# Patient Record
Sex: FEMALE | Race: WHITE | NOT HISPANIC OR LATINO | Employment: FULL TIME | URBAN - METROPOLITAN AREA
[De-identification: names, ages, dates, MRNs, and addresses within clinical notes are randomized per-mention and may not be internally consistent; named-entity substitution may affect disease eponyms.]

---

## 2017-07-19 ENCOUNTER — HOSPITAL ENCOUNTER (EMERGENCY)
Facility: MEDICAL CENTER | Age: 32
End: 2017-07-19
Attending: EMERGENCY MEDICINE
Payer: MEDICAID

## 2017-07-19 ENCOUNTER — APPOINTMENT (OUTPATIENT)
Dept: RADIOLOGY | Facility: MEDICAL CENTER | Age: 32
End: 2017-07-19
Attending: EMERGENCY MEDICINE
Payer: MEDICAID

## 2017-07-19 VITALS
HEIGHT: 63 IN | WEIGHT: 132.72 LBS | RESPIRATION RATE: 18 BRPM | TEMPERATURE: 98.5 F | DIASTOLIC BLOOD PRESSURE: 70 MMHG | SYSTOLIC BLOOD PRESSURE: 102 MMHG | OXYGEN SATURATION: 98 % | BODY MASS INDEX: 23.52 KG/M2 | HEART RATE: 62 BPM

## 2017-07-19 DIAGNOSIS — K59.00 CONSTIPATION, UNSPECIFIED CONSTIPATION TYPE: ICD-10-CM

## 2017-07-19 DIAGNOSIS — R10.84 GENERALIZED ABDOMINAL PAIN: ICD-10-CM

## 2017-07-19 DIAGNOSIS — R14.1 ABDOMINAL GAS PAIN: ICD-10-CM

## 2017-07-19 LAB
BASOPHILS # BLD AUTO: 0.3 % (ref 0–1.8)
BASOPHILS # BLD: 0.02 K/UL (ref 0–0.12)
EOSINOPHIL # BLD AUTO: 0.11 K/UL (ref 0–0.51)
EOSINOPHIL NFR BLD: 1.8 % (ref 0–6.9)
ERYTHROCYTE [DISTWIDTH] IN BLOOD BY AUTOMATED COUNT: 42.7 FL (ref 35.9–50)
HCT VFR BLD AUTO: 41.6 % (ref 37–47)
HGB BLD-MCNC: 14.5 G/DL (ref 12–16)
IMM GRANULOCYTES # BLD AUTO: 0.01 K/UL (ref 0–0.11)
IMM GRANULOCYTES NFR BLD AUTO: 0.2 % (ref 0–0.9)
LYMPHOCYTES # BLD AUTO: 2.9 K/UL (ref 1–4.8)
LYMPHOCYTES NFR BLD: 48.4 % (ref 22–41)
MCH RBC QN AUTO: 33.8 PG (ref 27–33)
MCHC RBC AUTO-ENTMCNC: 34.9 G/DL (ref 33.6–35)
MCV RBC AUTO: 97 FL (ref 81.4–97.8)
MONOCYTES # BLD AUTO: 0.36 K/UL (ref 0–0.85)
MONOCYTES NFR BLD AUTO: 6 % (ref 0–13.4)
NEUTROPHILS # BLD AUTO: 2.59 K/UL (ref 2–7.15)
NEUTROPHILS NFR BLD: 43.3 % (ref 44–72)
NRBC # BLD AUTO: 0 K/UL
NRBC BLD AUTO-RTO: 0 /100 WBC
PLATELET # BLD AUTO: 227 K/UL (ref 164–446)
PMV BLD AUTO: 8.7 FL (ref 9–12.9)
RBC # BLD AUTO: 4.29 M/UL (ref 4.2–5.4)
WBC # BLD AUTO: 6 K/UL (ref 4.8–10.8)

## 2017-07-19 PROCEDURE — 96374 THER/PROPH/DIAG INJ IV PUSH: CPT

## 2017-07-19 PROCEDURE — 74022 RADEX COMPL AQT ABD SERIES: CPT

## 2017-07-19 PROCEDURE — 700111 HCHG RX REV CODE 636 W/ 250 OVERRIDE (IP): Performed by: EMERGENCY MEDICINE

## 2017-07-19 PROCEDURE — 94760 N-INVAS EAR/PLS OXIMETRY 1: CPT

## 2017-07-19 PROCEDURE — 36415 COLL VENOUS BLD VENIPUNCTURE: CPT

## 2017-07-19 PROCEDURE — A9270 NON-COVERED ITEM OR SERVICE: HCPCS | Performed by: EMERGENCY MEDICINE

## 2017-07-19 PROCEDURE — 85025 COMPLETE CBC W/AUTO DIFF WBC: CPT

## 2017-07-19 PROCEDURE — 700102 HCHG RX REV CODE 250 W/ 637 OVERRIDE(OP): Performed by: EMERGENCY MEDICINE

## 2017-07-19 PROCEDURE — 99285 EMERGENCY DEPT VISIT HI MDM: CPT

## 2017-07-19 PROCEDURE — 700105 HCHG RX REV CODE 258: Performed by: EMERGENCY MEDICINE

## 2017-07-19 RX ORDER — BISACODYL 5 MG
10 TABLET, DELAYED RELEASE (ENTERIC COATED) ORAL ONCE
Status: COMPLETED | OUTPATIENT
Start: 2017-07-19 | End: 2017-07-19

## 2017-07-19 RX ORDER — SODIUM CHLORIDE 9 MG/ML
INJECTION, SOLUTION INTRAVENOUS CONTINUOUS
Status: DISCONTINUED | OUTPATIENT
Start: 2017-07-19 | End: 2017-07-20 | Stop reason: HOSPADM

## 2017-07-19 RX ORDER — KETOROLAC TROMETHAMINE 30 MG/ML
30 INJECTION, SOLUTION INTRAMUSCULAR; INTRAVENOUS ONCE
Status: COMPLETED | OUTPATIENT
Start: 2017-07-19 | End: 2017-07-19

## 2017-07-19 RX ADMIN — KETOROLAC TROMETHAMINE 30 MG: 30 INJECTION, SOLUTION INTRAMUSCULAR at 23:09

## 2017-07-19 RX ADMIN — MAGNESIUM HYDROXIDE 30 ML: 400 SUSPENSION ORAL at 23:25

## 2017-07-19 RX ADMIN — SODIUM CHLORIDE 1000 ML: 9 INJECTION, SOLUTION INTRAVENOUS at 23:11

## 2017-07-19 RX ADMIN — BISACODYL 10 MG: 5 TABLET, COATED ORAL at 23:25

## 2017-07-19 ASSESSMENT — PAIN SCALES - GENERAL: PAINLEVEL_OUTOF10: 7

## 2017-07-19 NOTE — ED AVS SNAPSHOT
Home Care Instructions                                                                                                                Marian Kent   MRN: [id number]    Department:  [facility], Emergency Dept   Date of Visit:  7/19/2017            [facility], Emergency Dept    [address] [place] [place] [address]    Phone:  [phone]      You were seen by     Rosario Graves D.O.      Your Diagnosis Was     Generalized abdominal pain     R10.84       These are the medications you received during your hospitalization from 07/19/2017 2009 to 07/19/2017 2326     Date/Time Order Dose Route Action    07/19/2017 2311 NS infusion 1,000 mL Intravenous New Bag    07/19/2017 2309 ketorolac (TORADOL) injection 30 mg 30 mg Intravenous Given    07/19/2017 2325 bisacodyl EC (DULCOLAX) tablet 10 mg 10 mg Oral Given    07/19/2017 2325 magnesium hydroxide (MILK OF MAGNESIA) suspension 30 mL 30 mL Oral Given      Follow-up Information     1. Follow up with [facility] Schedule an appointment as soon as possible for a visit in 2 days.    Specialty:  Family Medicine    Why:  As needed, If symptoms worsen    Contact information    [address] [place] [address]  [phone]        Medication Information     Review all of your home medications and newly ordered medications with your primary doctor and/or pharmacist as soon as possible. Follow medication instructions as directed by your doctor and/or pharmacist.     Please keep your complete medication list with you and share with your physician. Update the information when medications are discontinued, doses are changed, or new medications (including over-the-counter products) are added; and carry medication information at all times in the event of emergency situations.               Medication List      Notice     You have not been prescribed any medications.            Procedures and tests performed during  your visit     CBC WITH DIFFERENTIAL    DX-ABDOMEN COMPLETE WITH AP OR PA CXR        Discharge Instructions       Constipation, Adult  Constipation is when a person has fewer than three bowel movements a week, has difficulty having a bowel movement, or has stools that are dry, hard, or larger than normal. As people grow older, constipation is more common. A low-fiber diet, not taking in enough fluids, and taking certain medicines may make constipation worse.   CAUSES   · Certain medicines, such as antidepressants, pain medicine, iron supplements, antacids, and water pills.    · Certain diseases, such as diabetes, irritable bowel syndrome (IBS), thyroid disease, or depression.    · Not drinking enough water.    · Not eating enough fiber-rich foods.    · Stress or travel.    · Lack of physical activity or exercise.    · Ignoring the urge to have a bowel movement.    · Using laxatives too much.    SIGNS AND SYMPTOMS   · Having fewer than three bowel movements a week.    · Straining to have a bowel movement.    · Having stools that are hard, dry, or larger than normal.    · Feeling full or bloated.    · Pain in the lower abdomen.    · Not feeling relief after having a bowel movement.    DIAGNOSIS   Your health care provider will take a medical history and perform a physical exam. Further testing may be done for severe constipation. Some tests may include:  · A barium enema X-ray to examine your rectum, colon, and, sometimes, your small intestine.    · A sigmoidoscopy to examine your lower colon.    · A colonoscopy to examine your entire colon.  TREATMENT   Treatment will depend on the severity of your constipation and what is causing it. Some dietary treatments include drinking more fluids and eating more fiber-rich foods. Lifestyle treatments may include regular exercise. If these diet and lifestyle recommendations do not help, your health care provider may recommend taking over-the-counter laxative medicines to help  you have bowel movements. Prescription medicines may be prescribed if over-the-counter medicines do not work.   HOME CARE INSTRUCTIONS   · Eat foods that have a lot of fiber, such as fruits, vegetables, whole grains, and beans.  · Limit foods high in fat and processed sugars, such as french fries, hamburgers, cookies, candies, and soda.    · A fiber supplement may be added to your diet if you cannot get enough fiber from foods.    · Drink enough fluids to keep your urine clear or pale yellow.    · Exercise regularly or as directed by your health care provider.    · Go to the restroom when you have the urge to go. Do not hold it.    · Only take over-the-counter or prescription medicines as directed by your health care provider. Do not take other medicines for constipation without talking to your health care provider first.    SEEK IMMEDIATE MEDICAL CARE IF:   · You have bright red blood in your stool.    · Your constipation lasts for more than 4 days or gets worse.    · You have abdominal or rectal pain.    · You have thin, pencil-like stools.    · You have unexplained weight loss.  MAKE SURE YOU:   · Understand these instructions.  · Will watch your condition.  · Will get help right away if you are not doing well or get worse.     This information is not intended to replace advice given to you by your health care provider. Make sure you discuss any questions you have with your health care provider.     Document Released: 09/15/2005 Document Revised: 01/08/2016 Document Reviewed: 09/29/2014  CareKinesis Interactive Patient Education ©2016 [employer].    Fiber Content in Foods  Drinking plenty of fluids and consuming foods high in fiber can help with constipation. See the list below for the fiber content of some common foods.  Starches and Grains / Dietary Fiber (g)  · Cheerios, 1 cup / 3 g  · Jose J's Corn Flakes, 1 cup / 0.7 g  · Rice Krispies, 1 ¼ cup / 0.3 g  · Episcopal Oat Life Cereal, ¾ cup / 2.1 g  · Oatmeal,  instant (cooked), ½ cup / 2 g  · Jose J's Frosted Mini Wheats, 1 cup / 5.1 g  · Rice, brown, long-grain (cooked), 1 cup / 3.5 g  · Rice, white, long-grain (cooked), 1 cup / 0.6 g  · Macaroni, cooked, enriched, 1 cup / 2.5 g  Legumes / Dietary Fiber (g)  · Beans, baked, canned, plain or vegetarian, ½ cup / 5.2 g  · Beans, kidney, canned, ½ cup / 6.8 g  · Beans, gutierrez, dried (cooked), ½ cup / 7.7 g  · Beans, gutierrez, canned, ½ cup / 5.5 g  Breads and Crackers / Dietary Fiber (g)  · Adi crackers, plain or honey, 2 squares / 0.7 g  · Saltine crackers, 3 squares / 0.3 g  · Pretzels, plain, salted, 10 pieces / 1.8 g  · Bread, whole-wheat, 1 slice / 1.9 g  · Bread, white, 1 slice / 0.7 g  · Bread, raisin, 1 slice / 1.2 g  · Bagel, plain, 3 oz / 2 g  · Tortilla, flour, 1 oz / 0.9 g  · Tortilla, corn, 1 small / 1.5 g  · Bun, hamburger or hotdog, 1 small / 0.9 g  Fruits / Dietary Fiber (g)  · Apple, raw with skin, 1 medium / 4.4 g  · Applesauce, sweetened, ½ cup / 1.5 g  · Banana, ½ medium / 1.5 g  · Grapes, 10 grapes / 0.4 g  · Orange, 1 small / 2.3 g  · Raisin, 1.5 oz / 1.6 g  · Melon, 1 cup / 1.4 g  Vegetables / Dietary Fiber (g)  · Green beans, canned, ½ cup / 1.3 g  · Carrots (cooked), ½ cup / 2.3 g  · Broccoli (cooked), ½ cup / 2.8 g  · Peas, frozen (cooked), ½ cup / 4.4 g  · Potatoes, mashed, ½ cup / 1.6 g  · Lettuce, 1 cup / 0.5 g  · Corn, canned, ½ cup / 1.6 g  · Tomato, ½ cup / 1.1 g  Document Released: 05/05/2008 Document Revised: 03/11/2013 Document Reviewed: 06/30/2008  ExitCare® Patient Information ©2014 [employer].      Increase water and raw fiber in her diet i.e. bran cereal, brown muffins, Abdiaziz stools  If you do not feel remarkably better after the 1st dose of laxative she may need to take a 2nd dose.  Return to the ER if fever greater than 101, persistent vomiting, uncontrolled pain          Patient Information     Patient Information    Following emergency treatment: all patient requiring follow-up  care must return either to a private physician or a clinic if your condition worsens before you are able to obtain further medical attention, please return to the emergency room.     Billing Information    At [facility], we work to make the billing process streamlined for our patients.  Our Representatives are here to answer any questions you may have regarding your hospital bill.  If you have insurance coverage and have supplied your insurance information to us, we will submit a claim to your insurer on your behalf.  Should you have any questions regarding your bill, we can be reached online or by phone as follows:  Online: You are able pay your bills online or live chat with our representatives about any billing questions you may have. We are here to help Monday - Friday from 8:00am to 7:30pm and 9:00am - 12:00pm on Saturdays.  Please visit [email or web address]  for more information.   Phone:  [phone] or [phone]    Please note that your emergency physician, surgeon, pathologist, radiologist, anesthesiologist, and other specialists are not employed by [facility] and will therefore bill separately for their services.  Please contact them directly for any questions concerning their bills at the numbers below:     Emergency Physician Services:  [phone]  [facility]:  [phone]  [facility]:  [phone]  [facility]:  [phone]    1. Your final bill may vary from the amount quoted upon discharge if all procedures are not complete at that time, or if your doctor has additional procedures of which we are not aware. You will receive an additional bill if you return to the Emergency Department at [facility] for suture removal regardless of the facility of which the sutures were placed.     2. Please arrange for settlement of this account at the emergency registration.    3. All self-pay accounts are due in full at  the time of treatment.  If you are unable to meet this obligation then payment is expected within 4-5 days.     4. If you have had radiology studies (CT, X-ray, Ultrasound, MRI), you have received a preliminary result during your emergency department visit. Please contact the radiology department [phone] to receive a copy of your final result. Please discuss the Final result with your primary physician or with the follow up physician provided.     Crisis Hotline:  [place] Crisis Hotline:  5-655-JXHIYMD or 1-880.246.5357  [place] Crisis Hotline:    1-895.912.1662 or 470-722-6140         ED Discharge Follow Up Questions    1. In order to provide you with very good care, we would like to follow up with a phone call in the next few days.  May we have your permission to contact you?     YES /  NO    2. What is the best phone number to call you? (       )_____-__________    3. What is the best time to call you?      Morning  /  Afternoon  /  Evening                   Patient Signature:  ____________________________________________________________    Date:  ____________________________________________________________

## 2017-07-19 NOTE — ED AVS SNAPSHOT
Clicktivated Access Code: [email or web address]  Expires: 8/18/2017 11:25 PM    Clicktivated  A secure, online tool to manage your health information     [employer]’s Clicktivated® is a secure, online tool that connects you to your personalized health information from the privacy of your home -- day or night - making it very easy for you to manage your healthcare. Once the activation process is completed, you can even access your medical information using the Clicktivated sinai, which is available for free in the Apple Sinai store or Google Play store.     Clicktivated provides the following levels of access (as shown below):   My Chart Features   [facility] Primary Care Doctor [facility]  Specialists [facility]  Urgent  Care Non-[facility]  Primary Care  Doctor   Email your healthcare team securely and privately 24/7 X X X X   Manage appointments: schedule your next appointment; view details of past/upcoming appointments X      Request prescription refills. X      View recent personal medical records, including lab and immunizations X X X X   View health record, including health history, allergies, medications X X X X   Read reports about your outpatient visits, procedures, consult and ER notes X X X X   See your discharge summary, which is a recap of your hospital and/or ER visit that includes your diagnosis, lab results, and care plan. X X       How to register for Clicktivated:  1. Go to  [email or web address].  2. Click on the Sign Up Now box, which takes you to the New Member Sign Up page. You will need to provide the following information:  a. Enter your Clicktivated Access Code exactly as it appears at the top of this page. (You will not need to use this code after you’ve completed the sign-up process. If you do not sign up before the expiration date, you must request a new code.)   b. Enter your date of birth.   c. Enter your home email address.   d. Click Submit, and follow the next screen’s instructions.  3. Create a Clicktivated ID. This will be your MOVE Guidest  login ID and cannot be changed, so think of one that is secure and easy to remember.  4. Create a Courtanet password. You can change your password at any time.  5. Enter your Password Reset Question and Answer. This can be used at a later time if you forget your password.   6. Enter your e-mail address. This allows you to receive e-mail notifications when new information is available in Courtanet.  7. Click Sign Up. You can now view your health information.    For assistance activating your Courtanet account, call [phone]

## 2017-07-19 NOTE — ED AVS SNAPSHOT
7/19/2017    Marian Kent  [address] [address]    Dear [place]:    [facility] wants to ensure your discharge home is safe and you or your loved ones have had all of your questions answered regarding your care after you leave the hospital.    Below is a list of resources and contact information should you have any questions regarding your hospital stay, follow-up instructions, or active medical symptoms.    Questions or Concerns Regarding… Contact   Medical Questions Related to Your Discharge  (7 days a week, 8am-5pm) Contact a Nurse Care Coordinator   [phone]   Medical Questions Not Related to Your Discharge  (24 hours a day / 7 days a week)  Contact the Nurse Health Line   [phone]    Medications or Discharge Instructions Refer to your discharge packet   or contact your [facility] Primary Care Provider   [phone]   Follow-up Appointment(s) Schedule your appointment via Trendient   or contact Scheduling [phone]   Billing Review your statement via Trendient  or contact Billing [phone]   Medical Records Review your records via Trendient   or contact Medical Records [phone]     You may receive a telephone call within two days of discharge. This call is to make certain you understand your discharge instructions and have the opportunity to have any questions answered. You can also easily access your medical information, test results and upcoming appointments via the Trendient free online health management tool. You can learn more and sign up at Kirax/Trendient. For assistance setting up your Trendient account, please call [phone].    Once again, we want to ensure your discharge home is safe and that you have a clear understanding of any next steps in your care. If you have any questions or concerns, please do not hesitate to contact us, we are here for you. Thank you for choosing [facility] for your healthcare needs.    Sincerely,    Your [facility] Healthcare Team

## 2017-07-20 NOTE — DISCHARGE INSTRUCTIONS
Constipation, Adult  Constipation is when a person has fewer than three bowel movements a week, has difficulty having a bowel movement, or has stools that are dry, hard, or larger than normal. As people grow older, constipation is more common. A low-fiber diet, not taking in enough fluids, and taking certain medicines may make constipation worse.   CAUSES   · Certain medicines, such as antidepressants, pain medicine, iron supplements, antacids, and water pills.    · Certain diseases, such as diabetes, irritable bowel syndrome (IBS), thyroid disease, or depression.    · Not drinking enough water.    · Not eating enough fiber-rich foods.    · Stress or travel.    · Lack of physical activity or exercise.    · Ignoring the urge to have a bowel movement.    · Using laxatives too much.    SIGNS AND SYMPTOMS   · Having fewer than three bowel movements a week.    · Straining to have a bowel movement.    · Having stools that are hard, dry, or larger than normal.    · Feeling full or bloated.    · Pain in the lower abdomen.    · Not feeling relief after having a bowel movement.    DIAGNOSIS   Your health care provider will take a medical history and perform a physical exam. Further testing may be done for severe constipation. Some tests may include:  · A barium enema X-ray to examine your rectum, colon, and, sometimes, your small intestine.    · A sigmoidoscopy to examine your lower colon.    · A colonoscopy to examine your entire colon.  TREATMENT   Treatment will depend on the severity of your constipation and what is causing it. Some dietary treatments include drinking more fluids and eating more fiber-rich foods. Lifestyle treatments may include regular exercise. If these diet and lifestyle recommendations do not help, your health care provider may recommend taking over-the-counter laxative medicines to help you have bowel movements. Prescription medicines may be prescribed if over-the-counter medicines do not work.    HOME CARE INSTRUCTIONS   · Eat foods that have a lot of fiber, such as fruits, vegetables, whole grains, and beans.  · Limit foods high in fat and processed sugars, such as french fries, hamburgers, cookies, candies, and soda.    · A fiber supplement may be added to your diet if you cannot get enough fiber from foods.    · Drink enough fluids to keep your urine clear or pale yellow.    · Exercise regularly or as directed by your health care provider.    · Go to the restroom when you have the urge to go. Do not hold it.    · Only take over-the-counter or prescription medicines as directed by your health care provider. Do not take other medicines for constipation without talking to your health care provider first.    SEEK IMMEDIATE MEDICAL CARE IF:   · You have bright red blood in your stool.    · Your constipation lasts for more than 4 days or gets worse.    · You have abdominal or rectal pain.    · You have thin, pencil-like stools.    · You have unexplained weight loss.  MAKE SURE YOU:   · Understand these instructions.  · Will watch your condition.  · Will get help right away if you are not doing well or get worse.     This information is not intended to replace advice given to you by your health care provider. Make sure you discuss any questions you have with your health care provider.     Document Released: 09/15/2005 Document Revised: 01/08/2016 Document Reviewed: 09/29/2014  Teamer.net Interactive Patient Education ©2016 [employer].    Fiber Content in Foods  Drinking plenty of fluids and consuming foods high in fiber can help with constipation. See the list below for the fiber content of some common foods.  Starches and Grains / Dietary Fiber (g)  · Cheerios, 1 cup / 3 g  · Titus's Corn Flakes, 1 cup / 0.7 g  · Rice Krispies, 1 ¼ cup / 0.3 g  · Religious Oat Life Cereal, ¾ cup / 2.1 g  · Oatmeal, instant (cooked), ½ cup / 2 g  · Jose J's Frosted Mini Wheats, 1 cup / 5.1 g  · Rice, brown, long-grain  (cooked), 1 cup / 3.5 g  · Rice, white, long-grain (cooked), 1 cup / 0.6 g  · Macaroni, cooked, enriched, 1 cup / 2.5 g  Legumes / Dietary Fiber (g)  · Beans, baked, canned, plain or vegetarian, ½ cup / 5.2 g  · Beans, kidney, canned, ½ cup / 6.8 g  · Beans, gutierrez, dried (cooked), ½ cup / 7.7 g  · Beans, gutierrez, canned, ½ cup / 5.5 g  Breads and Crackers / Dietary Fiber (g)  · Adi crackers, plain or honey, 2 squares / 0.7 g  · Saltine crackers, 3 squares / 0.3 g  · Pretzels, plain, salted, 10 pieces / 1.8 g  · Bread, whole-wheat, 1 slice / 1.9 g  · Bread, white, 1 slice / 0.7 g  · Bread, raisin, 1 slice / 1.2 g  · Bagel, plain, 3 oz / 2 g  · Tortilla, flour, 1 oz / 0.9 g  · Tortilla, corn, 1 small / 1.5 g  · Bun, hamburger or hotdog, 1 small / 0.9 g  Fruits / Dietary Fiber (g)  · Apple, raw with skin, 1 medium / 4.4 g  · Applesauce, sweetened, ½ cup / 1.5 g  · Banana, ½ medium / 1.5 g  · Grapes, 10 grapes / 0.4 g  · Orange, 1 small / 2.3 g  · Raisin, 1.5 oz / 1.6 g  · Melon, 1 cup / 1.4 g  Vegetables / Dietary Fiber (g)  · Green beans, canned, ½ cup / 1.3 g  · Carrots (cooked), ½ cup / 2.3 g  · Broccoli (cooked), ½ cup / 2.8 g  · Peas, frozen (cooked), ½ cup / 4.4 g  · Potatoes, mashed, ½ cup / 1.6 g  · Lettuce, 1 cup / 0.5 g  · Corn, canned, ½ cup / 1.6 g  · Tomato, ½ cup / 1.1 g  Document Released: 05/05/2008 Document Revised: 03/11/2013 Document Reviewed: 06/30/2008  ExitCare® Patient Information ©2014 [employer].      Increase water and raw fiber in her diet i.e. bran cereal, brown muffins, Abdiaziz stools  If you do not feel remarkably better after the 1st dose of laxative she may need to take a 2nd dose.  Return to the ER if fever greater than 101, persistent vomiting, uncontrolled pain

## 2017-07-20 NOTE — ED NOTES
"Pt presents to the ER stating that she has had abd pain x 1 month that got worse today, pt describes it like she \"is going into labor.\" Pt denies any n/v. Pt states she had a partial hysterectomy about 1 year ago and has intermittent pain since. Pt also c/o \"burning/itching down there in my privates\" Pt states that she is \"unable to go number one.\" Pt placed into a gown.   "

## 2017-07-20 NOTE — ED PROVIDER NOTES
ED Provider Note    CHIEF COMPLAINT  Chief Complaint   Patient presents with   • Abdominal Pain     X 1 month increasing in severity   • Vaginal Pain     denies any bleeding       HPI  Marian Kent is a 31 y.o. female who presents tonight with family members with a chief complaint of generalized abdominal pain off and on for the last month which has increased in severity since 5:00 this morning. She denies any vaginal discharges, vaginal bleeding. She's had a upper scopic hysterectomy done and has had multiple surgeries up to 10 for endometriosis. She denies any nausea, vomiting, fever, chills, dysuria, hematuria or flank pain. She states the pain seems to come and go and at 5 this morning it had doubled her over. She describes it as sharp and cramping in nature.    REVIEW OF SYSTEMS  See HPI for further details. All other system reviews are negative.    PAST MEDICAL HISTORY  Past Medical History   Diagnosis Date   • EP (ectopic pregnancy)    • Placenta previa diagnosed with US at [facility] 2013   • Hydronephrosis right 1/3/2014   • Pain 2016     pelvic, 7/10       FAMILY HISTORY  Family History   Problem Relation Age of Onset   • Cancer Maternal Grandmother    • Other Maternal Grandfather        SOCIAL HISTORY  Social History     Social History   • Marital Status:      Spouse Name: N/A   • Number of Children: N/A   • Years of Education: N/A     Social History Main Topics   • Smoking status: Current Every Day Smoker -- 0.50 packs/day for .1 years     Types: Cigarettes   • Smokeless tobacco: Never Used   • Alcohol Use: No   • Drug Use: No   • Sexual Activity:     Partners: Male     Birth Control/ Protection: Pill     Other Topics Concern   • None     Social History Narrative    ** Merged History Encounter **            SURGICAL HISTORY  Past Surgical History   Procedure Laterality Date   • Gyn surgery        x 2   • [facility]  delivery ser   &    • Pelviscopy   "1/23/2011     Performed by STEVEN MARIANO at SURGERY [facility]   • Salpingectomy  1/23/2011     Performed by STEVEN MARIANO at SURGERY [facility]   • Primary c section  6/19/2006   • Repeat c section  11/9/2007   • Repeat c section  5/23/2014     Performed by Steven Mariano M.D. at LABOR AND DELIVERY   • Pelviscopy N/A 5/20/2016     Procedure: PELVISCOPY Diagnostic;  Surgeon: Steven Mariano M.D.;  Location: SURGERY [facility];  Service:    • Hysterectomy laparoscopy  8/8/2016     Procedure: HYSTERECTOMY LAPAROSCOPY RIGHT SALPINGECTOMY;  Surgeon: Steven Mariano M.D.;  Location: SURGERY SAME DAY [facility];  Service:    • Cystoscopy  8/8/2016     Procedure: CYSTOSCOPY;  Surgeon: Steven Mariano M.D.;  Location: SURGERY SAME DAY [facility];  Service:        CURRENT MEDICATIONS  See nurse's note    ALLERGIES  No Known Allergies    PHYSICAL EXAM  VITAL SIGNS: /70 mmHg  Pulse 76  Temp(Src) 36.9 °C (98.5 °F)  Resp 18  Ht 1.6 m (5' 2.99\")  Wt 60.2 kg (132 lb 11.5 oz)  BMI 23.52 kg/m2  LMP 06/25/2013    Constitutional: Patient is well developed, well nourished in mild distress on exam. She is very tearful.  HENT: Normocephalic, atraumatic, Oropharynx moist , nose normal with no drainage.   Eyes: PERRL, EOMI, Conjunctiva without erythema.  Neck: Supple with  Normal range of motion in flexion, extension and lateral rotation. No tenderness along the bony prominences or paraspinal muscles.  Cardiovascular: Normal heart rate and rhythm. No murmur.  Thorax & Lungs: Clear and equal breath sounds with good excursion. No respiratory distress.  Abdomen: Bowel sounds hyperactive in all 4 quadrants. She has no rebound, guarding, flank tenderness, masses.  Skin: Warm, Dry, No rashes.   Back: No cervical, thoracic, or lumbosacral tenderness.  Extremities: Peripheral pulses 4/4  No tenderness  Musculoskeletal: Normal range of motion in all major joints. No tenderness to palpation.  Neurologic: " Alert & oriented x 3, Normal motor function, Normal sensory function.  Psychiatric: Affect odd, Judgment normal, Mood tearful.      RADIOLOGY/PROCEDURES  DX-ABDOMEN COMPLETE WITH AP OR PA CXR   Final Result      1.  No pneumonia or pneumothorax.   2.  No evidence of bowel obstruction.            COURSE & MEDICAL DECISION MAKING  Pertinent Labs & Imaging studies reviewed. (See chart for details)  Laboratories were drawn showing a normal white blood cell count with a stable H&H. There were no gross abnormalities noted. Three-view abdomen shows increased stool and bowel gas with no obstructive bowel gas patterns. Patient was given Dulcolax and milk of magnesia. She will be discharged home to increase water and fiber in her diet, rest and return if elevated fever, persistent vomiting or uncontrolled pain. She is follow up with her primary care of either within the week for recheck if symptoms persist.    FINAL IMPRESSION  1. Generalized abdominal pain  2. Constipation  3. Abdominal gas pains         Electronically signed by: Rosario Graves, 7/19/2017 11:24 PM

## 2017-07-20 NOTE — ED NOTES
"Urine collection device given to pt. Pt states,\"Im haven't been able to pee.\" when asked when last urination pt states this afternoon.  "

## 2017-07-20 NOTE — ED NOTES
"Chief Complaint   Patient presents with   • Abdominal Pain     X 1 month increasing in severity   • Vaginal Pain     denies any bleeding     Pt amb to triage with above complaint. Pt states,\" i know im dehydrated too but i just cant drink anything.\" pt denies n/v at this time. Pt states she had a hysterectomy approx 1 year ago and has had pain intermittently since procedure.   "

## 2017-09-09 ENCOUNTER — HOSPITAL ENCOUNTER (EMERGENCY)
Facility: MEDICAL CENTER | Age: 32
End: 2017-09-09
Attending: EMERGENCY MEDICINE
Payer: MEDICAID

## 2017-09-09 VITALS
HEIGHT: 62 IN | WEIGHT: 134.7 LBS | DIASTOLIC BLOOD PRESSURE: 82 MMHG | OXYGEN SATURATION: 97 % | SYSTOLIC BLOOD PRESSURE: 102 MMHG | TEMPERATURE: 97.2 F | RESPIRATION RATE: 14 BRPM | HEART RATE: 70 BPM | BODY MASS INDEX: 24.79 KG/M2

## 2017-09-09 DIAGNOSIS — G89.29 CHRONIC ABDOMINAL PAIN: ICD-10-CM

## 2017-09-09 DIAGNOSIS — R10.9 CHRONIC ABDOMINAL PAIN: ICD-10-CM

## 2017-09-09 DIAGNOSIS — Z72.0 TOBACCO ABUSE: ICD-10-CM

## 2017-09-09 LAB
ALBUMIN SERPL BCP-MCNC: 4.3 G/DL (ref 3.2–4.9)
ALBUMIN/GLOB SERPL: 1.7 G/DL
ALP SERPL-CCNC: 53 U/L (ref 30–99)
ALT SERPL-CCNC: 17 U/L (ref 2–50)
ANION GAP SERPL CALC-SCNC: 11 MMOL/L (ref 0–11.9)
AST SERPL-CCNC: 18 U/L (ref 12–45)
BASOPHILS # BLD AUTO: 0.4 % (ref 0–1.8)
BASOPHILS # BLD: 0.02 K/UL (ref 0–0.12)
BILIRUB SERPL-MCNC: 0.3 MG/DL (ref 0.1–1.5)
BUN SERPL-MCNC: 9 MG/DL (ref 8–22)
CALCIUM SERPL-MCNC: 9.4 MG/DL (ref 8.5–10.5)
CHLORIDE SERPL-SCNC: 110 MMOL/L (ref 96–112)
CO2 SERPL-SCNC: 20 MMOL/L (ref 20–33)
CREAT SERPL-MCNC: 0.65 MG/DL (ref 0.5–1.4)
EOSINOPHIL # BLD AUTO: 0.03 K/UL (ref 0–0.51)
EOSINOPHIL NFR BLD: 0.6 % (ref 0–6.9)
ERYTHROCYTE [DISTWIDTH] IN BLOOD BY AUTOMATED COUNT: 43.1 FL (ref 35.9–50)
GFR SERPL CREATININE-BSD FRML MDRD: >60 ML/MIN/1.73 M 2
GLOBULIN SER CALC-MCNC: 2.6 G/DL (ref 1.9–3.5)
GLUCOSE SERPL-MCNC: 103 MG/DL (ref 65–99)
HCT VFR BLD AUTO: 39.9 % (ref 37–47)
HGB BLD-MCNC: 13.9 G/DL (ref 12–16)
IMM GRANULOCYTES # BLD AUTO: 0.01 K/UL (ref 0–0.11)
IMM GRANULOCYTES NFR BLD AUTO: 0.2 % (ref 0–0.9)
LIPASE SERPL-CCNC: 39 U/L (ref 11–82)
LYMPHOCYTES # BLD AUTO: 1.92 K/UL (ref 1–4.8)
LYMPHOCYTES NFR BLD: 38.1 % (ref 22–41)
MCH RBC QN AUTO: 34.4 PG (ref 27–33)
MCHC RBC AUTO-ENTMCNC: 34.8 G/DL (ref 33.6–35)
MCV RBC AUTO: 98.8 FL (ref 81.4–97.8)
MONOCYTES # BLD AUTO: 0.29 K/UL (ref 0–0.85)
MONOCYTES NFR BLD AUTO: 5.8 % (ref 0–13.4)
NEUTROPHILS # BLD AUTO: 2.77 K/UL (ref 2–7.15)
NEUTROPHILS NFR BLD: 54.9 % (ref 44–72)
NRBC # BLD AUTO: 0 K/UL
NRBC BLD AUTO-RTO: 0 /100 WBC
PLATELET # BLD AUTO: 207 K/UL (ref 164–446)
PMV BLD AUTO: 8.8 FL (ref 9–12.9)
POTASSIUM SERPL-SCNC: 3.8 MMOL/L (ref 3.6–5.5)
PROT SERPL-MCNC: 6.9 G/DL (ref 6–8.2)
RBC # BLD AUTO: 4.04 M/UL (ref 4.2–5.4)
SODIUM SERPL-SCNC: 141 MMOL/L (ref 135–145)
WBC # BLD AUTO: 5 K/UL (ref 4.8–10.8)

## 2017-09-09 PROCEDURE — 700105 HCHG RX REV CODE 258: Performed by: EMERGENCY MEDICINE

## 2017-09-09 PROCEDURE — 83690 ASSAY OF LIPASE: CPT

## 2017-09-09 PROCEDURE — 80053 COMPREHEN METABOLIC PANEL: CPT

## 2017-09-09 PROCEDURE — 99285 EMERGENCY DEPT VISIT HI MDM: CPT

## 2017-09-09 PROCEDURE — 96375 TX/PRO/DX INJ NEW DRUG ADDON: CPT

## 2017-09-09 PROCEDURE — 700111 HCHG RX REV CODE 636 W/ 250 OVERRIDE (IP): Performed by: EMERGENCY MEDICINE

## 2017-09-09 PROCEDURE — 36415 COLL VENOUS BLD VENIPUNCTURE: CPT

## 2017-09-09 PROCEDURE — 96374 THER/PROPH/DIAG INJ IV PUSH: CPT

## 2017-09-09 PROCEDURE — 85025 COMPLETE CBC W/AUTO DIFF WBC: CPT

## 2017-09-09 RX ORDER — SODIUM CHLORIDE 9 MG/ML
1000 INJECTION, SOLUTION INTRAVENOUS ONCE
Status: COMPLETED | OUTPATIENT
Start: 2017-09-09 | End: 2017-09-09

## 2017-09-09 RX ORDER — ONDANSETRON 2 MG/ML
4 INJECTION INTRAMUSCULAR; INTRAVENOUS ONCE
Status: COMPLETED | OUTPATIENT
Start: 2017-09-09 | End: 2017-09-09

## 2017-09-09 RX ORDER — HYDROCODONE BITARTRATE AND ACETAMINOPHEN 10; 325 MG/1; MG/1
1-2 TABLET ORAL EVERY 6 HOURS PRN
COMMUNITY
End: 2018-06-22

## 2017-09-09 RX ORDER — KETOROLAC TROMETHAMINE 30 MG/ML
60 INJECTION, SOLUTION INTRAMUSCULAR; INTRAVENOUS ONCE
Status: DISCONTINUED | OUTPATIENT
Start: 2017-09-09 | End: 2017-09-09

## 2017-09-09 RX ADMIN — SODIUM CHLORIDE 1000 ML: 9 INJECTION, SOLUTION INTRAVENOUS at 15:30

## 2017-09-09 RX ADMIN — HYDROMORPHONE HYDROCHLORIDE 1 MG: 1 INJECTION, SOLUTION INTRAMUSCULAR; INTRAVENOUS; SUBCUTANEOUS at 15:29

## 2017-09-09 RX ADMIN — ONDANSETRON 4 MG: 2 INJECTION INTRAMUSCULAR; INTRAVENOUS at 15:28

## 2017-09-09 NOTE — DISCHARGE INSTRUCTIONS
Follow-up with the gastroenterologist on Thursday as planned. Also follow-up with Dr. Price as planned and continue seeing your pain management and primary care doctors. Drink lots of fluids and follow a healthy diet. You may return here if you need emergency medical care.

## 2017-09-09 NOTE — ED NOTES
"Ambulates to triage  Chief Complaint   Patient presents with   • Vomiting     started throwing up blood today   • LLQ Pain     started this morning     Denies any painful urination, no blood in her urine, denies any diarrhea. \"I know I'm dehydrated, I can't keep anything down.\"  "

## 2017-09-09 NOTE — LETTER
Emergency Services     September 9, 2017    Patient: Marian Kent   YOB: 1985   Date of Visit: 9/9/2017       To Whom It May Concern:    Marian Kent was seen and treated in our emergency department on 9/9/2017.   She may return 9/12/17.    Sincerely,     CARLOS DE LOS SANTOS M.D.  [facility], EMERGENCY DEPT  Dept: [phone]

## 2017-09-09 NOTE — ED NOTES
Pt iv removed catheter tip intact. Pt has follow up with pcp/pain management dr next week. Also with GI. Pt to call and make follow up with gyn. Continue taking medications as prescribed.

## 2017-09-09 NOTE — ED NOTES
Blood drawn and sent to lab.   Patient given urine specimen supplies.   Patient to lobby and instructed to inform staff of any needs.

## 2017-09-09 NOTE — ED NOTES
Pt reports having chronic adhesions and cysts. States she has a pain management Dr. And a appointment with GI.

## 2017-09-10 NOTE — ED PROVIDER NOTES
ED Provider Note    CHIEF COMPLAINT  Chief Complaint   Patient presents with   • Vomiting     started throwing up blood today   • LLQ Pain     started this morning       HPI  Marian Kent is a 31 y.o. female who presentsTo the emergency department complaining of continuing chronic lower abdominal pain. The patient says she has a long history of abdominal pain which has been attributed to adhesions and cysts in the past she has had multiple evaluations and currently sees a pain management doctor. The patient also tells me that she's had some vomiting recently and noticed a little bit of blood in the emesis a couple of days ago and has an appointment with a gastroenterologist on Thursday but she doesn't actually remember the doctor's name. Finally the patient says she has a history with Dr. Price on the 30th this month for gynecology care. The patient has had exactly this pain for a very long time and it doesn't seem new  just seems worse than usual. The patient has also had some vomiting and feels that she is dehydrated. The patient says the pain is bilateral and laterally just medial to the superior anterior iliac spine on both sides.    REVIEW OF SYSTEMS no black or bloody stool, no chest pain or difficulty breathing O upper abdominal pain.    PAST MEDICAL HISTORY  Past Medical History:   Diagnosis Date   • Pain 05-    pelvic, 7/10   • Hydronephrosis right 1/3/2014   • Placenta previa diagnosed with US at [facility] 12/17/2013   • EP (ectopic pregnancy)        FAMILY HISTORY  Family History   Problem Relation Age of Onset   • Cancer Maternal Grandmother    • Other Maternal Grandfather        SOCIAL HISTORY  Social History     Social History   • Marital status:      Spouse name: N/A   • Number of children: N/A   • Years of education: N/A     Social History Main Topics   • Smoking status: Current Every Day Smoker     Packs/day: 0.50     Years: 0.10     Types: Cigarettes   • Smokeless tobacco:  "Never Used   • Alcohol use No   • Drug use: No   • Sexual activity: Not Currently     Partners: Male     Birth control/ protection: Pill     Other Topics Concern   • Not on file     Social History Narrative    ** Merged History Encounter **            SURGICAL HISTORY  Past Surgical History:   Procedure Laterality Date   • HYSTERECTOMY LAPAROSCOPY  2016    Procedure: HYSTERECTOMY LAPAROSCOPY RIGHT SALPINGECTOMY;  Surgeon: Mark Mariano M.D.;  Location: SURGERY SAME DAY [facility];  Service:    • CYSTOSCOPY  2016    Procedure: CYSTOSCOPY;  Surgeon: Mark Mariano M.D.;  Location: SURGERY SAME DAY [facility];  Service:    • PELVISCOPY N/A 2016    Procedure: PELVISCOPY Diagnostic;  Surgeon: Mark Mariano M.D.;  Location: SURGERY [facility];  Service:    • REPEAT C SECTION  2014    Performed by Mark Mariano M.D. at LABOR AND DELIVERY   • PELVISCOPY  2011    Performed by MARK MARIANO at SURGERY [facility]   • SALPINGECTOMY  2011    Performed by MARK MARIANO at SURGERY [facility]   • REPEAT C SECTION  2007   • PRIMARY C SECTION  2006   • GYN SURGERY       x 2   • [facility]  DELIVERY SER   &        CURRENT MEDICATIONS  Home Medications     Reviewed by Clarisa Joseph R.N. (Registered Nurse) on 17 at 1358  Med List Status: Complete   Medication Last Dose Status   hydrocodone/acetaminophen (NORCO)  MG Tab 2017 Active                ALLERGIES  No Known Allergies    PHYSICAL EXAM  VITAL SIGNS: /82   Pulse 70   Temp 36.2 °C (97.2 °F) (Temporal)   Resp 14   Ht 1.575 m (5' 2\")   Wt 61.1 kg (134 lb 11.2 oz)   LMP 2016 (Exact Date)   SpO2 97%   BMI 24.64 kg/m²    Oxygen saturation is interpreted asAdequate  Constitutional: Awake verbal and nontoxic  HENT: Mucous membranes are moist  Eyes: No erythema or discharge or jaundice  Neck: Trachea midline no JVD  Cardiovascular: Regular rate and " rhythm  Lungs: Clear and equal bilaterally with no apparent difficulty breathing  Abdomen/Back: Soft nondistended with normoactive bowel sounds and completely nontender in the upper abdomen and mid abdomen although she does complain of pain laterally as described above  Skin: Warm and dry  Musculoskeletal: No acute bony deformity  Neurologic: Awake verbal and moving all extremities    Laboratory  A CBC shows white blood count of 5.0, complete metabolic panel and lipase are unremarkable.    MEDICAL DECISION MAKING and DISPOSITION  The patient has had multiple evaluations for chronic abdominal pain I do not think that she needs emergent imaging at this time. In generally showed swell and her labs and vital signs are unremarkable. In the emergency department the patient was given Dilaudid and Zofran and fluids. She had no vomiting or diarrhea while in the emergency department. I reviewed all the findings with the patient and I think it'll be safe for her to go home I have encouraged her to continue working with her pain management doctor and to see the gastroenterologist and gynecologist as planned. If she feels she needs emergency medical care or has new or worsening symptoms she is to return here for recheck    IMPRESSION  1. Chronic abdominal pain  2. Tobacco abuse      Electronically signed by: Tone Cruz, 9/10/2017 2:31 PM

## 2017-12-08 ENCOUNTER — APPOINTMENT (OUTPATIENT)
Dept: ADMISSIONS | Facility: MEDICAL CENTER | Age: 32
End: 2017-12-08
Attending: SPECIALIST
Payer: MEDICAID

## 2017-12-08 RX ORDER — ALPRAZOLAM 1 MG/1
1 TABLET ORAL NIGHTLY PRN
COMMUNITY
End: 2018-06-22

## 2017-12-08 RX ORDER — DULOXETIN HYDROCHLORIDE 30 MG/1
30 CAPSULE, DELAYED RELEASE ORAL EVERY EVENING
COMMUNITY
End: 2019-03-26

## 2017-12-09 ENCOUNTER — HOSPITAL ENCOUNTER (OUTPATIENT)
Facility: MEDICAL CENTER | Age: 32
End: 2017-12-10
Attending: SPECIALIST | Admitting: SPECIALIST
Payer: MEDICAID

## 2017-12-09 PROCEDURE — G0378 HOSPITAL OBSERVATION PER HR: HCPCS

## 2017-12-09 PROCEDURE — A9270 NON-COVERED ITEM OR SERVICE: HCPCS | Performed by: SPECIALIST

## 2017-12-09 PROCEDURE — 700101 HCHG RX REV CODE 250

## 2017-12-09 PROCEDURE — 501579 HCHG TROCAR, STEP 5MM: Performed by: SPECIALIST

## 2017-12-09 PROCEDURE — 160028 HCHG SURGERY MINUTES - 1ST 30 MINS LEVEL 3: Performed by: SPECIALIST

## 2017-12-09 PROCEDURE — 90732 PPSV23 VACC 2 YRS+ SUBQ/IM: CPT | Performed by: SPECIALIST

## 2017-12-09 PROCEDURE — A9270 NON-COVERED ITEM OR SERVICE: HCPCS

## 2017-12-09 PROCEDURE — 90471 IMMUNIZATION ADMIN: CPT

## 2017-12-09 PROCEDURE — 96374 THER/PROPH/DIAG INJ IV PUSH: CPT | Mod: XU

## 2017-12-09 PROCEDURE — 700105 HCHG RX REV CODE 258: Performed by: SPECIALIST

## 2017-12-09 PROCEDURE — 160039 HCHG SURGERY MINUTES - EA ADDL 1 MIN LEVEL 3: Performed by: SPECIALIST

## 2017-12-09 PROCEDURE — 160048 HCHG OR STATISTICAL LEVEL 1-5: Performed by: SPECIALIST

## 2017-12-09 PROCEDURE — 700111 HCHG RX REV CODE 636 W/ 250 OVERRIDE (IP): Performed by: SPECIALIST

## 2017-12-09 PROCEDURE — 160002 HCHG RECOVERY MINUTES (STAT): Performed by: SPECIALIST

## 2017-12-09 PROCEDURE — 500800 HCHG LAPAROSCOPIC J/L HOOK: Performed by: SPECIALIST

## 2017-12-09 PROCEDURE — 90686 IIV4 VACC NO PRSV 0.5 ML IM: CPT | Performed by: SPECIALIST

## 2017-12-09 PROCEDURE — 160009 HCHG ANES TIME/MIN: Performed by: SPECIALIST

## 2017-12-09 PROCEDURE — 500123 HCHG BOVIE, CONTROL W/BLADE: Performed by: SPECIALIST

## 2017-12-09 PROCEDURE — 160035 HCHG PACU - 1ST 60 MINS PHASE I: Performed by: SPECIALIST

## 2017-12-09 PROCEDURE — 700111 HCHG RX REV CODE 636 W/ 250 OVERRIDE (IP)

## 2017-12-09 PROCEDURE — 501838 HCHG SUTURE GENERAL: Performed by: SPECIALIST

## 2017-12-09 PROCEDURE — 500886 HCHG PACK, LAPAROSCOPY: Performed by: SPECIALIST

## 2017-12-09 PROCEDURE — 500854 HCHG NEEDLE, INSUFFLATION FOR STEP: Performed by: SPECIALIST

## 2017-12-09 PROCEDURE — 96376 TX/PRO/DX INJ SAME DRUG ADON: CPT | Mod: XU

## 2017-12-09 PROCEDURE — 700102 HCHG RX REV CODE 250 W/ 637 OVERRIDE(OP): Performed by: SPECIALIST

## 2017-12-09 PROCEDURE — 700102 HCHG RX REV CODE 250 W/ 637 OVERRIDE(OP)

## 2017-12-09 PROCEDURE — 160036 HCHG PACU - EA ADDL 30 MINS PHASE I: Performed by: SPECIALIST

## 2017-12-09 PROCEDURE — A4314 CATH W/DRAINAGE 2-WAY LATEX: HCPCS | Performed by: SPECIALIST

## 2017-12-09 RX ORDER — MORPHINE SULFATE 10 MG/ML
5 INJECTION, SOLUTION INTRAMUSCULAR; INTRAVENOUS
Status: DISCONTINUED | OUTPATIENT
Start: 2017-12-09 | End: 2017-12-10 | Stop reason: HOSPADM

## 2017-12-09 RX ORDER — LIDOCAINE AND PRILOCAINE 25; 25 MG/G; MG/G
1 CREAM TOPICAL
Status: ACTIVE | OUTPATIENT
Start: 2017-12-09 | End: 2017-12-10

## 2017-12-09 RX ORDER — MORPHINE SULFATE 10 MG/ML
5-10 INJECTION, SOLUTION INTRAMUSCULAR; INTRAVENOUS
Status: DISCONTINUED | OUTPATIENT
Start: 2017-12-09 | End: 2017-12-09

## 2017-12-09 RX ORDER — LIDOCAINE HYDROCHLORIDE 10 MG/ML
0.5 INJECTION, SOLUTION INFILTRATION; PERINEURAL
Status: ACTIVE | OUTPATIENT
Start: 2017-12-09 | End: 2017-12-10

## 2017-12-09 RX ORDER — ZOLPIDEM TARTRATE 5 MG/1
5 TABLET ORAL NIGHTLY PRN
Status: DISCONTINUED | OUTPATIENT
Start: 2017-12-09 | End: 2017-12-10 | Stop reason: HOSPADM

## 2017-12-09 RX ORDER — MIDAZOLAM HYDROCHLORIDE 1 MG/ML
INJECTION INTRAMUSCULAR; INTRAVENOUS
Status: DISPENSED
Start: 2017-12-09 | End: 2017-12-09

## 2017-12-09 RX ORDER — OXYCODONE HCL 5 MG/5 ML
SOLUTION, ORAL ORAL
Status: COMPLETED
Start: 2017-12-09 | End: 2017-12-09

## 2017-12-09 RX ORDER — LIDOCAINE HYDROCHLORIDE 10 MG/ML
INJECTION, SOLUTION INFILTRATION; PERINEURAL
Status: COMPLETED
Start: 2017-12-09 | End: 2017-12-09

## 2017-12-09 RX ORDER — OXYCODONE HYDROCHLORIDE 5 MG/1
5 TABLET ORAL EVERY 4 HOURS PRN
Status: DISCONTINUED | OUTPATIENT
Start: 2017-12-09 | End: 2017-12-10 | Stop reason: HOSPADM

## 2017-12-09 RX ORDER — HYDROMORPHONE HYDROCHLORIDE 2 MG/ML
INJECTION, SOLUTION INTRAMUSCULAR; INTRAVENOUS; SUBCUTANEOUS
Status: COMPLETED
Start: 2017-12-09 | End: 2017-12-09

## 2017-12-09 RX ORDER — MORPHINE SULFATE 10 MG/ML
10 INJECTION, SOLUTION INTRAMUSCULAR; INTRAVENOUS
Status: DISCONTINUED | OUTPATIENT
Start: 2017-12-09 | End: 2017-12-10 | Stop reason: HOSPADM

## 2017-12-09 RX ORDER — SIMETHICONE 80 MG
80 TABLET,CHEWABLE ORAL EVERY 8 HOURS PRN
Status: DISCONTINUED | OUTPATIENT
Start: 2017-12-09 | End: 2017-12-10 | Stop reason: HOSPADM

## 2017-12-09 RX ORDER — AMOXICILLIN 250 MG
2 CAPSULE ORAL
Status: DISCONTINUED | OUTPATIENT
Start: 2017-12-09 | End: 2017-12-10 | Stop reason: HOSPADM

## 2017-12-09 RX ORDER — OXYCODONE HYDROCHLORIDE AND ACETAMINOPHEN 5; 325 MG/1; MG/1
1-2 TABLET ORAL EVERY 4 HOURS PRN
Status: DISCONTINUED | OUTPATIENT
Start: 2017-12-09 | End: 2017-12-10 | Stop reason: HOSPADM

## 2017-12-09 RX ORDER — SODIUM CHLORIDE, SODIUM LACTATE, POTASSIUM CHLORIDE, CALCIUM CHLORIDE 600; 310; 30; 20 MG/100ML; MG/100ML; MG/100ML; MG/100ML
INJECTION, SOLUTION INTRAVENOUS CONTINUOUS
Status: DISCONTINUED | OUTPATIENT
Start: 2017-12-09 | End: 2017-12-10 | Stop reason: HOSPADM

## 2017-12-09 RX ORDER — OXYCODONE HYDROCHLORIDE 10 MG/1
10 TABLET ORAL EVERY 4 HOURS PRN
Status: DISCONTINUED | OUTPATIENT
Start: 2017-12-09 | End: 2017-12-10 | Stop reason: HOSPADM

## 2017-12-09 RX ORDER — ONDANSETRON 2 MG/ML
4 INJECTION INTRAMUSCULAR; INTRAVENOUS EVERY 6 HOURS PRN
Status: DISCONTINUED | OUTPATIENT
Start: 2017-12-09 | End: 2017-12-10 | Stop reason: HOSPADM

## 2017-12-09 RX ADMIN — MORPHINE SULFATE 5 MG: 10 INJECTION INTRAVENOUS at 19:56

## 2017-12-09 RX ADMIN — SODIUM CHLORIDE, POTASSIUM CHLORIDE, SODIUM LACTATE AND CALCIUM CHLORIDE: 600; 310; 30; 20 INJECTION, SOLUTION INTRAVENOUS at 08:38

## 2017-12-09 RX ADMIN — FENTANYL CITRATE 50 MCG: 50 INJECTION, SOLUTION INTRAMUSCULAR; INTRAVENOUS at 10:15

## 2017-12-09 RX ADMIN — LIDOCAINE HYDROCHLORIDE 0.5 ML: 10 INJECTION, SOLUTION INFILTRATION; PERINEURAL at 08:38

## 2017-12-09 RX ADMIN — HYDROMORPHONE HYDROCHLORIDE 0.2 MG: 2 INJECTION INTRAMUSCULAR; INTRAVENOUS; SUBCUTANEOUS at 10:58

## 2017-12-09 RX ADMIN — HYDROMORPHONE HYDROCHLORIDE 0.4 MG: 2 INJECTION INTRAMUSCULAR; INTRAVENOUS; SUBCUTANEOUS at 11:29

## 2017-12-09 RX ADMIN — OXYCODONE HYDROCHLORIDE 10 MG: 5 SOLUTION ORAL at 10:17

## 2017-12-09 RX ADMIN — HYDROMORPHONE HYDROCHLORIDE 0.4 MG: 2 INJECTION INTRAMUSCULAR; INTRAVENOUS; SUBCUTANEOUS at 11:05

## 2017-12-09 RX ADMIN — MORPHINE SULFATE 5 MG: 10 INJECTION INTRAVENOUS at 23:48

## 2017-12-09 RX ADMIN — OXYCODONE AND ACETAMINOPHEN 2 TABLET: 5; 325 TABLET ORAL at 12:46

## 2017-12-09 RX ADMIN — OXYCODONE HYDROCHLORIDE 10 MG: 10 TABLET ORAL at 22:38

## 2017-12-09 RX ADMIN — INFLUENZA A VIRUS A/MICHIGAN/45/2015 X-275 (H1N1) ANTIGEN (FORMALDEHYDE INACTIVATED), INFLUENZA A VIRUS A/HONG KONG/4801/2014 X-263B (H3N2) ANTIGEN (FORMALDEHYDE INACTIVATED), INFLUENZA B VIRUS B/PHUKET/3073/2013 ANTIGEN (FORMALDEHYDE INACTIVATED), AND INFLUENZA B VIRUS B/BRISBANE/60/2008 ANTIGEN (FORMALDEHYDE INACTIVATED) 0.5 ML: 15; 15; 15; 15 INJECTION, SUSPENSION INTRAMUSCULAR at 14:38

## 2017-12-09 RX ADMIN — FENTANYL CITRATE 50 MCG: 50 INJECTION, SOLUTION INTRAMUSCULAR; INTRAVENOUS at 10:30

## 2017-12-09 RX ADMIN — MORPHINE SULFATE 5 MG: 10 INJECTION INTRAVENOUS at 14:30

## 2017-12-09 RX ADMIN — PNEUMOCOCCAL VACCINE POLYVALENT 25 MCG
25; 25; 25; 25; 25; 25; 25; 25; 25; 25; 25; 25; 25; 25; 25; 25; 25; 25; 25; 25; 25; 25; 25 INJECTION, SOLUTION INTRAMUSCULAR; SUBCUTANEOUS at 14:33

## 2017-12-09 RX ADMIN — HYDROMORPHONE HYDROCHLORIDE 0.2 MG: 2 INJECTION INTRAMUSCULAR; INTRAVENOUS; SUBCUTANEOUS at 12:18

## 2017-12-09 RX ADMIN — SODIUM CHLORIDE, POTASSIUM CHLORIDE, SODIUM LACTATE AND CALCIUM CHLORIDE: 600; 310; 30; 20 INJECTION, SOLUTION INTRAVENOUS at 12:59

## 2017-12-09 RX ADMIN — MORPHINE SULFATE 5 MG: 10 INJECTION INTRAVENOUS at 16:30

## 2017-12-09 RX ADMIN — OXYCODONE HYDROCHLORIDE 5 MG: 5 TABLET ORAL at 16:13

## 2017-12-09 ASSESSMENT — PAIN SCALES - GENERAL
PAINLEVEL_OUTOF10: 9
PAINLEVEL_OUTOF10: 8
PAINLEVEL_OUTOF10: 0
PAINLEVEL_OUTOF10: 6
PAINLEVEL_OUTOF10: 6
PAINLEVEL_OUTOF10: 8
PAINLEVEL_OUTOF10: 6
PAINLEVEL_OUTOF10: 9
PAINLEVEL_OUTOF10: 10
PAINLEVEL_OUTOF10: 7
PAINLEVEL_OUTOF10: 7
PAINLEVEL_OUTOF10: 6
PAINLEVEL_OUTOF10: 0
PAINLEVEL_OUTOF10: 7
PAINLEVEL_OUTOF10: 8

## 2017-12-09 ASSESSMENT — LIFESTYLE VARIABLES
EVER_SMOKED: YES
ALCOHOL_USE: NO

## 2017-12-09 NOTE — CARE PLAN
Problem: Safety  Goal: Will remain free from injury  Outcome: PROGRESSING AS EXPECTED  Mobility assessed, sign placed outside door    Problem: Pain Management  Goal: Pain level will decrease to patient's comfort goal  Outcome: NOT MET  MD paged to manage pain

## 2017-12-09 NOTE — H&P
Marian Kent          YOB: 1985  Date of today's surgery: 2017  Facility: [facility]    ID: The patient is a very pleasant 32 year old multipara (para-3, and [place] has had 3 previous  sections).    Chief Complaint: The patient complains of pelvic pain.    History of Present Illness: The patient has had a history of pelvic pain and endometriosis.  She sees a Dr. Barger for pain management.  Her medications include hydrocodone, baclofen, and Cymbalta.  She is scheduled today to have a laparoscopy, both diagnostic and if necessary and depending on findings at the time of laparoscopy, operative.  Her previous surgeries include 3 previous  sections and on 2011 she had a laparoscopy with removal of hemoperitoneum and laparoscopic removal of left-sided ruptured tubal ectopic pregnancy via left salpingectomy.  She had a laparoscopic bilateral tubal ligation.  She has had diagnostic laparoscopies.  On 2016 I performed a total laparoscopic hysterectomy and right salpingectomy and cystoscopy.  I have discussed with the patient recently in detail and at length what diagnostic and operative laparoscopy is and what diagnostic and operative laparoscopy involves and I have discussed with her and explained to her in detail and at length the risks and benefits and alternatives of diagnostic and operative laparoscopy.  After our discussions and after answering her questions she told me that she very much wishes for us to proceed with laparoscopy, both diagnostic and if necessary and depending on findings at the time of laparoscopy, operative.    Past Medical History: The patient says she has no medical illnesses.    Past Surgical History: The patient has had 3 previous  sections.  On 2011 she had a laparoscopy with removal of hemoperitoneum a laparoscopic removal of left-sided ruptured tubal ectopic pregnancy  via laparoscopic left salpingectomy.  She has had a laparoscopic tubal ligation.  She had a diagnostic laparoscopy on May 20, 2016.  On Monday, August 8, 2016 I performed a total laparoscopic hysterectomy and unilateral salpingectomy.    Medications: The patient says that she takes hydrocodone and baclofen and Cymbalta    Allergies: The patient says that she has no known drug allergies.    Social History: The patient smokes.  She denies consuming alcoholic beverages.  She denies the use of recreational drugs.    Review of Systems  General: The patient denies any fevers, chills, sweats.  Pulmonary: The patient denies any coughing, wheezing, chest pain, shortness of breath.  Cardiovascular: The patient denies any palpitations, dyspnea, chest pain.  Gastrointestinal: The patient denies any nausea, vomiting, diarrhea, constipation, hematochezia, melena, history of hepatitis, history of jaundice.  Genitourinary: The patient complains of pelvic pain.  Musculoskeletal: The patient denies any arthralgias or myalgias.   Neurological: No headaches or syncope or seizures.     Physical Exam:   Vital Signs: The patient's vital signs are stable and she is afebrile.  General: The patient appears well developed and well nourished and relaxed and alert and comfortable and in no apparent distress.    HEENT :  Normo-cephalic, atraumatic, pupils equal, round, reactive to light and accommodation, extra ocular motions intact, pharynx clear; there is no thyromegaly. There is no cervical lymphadenopathy.  Chest: Heart regular rate and rhythm, with no murmurs or rubs or gallops; the lungs are clear to auscultation bilaterally.  Abdomen: The abdomen is soft and flat and non-tender and non-distended. There is no hepatomegaly. There is no splenomegaly.   Pelvic: The uterus is absent.  There is diffuse nonspecific mild pelvic tenderness.  Extremities: No clubbing or cyanosis or edema.   Neurological: non-focal.     Assessment:   Pelvic pain,  history of endometriosis, and the patient is status post previous hysterectomy.    Plan:   We will proceed today with laparoscopy, both diagnostic and if necessary and depending on findings at the time of laparoscopy, operative.  Please see above.            ________________________  Mark Price M.D.

## 2017-12-09 NOTE — OR SURGEON
Immediate Post OP Note    PreOp Diagnosis:   Pelvic pain, history of endometriosis, and the patient is status post previous hysterectomy.    PostOp Diagnosis:   Pelvic pain, history of endometriosis, and the patient is status post previous hysterectomy.  Intraperitoneal adhesions including adhesions of the adipose tissue associated with the sigmoid colon to the bladder (see pictures) and also adhesions involving the right ovary.  There is a small right ovarian cyst.    Procedure(s):  Exploratory laparoscopy - Wound Class: Clean  LAPAROSCOPIC LYSIS OF ADHESIONS - Wound Class: Clean    Surgeon(s):  Mark Price M.D.    Anesthesiologist/Type of Anesthesia:  Anesthesiologist: David Kern M.D./General    Surgical Staff:  Circulator: Mallory Araiza R.N.  Relief Circulator: Steph Peña R.N.  Scrub Person: Edson Hassan    Specimens:  None    Estimated Blood Loss:  Less than 20 mL    Findings:  The uterus is absent.  The vaginal cuff appears normal.  Both fallopian tubes are absent.  There are adhesions of the adipose tissue associated with the sigmoid colon to the bladder.  There are adhesions surrounding the right ovary.  There is a small cyst of the right ovary which was incised and drained yielded clear to straw-colored fluid.  Both Fallopian tubes are absent.  Both ovarian fossa are normal.  The cul-de-sac appears normal.    Complications:  None        12/9/2017 10:08 AM Mark Price

## 2017-12-09 NOTE — OP REPORT
DATE OF SERVICE:  12/09/2017    PREOPERATIVE DIAGNOSES:  Pelvic pain, history of endometriosis, and the   patient is status post previous hysterectomy.    POSTOPERATIVE DIAGNOSES:  1.  Pelvic pain, history of endometriosis, and the patient is status post   previous hysterectomy.  2.  Intraperitoneal adhesions including adhesions of the adipose tissue   associated with the sigmoid colon to the bladder and also adhesions involving   the right ovary.  3.  There is a small right ovarian cyst.    PROCEDURES:  Laparoscopy with lysis of adhesions including adhesions of the   adipose tissue associated with the colon to the bladder and also adhesions   surrounding and involving the right ovary and also incision and drainage of   right ovarian cyst.    SURGEON:  Mark Price MD    ANESTHESIA:  General endotracheal tube anesthesia.    ANESTHESIOLOGIST:  David Kern MD    FINDINGS:  At laparoscopy, absence of the uterus is noted.  The vaginal cuff   appears normal.  Both fallopian tubes are absent.  There are adhesions of the   adipose tissue associated with the sigmoid colon to the bladder.  There are   adhesions surrounding and involving the right ovary.  There is a small cyst of   the right ovary, which when incised and drains yields clear to straw-colored   fluid.  Both fallopian tubes are absent.  Both ovarian fossa are normal.  The   left ovary is normal.  The right ovary is normal except for a small cyst of   the right ovary.  The cul-de-sac appears normal.    SPECIMENS:  None.    COMPLICATIONS:  None.    ESTIMATED BLOOD LOSS:  Less than 20 mL.    DESCRIPTION OF PROCEDURE:  After appropriate consents have been obtained, the   patient was taken to the operating room and given general anesthesia.  She was   prepped and draped in the dorsal lithotomy position and a Good catheter was   noted to be in place and draining urine.  Attention was directed to the   abdomen where a small (approximately 1 cm) horizontal  infraumbilical incision   was made with a scalpel.  A Veress needle was advanced through this incision   into the peritoneal cavity and proper placement of the peritoneal cavity was   verified with palmar hanging-drop technique.  Peritoneal cavity was then   insufflated with approximately 2-3 liters of carbon dioxide gas.  The Veress   needle was removed and a 5 mm port was introduced through the infraumbilical   incision into the peritoneal cavity utilizing the VersaStep trocar system.    The central portion of this port was removed and the 5 mm 30-degree   laparoscope was inserted through the remaining sleeve and proper entry in the   peritoneal cavity was verified visually with laparoscope.  Patient was placed   in Trendelenburg position.  A 5 mm port was placed suprapubically under direct   laparoscopic visualization utilizing the VersaStep trocar system.  A Prestige   instrument was placed through the suprapubic port and used to manipulate   structures.  A sponge stick was placed in the vaginal vault and used to   manipulate the vaginal cuff.  Findings were as noted above.  A J-hook   monopolar cautery instrument was placed through the suprapubic port and used   to lyse adhesions of the adipose tissue associated with the colon to the   bladder.  Also, this instrument was used to lyse adhesions surrounding and   involving the right ovary.  This instrument was also used to incise and drain   the right ovarian cyst in the usual fashion.  Bleeding seen coming from the   site of incision and drainage of the right ovarian cyst was controlled with   use of monopolar cautery.  The J-hook was removed and suction irrigation   instrument was placed through the suprapubic port and used to copiously   irrigate and drained the pelvis.  The right ovary was carefully examined and   hemostasis was noted to be excellent.  The patient was taken out of   Trendelenburg position.  Pneumoperitoneum was evacuated with a suction    irrigation instrument and the suction irrigation instrument was then removed   along with laparoscope.  Both ports were removed.  Skin incisions were   reapproximated with placement of interrupted buried sutures of 4-0 Monocryl   placed in the dermis.  The vaginal sponge stick was removed.  The procedure   was terminated.  Patient tolerated the procedure well and sent to   postanesthesia recovery in stable condition.       ____________________________________     STEVEN MARIANO MD    MED / NTS    DD:  12/09/2017 10:22:00  DT:  12/09/2017 10:45:16    D#:  [id number]  Job#:  [id number]    cc: ROXANNE DEAL MD

## 2017-12-10 VITALS
RESPIRATION RATE: 16 BRPM | DIASTOLIC BLOOD PRESSURE: 60 MMHG | OXYGEN SATURATION: 93 % | HEART RATE: 73 BPM | TEMPERATURE: 98.7 F | SYSTOLIC BLOOD PRESSURE: 93 MMHG | BODY MASS INDEX: 24.77 KG/M2 | HEIGHT: 63 IN | WEIGHT: 139.77 LBS

## 2017-12-10 LAB
BASOPHILS # BLD AUTO: 0.3 % (ref 0–1.8)
BASOPHILS # BLD: 0.02 K/UL (ref 0–0.12)
EOSINOPHIL # BLD AUTO: 0 K/UL (ref 0–0.51)
EOSINOPHIL NFR BLD: 0 % (ref 0–6.9)
ERYTHROCYTE [DISTWIDTH] IN BLOOD BY AUTOMATED COUNT: 41.7 FL (ref 35.9–50)
HCT VFR BLD AUTO: 37.6 % (ref 37–47)
HGB BLD-MCNC: 13.1 G/DL (ref 12–16)
IMM GRANULOCYTES # BLD AUTO: 0.03 K/UL (ref 0–0.11)
IMM GRANULOCYTES NFR BLD AUTO: 0.4 % (ref 0–0.9)
LYMPHOCYTES # BLD AUTO: 1.76 K/UL (ref 1–4.8)
LYMPHOCYTES NFR BLD: 22.7 % (ref 22–41)
MCH RBC QN AUTO: 33.7 PG (ref 27–33)
MCHC RBC AUTO-ENTMCNC: 34.8 G/DL (ref 33.6–35)
MCV RBC AUTO: 96.7 FL (ref 81.4–97.8)
MONOCYTES # BLD AUTO: 0.82 K/UL (ref 0–0.85)
MONOCYTES NFR BLD AUTO: 10.6 % (ref 0–13.4)
NEUTROPHILS # BLD AUTO: 5.12 K/UL (ref 2–7.15)
NEUTROPHILS NFR BLD: 66 % (ref 44–72)
NRBC # BLD AUTO: 0 K/UL
NRBC BLD AUTO-RTO: 0 /100 WBC
PLATELET # BLD AUTO: 180 K/UL (ref 164–446)
PMV BLD AUTO: 8.5 FL (ref 9–12.9)
RBC # BLD AUTO: 3.89 M/UL (ref 4.2–5.4)
WBC # BLD AUTO: 7.8 K/UL (ref 4.8–10.8)

## 2017-12-10 PROCEDURE — 700111 HCHG RX REV CODE 636 W/ 250 OVERRIDE (IP): Performed by: SPECIALIST

## 2017-12-10 PROCEDURE — 85025 COMPLETE CBC W/AUTO DIFF WBC: CPT

## 2017-12-10 PROCEDURE — A9270 NON-COVERED ITEM OR SERVICE: HCPCS | Performed by: SPECIALIST

## 2017-12-10 PROCEDURE — G0378 HOSPITAL OBSERVATION PER HR: HCPCS

## 2017-12-10 PROCEDURE — 36415 COLL VENOUS BLD VENIPUNCTURE: CPT

## 2017-12-10 PROCEDURE — 700102 HCHG RX REV CODE 250 W/ 637 OVERRIDE(OP): Performed by: SPECIALIST

## 2017-12-10 PROCEDURE — 96376 TX/PRO/DX INJ SAME DRUG ADON: CPT

## 2017-12-10 RX ADMIN — MORPHINE SULFATE 5 MG: 10 INJECTION INTRAVENOUS at 06:18

## 2017-12-10 RX ADMIN — MORPHINE SULFATE 5 MG: 10 INJECTION INTRAVENOUS at 11:23

## 2017-12-10 RX ADMIN — MORPHINE SULFATE 5 MG: 10 INJECTION INTRAVENOUS at 08:37

## 2017-12-10 RX ADMIN — OXYCODONE HYDROCHLORIDE 10 MG: 10 TABLET ORAL at 07:38

## 2017-12-10 RX ADMIN — MORPHINE SULFATE 5 MG: 10 INJECTION INTRAVENOUS at 03:09

## 2017-12-10 RX ADMIN — OXYCODONE HYDROCHLORIDE 10 MG: 10 TABLET ORAL at 03:48

## 2017-12-10 ASSESSMENT — PAIN SCALES - GENERAL
PAINLEVEL_OUTOF10: 6
PAINLEVEL_OUTOF10: 8
PAINLEVEL_OUTOF10: 8
PAINLEVEL_OUTOF10: 10
PAINLEVEL_OUTOF10: 8
PAINLEVEL_OUTOF10: 8
PAINLEVEL_OUTOF10: 7

## 2017-12-10 NOTE — PROGRESS NOTES
Received report from day shift RN.   Pt A&O x 4.  Pain reported at 8/10. Medicated per MAR.  Pt denies nausea, vomiting, chest pain, shortness of breath at this time.  Lap stab sites x2 with bandaids, CDI.  +BS x 4. +void. Pt tolerating diet.  Pt ambulates with standby assist.  Call light within reach. Bed locked and in lowest position.  All needs are met at this time. Plan of care discussed with pt and pt's partner.

## 2017-12-10 NOTE — PROGRESS NOTES
Assumed care of patient. Patient sitting up in bed stating pain is an 8/10. Medicated per MAR. Patient very fixated on when next dose of IV Morphine is. Educated patient on weaning IV pain medications, and patient continues to state that her ribs also hurt like a cramp. Patient not wishing to decrease pain medications. Three lap sites noted with bandaids. No drainage noted. Abdomen is soft. No other needs at this time. Call light within reach.

## 2017-12-10 NOTE — CARE PLAN
Problem: Safety  Goal: Will remain free from falls  Outcome: PROGRESSING AS EXPECTED  Pt calls for assistance appropriately. Call light within reach. Bed locked and in lowest position. Hourly rounding in place.    Problem: Pain Management  Goal: Pain level will decrease to patient's comfort goal  Outcome: PROGRESSING SLOWER THAN EXPECTED  Pain reported at 8-10/10. Medicated per MAR.     Problem: Mobility  Goal: Risk for activity intolerance will decrease  Outcome: PROGRESSING AS EXPECTED  Pt ambulates with standby assist, steady gait noted.

## 2017-12-10 NOTE — DISCHARGE INSTRUCTIONS
Discharge Instructions    Discharged to home by car with relative. Discharged via wheelchair, hospital escort: Yes.  Special equipment needed: Not Applicable    Be sure to schedule a follow-up appointment with your primary care doctor or any specialists as instructed.     Discharge Plan:   Diet Plan: Discussed  Activity Level: Discussed  Smoking Cessation Offered: Patient Counseled  Confirmed Follow up Appointment: Patient to Call and Schedule Appointment  Confirmed Symptoms Management: Discussed  Medication Reconciliation Updated: Yes  Pneumococcal Vaccine Given - only chart on this line when given: Given (See MAR)  Influenza Vaccine Indication: Indicated: 9 to 64 years of age  Influenza Vaccine Given - only chart on this line when given: Influenza Vaccine Given (See MAR)    I understand that a diet low in cholesterol, fat, and sodium is recommended for good health. Unless I have been given specific instructions below for another diet, I accept this instruction as my diet prescription.   Other diet: Regular    Special Instructions: None    · Is patient discharged on Warfarin / Coumadin?   No     · Is patient Post Blood Transfusion?  No    Depression / Suicide Risk    As you are discharged from this [facility] facility, it is important to learn how to keep safe from harming yourself.    Recognize the warning signs:  · Abrupt changes in personality, positive or negative- including increase in energy   · Giving away possessions  · Change in eating patterns- significant weight changes-  positive or negative  · Change in sleeping patterns- unable to sleep or sleeping all the time   · Unwillingness or inability to communicate  · Depression  · Unusual sadness, discouragement and loneliness  · Talk of wanting to die  · Neglect of personal appearance   · Rebelliousness- reckless behavior  · Withdrawal from people/activities they love  · Confusion- inability to concentrate     If you or a loved one observes any of these  behaviors or has concerns about self-harm, here's what you can do:  · Talk about it- your feelings and reasons for harming yourself  · Remove any means that you might use to hurt yourself (examples: pills, rope, extension cords, firearm)  · Get professional help from the community (Mental Health, Substance Abuse, psychological counseling)  · Do not be alone:Call your Safe Contact- someone whom you trust who will be there for you.  · Call your local CRISIS HOTLINE 764-7744 or 526-706-5541  · Call your local Children's Mobile Crisis Response Team [place] (350) 484-0629 or www.[employer]  · Call the toll free National Suicide Prevention Hotlines   · National Suicide Prevention Lifeline 712-357-CMZC (3400)  · [employer] Line Network 800-SUICIDE (305-0198)          ACTIVITY: Rest and take it easy for the first 24 hours.  A responsible adult is recommended to remain with you during that time.  It is normal to feel sleepy.  We encourage you to not do anything that requires balance, judgment or coordination.    MILD FLU-LIKE SYMPTOMS ARE NORMAL. YOU MAY EXPERIENCE GENERALIZED MUSCLE ACHES, THROAT IRRITATION, HEADACHE AND/OR SOME NAUSEA.    FOR 24 HOURS DO NOT:  Drive, operate machinery or run household appliances.  Drink beer or alcoholic beverages.   Make important decisions or sign legal documents.    SPECIAL INSTRUCTIONS:   Laparoscopic lysis of abdominal adhesions is a surgical procedure to remove stringy (fibrous) bands of tissue from the abdomen. During the procedure a lighted, pencil-sized instrument (laparoscope) is inserted into the abdomen through an incision. The laparoscope allows your health care provider to look at the organs inside of your body.  LET YOUR HEALTH CARE PROVIDER KNOW ABOUT:  · Any allergies you have.  · All medicines you are taking, including vitamins, herbs, eye drops, creams, and over-the-counter medicines.  · Previous problems you or members of your family have had with the use  of anesthetics.  · Any blood disorders you have.  · Previous surgeries you have had.  · Any medical conditions you may have.  RISKS AND COMPLICATIONS  Generally, this is a safe procedure. However, problems may occur, including:  · Injury to abdominal organs.  · Bleeding.  · Infection.  · Development of more abdominal adhesions.  BEFORE THE PROCEDURE  · You may have imaging tests and blood tests.  · Ask your health care provider about:  ¨ Changing or stopping your regular medicines. This is especially important if you are taking diabetes medicines or blood thinners.  ¨ Taking medicines such as aspirin and ibuprofen. These medicines can thin your blood. Do not take these medicines before your procedure if your health care provider instructs you not to.  · Follow instructions from your health care provider about eating or drinking restrictions.  PROCEDURE  · An IV tube will be inserted into one of your veins.  · You will be given a medicine that makes you fall asleep (general anesthetic).  · A tube may be passed through your nose and into your stomach. This is called a nasogastric tube.  · The surgeon will make a small incision through the wall of your abdomen.  · Your abdomen will be filled with a gas. This will help your surgeon to see the inside your abdomen more clearly. It will also give him or her more room to operate.  · A laparoscope with a camera on the end will be passed through the incision. It will send images to a monitor in the operating room.  · Other small incisions may be made in your abdomen. Long, thin surgical instruments will be inserted through them as needed.  · The adhesions will be cut with surgical scissors or removed with some form of energy, such as electric current or laser energy.  · The incisions will be closed with adhesive strips or stitches (sutures).  · A bandage (dressing) will be placed over the incisions.  The procedure may vary among health care providers and hospitals.  AFTER THE  PROCEDURE  · You may have to spend one or more nights in the hospital.  · You may have some pain. You will get pain medicine as needed.  · You will be encouraged to get up and walk around while you are still in the hospital.  · The nasogastric tube will be removed when your bowel function returns. After that, you will be allowed to eat or drink as usual.  · When you are taking fluids well, your IV tube will be removed.     This information is not intended to replace advice given to you by your health care provider. Make sure you discuss any questions you have with your health care provider.     Document Released: 03/16/2010 Document Revised: 05/03/2016 Document Reviewed: 12/14/2015  [employer] Interactive Patient Education ©2016 [employer]      DIET: To avoid nausea, slowly advance diet as tolerated, avoiding spicy or greasy foods for the first day.  Add more substantial food to your diet according to your physician's instructions.  Babies can be fed formula or breast milk as soon as they are hungry.  INCREASE FLUIDS AND FIBER TO AVOID CONSTIPATION.    SURGICAL DRESSING/BATHING: No baths, shower okay    FOLLOW-UP APPOINTMENT:  A follow-up appointment should be arranged with your doctor in 1 week; call to schedule.    You should CALL YOUR PHYSICIAN if you develop:  Fever greater than 101 degrees F.  Pain not relieved by medication, or persistent nausea or vomiting.  Excessive bleeding (blood soaking through dressing) or unexpected drainage from the wound.  Extreme redness or swelling around the incision site, drainage of pus or foul smelling drainage.  Inability to urinate or empty your bladder within 8 hours.  Problems with breathing or chest pain.    You should call 911 if you develop problems with breathing or chest pain.  If you are unable to contact your doctor or surgical center, you should go to the nearest emergency room or urgent care center.      If any questions arise, call your doctor.  If your doctor is  not available, please feel free to call the Surgical Center at [phone].  The Center is open Monday through Friday from 7AM to 7PM.  You can also call the HEALTH HOTLINE open 24 hours/day, 7 days/week and speak to a nurse at [phone], or toll free at [phone].    A registered nurse may call you a few days after your surgery to see how you are doing after your procedure.    MEDICATIONS: Resume taking daily medication.  Take prescribed pain medication with food.  If no medication is prescribed, you may take non-aspirin pain medication if needed.  PAIN MEDICATION CAN BE VERY CONSTIPATING.  Take a stool softener or laxative such as senokot, pericolace, or milk of magnesia if needed.    Prescription given for Oxycodone.  Last pain medication given at 0738.    If your physician has prescribed pain medication that includes Acetaminophen (Tylenol), do not take additional Acetaminophen (Tylenol) while taking the prescribed medication.    Depression / Suicide Risk    As you are discharged from this [facility] facility, it is important to learn how to keep safe from harming yourself.    Recognize the warning signs:  · Abrupt changes in personality, positive or negative- including increase in energy   · Giving away possessions  · Change in eating patterns- significant weight changes-  positive or negative  · Change in sleeping patterns- unable to sleep or sleeping all the time   · Unwillingness or inability to communicate  · Depression  · Unusual sadness, discouragement and loneliness  · Talk of wanting to die  · Neglect of personal appearance   · Rebelliousness- reckless behavior  · Withdrawal from people/activities they love  · Confusion- inability to concentrate     If you or a loved one observes any of these behaviors or has concerns about self-harm, here's what you can do:  · Talk about it- your feelings and reasons for harming yourself  · Remove any means that you might use to hurt yourself (examples:  pills, rope, extension cords, firearm)  · Get professional help from the community (Mental Health, Substance Abuse, psychological counseling)  · Do not be alone:Call your Safe Contact- someone whom you trust who will be there for you.  · Call your local CRISIS HOTLINE 598-0776 or 766-005-5481  · Call your local Children's Mobile Crisis Response Team [place] (594) 477-3079 or www.[employer]  · Call the toll free National Suicide Prevention Hotlines   · National Suicide Prevention Lifeline 351-255-TTZR (9565)  · [employer] [facility] Line Network 800-SUICIDE (949-7017)    Exploratory Laparotomy, Adult  Exploratory laparotomy is a surgical procedure to examine the organs inside your belly (abdomen). Another name for this is abdominal exploration. You may have this procedure if you have abdominal pain, trauma, bleeding, infection, or obstruction. The procedure may be done if your health care provider cannot make a diagnosis from only an exam and testing.  Exploratory laparotomy may be a planned procedure or an emergency procedure. You may have surgical treatment as part of the laparotomy, or you may have additional treatment after your laparotomy. This will depend on what your surgeon finds during the procedure.  LET YOUR HEALTH CARE PROVIDER KNOW ABOUT:  · Any allergies you have.  · All medicines you are taking, including vitamins, herbs, eye drops, creams, and over-the-counter medicines.  · Previous problems you or members of your family have had with the use of anesthetics.  · Any blood disorders you have.  · Previous surgeries you have had.  · Medical conditions you have.  RISKS AND COMPLICATIONS  Generally, this is a safe procedure. However, problems can occur and include:  · Bleeding.  · Infection.  · A blood clot that forms in your leg and travels to your lungs.  · Damage to organs inside your abdomen.  · Scar tissue that blocks your digestive tract.  BEFORE THE PROCEDURE  · Ask your health care provider  about:  ¨ Changing or stopping your regular medicines. This is especially important if you are taking diabetes medicines or blood thinners.  ¨ Taking medicines such as aspirin and ibuprofen. These medicines can thin your blood. Do not take these medicines before your procedure if your health care provider instructs you not to.  · Do not eat or drink anything after midnight on the night before the procedure or as directed by your health care provider.  · You may be given instructions for clearing out your bowel before surgery (bowel prep). If you are already in the hospital, the bowel prep may be done there.  PROCEDURE  · An IV tube may be inserted into a vein. You may receive fluids and medicine through the IV tube. This may include antibiotic medicine to treat or prevent infection.  · You will be given a medicine that makes you go to sleep (general anesthetic).  · You may have a tube placed through your nose and into your stomach (nasogastric tube) to drain your stomach fluids.  · You may have a tube placed into your bladder (urinary catheter) to drain urine.  · Your abdomen will be cleaned with a germ-killing solution (antiseptic).  · The surgeon will make a surgical cut (incision) in your abdomen. This is usually an up-and-down incision in the midsection of your abdomen. The incision will go through the inside lining of your abdomen (peritoneum).  · Your surgeon will spread the incision wide enough to examine the inside of your abdomen.  · The rest of the procedure will depend on what the surgeon finds:  ¨ The surgeon will check all organs in your abdomen for damage or obstruction. Repairs will be made when possible.  ¨ If there is blood in the abdomen, the surgeon will look for the source of the bleeding in order to stop it.  ¨ If there is yellowish-white fluid (pus) or gastric fluids in your abdomen, the surgeon will check for an infection or a hole (perforation) in your digestive tract.  ¨ If the surgeon finds  infection, a drain may be placed to empty fluid that can build up in your abdomen after surgery.  ¨ If there is a growth (tumor) inside your abdomen, the surgeon may remove a piece of the growth (biopsy) to examine it under a microscope.  · When all procedures are complete, the surgeon will close your abdomen with layers of stitches (sutures).  · The incision through the skin of your abdomen will be closed with sutures or staples.  AFTER THE PROCEDURE  · Your blood pressure, heart rate, breathing rate, and blood oxygen level will be monitored often until the medicines you were given have worn off.  · You will continue to receive fluids and nutrition through your IV tube. This will stop when you can eat and drink on your own.  · You may also get antibiotic medicine and pain medicine through your IV tube.  · Your nasogastric tube may be removed when you start to pass gas.  · Your urinary catheter may be removed when the anesthetic wears off.     This information is not intended to replace advice given to you by your health care provider. Make sure you discuss any questions you have with your health care provider.     Document Released: 09/12/2002 Document Revised: 01/08/2016 Document Reviewed: 08/05/2015  [employer] Interactive Patient Education ©2016 [employer]      Laparoscopic Lysis of Abdominal Adhesions, Care After  Refer to this sheet in the next few weeks. These instructions provide you with information about caring for yourself after your procedure. Your health care provider may also give you more specific instructions. Your treatment has been planned according to current medical practices, but problems sometimes occur. Call your health care provider if you have any problems or questions after your procedure.  WHAT TO EXPECT AFTER THE PROCEDURE  After your procedure, it is common to have some pain around the incision.  HOME CARE INSTRUCTIONS  · Take medicines only as directed by your health care  provider.  · You may need to start by eating only a little at a time. Start with liquids. As your appetite improves, gradually return to eating solid foods.  · Do not lift anything that is heavier than 10 lb (4.5 kg) for four weeks.  · Return to your normal activities as directed by your health care provider. Ask your health care provider what activities are safe for you.  · There are many different ways to close and cover an incision, including stitches (sutures), skin glue, and adhesive strips. Follow instructions from your health care provider about:  ¨ Incision care.  ¨ Bandage (dressing) changes and removal.  ¨ Incision closure removal.  · Check your incisions every day for signs of infection. Watch for:  ¨ Redness, swelling, or pain.  ¨ Fluid , blood, or pus.  SEEK MEDICAL CARE IF:  · You develop a fever or chills.  · You have redness, swelling, or pain at the site of your incisions.  · You have fluid, blood, or pus coming from your incisions.  · There is a bad smell coming from your incisions or the dressing.  · Your pain gets worse.  · You have a cough.  · You have nausea and vomiting that does not go away after 3 hours.  SEEK IMMEDIATE MEDICAL CARE IF:  · You develop severe pain in your abdomen or your chest.  · You develop shortness of breath.  · You develop nausea or vomiting that is severe or keeps coming back.     This information is not intended to replace advice given to you by your health care provider. Make sure you discuss any questions you have with your health care provider.     Document Released: 05/03/2016 Document Reviewed: 12/14/2015  [employer] Interactive Patient Education ©2016 [employer]

## 2017-12-10 NOTE — PROGRESS NOTES
Discharge paperwork discussed and signed. All questions answered. Prescription and follow up information given. Patient verbalized understanding of worsening symptoms and when to call MD or return to ER. Patient opted to ambulate out with family.

## 2017-12-10 NOTE — CARE PLAN
Problem: Pain Management  Goal: Pain level will decrease to patient's comfort goal  Patient stating pain is always high. Requiring oxy 10 mg, and IV Morphine.     Problem: Mobility  Goal: Risk for activity intolerance will decrease  Patient up self with steady gait.

## 2018-02-07 NOTE — DISCHARGE SUMMARY
The patient is a very pleasant 32-year-old multipara (para 3 with 3 previous    sections), who was admitted on 2017 and on that day, she   underwent scheduled laparoscopy with laparoscopic lysis of adhesions including   adhesions of the adipose tissue associated with the colon to the bladder and   also adhesions surrounding and involving the right ovary and also incision and   drainage of right ovarian cyst.  The preoperative diagnoses included pelvic   pain, history of endometriosis, and the patient is status post previous   hysterectomy and the postoperative diagnoses include these plus   intraperitoneal adhesions including adhesions of the adipose tissue associated   with the sigmoid colon to the bladder and also adhesions involving the right   ovary and also small right ovarian cyst.  These procedures were without   complications.  The patient had requested to stay overnight and the next day,   on 12/10/2017, the patient was ambulating and urinating and tolerating regular   diet and passing flatus and is discharged home in stable condition with   prescriptions for analgesics and instructions to follow up with me in the   office.  She did have intravenous morphine overnight.  Her vital signs   remained stable, she remained afebrile.       ____________________________________     STEVEN MARIANO MD    MED / [facility]    DD:  2018 06:32:05  DT:  2018 08:15:42    D#:  [id number]  Job#:  [id number]

## 2018-02-26 ENCOUNTER — NON-PROVIDER VISIT (OUTPATIENT)
Dept: OCCUPATIONAL MEDICINE | Facility: CLINIC | Age: 33
End: 2018-02-26

## 2018-02-26 DIAGNOSIS — Z02.1 PRE-EMPLOYMENT DRUG SCREENING: ICD-10-CM

## 2018-02-26 LAB
AMP AMPHETAMINE: NORMAL
COC COCAINE: NORMAL
INT CON NEG: NORMAL
INT CON POS: NORMAL
MET METHAMPHETAMINES: NORMAL
OPI OPIATES: NORMAL
PCP PHENCYCLIDINE: NORMAL
POC DRUG COMMENT 753798-OCCUPATIONAL HEALTH: NORMAL
THC: NORMAL

## 2018-02-26 PROCEDURE — 80305 DRUG TEST PRSMV DIR OPT OBS: CPT | Performed by: PREVENTIVE MEDICINE

## 2018-06-22 DIAGNOSIS — Z01.810 PRE-OPERATIVE CARDIOVASCULAR EXAMINATION: ICD-10-CM

## 2018-06-22 DIAGNOSIS — Z01.812 PRE-PROCEDURAL LABORATORY EXAMINATION: ICD-10-CM

## 2018-06-22 LAB
ANION GAP SERPL CALC-SCNC: 6 MMOL/L (ref 0–11.9)
BUN SERPL-MCNC: 12 MG/DL (ref 8–22)
CALCIUM SERPL-MCNC: 8.8 MG/DL (ref 8.5–10.5)
CHLORIDE SERPL-SCNC: 110 MMOL/L (ref 96–112)
CO2 SERPL-SCNC: 23 MMOL/L (ref 20–33)
CREAT SERPL-MCNC: 0.66 MG/DL (ref 0.5–1.4)
ERYTHROCYTE [DISTWIDTH] IN BLOOD BY AUTOMATED COUNT: 43.6 FL (ref 35.9–50)
GLUCOSE SERPL-MCNC: 85 MG/DL (ref 65–99)
HCT VFR BLD AUTO: 39.6 % (ref 37–47)
HGB BLD-MCNC: 13.4 G/DL (ref 12–16)
MCH RBC QN AUTO: 33.9 PG (ref 27–33)
MCHC RBC AUTO-ENTMCNC: 33.8 G/DL (ref 33.6–35)
MCV RBC AUTO: 100.3 FL (ref 81.4–97.8)
PLATELET # BLD AUTO: 191 K/UL (ref 164–446)
PMV BLD AUTO: 8.9 FL (ref 9–12.9)
POTASSIUM SERPL-SCNC: 3.6 MMOL/L (ref 3.6–5.5)
RBC # BLD AUTO: 3.95 M/UL (ref 4.2–5.4)
SODIUM SERPL-SCNC: 139 MMOL/L (ref 135–145)
WBC # BLD AUTO: 4.4 K/UL (ref 4.8–10.8)

## 2018-06-22 PROCEDURE — 36415 COLL VENOUS BLD VENIPUNCTURE: CPT

## 2018-06-22 PROCEDURE — 93010 ELECTROCARDIOGRAM REPORT: CPT | Performed by: INTERNAL MEDICINE

## 2018-06-22 PROCEDURE — 80048 BASIC METABOLIC PNL TOTAL CA: CPT

## 2018-06-22 PROCEDURE — 85027 COMPLETE CBC AUTOMATED: CPT

## 2018-06-22 PROCEDURE — 93005 ELECTROCARDIOGRAM TRACING: CPT

## 2018-06-22 RX ORDER — DULOXETIN HYDROCHLORIDE 60 MG/1
60 CAPSULE, DELAYED RELEASE ORAL EVERY MORNING
COMMUNITY
End: 2019-03-26

## 2018-06-22 RX ORDER — CLONAZEPAM 0.5 MG/1
0.25 TABLET ORAL 2 TIMES DAILY
COMMUNITY
End: 2019-07-19

## 2018-06-22 RX ORDER — OXYCODONE AND ACETAMINOPHEN 7.5; 325 MG/1; MG/1
1 TABLET ORAL PRN
COMMUNITY
End: 2019-03-26

## 2018-06-23 LAB — EKG IMPRESSION: NORMAL

## 2018-07-09 ENCOUNTER — HOSPITAL ENCOUNTER (OUTPATIENT)
Facility: MEDICAL CENTER | Age: 33
End: 2018-07-10
Attending: SPECIALIST | Admitting: SPECIALIST
Payer: MEDICAID

## 2018-07-09 DIAGNOSIS — G89.18 POST-OP PAIN: Primary | ICD-10-CM

## 2018-07-09 PROCEDURE — 160041 HCHG SURGERY MINUTES - EA ADDL 1 MIN LEVEL 4: Performed by: SPECIALIST

## 2018-07-09 PROCEDURE — 160035 HCHG PACU - 1ST 60 MINS PHASE I: Performed by: SPECIALIST

## 2018-07-09 PROCEDURE — 700102 HCHG RX REV CODE 250 W/ 637 OVERRIDE(OP): Performed by: SPECIALIST

## 2018-07-09 PROCEDURE — 501838 HCHG SUTURE GENERAL: Performed by: SPECIALIST

## 2018-07-09 PROCEDURE — 160048 HCHG OR STATISTICAL LEVEL 1-5: Performed by: SPECIALIST

## 2018-07-09 PROCEDURE — A4338 INDWELLING CATHETER LATEX: HCPCS | Performed by: SPECIALIST

## 2018-07-09 PROCEDURE — 160029 HCHG SURGERY MINUTES - 1ST 30 MINS LEVEL 4: Performed by: SPECIALIST

## 2018-07-09 PROCEDURE — 160009 HCHG ANES TIME/MIN: Performed by: SPECIALIST

## 2018-07-09 PROCEDURE — 700111 HCHG RX REV CODE 636 W/ 250 OVERRIDE (IP)

## 2018-07-09 PROCEDURE — 500594 HCHG GELPORT: Performed by: SPECIALIST

## 2018-07-09 PROCEDURE — 500002 HCHG ADHESIVE, DERMABOND: Performed by: SPECIALIST

## 2018-07-09 PROCEDURE — 500854 HCHG NEEDLE, INSUFFLATION FOR STEP: Performed by: SPECIALIST

## 2018-07-09 PROCEDURE — G0378 HOSPITAL OBSERVATION PER HR: HCPCS

## 2018-07-09 PROCEDURE — 500800 HCHG LAPAROSCOPIC J/L HOOK: Performed by: SPECIALIST

## 2018-07-09 PROCEDURE — 700101 HCHG RX REV CODE 250

## 2018-07-09 PROCEDURE — 160002 HCHG RECOVERY MINUTES (STAT): Performed by: SPECIALIST

## 2018-07-09 PROCEDURE — 501579 HCHG TROCAR, STEP 5MM: Performed by: SPECIALIST

## 2018-07-09 PROCEDURE — A9270 NON-COVERED ITEM OR SERVICE: HCPCS | Performed by: SPECIALIST

## 2018-07-09 PROCEDURE — 500886 HCHG PACK, LAPAROSCOPY: Performed by: SPECIALIST

## 2018-07-09 PROCEDURE — 500123 HCHG BOVIE, CONTROL W/BLADE: Performed by: SPECIALIST

## 2018-07-09 RX ORDER — ONDANSETRON 2 MG/ML
INJECTION INTRAMUSCULAR; INTRAVENOUS
Status: COMPLETED
Start: 2018-07-09 | End: 2018-07-09

## 2018-07-09 RX ORDER — ONDANSETRON 2 MG/ML
4 INJECTION INTRAMUSCULAR; INTRAVENOUS EVERY 6 HOURS PRN
Status: DISCONTINUED | OUTPATIENT
Start: 2018-07-09 | End: 2018-07-10 | Stop reason: HOSPADM

## 2018-07-09 RX ORDER — IBUPROFEN 800 MG/1
800 TABLET ORAL EVERY 8 HOURS PRN
Status: DISCONTINUED | OUTPATIENT
Start: 2018-07-09 | End: 2018-07-10 | Stop reason: HOSPADM

## 2018-07-09 RX ORDER — SIMETHICONE 80 MG
80 TABLET,CHEWABLE ORAL EVERY 8 HOURS PRN
Status: DISCONTINUED | OUTPATIENT
Start: 2018-07-09 | End: 2018-07-10 | Stop reason: HOSPADM

## 2018-07-09 RX ORDER — SODIUM CHLORIDE, SODIUM LACTATE, POTASSIUM CHLORIDE, CALCIUM CHLORIDE 600; 310; 30; 20 MG/100ML; MG/100ML; MG/100ML; MG/100ML
1000 INJECTION, SOLUTION INTRAVENOUS
Status: COMPLETED | OUTPATIENT
Start: 2018-07-09 | End: 2018-07-09

## 2018-07-09 RX ORDER — AMOXICILLIN 250 MG
2 CAPSULE ORAL
Status: DISCONTINUED | OUTPATIENT
Start: 2018-07-09 | End: 2018-07-10 | Stop reason: HOSPADM

## 2018-07-09 RX ORDER — ZOLPIDEM TARTRATE 5 MG/1
5 TABLET ORAL NIGHTLY PRN
Status: DISCONTINUED | OUTPATIENT
Start: 2018-07-09 | End: 2018-07-10 | Stop reason: HOSPADM

## 2018-07-09 RX ORDER — HALOPERIDOL 5 MG/ML
INJECTION INTRAMUSCULAR
Status: COMPLETED
Start: 2018-07-09 | End: 2018-07-09

## 2018-07-09 RX ORDER — OXYCODONE HYDROCHLORIDE AND ACETAMINOPHEN 5; 325 MG/1; MG/1
1-2 TABLET ORAL EVERY 4 HOURS PRN
Status: DISCONTINUED | OUTPATIENT
Start: 2018-07-09 | End: 2018-07-10 | Stop reason: HOSPADM

## 2018-07-09 RX ADMIN — ONDANSETRON 4 MG: 2 INJECTION INTRAMUSCULAR; INTRAVENOUS at 14:50

## 2018-07-09 RX ADMIN — FENTANYL CITRATE 50 MCG: 50 INJECTION, SOLUTION INTRAMUSCULAR; INTRAVENOUS at 14:55

## 2018-07-09 RX ADMIN — IBUPROFEN 800 MG: 800 TABLET, FILM COATED ORAL at 20:48

## 2018-07-09 RX ADMIN — FENTANYL CITRATE 50 MCG: 50 INJECTION, SOLUTION INTRAMUSCULAR; INTRAVENOUS at 14:50

## 2018-07-09 RX ADMIN — HALOPERIDOL LACTATE 1 MG: 5 INJECTION, SOLUTION INTRAMUSCULAR at 15:00

## 2018-07-09 RX ADMIN — SODIUM CHLORIDE, SODIUM LACTATE, POTASSIUM CHLORIDE, CALCIUM CHLORIDE 1000 ML: 600; 310; 30; 20 INJECTION, SOLUTION INTRAVENOUS at 11:45

## 2018-07-09 RX ADMIN — OXYCODONE HYDROCHLORIDE AND ACETAMINOPHEN 1 TABLET: 5; 325 TABLET ORAL at 21:42

## 2018-07-09 RX ADMIN — OXYCODONE HYDROCHLORIDE AND ACETAMINOPHEN 2 TABLET: 5; 325 TABLET ORAL at 17:18

## 2018-07-09 ASSESSMENT — PAIN SCALES - GENERAL
PAINLEVEL_OUTOF10: 2
PAINLEVEL_OUTOF10: 3
PAINLEVEL_OUTOF10: 2
PAINLEVEL_OUTOF10: 7
PAINLEVEL_OUTOF10: 2
PAINLEVEL_OUTOF10: 8
PAINLEVEL_OUTOF10: 6
PAINLEVEL_OUTOF10: 7
PAINLEVEL_OUTOF10: 2
PAINLEVEL_OUTOF10: 1
PAINLEVEL_OUTOF10: 2
PAINLEVEL_OUTOF10: 0
PAINLEVEL_OUTOF10: 3

## 2018-07-09 ASSESSMENT — COGNITIVE AND FUNCTIONAL STATUS - GENERAL
DAILY ACTIVITIY SCORE: 24
SUGGESTED CMS G CODE MODIFIER MOBILITY: CH
SUGGESTED CMS G CODE MODIFIER DAILY ACTIVITY: CH
MOBILITY SCORE: 24

## 2018-07-09 ASSESSMENT — PATIENT HEALTH QUESTIONNAIRE - PHQ9
1. LITTLE INTEREST OR PLEASURE IN DOING THINGS: NOT AT ALL
SUM OF ALL RESPONSES TO PHQ9 QUESTIONS 1 AND 2: 0
2. FEELING DOWN, DEPRESSED, IRRITABLE, OR HOPELESS: NOT AT ALL

## 2018-07-09 ASSESSMENT — LIFESTYLE VARIABLES: EVER_SMOKED: YES

## 2018-07-09 NOTE — H&P
Marian Kent          YOB: 1985  Date of today's surgery:  Facility: [facility]    ID: The patient is a very pleasant 32-year-old multipara (para-3, and [place] has had 3 previous  sections).    Chief Complaint: The patient complains of pelvic pain.    History of Present Illness: The patient has a history of pelvic pain and she has in the past been diagnosed as having endometriosis.  She does see pain management.  Previous surgeries include 3 previous  sections, multiple laparoscopies, laparoscopic removal of left-sided ruptured tubal ectopic pregnancy via left salpingectomy, laparoscopic bilateral tubal ligation, and multiple diagnostic laparoscopies.  She is scheduled today to have laparoscopy, both diagnostic and if necessary and depending on findings at the time of laparoscopy, operative.  I have discussed with her and explained to her in detail and at length what diagnostic and operative laparoscopy is and what diagnostic and operative laparoscopy involves and I have discussed with her and explained to her in detail and at length the risks and benefits and alternatives of diagnostic and operative laparoscopy.  After our discussions and after answering her questions she told me that she would like for us to proceed with laparoscopy, both diagnostic and if necessary and depending on findings at the time of laparoscopy, operative.    Past Medical History: The patient says she has a history of depression and panic attacks.    Past Surgical History: The patient has had 3 previous  sections.  She has had multiple laparoscopies.  She has had a hysterectomy.    Family history: non contributory.    Medications: The patient takes Percocet 10/325 (she says that this is given to her by her pain management specialist) and Cymbalta and Klonopin.    Allergies: The patient says that she has no known drug allergies.    Social History: The patient says that she  smokes about one half of a pack of cigarettes per day.  She says she does not consume alcoholic beverages.  She denies using recreational drugs.    Review of Systems  General: The patient denies any fevers, chills, sweats.  Pulmonary: The patient denies any coughing, wheezing, chest pain, shortness of breath.  Cardiovascular: The patient denies any palpitations, dyspnea, chest pain.  Gastrointestinal: The patient denies any nausea, vomiting, diarrhea, constipation, hematochezia, melena, history of hepatitis, history of jaundice.  Genitourinary: The patient complains of pelvic pain.  Musculoskeletal: The patient denies any arthralgias or myalgias.   Neurological: No headaches or syncope or seizures.     Physical Exam:   Vital Signs: The patient's vital signs are stable and she is afebrile.  General: The patient appears well developed and well nourished and relaxed and alert and comfortable and in no apparent distress.    HEENT :  Normo-cephalic, atraumatic, pupils equal, round, reactive to light and accommodation, extra ocular motions intact, pharynx clear; there is no thyromegaly. There is no cervical lymphadenopathy.  Chest: Heart regular rate and rhythm, with no murmurs or rubs or gallops; the lungs are clear to auscultation bilaterally.  Abdomen: The abdomen is soft and flat and non-tender and non-distended. There is no hepatomegaly. There is no splenomegaly.   Pelvic: The uterus is absent.  Extremities: No clubbing or cyanosis or edema.   Neurological: non-focal.     Assessment:   Pelvic pain.  History of endometriosis.    Plan:   We will proceed today with laparoscopy, both diagnostic and if necessary and depending on findings at the time of laparoscopy, operative.  Please see above.            ________________________  Mark Price M.D.

## 2018-07-09 NOTE — OP REPORT
DATE OF SERVICE:  07/09/2018    PREOPERATIVE DIAGNOSES:  Pelvic pain, history of endometriosis.    POSTOPERATIVE DIAGNOSES:  Pelvic pain, small endometriosis lesion seen on the   right ovary.    PROCEDURES PERFORMED:  Laparoscopy with laparoscopic cauterization of   endometriosis on the right ovary.    SURGEON:  Mark Price MD    ANESTHESIA:  General endotracheal tube anesthesia.    ANESTHESIOLOGIST:  Jose Soares MD    FINDINGS:  Speculum exam under anesthesia reveals absence of the cervix and   reveals that there are no vulvar or vaginal lesions and the vaginal cuff   appears normal.  At laparoscopy, absence of the uterus is found.  The vaginal   cuff appears normal.  Absence of both fallopian tubes was noted.  The left   ovary is normal.  The left ovarian fossa is normal.  There is a small   endometriosis lesion seen on the right ovary.  The right ovarian fossa was   normal.  This cul-de-sac is normal.  The vaginal cuff is normal.    SPECIMENS:  None.    COMPLICATIONS:  None.    ESTIMATED BLOOD LOSS:  Less than 20 mL.    DESCRIPTION OF PROCEDURE:  After appropriate consents have been obtained, the   patient was taken to the operating room and given general anesthesia.  She is   prepped and draped in the dorsal lithotomy position and the bladder is emptied   with a catheter.  A speculum exam is performed and reveals that there are no   vulvar or vaginal lesions and reveals that the cervix is absent.  The vaginal   cuff is well visualized and appears normal.  The speculum was removed.  The   's gloves were changed.  Attention is directed to the abdomen where a   small (approximately 1 cm) horizontal infraumbilical incision was made with a   scalpel.  A Veress needle was advanced through this incision into the   peritoneal cavity and proper placement in the peritoneal cavity was verified   with a Matta hanging drop technique.  The peritoneal cavity was then   insufflated with approximately 2-3  liters of carbon dioxide gas.  The Veress   needle was removed and a 5 mm port was introduced through the infraumbilical   incision into the peritoneal cavity utilizing the VersaStep trocar system.    The central portion of this port was removed and the 5 mm 0-degree laparoscope   was inserted through the remaining sleeve and proper entry in the peritoneal   cavity was verified visually with laparoscope.  The patient was placed in   Trendelenburg position.  A 5 mm port was placed suprapubically under direct   laparoscopic visualization utilizing the VersaStep trocar system.  A Prestige   instrument was placed through the suprapubic port.  Findings are as noted   above.  A monopolar cautery instrument (namely the J hook) was placed through   the suprapubic port and used to cauterize an endometriosis lesion on the right   ovary.  Hemostasis is noted to be excellent.  Of note, the laparoscope was   placed through the suprapubic port to observe the infraumbilical port.  The   laparoscope was then placed through the infraumbilical port.  Again, findings   are as noted above.  Laparoscope was removed and air is allowed to escape the   peritoneal cavity and both ports were removed.  Skin incisions were   reapproximated with placement of multiple interrupted buried sutures of 4-0   Monocryl placed in the dermis.  The vaginal sponge stick was removed.  It   should be noted that during this procedure, the vaginal sponge stick was used   to delineate the vaginal cuff and the vaginal cuff was correctly identified   and examined during laparoscopy.  The procedure was terminated.  Final lap and   needle counts reported to be correct x2 at the end of the procedure.  The   patient tolerated the procedure well and sent to postanesthesia recovery in   stable condition.       ____________________________________     STEVEN MARIANO MD    MED / NTS    DD:  07/09/2018 15:37:52  DT:  07/09/2018 15:49:16    D#:  [id number]  Job#:   [id number]    cc: ALYSSA BALLESTEROS MD

## 2018-07-09 NOTE — OR SURGEON
Immediate Post OP Note    PreOp Diagnosis:   Pelvic pain.  History of endometriosis.    PostOp Diagnosis:   Pelvic pain  Small endometriosis lesion on the right ovary.     Procedure(s):  PELVISCOPY-DIAGNOSTIC - Wound Class: Clean Contaminated    Surgeon(s):  Mark Price M.D.    Anesthesiologist/Type of Anesthesia:  Anesthesiologist: Jose Soares M.D./General    Surgical Staff:  Circulator: Adilene Rucker R.N.  Scrub Person: Kassandra Arenas    Specimens removed if any:  None     Estimated Blood Loss:   Less than 20 cc's     Findings:   The uterus is absent  There is a small endometriosis lesion on the right ovary.     Complications:   None.         7/9/2018 2:30 PM Mark Price M.D.

## 2018-07-10 VITALS
HEIGHT: 63 IN | BODY MASS INDEX: 23.2 KG/M2 | DIASTOLIC BLOOD PRESSURE: 70 MMHG | WEIGHT: 130.95 LBS | OXYGEN SATURATION: 93 % | RESPIRATION RATE: 18 BRPM | SYSTOLIC BLOOD PRESSURE: 98 MMHG | HEART RATE: 64 BPM | TEMPERATURE: 98.3 F

## 2018-07-10 LAB
BASOPHILS # BLD AUTO: 0.3 % (ref 0–1.8)
BASOPHILS # BLD: 0.02 K/UL (ref 0–0.12)
EOSINOPHIL # BLD AUTO: 0 K/UL (ref 0–0.51)
EOSINOPHIL NFR BLD: 0 % (ref 0–6.9)
ERYTHROCYTE [DISTWIDTH] IN BLOOD BY AUTOMATED COUNT: 43.5 FL (ref 35.9–50)
HCT VFR BLD AUTO: 37 % (ref 37–47)
HGB BLD-MCNC: 12.3 G/DL (ref 12–16)
IMM GRANULOCYTES # BLD AUTO: 0.02 K/UL (ref 0–0.11)
IMM GRANULOCYTES NFR BLD AUTO: 0.3 % (ref 0–0.9)
LYMPHOCYTES # BLD AUTO: 1.11 K/UL (ref 1–4.8)
LYMPHOCYTES NFR BLD: 18.3 % (ref 22–41)
MCH RBC QN AUTO: 33.7 PG (ref 27–33)
MCHC RBC AUTO-ENTMCNC: 33.2 G/DL (ref 33.6–35)
MCV RBC AUTO: 101.4 FL (ref 81.4–97.8)
MONOCYTES # BLD AUTO: 0.4 K/UL (ref 0–0.85)
MONOCYTES NFR BLD AUTO: 6.6 % (ref 0–13.4)
NEUTROPHILS # BLD AUTO: 4.5 K/UL (ref 2–7.15)
NEUTROPHILS NFR BLD: 74.5 % (ref 44–72)
NRBC # BLD AUTO: 0 K/UL
NRBC BLD-RTO: 0 /100 WBC
PLATELET # BLD AUTO: 224 K/UL (ref 164–446)
PMV BLD AUTO: 8.5 FL (ref 9–12.9)
RBC # BLD AUTO: 3.65 M/UL (ref 4.2–5.4)
WBC # BLD AUTO: 6.1 K/UL (ref 4.8–10.8)

## 2018-07-10 PROCEDURE — 36415 COLL VENOUS BLD VENIPUNCTURE: CPT

## 2018-07-10 PROCEDURE — 700102 HCHG RX REV CODE 250 W/ 637 OVERRIDE(OP): Performed by: SPECIALIST

## 2018-07-10 PROCEDURE — A9270 NON-COVERED ITEM OR SERVICE: HCPCS | Performed by: SPECIALIST

## 2018-07-10 PROCEDURE — G0378 HOSPITAL OBSERVATION PER HR: HCPCS

## 2018-07-10 PROCEDURE — 85025 COMPLETE CBC W/AUTO DIFF WBC: CPT

## 2018-07-10 RX ORDER — IBUPROFEN 800 MG/1
800 TABLET ORAL EVERY 8 HOURS PRN
Qty: 30 TAB | Refills: 0 | Status: SHIPPED | OUTPATIENT
Start: 2018-07-10 | End: 2019-03-26

## 2018-07-10 RX ORDER — OXYCODONE AND ACETAMINOPHEN 10; 325 MG/1; MG/1
1 TABLET ORAL EVERY 6 HOURS PRN
Qty: 5 TAB | Refills: 0 | Status: SHIPPED | OUTPATIENT
Start: 2018-07-10 | End: 2018-07-12

## 2018-07-10 RX ADMIN — OXYCODONE HYDROCHLORIDE AND ACETAMINOPHEN 1 TABLET: 5; 325 TABLET ORAL at 04:15

## 2018-07-10 RX ADMIN — OXYCODONE HYDROCHLORIDE AND ACETAMINOPHEN 2 TABLET: 5; 325 TABLET ORAL at 10:29

## 2018-07-10 ASSESSMENT — PAIN SCALES - GENERAL
PAINLEVEL_OUTOF10: 3
PAINLEVEL_OUTOF10: 8

## 2018-07-10 NOTE — PROGRESS NOTES
The patient is today postoperative day #1 status post laparoscopy with laparoscopic cauterization of endometriosis lesion on the right ovary. She tells me today that she feels fine and that she has no problems or complaints.  The patient's vital signs are stable and she is afebrile.  The patient appears well-developed and well-nourished and relaxed and alert and comfortable and in no apparent distress.  The patient's hemoglobin went from 13.4 g/dL preoperatively to 12.3 g/dL postoperatively (this morning). Her white blood count this morning is normal at 6.1 and her platelet count this morning is normal at 224,000.  She is open to being sent home today. She says she usually takes for Percocet 10/325 per day and she says that this is prescribed to her by her pain management specialist.  She says she does not have an appointment with her pain management specialist until tomorrow and that she is completely out of analgesics.  I wrote for her a prescription for Percocet 10/325 one orally every 6 hours as needed for pain, #5, and I also wrote for her prescription for high-dose ibuprofen.  I asked her to follow up with me in the office in about 2 weeks for postoperative visit and to call or contact me at any time should she ever have any problems or questions or complaints and she said that she would do so.   Mark Price M.D.

## 2018-07-10 NOTE — DISCHARGE INSTRUCTIONS
Discharge Instructions    Discharged to home by car with friend. Discharged via walking, hospital escort: Yes.  Special equipment needed: Not Applicable    Be sure to schedule a follow-up appointment with your primary care doctor or any specialists as instructed.     Discharge Plan:   Diet Plan: Discussed  Activity Level: Discussed  Smoking Cessation Offered: Patient Counseled  Confirmed Follow up Appointment: Patient to Call and Schedule Appointment  Confirmed Symptoms Management: Discussed  Medication Reconciliation Updated: Yes  Pneumococcal Vaccine Administered/Refused:  (received in December 2017)  Influenza Vaccine Indication: Indicated: Not available from distributor/    I understand that a diet low in cholesterol, fat, and sodium is recommended for good health. Unless I have been given specific instructions below for another diet, I accept this instruction as my diet prescription.   Other diet: Regular diet as tolerated    Special Instructions:   Monitor for signs and symptoms of infection (fever, chills, nausea, vomiting)  Monitor incisions for swelling, redness, or drainage      Endometriosis  Endometriosis is a condition in which the tissue that lines the uterus (endometrium) grows outside of its normal location. The tissue may grow in many locations close to the uterus, but it commonly grows on the ovaries, fallopian tubes, vagina, or bowel. When the uterus sheds the endometrium every menstrual cycle, there is bleeding wherever the endometrial tissue is located. This can cause pain because blood is irritating to tissues that are not normally exposed to it.  What are the causes?  The cause of endometriosis is not known.  What increases the risk?  You may be more likely to develop endometriosis if you:  · Have a family history of endometriosis.  · Have never given birth.  · Started your period at age 10 or younger.  · Have high levels of estrogen in your body.  · Were exposed to a certain  medicine (diethylstilbestrol) before you were born (in utero).  · Had low birth weight.  · Were born as a twin, triplet, or other multiple.  · Have a BMI of less than 25. BMI is an estimate of body fat and is calculated from height and weight.  What are the signs or symptoms?  Often, there are no symptoms of this condition. If you do have symptoms, they may:  · Vary depending on where your endometrial tissue is growing.  · Occur during your menstrual period (most common) or midcycle.  · Come and go, or you may go months with no symptoms at all.  · Stop with menopause.  Symptoms may include:  · Pain in the back or abdomen.  · Heavier bleeding during periods.  · Pain during sex.  · Painful bowel movements.  · Infertility.  · Pelvic pain.  · Bleeding more than once a month.  How is this diagnosed?  This condition is diagnosed based on your symptoms and a physical exam. You may have tests, such as:  · Blood tests and urine tests. These may be done to help rule out other possible causes of your symptoms.  · Ultrasound, to look for abnormal tissues.  · An X-ray of the lower bowel (barium enema).  · An ultrasound that is done through the vagina (transvaginally).  · CT scan.  · MRI.  · Laparoscopy. In this procedure, a lighted, pencil-sized instrument called a laparoscope is inserted into your abdomen through an incision. The laparoscope allows your health care provider to look at the organs inside your body and check for abnormal tissue to confirm the diagnosis. If abnormal tissue is found, your health care provider may remove a small piece of tissue (biopsy) to be examined under a microscope.  How is this treated?  Treatment for this condition may include:  · Medicines to relieve pain, such as NSAIDs.  · Hormone therapy. This involves using artificial (synthetic) hormones to reduce endometrial tissue growth. Your health care provider may recommend using a hormonal form of birth control, or other medicines.  · Surgery.  This may be done to remove abnormal endometrial tissue.  ¨ In some cases, tissue may be removed using a laparoscope and a laser (laparoscopic laser treatment).  ¨ In severe cases, surgery may be done to remove the fallopian tubes, uterus, and ovaries (hysterectomy).  Follow these instructions at home:  · Take over-the-counter and prescription medicines only as told by your health care provider.  · Do not drive or use heavy machinery while taking prescription pain medicine.  · Try to avoid activities that cause pain, including sexual activity.  · Keep all follow-up visits as told by your health care provider. This is important.  Contact a health care provider if:  · You have pain in the area between your hip bones (pelvic area) that occurs:  ¨ Before, during, or after your period.  ¨ In between your period and gets worse during your period.  ¨ During or after sex.  ¨ With bowel movements or urination, especially during your period.  · You have problems getting pregnant.  · You have a fever.  Get help right away if:  · You have severe pain that does not get better with medicine.  · You have severe nausea and vomiting, or you cannot eat without vomiting.  · You have pain that affects only the lower, right side of your abdomen.  · You have abdominal pain that gets worse.  · You have abdominal swelling.  · You have blood in your stool.  This information is not intended to replace advice given to you by your health care provider. Make sure you discuss any questions you have with your health care provider.  Document Released: 12/15/2001 Document Revised: 09/22/2017 Document Reviewed: 05/20/2017  [employer] Interactive Patient Education © 2017 [employer]          Depression / Suicide Risk    As you are discharged from this [facility] facility, it is important to learn how to keep safe from harming yourself.    Recognize the warning signs:  · Abrupt changes in personality, positive or negative- including increase in  energy   · Giving away possessions  · Change in eating patterns- significant weight changes-  positive or negative  · Change in sleeping patterns- unable to sleep or sleeping all the time   · Unwillingness or inability to communicate  · Depression  · Unusual sadness, discouragement and loneliness  · Talk of wanting to die  · Neglect of personal appearance   · Rebelliousness- reckless behavior  · Withdrawal from people/activities they love  · Confusion- inability to concentrate     If you or a loved one observes any of these behaviors or has concerns about self-harm, here's what you can do:  · Talk about it- your feelings and reasons for harming yourself  · Remove any means that you might use to hurt yourself (examples: pills, rope, extension cords, firearm)  · Get professional help from the community (Mental Health, Substance Abuse, psychological counseling)  · Do not be alone:Call your Safe Contact- someone whom you trust who will be there for you.  · Call your local CRISIS HOTLINE 342-2649 or 230-738-2475  · Call your local Children's Mobile Crisis Response Team [place] (432) 564-2459 or www.[employer]  · Call the toll free National Suicide Prevention Hotlines   · National Suicide Prevention Lifeline 296-985-VWXS (5955)  · National Hope Line Network 800-SUICIDE (149-2685)

## 2018-07-10 NOTE — PROGRESS NOTES
Assumed care at 0700. Received report from night shift RN. Bedside report completed. AOx4.    C/o pain-medicated.  Denies nausea.  Tolerating diet. +voiding.   Ambulating with steady gait. Pt call light and belongings within reach, fall precautions in place. Family at bedside.

## 2018-07-10 NOTE — PROGRESS NOTES
Pt transferred to unit from PACU via pt transport.  AAOx4.  Able to ambulate to bed.  Friend present at bedside.  Lap sites noted to abdomen with adhesive bandage in place.  Pt up to bathroom to void.  Full liquid diet in place, no c/o n/v.  Pt rating pain at 8/10, medicated per MAR.  Pt reporting a sore throat.  Pt requesting nicotine patch, none ordered at this time.   POC reviewed with pt.  Call light within reach, pt educated to call for assistance.  Hourly rounding in place.

## 2018-07-10 NOTE — CARE PLAN
Problem: Venous Thromboembolism (VTW)/Deep Vein Thrombosis (DVT) Prevention:  Goal: Patient will participate in Venous Thrombosis (VTE)/Deep Vein Thrombosis (DVT)Prevention Measures  Outcome: PROGRESSING AS EXPECTED  Pt refusing SCDs at this time. DVT prophylaxis education provided. Pt continuing to refuse. Pt up ambulating self. Encouraging ambulation.     Problem: Pain Management  Goal: Pain level will decrease to patient's comfort goal  Outcome: PROGRESSING AS EXPECTED  PO percocet provided. Ice pack and heat pack offered. Extra pillows in use. Providing rest and quiet environment.

## 2018-07-10 NOTE — PROGRESS NOTES
Discharging Patient home per physician order.  Discharged with Friends.  Demonstrated understanding of discharge instructions, follow up appointments, home medications, prescriptions, home care for surgical wound and nursing care instructions.  Ambulating without assistance, voiding without difficulty, pain well controlled, tolerating oral medications, oxygen saturation greater than 90%, tolerating diet.  Educational handouts given and discussed.  Verbalized understanding of discharge instructions and educational handouts.  All questions answered.  Belongings with patient at time of discharge.

## 2018-12-28 ENCOUNTER — NON-PROVIDER VISIT (OUTPATIENT)
Dept: URGENT CARE | Facility: CLINIC | Age: 33
End: 2018-12-28

## 2018-12-28 DIAGNOSIS — Z11.1 PPD SCREENING TEST: ICD-10-CM

## 2018-12-28 PROCEDURE — 86580 TB INTRADERMAL TEST: CPT | Performed by: PHYSICIAN ASSISTANT

## 2019-01-01 ENCOUNTER — NON-PROVIDER VISIT (OUTPATIENT)
Dept: URGENT CARE | Facility: CLINIC | Age: 34
End: 2019-01-01

## 2019-01-01 LAB — TB WHEAL 3D P 5 TU DIAM: NORMAL MM

## 2019-01-14 ENCOUNTER — NON-PROVIDER VISIT (OUTPATIENT)
Dept: OCCUPATIONAL MEDICINE | Facility: CLINIC | Age: 34
End: 2019-01-14

## 2019-01-14 DIAGNOSIS — Z11.1 ENCOUNTER FOR PPD TEST: ICD-10-CM

## 2019-01-14 PROCEDURE — 86580 TB INTRADERMAL TEST: CPT | Performed by: PREVENTIVE MEDICINE

## 2019-01-21 ENCOUNTER — TELEPHONE (OUTPATIENT)
Dept: MEDICAL GROUP | Facility: MEDICAL CENTER | Age: 34
End: 2019-01-21

## 2019-01-21 NOTE — TELEPHONE ENCOUNTER
Left message with patient about no show to appointment today 1/21/19.  Explained that this was her first no show and the no show policy.

## 2019-02-19 ENCOUNTER — OFFICE VISIT (OUTPATIENT)
Dept: MEDICAL GROUP | Facility: MEDICAL CENTER | Age: 34
End: 2019-02-19
Attending: FAMILY MEDICINE
Payer: MEDICAID

## 2019-02-19 VITALS
OXYGEN SATURATION: 95 % | BODY MASS INDEX: 27.05 KG/M2 | HEART RATE: 95 BPM | WEIGHT: 147 LBS | HEIGHT: 62 IN | TEMPERATURE: 99.1 F | SYSTOLIC BLOOD PRESSURE: 106 MMHG | DIASTOLIC BLOOD PRESSURE: 72 MMHG | RESPIRATION RATE: 16 BRPM

## 2019-02-19 DIAGNOSIS — N80.9 ENDOMETRIOSIS: ICD-10-CM

## 2019-02-19 DIAGNOSIS — F41.9 ANXIETY: ICD-10-CM

## 2019-02-19 DIAGNOSIS — Z72.0 TOBACCO ABUSE DISORDER: ICD-10-CM

## 2019-02-19 DIAGNOSIS — K40.90 LEFT INGUINAL HERNIA: ICD-10-CM

## 2019-02-19 PROCEDURE — 99202 OFFICE O/P NEW SF 15 MIN: CPT | Performed by: FAMILY MEDICINE

## 2019-02-19 PROCEDURE — 99203 OFFICE O/P NEW LOW 30 MIN: CPT | Performed by: FAMILY MEDICINE

## 2019-02-20 NOTE — ASSESSMENT & PLAN NOTE
New patient reports a 2.5-month history of recurrent tender swelling that will protrude from her left groin.  Oftentimes it will received when she lies supine.  She reports that she will have daily to every second day bowel movement which is her normal pattern.  She is not seen any black or bloody stools or diarrhea.  She denies any emesis or abdominal early satiety.

## 2019-02-20 NOTE — PROGRESS NOTES
Chief Complaint   Patient presents with   • Establish Care   • Hernia         HISTORY OF THE PRESENT ILLNESS: Patient is a 33 y.o. female. This pleasant patient is here today to establish care and discuss the following problems      Left inguinal hernia  New patient reports a 2.5-month history of recurrent tender swelling that will protrude from her left groin.  Oftentimes it will received when she lies supine.  She reports that she will have daily to every second day bowel movement which is her normal pattern.  She is not seen any black or bloody stools or diarrhea.  She denies any emesis or abdominal early satiety.    Endometriosis  Patient reports past history of endometriosis however has subsequently had a partial hysterectomy along with removal of bilateral tubes.  Ovaries were spared.    Anxiety  Patient [place] [facility] [place].  She takes Lorazepam 1 mg on a as needed basis and duloxetine 60 mg daily.  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  She is assisted with  by her mom who lives nearby.  She denies confusion or suicidal ideation.    Social history-single, 3 children, working as a caregiver  Allergies: Vicodin [hydrocodone-acetaminophen]    Current Outpatient Prescriptions Ordered in [facility]   Medication Sig Dispense Refill   • ibuprofen (MOTRIN) 800 MG Tab Take 1 Tab by mouth every 8 hours as needed for Moderate Pain. 30 Tab 0   • DULoxetine (CYMBALTA) 60 MG Cap DR Particles delayed-release capsule Take 60 mg by mouth every morning.     • clonazePAM (KLONOPIN) 0.5 MG Tab Take 0.25 mg by mouth 2 times a day.     • oxyCODONE-acetaminophen (PERCOCET) 7.5-325 MG per tablet Take 1 Tab by mouth as needed.     • duloxetine (CYMBALTA) 30 MG Cap DR Particles Take 30 mg by mouth every evening.       No current Epic-ordered facility-administered medications on file.        Past Medical History:   Diagnosis Date   •  "Anesthesia     PONV   • Anxiety 2019   • EP (ectopic pregnancy)    • Gynecological disorder     adhesions/cysts   • Hydronephrosis right 1/3/2014   • Pain     pelvic   • Pain 2018    \"Stomach\"   • Pain management     With Dr. Barger in [place]   • Placenta previa diagnosed with US at [facility] 2013   • Psychiatric problem     depressions/cymbalta, anxiety   • PTSD (post-traumatic stress disorder)        Past Surgical History:   Procedure Laterality Date   • PELVISCOPY N/A 2018    Procedure: PELVISCOPY-DIAGNOSTIC;  Surgeon: Mark Mariano M.D.;  Location: SURGERY SAME DAY [facility];  Service: Gynecology   • LAPAROSCOPY  2017    Procedure: Exploratory laparoscopy;  Surgeon: Mark Mariano M.D.;  Location: SURGERY [facility];  Service: Gynecology   • LAPAROSCOPIC LYSIS OF ADHESIONS  2017    Procedure: LAPAROSCOPIC LYSIS OF ADHESIONS;  Surgeon: Mark Mariano M.D.;  Location: SURGERY [facility];  Service: Gynecology   • CYSTOSCOPY  2016    Procedure: CYSTOSCOPY;  Surgeon: Mark Mariano M.D.;  Location: SURGERY SAME DAY [facility];  Service:    • HYSTERECTOMY LAPAROSCOPY  2016    Procedure: HYSTERECTOMY LAPAROSCOPY RIGHT SALPINGECTOMY;  Surgeon: Mark Mariano M.D.;  Location: SURGERY SAME DAY [facility];  Service:    • PELVISCOPY N/A 2016    Procedure: PELVISCOPY Diagnostic;  Surgeon: Mark Mariano M.D.;  Location: SURGERY [facility];  Service:    • REPEAT C SECTION  2014    Performed by Mark Mariano M.D. at [facility] DELIVERY   • PELVISCOPY  2011    Performed by MARK MARIANO at SURGERY [facility]   • SALPINGECTOMY  2011    Performed by MARK MARIANO at SURGERY [facility]   • REPEAT C SECTION  2007   • PRIMARY C SECTION  2006   • GYN SURGERY       x 2   • [facility]  DELIVERY SER   &        Social History   Substance Use Topics   • Smoking status: Current Every Day Smoker     " Packs/day: 1.00     Years: 10.00     Types: Cigarettes   • Smokeless tobacco: Never Used   • Alcohol use No       Family Status   Relation Status   • Mo Alive   • Fa Alive   • Sis Alive   • MGMo    • MGFa    • PGMo    • PGFa    • Neg Hx (Not Specified)     Family History   Problem Relation Age of Onset   • Cancer Father 45        colon   • Other Maternal Grandfather    • Cancer Paternal Grandmother         breast   • Diabetes Paternal Grandmother    • Heart Disease Neg Hx    • Stroke Neg Hx        Review of Systems   Constitutional: Negative for fever, chills, weight loss and malaise/fatigue.   HENT: Negative for ear pain, nosebleeds, congestion, sore throat and neck pain.    Eyes: Negative for blurred vision.   Respiratory: Negative for cough, sputum production, shortness of breath and wheezing.    Cardiovascular: Negative for chest pain, palpitations, orthopnea and leg swelling.   Gastrointestinal: Negative for heartburn, nausea, vomiting.  Positive for intermittent left inguinal pain  Genitourinary: Negative for dysuria, urgency and frequency.   Musculoskeletal: Negative for myalgias, back pain and joint pain.   Skin: Negative for rash and itching.   Neurological: Negative for dizziness, tingling, tremors, sensory change, focal weakness and headaches.   Endo/Heme/Allergies: Does not bruise/bleed easily.   Psychiatric/Behavioral: Positive for depression, anxiety, without memory loss           Exam: Blood pressure 106/72, pulse 95, temperature 37.3 °C (99.1 °F), temperature source Temporal, resp. rate 16, weight 66.7 kg (147 lb), SpO2 95 %, not currently breastfeeding.  Height 5 feet 2 inches  General: Normal appearing. No distress.  HEENT: Normocephalic. Eyes conjunctiva clear lids without ptosis, pupils equal and reactive to light accommodation, ears normal shape and contour, canals are clear bilaterally, tympanic membranes are benign, nasal mucosa benign, oropharynx is without  erythema, edema or exudates.   Neck: Supple without JVD or bruit. Thyroid is not enlarged.  Pulmonary: Clear to ausculation.  Normal effort. No rales, ronchi, or wheezing.  Cardiovascular: Regular rate and rhythm without murmur. Carotid and radial pulses are intact and equal bilaterally.  Abdomen: Soft, nontender, nondistended. Normal bowel sounds. Liver and spleen are not palpable  Neurologic: Intact light touch and strength bilaterally,normal speech, no tremor, normal gait.   Lymph: No cervical, supraclavicular or axillary lymph nodes are palpable  Skin: Warm and dry.  No obvious lesions.  Musculoskeletal: Normal gait. No extremity cyanosis, clubbing, or edema.  Psych: Normal mood and affect. Alert and oriented x3. Judgment and insight is normal.    Please note that this dictation was created using voice recognition software. I have made every reasonable attempt to correct obvious errors, but I expect that there are errors of grammar and possibly content that I did not discover before finalizing the note.      Assessment/Plan  1. Left inguinal hernia  REFERRAL TO GENERAL SURGERY   2. Anxiety     3. Endometriosis     4. Tobacco abuse disorder       Plan: 1.  General surgical referral sent  2.  Patient was cautioned about using benzodiazepines (Xanax) and opiates concurrently due to risk of respiratory depression-she reports that she does not take those 2 medications in the same day.

## 2019-02-20 NOTE — ASSESSMENT & PLAN NOTE
Patient [place] [facility] [place].  She takes Lorazepam 1 mg on a as needed basis and duloxetine 60 mg daily.  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  She is assisted with  by her mom who lives nearby.  She denies confusion or suicidal ideation.

## 2019-02-20 NOTE — ASSESSMENT & PLAN NOTE
Patient reports past history of endometriosis however has subsequently had a partial hysterectomy along with removal of bilateral tubes.  Ovaries were spared.

## 2019-03-19 ENCOUNTER — APPOINTMENT (OUTPATIENT)
Dept: ADMISSIONS | Facility: MEDICAL CENTER | Age: 34
End: 2019-03-19
Payer: MEDICAID

## 2019-03-22 ENCOUNTER — APPOINTMENT (OUTPATIENT)
Dept: ADMISSIONS | Facility: MEDICAL CENTER | Age: 34
End: 2019-03-22
Payer: MEDICAID

## 2019-03-26 ENCOUNTER — APPOINTMENT (OUTPATIENT)
Dept: ADMISSIONS | Facility: MEDICAL CENTER | Age: 34
End: 2019-03-26
Attending: SURGERY
Payer: MEDICAID

## 2019-03-26 ENCOUNTER — APPOINTMENT (OUTPATIENT)
Dept: ADMISSIONS | Facility: MEDICAL CENTER | Age: 34
End: 2019-03-26
Payer: MEDICAID

## 2019-03-26 RX ORDER — LORAZEPAM 1 MG/1
1 TABLET ORAL PRN
COMMUNITY
End: 2019-07-19

## 2019-03-26 RX ORDER — OXYCODONE AND ACETAMINOPHEN 7.5; 325 MG/1; MG/1
1 TABLET ORAL EVERY 4 HOURS PRN
COMMUNITY
End: 2019-07-19

## 2019-03-26 RX ORDER — FLUOXETINE HYDROCHLORIDE 20 MG/1
20 CAPSULE ORAL EVERY MORNING
COMMUNITY
End: 2019-07-19

## 2019-03-26 RX ORDER — OXYCODONE HYDROCHLORIDE 10 MG/1
10 TABLET ORAL 3 TIMES DAILY PRN
COMMUNITY
End: 2019-07-19

## 2019-03-26 RX ORDER — ALPRAZOLAM 0.5 MG/1
0.5 TABLET ORAL
COMMUNITY
End: 2019-07-19

## 2019-03-27 ENCOUNTER — ANESTHESIA EVENT (OUTPATIENT)
Dept: SURGERY | Facility: MEDICAL CENTER | Age: 34
End: 2019-03-27
Payer: MEDICAID

## 2019-03-28 ENCOUNTER — ANESTHESIA (OUTPATIENT)
Dept: SURGERY | Facility: MEDICAL CENTER | Age: 34
End: 2019-03-28
Payer: MEDICAID

## 2019-03-28 ENCOUNTER — HOSPITAL ENCOUNTER (OUTPATIENT)
Facility: MEDICAL CENTER | Age: 34
End: 2019-03-28
Attending: SURGERY | Admitting: SURGERY
Payer: MEDICAID

## 2019-03-28 VITALS
HEART RATE: 88 BPM | TEMPERATURE: 97.5 F | RESPIRATION RATE: 14 BRPM | HEIGHT: 63 IN | OXYGEN SATURATION: 95 % | WEIGHT: 148.37 LBS | BODY MASS INDEX: 26.29 KG/M2 | DIASTOLIC BLOOD PRESSURE: 72 MMHG | SYSTOLIC BLOOD PRESSURE: 103 MMHG

## 2019-03-28 PROCEDURE — 503366 HCHG TROCAR, 12X150 KII FIOS Z THR: Performed by: SURGERY

## 2019-03-28 PROCEDURE — 700101 HCHG RX REV CODE 250

## 2019-03-28 PROCEDURE — 502714 HCHG ROBOTIC SURGERY SERVICES: Performed by: SURGERY

## 2019-03-28 PROCEDURE — 160048 HCHG OR STATISTICAL LEVEL 1-5: Performed by: SURGERY

## 2019-03-28 PROCEDURE — C1781 MESH (IMPLANTABLE): HCPCS | Performed by: SURGERY

## 2019-03-28 PROCEDURE — 700101 HCHG RX REV CODE 250: Performed by: ANESTHESIOLOGY

## 2019-03-28 PROCEDURE — 160029 HCHG SURGERY MINUTES - 1ST 30 MINS LEVEL 4: Performed by: SURGERY

## 2019-03-28 PROCEDURE — 160009 HCHG ANES TIME/MIN: Performed by: SURGERY

## 2019-03-28 PROCEDURE — 700111 HCHG RX REV CODE 636 W/ 250 OVERRIDE (IP): Performed by: ANESTHESIOLOGY

## 2019-03-28 PROCEDURE — 500868 HCHG NEEDLE, SURGI(VARES): Performed by: SURGERY

## 2019-03-28 PROCEDURE — 700111 HCHG RX REV CODE 636 W/ 250 OVERRIDE (IP)

## 2019-03-28 PROCEDURE — 160035 HCHG PACU - 1ST 60 MINS PHASE I: Performed by: SURGERY

## 2019-03-28 PROCEDURE — 500002 HCHG ADHESIVE, DERMABOND: Performed by: SURGERY

## 2019-03-28 PROCEDURE — 501838 HCHG SUTURE GENERAL: Performed by: SURGERY

## 2019-03-28 PROCEDURE — 700102 HCHG RX REV CODE 250 W/ 637 OVERRIDE(OP): Performed by: ANESTHESIOLOGY

## 2019-03-28 PROCEDURE — 160046 HCHG PACU - 1ST 60 MINS PHASE II: Performed by: SURGERY

## 2019-03-28 PROCEDURE — 160036 HCHG PACU - EA ADDL 30 MINS PHASE I: Performed by: SURGERY

## 2019-03-28 PROCEDURE — 160002 HCHG RECOVERY MINUTES (STAT): Performed by: SURGERY

## 2019-03-28 PROCEDURE — 160041 HCHG SURGERY MINUTES - EA ADDL 1 MIN LEVEL 4: Performed by: SURGERY

## 2019-03-28 PROCEDURE — A9270 NON-COVERED ITEM OR SERVICE: HCPCS | Performed by: ANESTHESIOLOGY

## 2019-03-28 PROCEDURE — 700105 HCHG RX REV CODE 258: Performed by: ANESTHESIOLOGY

## 2019-03-28 PROCEDURE — 160025 RECOVERY II MINUTES (STATS): Performed by: SURGERY

## 2019-03-28 DEVICE — MESH PROGRIP LAPROSCOPIC SELF FIXATING (1/CA): Type: IMPLANTABLE DEVICE | Site: GROIN | Status: FUNCTIONAL

## 2019-03-28 RX ORDER — SODIUM CHLORIDE, SODIUM LACTATE, POTASSIUM CHLORIDE, CALCIUM CHLORIDE 600; 310; 30; 20 MG/100ML; MG/100ML; MG/100ML; MG/100ML
INJECTION, SOLUTION INTRAVENOUS
Status: DISCONTINUED | OUTPATIENT
Start: 2019-03-28 | End: 2019-03-28 | Stop reason: SURG

## 2019-03-28 RX ORDER — HYDROMORPHONE HYDROCHLORIDE 1 MG/ML
0.4 INJECTION, SOLUTION INTRAMUSCULAR; INTRAVENOUS; SUBCUTANEOUS
Status: DISCONTINUED | OUTPATIENT
Start: 2019-03-28 | End: 2019-03-28 | Stop reason: HOSPADM

## 2019-03-28 RX ORDER — OXYCODONE HCL 5 MG/5 ML
5 SOLUTION, ORAL ORAL
Status: COMPLETED | OUTPATIENT
Start: 2019-03-28 | End: 2019-03-28

## 2019-03-28 RX ORDER — HYDROMORPHONE HYDROCHLORIDE 1 MG/ML
0.2 INJECTION, SOLUTION INTRAMUSCULAR; INTRAVENOUS; SUBCUTANEOUS
Status: DISCONTINUED | OUTPATIENT
Start: 2019-03-28 | End: 2019-03-28 | Stop reason: HOSPADM

## 2019-03-28 RX ORDER — KETAMINE HYDROCHLORIDE 50 MG/ML
INJECTION, SOLUTION INTRAMUSCULAR; INTRAVENOUS PRN
Status: DISCONTINUED | OUTPATIENT
Start: 2019-03-28 | End: 2019-03-28 | Stop reason: SURG

## 2019-03-28 RX ORDER — KETOROLAC TROMETHAMINE 30 MG/ML
INJECTION, SOLUTION INTRAMUSCULAR; INTRAVENOUS PRN
Status: DISCONTINUED | OUTPATIENT
Start: 2019-03-28 | End: 2019-03-28 | Stop reason: SURG

## 2019-03-28 RX ORDER — BUPIVACAINE HYDROCHLORIDE AND EPINEPHRINE 5; 5 MG/ML; UG/ML
INJECTION, SOLUTION EPIDURAL; INTRACAUDAL; PERINEURAL
Status: DISCONTINUED | OUTPATIENT
Start: 2019-03-28 | End: 2019-03-28 | Stop reason: HOSPADM

## 2019-03-28 RX ORDER — OXYCODONE HCL 5 MG/5 ML
10 SOLUTION, ORAL ORAL
Status: COMPLETED | OUTPATIENT
Start: 2019-03-28 | End: 2019-03-28

## 2019-03-28 RX ORDER — SODIUM CHLORIDE, SODIUM LACTATE, POTASSIUM CHLORIDE, CALCIUM CHLORIDE 600; 310; 30; 20 MG/100ML; MG/100ML; MG/100ML; MG/100ML
INJECTION, SOLUTION INTRAVENOUS CONTINUOUS
Status: DISCONTINUED | OUTPATIENT
Start: 2019-03-28 | End: 2019-03-28 | Stop reason: HOSPADM

## 2019-03-28 RX ORDER — DIPHENHYDRAMINE HYDROCHLORIDE 50 MG/ML
12.5 INJECTION INTRAMUSCULAR; INTRAVENOUS
Status: DISCONTINUED | OUTPATIENT
Start: 2019-03-28 | End: 2019-03-28 | Stop reason: HOSPADM

## 2019-03-28 RX ORDER — HALOPERIDOL 5 MG/ML
1 INJECTION INTRAMUSCULAR
Status: DISCONTINUED | OUTPATIENT
Start: 2019-03-28 | End: 2019-03-28 | Stop reason: HOSPADM

## 2019-03-28 RX ORDER — ONDANSETRON 2 MG/ML
INJECTION INTRAMUSCULAR; INTRAVENOUS PRN
Status: DISCONTINUED | OUTPATIENT
Start: 2019-03-28 | End: 2019-03-28 | Stop reason: SURG

## 2019-03-28 RX ORDER — LORAZEPAM 2 MG/ML
2 INJECTION INTRAMUSCULAR
Status: DISCONTINUED | OUTPATIENT
Start: 2019-03-28 | End: 2019-03-28 | Stop reason: HOSPADM

## 2019-03-28 RX ORDER — MEPERIDINE HYDROCHLORIDE 25 MG/ML
12.5 INJECTION INTRAMUSCULAR; INTRAVENOUS; SUBCUTANEOUS
Status: DISCONTINUED | OUTPATIENT
Start: 2019-03-28 | End: 2019-03-28 | Stop reason: HOSPADM

## 2019-03-28 RX ORDER — HYDROMORPHONE HYDROCHLORIDE 1 MG/ML
0.1 INJECTION, SOLUTION INTRAMUSCULAR; INTRAVENOUS; SUBCUTANEOUS
Status: DISCONTINUED | OUTPATIENT
Start: 2019-03-28 | End: 2019-03-28 | Stop reason: HOSPADM

## 2019-03-28 RX ORDER — ONDANSETRON 2 MG/ML
4 INJECTION INTRAMUSCULAR; INTRAVENOUS
Status: COMPLETED | OUTPATIENT
Start: 2019-03-28 | End: 2019-03-28

## 2019-03-28 RX ORDER — CEFAZOLIN SODIUM 1 G/3ML
INJECTION, POWDER, FOR SOLUTION INTRAMUSCULAR; INTRAVENOUS PRN
Status: DISCONTINUED | OUTPATIENT
Start: 2019-03-28 | End: 2019-03-28 | Stop reason: SURG

## 2019-03-28 RX ORDER — DEXAMETHASONE SODIUM PHOSPHATE 4 MG/ML
INJECTION, SOLUTION INTRA-ARTICULAR; INTRALESIONAL; INTRAMUSCULAR; INTRAVENOUS; SOFT TISSUE PRN
Status: DISCONTINUED | OUTPATIENT
Start: 2019-03-28 | End: 2019-03-28 | Stop reason: SURG

## 2019-03-28 RX ADMIN — FENTANYL CITRATE 50 MCG: 50 INJECTION, SOLUTION INTRAMUSCULAR; INTRAVENOUS at 16:28

## 2019-03-28 RX ADMIN — PROPOFOL 200 MG: 10 INJECTION, EMULSION INTRAVENOUS at 14:44

## 2019-03-28 RX ADMIN — MIDAZOLAM 5 MG: 1 INJECTION INTRAMUSCULAR; INTRAVENOUS at 14:37

## 2019-03-28 RX ADMIN — ROCURONIUM BROMIDE 50 MG: 10 INJECTION, SOLUTION INTRAVENOUS at 14:44

## 2019-03-28 RX ADMIN — SODIUM CHLORIDE, POTASSIUM CHLORIDE, SODIUM LACTATE AND CALCIUM CHLORIDE: 600; 310; 30; 20 INJECTION, SOLUTION INTRAVENOUS at 14:38

## 2019-03-28 RX ADMIN — HYDROMORPHONE HYDROCHLORIDE 0.2 MG: 1 INJECTION, SOLUTION INTRAMUSCULAR; INTRAVENOUS; SUBCUTANEOUS at 17:30

## 2019-03-28 RX ADMIN — KETAMINE HYDROCHLORIDE 30 MG: 50 INJECTION, SOLUTION, CONCENTRATE INTRAMUSCULAR; INTRAVENOUS at 14:44

## 2019-03-28 RX ADMIN — KETOROLAC TROMETHAMINE 30 MG: 30 INJECTION, SOLUTION INTRAMUSCULAR at 15:27

## 2019-03-28 RX ADMIN — FENTANYL CITRATE 300 MCG: 50 INJECTION, SOLUTION INTRAMUSCULAR; INTRAVENOUS at 14:44

## 2019-03-28 RX ADMIN — ONDANSETRON 4 MG: 2 INJECTION INTRAMUSCULAR; INTRAVENOUS at 16:30

## 2019-03-28 RX ADMIN — LIDOCAINE HYDROCHLORIDE 60 MG: 20 INJECTION, SOLUTION INFILTRATION; PERINEURAL at 14:44

## 2019-03-28 RX ADMIN — DEXAMETHASONE SODIUM PHOSPHATE 6 MG: 4 INJECTION, SOLUTION INTRAMUSCULAR; INTRAVENOUS at 14:50

## 2019-03-28 RX ADMIN — SUGAMMADEX 150 MG: 100 INJECTION, SOLUTION INTRAVENOUS at 15:33

## 2019-03-28 RX ADMIN — HYDROMORPHONE HYDROCHLORIDE 0.4 MG: 1 INJECTION, SOLUTION INTRAMUSCULAR; INTRAVENOUS; SUBCUTANEOUS at 16:51

## 2019-03-28 RX ADMIN — CEFAZOLIN 2 G: 330 INJECTION, POWDER, FOR SOLUTION INTRAMUSCULAR; INTRAVENOUS at 14:51

## 2019-03-28 RX ADMIN — OXYCODONE HYDROCHLORIDE 10 MG: 5 SOLUTION ORAL at 16:48

## 2019-03-28 RX ADMIN — ONDANSETRON 4 MG: 2 INJECTION INTRAMUSCULAR; INTRAVENOUS at 15:27

## 2019-03-28 NOTE — ANESTHESIA TIME REPORT
Anesthesia Start and Stop Event Times     Date Time Event    3/28/2019 1438 Anesthesia Start     1601 Anesthesia Stop        Responsible Staff  03/28/19    Name Role Begin End    Bronwyn Clifford M.D. Anesth 1438 1601        Preop Diagnosis (Free Text):  Pre-op Diagnosis     left inguinal hernia        Preop Diagnosis (Codes):  Diagnosis Information     Diagnosis Code(s):         Post op Diagnosis  Left inguinal hernia      Premium Reason  A. 3PM - 7AM    Comments:

## 2019-03-28 NOTE — ANESTHESIA PROCEDURE NOTES
Airway  Date/Time: 3/28/2019 2:46 PM  Performed by: MAIN BENITES  Authorized by: MAIN BENITES     Location:  OR  Urgency:  Elective  Indications for Airway Management:  Anesthesia  Spontaneous Ventilation: absent    Sedation Level:  Deep  Preoxygenated: Yes    Patient Position:  Sniffing  Mask Difficulty Assessment:  1 - vent by mask  Final Airway Type:  Endotracheal airway  Final Endotracheal Airway:  ETT  Cuffed: Yes    Technique Used for Successful ETT Placement:  Direct laryngoscopy  Insertion Site:  Oral  Blade Type:  Elena  Laryngoscope Blade/Videolaryngoscope Blade Size:  3  ETT Size (mm):  7.0  Measured from:  Teeth  ETT to Teeth (cm):  22  Placement Verified by: auscultation and capnometry    Cormack-Lehane Classification:  Grade I - full view of glottis  Number of Attempts at Approach:  1

## 2019-03-28 NOTE — DISCHARGE INSTRUCTIONS
ACTIVITY: Rest and take it easy for the first 24 hours.  A responsible adult is recommended to remain with you during that time.  It is normal to feel sleepy.  We encourage you to not do anything that requires balance, judgment or coordination.    MILD FLU-LIKE SYMPTOMS ARE NORMAL. YOU MAY EXPERIENCE GENERALIZED MUSCLE ACHES, THROAT IRRITATION, HEADACHE AND/OR SOME NAUSEA.    FOR 24 HOURS DO NOT:  Drive, operate machinery or run household appliances.  Drink beer or alcoholic beverages.   Make important decisions or sign legal documents.    SPECIAL INSTRUCTIONS: see hand-out    DIET: To avoid nausea, slowly advance diet as tolerated, avoiding spicy or greasy foods for the first day.  Add more substantial food to your diet according to your physician's instructions.  Babies can be fed formula or breast milk as soon as they are hungry.  INCREASE FLUIDS AND FIBER TO AVOID CONSTIPATION.    SURGICAL DRESSING/BATHING: May shower in 24 hours. No tub baths/hot tubs/swimming until cleared by your doctor.     FOLLOW-UP APPOINTMENT:  A follow-up appointment should be arranged with your doctor ; call to schedule.    You should CALL YOUR PHYSICIAN if you develop:  Fever greater than 101 degrees F.  Pain not relieved by medication, or persistent nausea or vomiting.  Excessive bleeding (blood soaking through dressing) or unexpected drainage from the wound.  Extreme redness or swelling around the incision site, drainage of pus or foul smelling drainage.  Inability to urinate or empty your bladder within 8 hours.  Problems with breathing or chest pain.    You should call 911 if you develop problems with breathing or chest pain.  If you are unable to contact your doctor or surgical center, you should go to the nearest emergency room or urgent care center.  Physician's telephone #: DR. Cooley [phone]    If any questions arise, call your doctor.  If your doctor is not available, please feel free to call the Surgical Center at  [phone].  The Center is open Monday through Friday from 7AM to 7PM.  You can also call the HEALTH HOTLINE open 24 hours/day, 7 days/week and speak to a nurse at [phone], or toll free at [phone].    A registered nurse may call you a few days after your surgery to see how you are doing after your procedure.    MEDICATIONS: Resume taking daily medication.  Take prescribed pain medication with food.  If no medication is prescribed, you may take non-aspirin pain medication if needed.  PAIN MEDICATION CAN BE VERY CONSTIPATING.  Take a stool softener or laxative such as senokot, pericolace, or milk of magnesia if needed.      Last pain medication given at 4:48 PM, next dose at 8:48 PM.    If your physician has prescribed pain medication that includes Acetaminophen (Tylenol), do not take additional Acetaminophen (Tylenol) while taking the prescribed medication.    Depression / Suicide Risk    As you are discharged from this [facility] facility, it is important to learn how to keep safe from harming yourself.    Recognize the warning signs:  · Abrupt changes in personality, positive or negative- including increase in energy   · Giving away possessions  · Change in eating patterns- significant weight changes-  positive or negative  · Change in sleeping patterns- unable to sleep or sleeping all the time   · Unwillingness or inability to communicate  · Depression  · Unusual sadness, discouragement and loneliness  · Talk of wanting to die  · Neglect of personal appearance   · Rebelliousness- reckless behavior  · Withdrawal from people/activities they love  · Confusion- inability to concentrate     If you or a loved one observes any of these behaviors or has concerns about self-harm, here's what you can do:  · Talk about it- your feelings and reasons for harming yourself  · Remove any means that you might use to hurt yourself (examples: pills, rope, extension cords, firearm)  · Get professional help from  the community (Mental Health, Substance Abuse, psychological counseling)  · Do not be alone:Call your Safe Contact- someone whom you trust who will be there for you.  · Call your local CRISIS HOTLINE 777-9605 or 128-033-0630  · Call your local Children's Mobile Crisis Response Team [place] (246) 260-8992 or www.Neema  · Call the toll free National Suicide Prevention Hotlines   · National Suicide Prevention Lifeline 157-053-DUKW (4734)  · National Hope Line Network 800-SUICIDE (859-2287)

## 2019-03-28 NOTE — ANESTHESIA QCDR
2019 [facility] Clinical Data Registry (for Quality Improvement)     Postoperative nausea/vomiting risk protocol (Adult = 18 yrs and Pediatric 3-17 yrs)- (430 and 463)  General inhalation anesthetic (NOT TIVA) with PONV risk factors: Yes  Provision of anti-emetic therapy with at least 2 different classes of agents: Yes   Patient DID NOT receive anti-emetic therapy and reason is documented in Medical Record:  N/A    Multimodal Pain Management- (AQI59)  Patient undergoing Elective Surgery (i.e. Outpatient, or ASC, or Prescheduled Surgery prior to Hospital Admission): Yes  Use of Multimodal Pain Management, two or more drugs and/or interventions, NOT including systemic opioids: Yes   Exception: Documented allergy to multiple classes of analgesics:  N/A    PACU assessment of acute postoperative pain prior to Anesthesia Care End- Applies to Patients Age = 18- (ABG7)  Initial PACU pain score is which of the following: < 7/10  Patient unable to report pain score: N/A    Post-anesthetic transfer of care checklist/protocol to PACU/ICU- (426 and 427)  Upon conclusion of case, patient transferred to which of the following locations: PACU/Non-ICU  Use of transfer checklist/protocol: Yes  Exclusion: Service Performed in Patient Hospital Room (and thus did not require transfer): N/A    PACU Reintubation- (AQI31)  General anesthesia requiring endotracheal intubation (ETT) along with subsequent extubation in OR or PACU: Yes  Required reintubation in the PACU: No   Extubation was a planned trial documented in the medical record prior to removal of the original airway device:  N/A    Unplanned admission to ICU related to anesthesia service up through end of PACU care- (MD51)  Unplanned admission to ICU (not initially anticipated at anesthesia start time): No

## 2019-03-28 NOTE — ANESTHESIA PREPROCEDURE EVALUATION
Relevant Problems   Within Normal Limits   ANESTHESIA   CARDIAC   GI   PULMONARY       Physical Exam    Airway   Mallampati: I  TM distance: >3 FB  Neck ROM: full       Cardiovascular    Dental - normal exam         Pulmonary    Abdominal - normal exam     Neurological              Anesthesia Plan    ASA 3 (chronic pain, chronic opioid use)   ASA physical status 3 criteria: other (comment)    Plan - general       Airway plan will be ETT        Induction: intravenous          Informed Consent:    Anesthetic plan and risks discussed with patient.

## 2019-03-28 NOTE — OP REPORT
DATE OF SERVICE:  03/28/2019    SURGEON:  Chris Cooley MD    ASSISTANT:  Ari Jett MD    PREOPERATIVE DIAGNOSIS:  Left inguinal hernia.    POSTOPERATIVE DIAGNOSIS:  Left indirect inguinal hernia.    PROCEDURE PERFORMED:  Robotic repair of left inguinal hernia with mesh.    ANESTHESIA:  General endotracheal anesthesia.    ANESTHESIOLOGIST:  Bronwyn Clifford MD    INDICATIONS:  A 33-year-old woman with a symptomatic left inguinal hernia.    Repair is indicated.    DESCRIPTION OF PROCEDURE:  Procedure discussed in detail with the patient   including the use of surgical robot, potential for converting to an open   procedure, and associated risk of bleeding, infection, abscess, and hematoma.    I also discussed use of mesh, risk of recurrence, risk of vascular injury,   nerve injury, injury to intraperitoneal organs, and chronic pain.  She   understood all the above and wished to proceed.  She was placed under   anesthesia by Dr. Clifford.  Her abdomen was prepped and draped with   chlorhexidine prep and sterile drapes.  After a timeout, a supraumbilical   incision was made, and through this incision, a Veress needle was placed.  Low   pressure was confirmed and CO2 insufflation was used to achieve a   pneumoperitoneum of 15 mmHg.  Through the same incision, a 12 mm port was   placed.  Under direct visualization, right and left-sided da Espinoza ports were   placed.  Instruments were placed.  A left indirect hernia was readily   identified.  Peritoneum was incised and a preperitoneal dissection was   undertaken.  The indirect sac was completely reduced.  Round ligament was   divided.  A space was created in the pelvic floor for placement of the mesh.    The mesh was then placed and noted to nicely cover the pelvic floor including   the hernia defect.  Peritoneum was then reapproximated over the mesh using a   running 2-0 PDS suture.  This needle was then removed.  Instruments removed.    The robot was undocked.  The  12 mm port was removed and the fascia at that   site was approximated with a 0 Vicryl stitch using the Endoclose device.    Remaining ports were removed.  Pneumoperitoneum was released.  The skin on all   incisions was approximated with 4-0 Vicryl sutures.  Dermabond was placed.    Patient tolerated the procedure well without apparent complication.  Final   counts were reported as correct.       ____________________________________     MD DARLENE RIVAS / MERY    DD:  03/28/2019 15:38:29  DT:  03/28/2019 15:58:35    D#:  [id number]  Job#:  [id number]    cc: JOHAN ONEIL MD

## 2019-03-28 NOTE — OR NURSING
1551 Patient arrived from OR on [facility]. Report received from RN and Anesthesia. Patient's VS WNL, patient arousable, stable, breathing even/non labored. Incision sites CDI.   1628 Pt c/o 8/10 pain, medicated see MAR. Girlfriend at bedside.  1742 D/c instructions reviewed with patient and girlfriend, copy given.   1800 Pt up to bathroom, voided  1805 d/c criteria met, PIV out, d/cd via wheelchair, belongings with patient.

## 2019-03-28 NOTE — OR SURGEON
Immediate Post OP Note    PreOp Diagnosis: left inguinal hernia    PostOp Diagnosis: left indirect inguinal hernia    Procedure(s):  INGUINAL HERNIA REPAIR ROBOTIC WITH MESH PLACEMENT   - Wound Class: Clean    Surgeon(s):  RAMIRO Meyer D.O.    Anesthesiologist/Type of Anesthesia:  Anesthesiologist: Bronwyn Clifford M.D./General    Surgical Staff:  Circulator: Rosey Pineda R.N.; Nevin Rubio R.N.  Scrub Person: Lamont Arenas; Kim Herrera    Specimens removed if any:  * No specimens in log *    Estimated Blood Loss: 25 cc    Findings: indirect left inguinal hernia    Complications: none        3/28/2019 3:38 PM Chris Cooley M.D.

## 2019-03-29 ASSESSMENT — PAIN SCALES - GENERAL: PAIN_LEVEL: 3

## 2019-03-29 NOTE — ANESTHESIA POSTPROCEDURE EVALUATION
Patient: Marian Kent    Procedure Summary     Date:  03/28/19 Room / Location:  [facility] ROOM 25 / SURGERY SAME DAY [facility] ORS    Anesthesia Start:  1438 Anesthesia Stop:  1601    Procedure:  INGUINAL HERNIA REPAIR ROBOTIC WITH MESH PLACEMENT   (Left Groin) Diagnosis:  (left inguinal hernia)    Surgeon:  Chris Cooley M.D. Responsible Provider:  Bronwyn Clifford M.D.    Anesthesia Type:  general ASA Status:  3          Final Anesthesia Type: general  Last vitals  BP   Blood Pressure: 103/72, NIBP: (!) 99/63    Temp   36.4 °C (97.5 °F)    Pulse   Pulse: 88, Heart Rate (Monitored): 99   Resp   14    SpO2   95 %      Anesthesia Post Evaluation    Patient location during evaluation: bedside  Patient participation: complete - patient participated  Level of consciousness: awake  Pain score: 3    Airway patency: patent  Anesthetic complications: no  Cardiovascular status: adequate  Respiratory status: acceptable  Hydration status: acceptable    PONV: none           Nurse Pain Score: 3 (NPRS)

## 2019-05-08 ENCOUNTER — OFFICE VISIT (OUTPATIENT)
Dept: MEDICAL GROUP | Facility: MEDICAL CENTER | Age: 34
End: 2019-05-08
Attending: FAMILY MEDICINE
Payer: MEDICAID

## 2019-05-08 VITALS
OXYGEN SATURATION: 94 % | TEMPERATURE: 98.2 F | BODY MASS INDEX: 27.6 KG/M2 | WEIGHT: 150 LBS | DIASTOLIC BLOOD PRESSURE: 76 MMHG | HEIGHT: 62 IN | RESPIRATION RATE: 16 BRPM | HEART RATE: 92 BPM | SYSTOLIC BLOOD PRESSURE: 110 MMHG

## 2019-05-08 DIAGNOSIS — F32.1 CURRENT MODERATE EPISODE OF MAJOR DEPRESSIVE DISORDER, UNSPECIFIED WHETHER RECURRENT (HCC): ICD-10-CM

## 2019-05-08 DIAGNOSIS — F41.9 ANXIETY: ICD-10-CM

## 2019-05-08 PROCEDURE — 99213 OFFICE O/P EST LOW 20 MIN: CPT | Performed by: FAMILY MEDICINE

## 2019-05-08 RX ORDER — ALPRAZOLAM 0.5 MG/1
0.5 TABLET ORAL NIGHTLY PRN
Qty: 30 TAB | Refills: 0 | Status: SHIPPED | OUTPATIENT
Start: 2019-05-08 | End: 2019-06-07

## 2019-05-08 RX ORDER — CITALOPRAM 20 MG/1
20 TABLET ORAL DAILY
Qty: 30 TAB | Refills: 3 | Status: SHIPPED | OUTPATIENT
Start: 2019-05-08 | End: 2019-06-08

## 2019-05-08 NOTE — PROGRESS NOTES
"Subjective:      Marian Kent is a 33 y.o. female who presents with Depression and Anxiety            HPI 1.  Depression-patient reports increasing problems with anxiety and depression.  She was seen at [facility] prior to insurance contract difficulties.  She recalls taking Zoloft for approximately 2 months with partial improvement.  Then that was switched to fluoxetine which she took for 4 months and felt that that was of no benefit.  She reports active symptoms currently of markedly decreased energy, poor sleep and increased anxiety with occasional anxiety episodes.  She finds that severe anxiety episodes can be treated with Xanax 0.5 mg taken up to 2 times a day on a bad day, and on good days not using any alprazolam.    ROS negative for chest pain, hallucinations, confusion       Objective:     /76 (BP Location: Left arm, Patient Position: Sitting, BP Cuff Size: Adult)   Pulse 92   Temp 36.8 °C (98.2 °F) (Temporal)   Resp 16   Ht 1.575 m (5' 2.01\")   Wt 68 kg (150 lb)   LMP 07/22/2016 (Exact Date)   SpO2 94%   BMI 27.43 kg/m²      Physical Exam  Gen.- alert, cooperative, in no acute distress  Neck- midline trachea, thyroid not enlarged or tender,supple, no cervical adenopathy  Chest-clear to auscultation and percussion with normal breath sounds. No retractions. Chest wall nontender  Cardiac- regular rhythm and rate. No murmur, thrill, or heave  Psych-normal affect with good eye contact. Normal grooming. Oriented x4.            Assessment/Plan:     1. Current moderate episode of major depressive disorder, unspecified whether recurrent ([facility])    Plan: 1.  Begin citalopram 20 mg p.o. daily  2.  Renew alprazolam 0.5 mg, #30  3.  Revisit with me in 1 month if not getting established by that time with psychiatry  4.  Psychiatry referral  Addendum: Patient's pharmacy called us to let us know that she was actually receiving active prescriptions in the past month from 4 5 different " prescribers.  This includes a active prescription for oxycodone and a recent prescription for Lexapro.  We will cancel the alprazolam prescription and advised patient that if she is taking Lexapro, which she did not share with me during her visit, there is no point in adding citalopram as they are essentially the same medication.

## 2019-05-10 ENCOUNTER — TELEPHONE (OUTPATIENT)
Dept: MEDICAL GROUP | Facility: MEDICAL CENTER | Age: 34
End: 2019-05-10

## 2019-05-10 NOTE — TELEPHONE ENCOUNTER
1. Caller Name: China Laila Tillotson                                           Call Back Number: [phone] (home)         Patient approves a detailed voicemail message: yes    Patient called and wanted to speak to MA or PCP about her Xanax rx. Patient is experiencing trouble trying to fill her prescription at [facility] because Samantha the pharmacist informed her that she had picked one up recently.     Patient stated that she spoke to someone in the office yesterday 5/9/19 about issues she may have had about this prescription, but looking into her chart there was no encounters.     I did however, find an addendum on 5/8/19 from PCP stating:   Addendum: Patient's pharmacy called us to let us know that she was actually receiving active prescriptions in the past month from 4 5 different prescribers.  This includes a active prescription for oxycodone and a recent prescription for Lexapro.  We will cancel the alprazolam prescription and advised patient that if she is taking Lexapro, which she did not share with me during her visit, there is no point in adding citalopram as they are essentially the same medication.      Patient was advised of this message. Patient stated that she has not been on Lexapro in a very long time and did not know what is was for or who is was from. Patient stated that she is currently not taking anything for anxiety and would like to have this issue resolved ASAP.     Patient was aware that PCP and MA were out of the office today, and back on Monday.

## 2019-05-13 DIAGNOSIS — F41.9 ANXIETY: ICD-10-CM

## 2019-05-13 NOTE — TELEPHONE ENCOUNTER
Please let patient know that I canceled her prescription for Xanax because I do not prescribe benzodiazepines in combination with oxycodone which is an opiate due to drug drug safety interactions that can stop her breathing.  She may take Lexapro for anxiety and depression if she is not currently taking citalopram which is the same molecule.

## 2019-05-28 ENCOUNTER — HOSPITAL ENCOUNTER (OUTPATIENT)
Dept: RADIOLOGY | Facility: MEDICAL CENTER | Age: 34
End: 2019-05-28

## 2019-06-13 ENCOUNTER — TELEPHONE (OUTPATIENT)
Dept: MEDICAL GROUP | Facility: MEDICAL CENTER | Age: 34
End: 2019-06-13

## 2019-06-13 NOTE — TELEPHONE ENCOUNTER
Left message with patient's friend to call me back.  Emailed patient about no show to appointment today 6/13/19.  Explained this was her 2nd no show and the no show policy.

## 2019-07-03 ENCOUNTER — OFFICE VISIT (OUTPATIENT)
Dept: MEDICAL GROUP | Facility: MEDICAL CENTER | Age: 34
End: 2019-07-03
Attending: FAMILY MEDICINE
Payer: MEDICAID

## 2019-07-03 VITALS
OXYGEN SATURATION: 94 % | DIASTOLIC BLOOD PRESSURE: 74 MMHG | BODY MASS INDEX: 28.34 KG/M2 | RESPIRATION RATE: 16 BRPM | HEIGHT: 62 IN | WEIGHT: 154 LBS | HEART RATE: 72 BPM | TEMPERATURE: 98.6 F | SYSTOLIC BLOOD PRESSURE: 112 MMHG

## 2019-07-03 DIAGNOSIS — H60.391 OTHER INFECTIVE ACUTE OTITIS EXTERNA OF RIGHT EAR: ICD-10-CM

## 2019-07-03 PROCEDURE — 99212 OFFICE O/P EST SF 10 MIN: CPT | Performed by: FAMILY MEDICINE

## 2019-07-03 PROCEDURE — 99213 OFFICE O/P EST LOW 20 MIN: CPT | Performed by: FAMILY MEDICINE

## 2019-07-03 RX ORDER — AMOXICILLIN AND CLAVULANATE POTASSIUM 875; 125 MG/1; MG/1
1 TABLET, FILM COATED ORAL 2 TIMES DAILY
Qty: 20 TAB | Refills: 0 | Status: SHIPPED | OUTPATIENT
Start: 2019-07-03 | End: 2019-07-19

## 2019-07-03 RX ORDER — NEOMYCIN SULFATE, POLYMYXIN B SULFATE AND HYDROCORTISONE 10; 3.5; 1 MG/ML; MG/ML; [USP'U]/ML
5 SUSPENSION/ DROPS AURICULAR (OTIC) 3 TIMES DAILY
Qty: 1 BOTTLE | Refills: 0 | Status: SHIPPED | OUTPATIENT
Start: 2019-07-03 | End: 2019-07-19

## 2019-07-03 ASSESSMENT — PATIENT HEALTH QUESTIONNAIRE - PHQ9: CLINICAL INTERPRETATION OF PHQ2 SCORE: 0

## 2019-07-03 NOTE — PROGRESS NOTES
"Subjective:      Marian Kent is a 33 y.o. female who presents with Otalgia (pt noticed a lump in her rt ear yesterday that has gotten bigger and more painful)            HPI 1.  Otitis externa-patient reported sudden onset yesterday of a tender nodule near the opening of her right ear canal.  She does not recall any trauma.  She does normally just clean her ears out with cotton swabs after a shower.  She does not put any other foreign objects into her ear canals.  There is been no drainage.  Today she noticed more widespread particularly around the anterior and inferior portion of her right external ear.    ROS mild decreased hearing sensitivity right side, negative for throat pain, difficulty swallowing       Objective:     /74 (BP Location: Left arm, Patient Position: Sitting, BP Cuff Size: Adult)   Pulse 72   Temp 37 °C (98.6 °F) (Temporal)   Resp 16   Ht 1.575 m (5' 2.01\")   Wt 69.9 kg (154 lb)   LMP 07/22/2016 (Exact Date)   SpO2 94%   BMI 28.16 kg/m²      Physical Exam  General-alert cooperative female in no acute distress  Ears-1+ tender to motion on the right auricle.  There is a 5 to 7 mm area of redness and slight swelling at about the 4 o'clock position just inside the opening of the right ear canal.  Drum is easily visible and intact with no debris or blood in the ear canal.  Left side is unremarkable    Oropharynx-clear pink moist mucosa without swelling, redness or exudate. Tongue is midline.         Assessment/Plan:     1. Other infective acute otitis externa of right ear    Plan: 1.  Augmentin 875 p.o. twice daily x10 days  2.  Cortisporin Otic ups 3-4 times daily in the right ear  3.  PRN NSAID-GI precautions reviewed    "

## 2019-07-19 ENCOUNTER — OFFICE VISIT (OUTPATIENT)
Dept: PHYSICAL MEDICINE AND REHAB | Facility: MEDICAL CENTER | Age: 34
End: 2019-07-19
Payer: MEDICAID

## 2019-07-19 VITALS
DIASTOLIC BLOOD PRESSURE: 64 MMHG | HEIGHT: 63 IN | OXYGEN SATURATION: 94 % | TEMPERATURE: 98.1 F | HEART RATE: 83 BPM | BODY MASS INDEX: 27.7 KG/M2 | SYSTOLIC BLOOD PRESSURE: 110 MMHG | WEIGHT: 156.31 LBS

## 2019-07-19 DIAGNOSIS — M25.559 ARTHRALGIA OF HIP, UNSPECIFIED LATERALITY: ICD-10-CM

## 2019-07-19 DIAGNOSIS — M25.519 SHOULDER PAIN, UNSPECIFIED CHRONICITY, UNSPECIFIED LATERALITY: ICD-10-CM

## 2019-07-19 DIAGNOSIS — M62.838 MUSCLE SPASM: ICD-10-CM

## 2019-07-19 DIAGNOSIS — M54.2 NECK PAIN: ICD-10-CM

## 2019-07-19 DIAGNOSIS — M79.18 MYOFASCIAL PAIN: ICD-10-CM

## 2019-07-19 DIAGNOSIS — M54.50 LUMBOSACRAL PAIN: ICD-10-CM

## 2019-07-19 DIAGNOSIS — R10.2 PELVIC PAIN: ICD-10-CM

## 2019-07-19 DIAGNOSIS — M54.9 MID BACK PAIN: ICD-10-CM

## 2019-07-19 PROCEDURE — 99204 OFFICE O/P NEW MOD 45 MIN: CPT | Performed by: PHYSICAL MEDICINE & REHABILITATION

## 2019-07-19 RX ORDER — METHOCARBAMOL 500 MG/1
500 TABLET, FILM COATED ORAL 2 TIMES DAILY PRN
Qty: 30 TAB | Refills: 1 | Status: SHIPPED | OUTPATIENT
Start: 2019-07-19 | End: 2019-08-09

## 2019-07-19 RX ORDER — PROPRANOLOL HYDROCHLORIDE 20 MG/1
TABLET ORAL
Refills: 1 | COMMUNITY
Start: 2019-06-24 | End: 2020-02-21

## 2019-07-19 RX ORDER — ARIPIPRAZOLE 15 MG/1
15 TABLET ORAL DAILY
COMMUNITY
End: 2019-08-09

## 2019-07-19 RX ORDER — HYDROCODONE BITARTRATE AND ACETAMINOPHEN 7.5; 325 MG/1; MG/1
1-2 TABLET ORAL 2 TIMES DAILY PRN
COMMUNITY
End: 2019-08-09

## 2019-07-19 ASSESSMENT — PATIENT HEALTH QUESTIONNAIRE - PHQ9: CLINICAL INTERPRETATION OF PHQ2 SCORE: 0

## 2019-07-19 ASSESSMENT — ENCOUNTER SYMPTOMS
PHOTOPHOBIA: 0
HEMOPTYSIS: 0
INSOMNIA: 1
BACK PAIN: 1
MYALGIAS: 1
TINGLING: 1
NAUSEA: 0
SPUTUM PRODUCTION: 0
DOUBLE VISION: 0
NECK PAIN: 1
ORTHOPNEA: 0
FEVER: 0
SENSORY CHANGE: 1
CHILLS: 0
NERVOUS/ANXIOUS: 1
VOMITING: 0
DEPRESSION: 1
PALPITATIONS: 0

## 2019-07-19 NOTE — PROGRESS NOTES
Subjective:      Marian Kent is a 33 y.o. female who presents with New Patient    Chief complaint: Back pain, neck pain      HPI Ms. Kent presents for evaluation of ongoing back and neck pain.  The patient notes a flare of back and neck pain beginning approximately 3 to 4 months ago without specific event or trauma.  She has tried conservative care.  Unfortunately, pain persist    Regarding today's visit:    The patient notes ongoing pain in the lumbosacral region, notes some radiating pain towards the hips, without overt radicular component    Patient notes mid back pain, some posterior chest wall pain, without overt radicular component    The patient notes neck pain, notes intermittent radiating pain towards the right shoulder and arm, without overt radicular component    The patient notes intermittent right shoulder area pain.    The patient has history of chronic pelvic pain, notes history of endometriosis, gynecology consulted, notes pending surgery, some records available for review    No overt carpal tunnel symptoms noted.  No cranial nerve symptomatology noted.    The patient has had prior treatment with medications, including NSAIDs.  She has had a recent course of physical therapy, ATS, records pending, without significant benefit.  She has tried massage therapy, as well as modalities, including ice and heat.  No acute changes with bowel/bladder noted.  No acute changes with strength noted.  She is making an effort with home exercise program.  The ongoing pain limits her ability to function.  She is inquiring about additional treatment options      MEDICAL RECORDS REVIEW/DATA REVIEW: Reviewed in epic.    Records Reviewed: Reviewed referring provider notes.  Reviewed primary care provider notes, most recently from 7/2019    I reviewed medications.  Tried NSAIDs    I reviewed  profile 7/19/2019    I reviewed diagnostic studies:     I reviewed radiographs.     Reviewed lumbar spine x-rays  "11/2016    Reviewed CT cervical spine 11/2016    Reviewed abdominal x-rays 7/2017    Reviewed head CT 11/2016    I reviewed lab studies.  Reviewed labs 4/2019, outside, including CMP, CBC, UA    I reviewed medical issues.  Had relatively recent oral surgery    I reviewed family history: No neuromuscular disorders noted.    I reviewed social issues.  Med tech      PAST MEDICAL HISTORY:   Past Medical History:   Diagnosis Date   • Anesthesia     PONV   • Anxiety 2/19/2019   • Breath shortness     occas. no O2   • EP (ectopic pregnancy)    • Gynecological disorder     adhesions/cysts   • Hydronephrosis right 1/3/2014    stone   • Pain 03/2019    pelvic, back   • Pain 03/2019    \"Stomach\"   • Pain management     With Dr. Barger in [place]   • Placenta previa diagnosed with US at [facility] 12/17/2013   • PONV (postoperative nausea and vomiting)    • Psychiatric problem     depressions, anxiety   • PTSD (post-traumatic stress disorder)        PAST SURGICAL HISTORY:    Past Surgical History:   Procedure Laterality Date   • INGUINAL HERNIA REPAIR ROBOTIC Left 3/28/2019    Procedure: INGUINAL HERNIA REPAIR ROBOTIC WITH MESH PLACEMENT  ;  Surgeon: Chris Cooley M.D.;  Location: SURGERY SAME DAY [facility];  Service: General   • PELVISCOPY N/A 7/9/2018    Procedure: PELVISCOPY-DIAGNOSTIC;  Surgeon: Mark Price M.D.;  Location: SURGERY SAME DAY [facility];  Service: Gynecology   • LAPAROSCOPY  12/9/2017    Procedure: Exploratory laparoscopy;  Surgeon: Mark Price M.D.;  Location: SURGERY [facility];  Service: Gynecology   • LAPAROSCOPIC LYSIS OF ADHESIONS  12/9/2017    Procedure: LAPAROSCOPIC LYSIS OF ADHESIONS;  Surgeon: Mark Price M.D.;  Location: SURGERY [facility];  Service: Gynecology   • CYSTOSCOPY  8/8/2016    Procedure: CYSTOSCOPY;  Surgeon: Mark Price M.D.;  Location: SURGERY SAME DAY [facility];  Service:    • HYSTERECTOMY LAPAROSCOPY  8/8/2016    Procedure: HYSTERECTOMY LAPAROSCOPY " RIGHT SALPINGECTOMY;  Surgeon: Mark Mariano M.D.;  Location: SURGERY SAME DAY [facility];  Service:    • PELVISCOPY N/A 2016    Procedure: PELVISCOPY Diagnostic;  Surgeon: Mark Mariano M.D.;  Location: SURGERY [facility];  Service:    • REPEAT C SECTION  2014    Performed by Mark Mariano M.D. at LABOR AND DELIVERY   • PELVISCOPY  2011    Performed by MARK MARIANO at SURGERY [facility]   • SALPINGECTOMY  2011    Performed by MARK MARIANO at SURGERY [facility]   • REPEAT C SECTION  2007   • PRIMARY C SECTION  2006   • GYN SURGERY       x 2   • [facility]  DELIVERY SER   &        ALLERGIES:  Vicodin [hydrocodone-acetaminophen]    MEDICATIONS:    Outpatient Encounter Prescriptions as of 2019   Medication Sig Dispense Refill   • ARIPiprazole (ABILIFY) 15 MG Tab Take 15 mg by mouth every day.     • propranolol (INDERAL) 20 MG Tab TAKE 1 TABLET BY MOUTH TWICE A DAY AND 1 EXTRA DOSE AS NEEDED ANXIETY  1   • HYDROcodone-acetaminophen (NORCO) 7.5-325 MG per tablet Take 1-2 Tabs by mouth 2 times a day as needed.     • methocarbamol (ROBAXIN) 500 MG Tab Take 1 Tab by mouth 2 times a day as needed. for muscle spasms 30 Tab 1   • [DISCONTINUED] amoxicillin-clavulanate (AUGMENTIN) 875-125 MG Tab Take 1 Tab by mouth 2 times a day. (Patient not taking: Reported on 2019) 20 Tab 0   • [DISCONTINUED] neomycin-polymyxin-HC (PEDIOTIC HC) 3.5-90591-5 Suspension Place 5 Drops in ear 3 times a day. (Patient not taking: Reported on 2019) 1 Bottle 0   • Multiple Vitamin (DAILY-CARRIE PO) Take 1 Tab by mouth every day.     • [DISCONTINUED] FLUoxetine (PROZAC) 20 MG Cap Take 20 mg by mouth every morning.     • [DISCONTINUED] oxyCODONE immediate release (ROXICODONE) 10 MG immediate release tablet Take 10 mg by mouth 3 times a day as needed for Moderate Pain.     • [DISCONTINUED] oxyCODONE-acetaminophen (PERCOCET) 7.5-325 MG per tablet Take 1 Tab  "by mouth every four hours as needed.     • [DISCONTINUED] LORazepam (ATIVAN) 1 MG Tab Take 1 mg by mouth as needed for Anxiety.     • [DISCONTINUED] ALPRAZolam (XANAX) 0.5 MG Tab Take 0.5 mg by mouth Pre-Op Once.     • [DISCONTINUED] clonazePAM (KLONOPIN) 0.5 MG Tab Take 0.25 mg by mouth 2 times a day.       No facility-administered encounter medications on file as of 7/19/2019.        SOCIAL HISTORY:    Social History     Social History   • Marital status: Single     Spouse name: N/A   • Number of children: N/A   • Years of education: N/A     Social History Main Topics   • Smoking status: Current Every Day Smoker     Packs/day: 1.00     Years: 10.00     Types: Cigarettes   • Smokeless tobacco: Never Used   • Alcohol use No   • Drug use: No   • Sexual activity: Not Currently     Partners: Male     Birth control/ protection: Pill     Other Topics Concern   • Not on file     Social History Narrative    ** Merged History Encounter **            Review of Systems   Constitutional: Negative for chills and fever.   HENT: Negative for ear pain and tinnitus.    Eyes: Negative for double vision and photophobia.   Respiratory: Negative for hemoptysis and sputum production.    Cardiovascular: Negative for palpitations and orthopnea.   Gastrointestinal: Negative for nausea and vomiting.   Genitourinary: Negative for frequency and urgency.   Musculoskeletal: Positive for back pain, joint pain, myalgias and neck pain.   Skin: Negative.    Neurological: Positive for tingling and sensory change.   Psychiatric/Behavioral: Positive for depression. The patient is nervous/anxious and has insomnia.    All other systems reviewed and are negative.        Objective:     /64 (BP Location: Left arm, Patient Position: Sitting, BP Cuff Size: Adult)   Pulse 83   Temp 36.7 °C (98.1 °F) (Temporal)   Ht 1.6 m (5' 3\")   Wt 70.9 kg (156 lb 4.9 oz)   LMP 07/22/2016 (Exact Date)   SpO2 94%   BMI 27.69 kg/m²      Physical Exam "   Constitutional: Awake, alert, no acute distress  HEENT: Normocephalic atraumatic, neck supple, no JVD noted,  no meningeal signs noted  Lymphadenopathy: no cervical, supraclavicular, or inguinal lymphadenopathy noted  Cardiovascular: Intact distal pulses, including at wrists and ankles, no limb swelling noted  Pulmonary: No tachypnea noted, no accessory muscle use noted, no dyspnea noted  Abdominal: Soft, nontender, exhibits no distension, no peritoneal signs, no HSM  Musculoskeletal:   Cervical: Tender with palpation, pain with range of motion testing, Spurling's testing produces axial pain, trigger points noted  Shoulder: Only mild tenderness palpation about right shoulder, only mild pain with range of motion testing, negative impingement  Thoracolumbar: Tender with palpation thoracolumbar region, pain with range of motion testing, trigger points noted, negative straight leg testing  Hip: Minimal tenderness, minimal pain with range of motion testing  Wrist/hand: No significant pain tenderness.  No significant pain with range of motion testing.  Negative Tinel's at wrist  Elbow: No significant tenderness.  No significant pain with range of motion testing.  Negative Tinel's at elbows  Neurological: oriented. Cranial nerves grossly intact, normal strength, non-focal.  Normal tone.  Sensation intact distally. Reflexes 1-2+ in upper and lower limbs, Gait mildly antalgic, reciprocal, Able to heel/toe walk, No upper motor neuron signs evident  Skin: Skin is intact. no rashes or lesions noted  Psychiatric: normal mood and affect. speech is normal and behavior is normal.        Assessment/Plan:       ASSESSMENT:    1.  Persistent back pain, thoracolumbar pain, myofascial pain    - DX-THORACOLUMBAR SPINE-2 VIEWS; Future    2.  Neck pain, myofascial pain    - DX-CERVICAL SPINE-2 OR 3 VIEWS; Future    3.  Right hip pain, sprain strain, relatively controlled    4.  Shoulder pain, sprain strain, relatively controlled    5.   Pelvic pain, endometriosis, with care per gynecology    -The patient notes pending pelvic surgery    6.  Comorbid medical issues, with care per primary care provider      DISCUSSION/PLAN:    - I discussed management options. I reviewed symptomatic care    - I would like to obtain/review additional records, including recent physical therapy records, ATS, requested    - I reviewed home exercise program, with medical precautions    - The patient can consider complementary trials with acupuncture, superficial massage therapy, or TENS unit    - I reviewed medication monitoring.  I reviewed medication adjustments.     - I wrote prescription for:    - methocarbamol (ROBAXIN) 500 MG Tab; Take 1 Tab by mouth 2 times a day as needed. for muscle spasms  Dispense: 30 Tab; Refill: 1    - The patient can consider trial with Lidoderm patch or over-the-counter equivalent, if no contraindication    - I reviewed risks, side effects, and interactions of medications, including NSAIDs and over-the-counter medications. I reviewed further symptomatic medications.    - I reviewed additional diagnostic options, including further/advanced imaging, electrodiagnostic testing, vascular studies, and further lab screen    - I reviewed additional therapeutic options, including injection/interventional therapy and additional consultative input    - I reviewed psychosocial interventions    - I encourage the patient to stop or decrease cigarette use    - Return after the above-noted radiographs, after additional records are received, or an as-needed basis      Please note that this dictation was created using voice recognition software. I have made every reasonable attempt to correct obvious errors but there may be errors of grammar and content that I may have overlooked prior to finalization of this note.

## 2019-07-29 ENCOUNTER — TELEPHONE (OUTPATIENT)
Dept: PHYSICAL MEDICINE AND REHAB | Facility: MEDICAL CENTER | Age: 34
End: 2019-07-29

## 2019-07-29 NOTE — TELEPHONE ENCOUNTER
"Dr. Brannon wrote:   \"I recommend we reviewed medication options at an office encounter\"    I left msg of above.   "

## 2019-07-29 NOTE — TELEPHONE ENCOUNTER
Marian said she feels methocarbamol is not working and makes her sleepy. She has an appt 8/2/19 and wants to know if he can prescribe something else, or if she should wait until next appt. She said she plans on having Xrays he ordered done tomorrow.       Marian left msg she is having issues with the methocarbamol Dr. Brannon prescribed.    I called back to get more information and left msg.

## 2019-08-07 ENCOUNTER — HOSPITAL ENCOUNTER (OUTPATIENT)
Dept: RADIOLOGY | Facility: MEDICAL CENTER | Age: 34
End: 2019-08-07
Attending: PHYSICAL MEDICINE & REHABILITATION
Payer: MEDICAID

## 2019-08-07 DIAGNOSIS — M54.2 NECK PAIN: ICD-10-CM

## 2019-08-07 DIAGNOSIS — M54.50 LUMBOSACRAL PAIN: ICD-10-CM

## 2019-08-07 DIAGNOSIS — M54.9 MID BACK PAIN: ICD-10-CM

## 2019-08-07 PROCEDURE — 72040 X-RAY EXAM NECK SPINE 2-3 VW: CPT

## 2019-08-07 PROCEDURE — 72080 X-RAY EXAM THORACOLMB 2/> VW: CPT

## 2019-08-08 ENCOUNTER — TELEPHONE (OUTPATIENT)
Dept: PHYSICAL MEDICINE AND REHAB | Facility: MEDICAL CENTER | Age: 34
End: 2019-08-08

## 2019-08-08 NOTE — TELEPHONE ENCOUNTER
Reports are in chart.     I spoke w/pt to reschedule for tomorrow. I will need to make sure imaging reports are in chart by 8/9/19 am

## 2019-08-09 ENCOUNTER — OFFICE VISIT (OUTPATIENT)
Dept: PHYSICAL MEDICINE AND REHAB | Facility: MEDICAL CENTER | Age: 34
End: 2019-08-09
Payer: MEDICAID

## 2019-08-09 VITALS
OXYGEN SATURATION: 96 % | DIASTOLIC BLOOD PRESSURE: 68 MMHG | TEMPERATURE: 98.2 F | HEIGHT: 63 IN | HEART RATE: 97 BPM | BODY MASS INDEX: 27.11 KG/M2 | SYSTOLIC BLOOD PRESSURE: 102 MMHG | WEIGHT: 153 LBS

## 2019-08-09 DIAGNOSIS — M41.9 SCOLIOSIS OF THORACIC SPINE, UNSPECIFIED SCOLIOSIS TYPE: ICD-10-CM

## 2019-08-09 DIAGNOSIS — M47.816 LUMBAR SPONDYLOSIS: ICD-10-CM

## 2019-08-09 DIAGNOSIS — M54.9 MID BACK PAIN: ICD-10-CM

## 2019-08-09 DIAGNOSIS — M41.9 SCOLIOSIS, UNSPECIFIED SCOLIOSIS TYPE, UNSPECIFIED SPINAL REGION: ICD-10-CM

## 2019-08-09 DIAGNOSIS — M54.50 LUMBOSACRAL PAIN: ICD-10-CM

## 2019-08-09 DIAGNOSIS — M25.551 RIGHT HIP PAIN: ICD-10-CM

## 2019-08-09 DIAGNOSIS — M47.814 SPONDYLOSIS OF THORACIC REGION WITHOUT MYELOPATHY OR RADICULOPATHY: ICD-10-CM

## 2019-08-09 DIAGNOSIS — M79.18 MYOFASCIAL PAIN: ICD-10-CM

## 2019-08-09 DIAGNOSIS — M62.838 MUSCLE SPASM: ICD-10-CM

## 2019-08-09 PROCEDURE — 99214 OFFICE O/P EST MOD 30 MIN: CPT | Performed by: PHYSICAL MEDICINE & REHABILITATION

## 2019-08-09 RX ORDER — IBUPROFEN 800 MG/1
800 TABLET ORAL 3 TIMES DAILY PRN
Refills: 0 | COMMUNITY
Start: 2019-07-16 | End: 2019-08-09

## 2019-08-09 RX ORDER — PENICILLIN V POTASSIUM 500 MG/1
TABLET ORAL
COMMUNITY
Start: 2019-07-22 | End: 2019-08-09

## 2019-08-09 RX ORDER — OXYCODONE HYDROCHLORIDE AND ACETAMINOPHEN 5; 325 MG/1; MG/1
TABLET ORAL
Refills: 0 | COMMUNITY
Start: 2019-05-16 | End: 2019-08-09

## 2019-08-09 RX ORDER — ARIPIPRAZOLE 2 MG/1
TABLET ORAL
Refills: 1 | COMMUNITY
Start: 2019-06-11 | End: 2019-08-09

## 2019-08-09 RX ORDER — IBUPROFEN 400 MG/1
400 TABLET ORAL EVERY 6 HOURS PRN
COMMUNITY
End: 2019-09-10

## 2019-08-09 RX ORDER — LORAZEPAM 0.5 MG/1
TABLET ORAL
COMMUNITY
Start: 2019-07-30 | End: 2019-08-09

## 2019-08-09 RX ORDER — HYDROCODONE BITARTRATE AND ACETAMINOPHEN 5; 325 MG/1; MG/1
TABLET ORAL
COMMUNITY
Start: 2019-07-22 | End: 2019-08-09

## 2019-08-09 RX ORDER — ARIPIPRAZOLE 5 MG/1
TABLET ORAL
COMMUNITY
Start: 2019-07-30 | End: 2020-02-21

## 2019-08-09 RX ORDER — DOXEPIN HYDROCHLORIDE 25 MG/1
CAPSULE ORAL
Refills: 1 | COMMUNITY
Start: 2019-06-24 | End: 2019-08-09

## 2019-08-09 RX ORDER — BACLOFEN 10 MG/1
10 TABLET ORAL 2 TIMES DAILY PRN
Qty: 30 TAB | Refills: 1 | Status: SHIPPED | OUTPATIENT
Start: 2019-08-09 | End: 2019-09-27

## 2019-08-09 RX ORDER — HYDROXYZINE HYDROCHLORIDE 25 MG/1
TABLET, FILM COATED ORAL
Refills: 1 | COMMUNITY
Start: 2019-07-29 | End: 2019-08-09

## 2019-08-09 ASSESSMENT — ENCOUNTER SYMPTOMS
INSOMNIA: 1
VOMITING: 0
NAUSEA: 0
BACK PAIN: 1
DEPRESSION: 1
NECK PAIN: 1
DOUBLE VISION: 0
CHILLS: 0
PHOTOPHOBIA: 0
NERVOUS/ANXIOUS: 1
TINGLING: 1
HEMOPTYSIS: 0
ORTHOPNEA: 0
SENSORY CHANGE: 1
PALPITATIONS: 0
MYALGIAS: 1
FEVER: 0
SPUTUM PRODUCTION: 0

## 2019-08-09 ASSESSMENT — PATIENT HEALTH QUESTIONNAIRE - PHQ9: CLINICAL INTERPRETATION OF PHQ2 SCORE: 0

## 2019-08-09 NOTE — PROGRESS NOTES
Subjective:      Marian Kent presents with Follow-Up        HPI Ms. Kent returns to the office today for follow-up evaluation of spinal/joint/musculoskeletal pain, abdominal/pelvic pain    The patient notes ongoing pain in the lumbosacral region, notes some radiation towards the posterior pelvic region, also radiating towards the thoracic spine.  The patient has had the pain for greater than 3 months.  The pain limits her ability to function    The patient notes ongoing pain in the thoracic spine, with some radiation towards the interscapular/scapula region.  The patient has had the pain for greater than 3 months.  The pain limits her ability to function     The patient notes right hip area pain, primarily activity associated    The patient notes intermittent neck pain, notes intermittent upper limb neuropathic symptoms, without overt radicular component or overt carpal tunnel symptoms, now relatively controlled    The patient notes intermittent shoulder area pain, controlled    Patient has history of chronic pelvic pain, history of endometriosis, has undergone prior laparoscopies, reviewed gynecology records most recently from 5/2019, recommending further nonsurgical treatment trial.  No dysuria, hematuria or genitourinary symptoms noted.    The patient has had prior treatment with medications, including NSAIDs.  She has been to physical therapy, reviewed records most recently from [facility] from 6/2019, without benefit.  She has tried modalities, including ice and heat. She has tried massage therapy.  No acute changes with bowel/bladder noted.  No acute changes with strength noted.  No overt cranial nerve symptomatology noted. She is making an effort with home exercise program.  The ongoing pain limits her ability to function.  She returns today to review results from recent imaging and best management options      MEDICAL RECORDS REVIEW/DATA REVIEW: Reviewed in epic.    I reviewed medications.  No  "significant benefit with trial with Robaxin.  Started trial with Lidoderm patch. Tried NSAIDs.    I reviewed diagnostic studies:     I reviewed radiographs.     Reviewed thoracolumbar x-rays 8/2019, I reviewed images and report, showed degenerative disc disease, spondylosis, mild scoliosis    Reviewed cervical spine x-rays 8/2019, I reviewed images and report, show degenerative disc disease, spondylosis, mild reversal of lordosis    Reviewed CT cervical spine 11/2016    Reviewed abdominal x-rays 7/2017    Reviewed head CT 11/2016    I reviewed lab studies.  Reviewed labs 4/2019, outside, including CMP, CBC, UA    I reviewed medical issues.  Followed by primary care provider, reviewed records most recently from 7/2019.  Followed by gynecology, reviewed records most recently from 5/2019.  Underwent left inguinal hernia surgery 3/2019.    I reviewed family history: No neuromuscular disorders noted.    I reviewed social issues.  [employer]      PAST MEDICAL HISTORY:   Past Medical History:   Diagnosis Date   • Anesthesia     PONV   • Anxiety 2/19/2019   • Breath shortness     occas. no O2   • EP (ectopic pregnancy)    • Gynecological disorder     adhesions/cysts   • Hydronephrosis right 1/3/2014    stone   • Pain 03/2019    pelvic, back   • Pain 03/2019    \"Stomach\"   • Pain management     With Dr. Barger in [place]   • Placenta previa diagnosed with US at [facility] 12/17/2013   • PONV (postoperative nausea and vomiting)    • Psychiatric problem     depressions, anxiety   • PTSD (post-traumatic stress disorder)        PAST SURGICAL HISTORY:    Past Surgical History:   Procedure Laterality Date   • INGUINAL HERNIA REPAIR ROBOTIC Left 3/28/2019    Procedure: INGUINAL HERNIA REPAIR ROBOTIC WITH MESH PLACEMENT  ;  Surgeon: Chris Cooley M.D.;  Location: SURGERY SAME DAY [facility];  Service: General   • PELVISCOPY N/A 7/9/2018    Procedure: PELVISCOPY-DIAGNOSTIC;  Surgeon: Mark Price M.D.;  Location: SURGERY SAME DAY " [facility];  Service: Gynecology   • LAPAROSCOPY  2017    Procedure: Exploratory laparoscopy;  Surgeon: Mark Mariano M.D.;  Location: SURGERY [facility];  Service: Gynecology   • LAPAROSCOPIC LYSIS OF ADHESIONS  2017    Procedure: LAPAROSCOPIC LYSIS OF ADHESIONS;  Surgeon: Mark Mariano M.D.;  Location: SURGERY [facility];  Service: Gynecology   • CYSTOSCOPY  2016    Procedure: CYSTOSCOPY;  Surgeon: Mark Mariano M.D.;  Location: SURGERY SAME DAY [facility];  Service:    • HYSTERECTOMY LAPAROSCOPY  2016    Procedure: HYSTERECTOMY LAPAROSCOPY RIGHT SALPINGECTOMY;  Surgeon: Mark Mariano M.D.;  Location: SURGERY SAME DAY [facility];  Service:    • PELVISCOPY N/A 2016    Procedure: PELVISCOPY Diagnostic;  Surgeon: Mark Mariano M.D.;  Location: SURGERY [facility];  Service:    • REPEAT C SECTION  2014    Performed by Mark Mariano M.D. at [facility] DEL[facility]   • PELVISCOPY  2011    Performed by MARK MARIANO at SURGERY [facility]   • SALPINGECTOMY  2011    Performed by MARK MARIANO at SURGERY [facility]   • REPEAT C SECTION  2007   • PRIMARY C SECTION  2006   • GYN SURGERY       x 2   • [facility]  DELIVERY SER   &        ALLERGIES:  Vicodin [hydrocodone-acetaminophen]    MEDICATIONS:    Outpatient Encounter Medications as of 2019   Medication Sig Dispense Refill   • aripiprazole (ABILIFY) 5 MG tablet      • baclofen (LIORESAL) 10 MG Tab Take 1 Tab by mouth 2 times a day as needed. for muscle spasms 30 Tab 1   • ibuprofen (MOTRIN) 400 MG Tab Take 400 mg by mouth every 6 hours as needed.     • propranolol (INDERAL) 20 MG Tab TAKE 1 TABLET BY MOUTH TWICE A DAY AND 1 EXTRA DOSE AS NEEDED ANXIETY  1   • Multiple Vitamin (DAILY-CARRIE PO) Take 1 Tab by mouth every day.     • [DISCONTINUED] doxepin (SINEQUAN) 25 MG Cap TAKE 1 CAPSULE ORAL MORNING DAILY AS NEEDED FOR ANXIETY AND 2 CAPS AT BEDTIME FOR  INSOMNIA  1   • [DISCONTINUED] hydrOXYzine HCl (ATARAX) 25 MG Tab TAKE 1-2 TABS ORALLY EVERY DAY  1   • [DISCONTINUED] ibuprofen (MOTRIN) 800 MG Tab Take 800 mg by mouth 3 times a day as needed.  0   • [DISCONTINUED] LORazepam (ATIVAN) 0.5 MG Tab      • [DISCONTINUED] oxyCODONE-acetaminophen (PERCOCET) 5-325 MG Tab TAKE 1 TABLET BY MOUTH EVERY 6HRS AS NEEDED FOR PAIN [id number]. R10.2  0   • [DISCONTINUED] penicillin v potassium (VEETID) 500 MG Tab      • [DISCONTINUED] ARIPiprazole (ABILIFY) 2 MG tablet TAKE 1 TABLET BY MOUTH EVERY DAY F33  1   • [DISCONTINUED] HYDROcodone-acetaminophen (NORCO) 5-325 MG Tab per tablet      • [DISCONTINUED] ARIPiprazole (ABILIFY) 15 MG Tab Take 15 mg by mouth every day.     • [DISCONTINUED] HYDROcodone-acetaminophen (NORCO) 7.5-325 MG per tablet Take 1-2 Tabs by mouth 2 times a day as needed.     • [DISCONTINUED] methocarbamol (ROBAXIN) 500 MG Tab Take 1 Tab by mouth 2 times a day as needed. for muscle spasms (Patient not taking: Reported on 8/9/2019) 30 Tab 1     No facility-administered encounter medications on file as of 8/9/2019.        SOCIAL HISTORY:    Social History     Socioeconomic History   • Marital status: Single     Spouse name: Not on file   • Number of children: Not on file   • Years of education: Not on file   • Highest education level: Not on file   Occupational History   • Not on file   Social Needs   • Financial resource strain: Not on file   • Food insecurity:     Worry: Not on file     Inability: Not on file   • Transportation needs:     Medical: Not on file     Non-medical: Not on file   Tobacco Use   • Smoking status: Current Every Day Smoker     Packs/day: 1.00     Years: 10.00     Pack years: 10.00     Types: Cigarettes   • Smokeless tobacco: Never Used   Substance and Sexual Activity   • Alcohol use: No     Alcohol/week: 0.0 oz   • Drug use: No   • Sexual activity: Not Currently     Partners: Male     Birth control/protection: Pill   Lifestyle   • Physical  "activity:     Days per week: Not on file     Minutes per session: Not on file   • Stress: Not on file   Relationships   • Social connections:     Talks on phone: Not on file     Gets together: Not on file     Attends [employer] service: Not on file     Active member of club or organization: Not on file     Attends meetings of clubs or organizations: Not on file     Relationship status: Not on file   • Intimate partner violence:     Fear of current or ex partner: Not on file     Emotionally abused: Not on file     Physically abused: Not on file     Forced sexual activity: Not on file   Other Topics Concern   • Not on file   Social History Narrative    ** Merged History Encounter **            Review of Systems   Constitutional: Negative for chills and fever.   HENT: Negative for ear pain and tinnitus.    Eyes: Negative for double vision and photophobia.   Respiratory: Negative for hemoptysis and sputum production.    Cardiovascular: Negative for palpitations and orthopnea.   Gastrointestinal: Negative for nausea and vomiting.   Genitourinary: Negative for frequency and urgency.   Musculoskeletal: Positive for back pain, joint pain, myalgias and neck pain.   Skin: Negative.    Neurological: Positive for tingling and sensory change.   Psychiatric/Behavioral: Positive for depression. The patient is nervous/anxious and has insomnia.    All other systems reviewed and are negative.  Reviewed, no changes noted       Objective:     /68   Pulse 97   Temp 36.8 °C (98.2 °F) (Temporal)   Ht 1.6 m (5' 3\") Comment: verbal  Wt 69.4 kg (153 lb)   LMP 07/22/2016 (Exact Date)   SpO2 96%   BMI 27.10 kg/m²      Physical Exam   Constitutional: Awake, alert, no acute distress  HEENT: Normocephalic atraumatic, neck supple, no JVD noted,  no meningeal signs noted  Lymphadenopathy: no cervical, supraclavicular, or inguinal lymphadenopathy noted  Cardiovascular: Intact distal pulses, including at wrists and ankles, no limb " swelling noted  Pulmonary: No tachypnea noted, no accessory muscle use noted, no dyspnea noted  Abdominal: Soft, mild tender suprapubic region, exhibits no distension, no peritoneal signs, no HSM, no CVA tenderness  Musculoskeletal:   Cervical: Mild tenderness, mild pain with range of motion testing, trigger points noted, Spurling's testing produces mild axial pain  Shoulder: Only mild tenderness, only mild pain with range of motion testing, negative impingement  Thoracolumbar: Tender with palpation thoracic and lumbosacral regions, pain with range of motion testing, decreased active range of motion, negative straight leg testing, trigger points noted  Hip: Tender with palpation lateral aspect right hip, decreased range of motion, pain with testing  Wrist/hand: No significant pain tenderness.  No significant pain with range of motion testing.  Negative Tinel's at wrist  Elbow: No significant tenderness.  No significant pain with range of motion testing.  Negative Tinel's at elbows  Neurological: oriented. Cranial nerves grossly intact, strength non-focal.  Normal tone.  Sensation intact distally. Reflexes 1+ in upper and lower limbs, Gait mildly antalgic, reciprocal,  Able to heel/toe walk, mildly unsteady with Romberg and tandem gait testing, although able to complete, no upper motor neuron signs evident  Skin: Skin is intact. no rashes or lesions noted  Psychiatric: normal mood and affect. speech is normal and behavior is normal.        Assessment/Plan:       ASSESSMENT:    1.  Persistent back pain, thoracolumbar pain, myofascial pain, degenerative disc disease, spondylosis, scoliosis, consider stenosis versus secondary soft tissue pathology    - Ordered MRIs of lumbar and thoracic spine without contrast to further evaluate  - Note, the patient has ongoing and functional limiting pain for greater than 3 months despite previous treatment pause with medications, including NSAIDs, physical therapy, home exercise  program,  and time    2.  Right hip pain, sprain strain    - Ordered right hip x-ray    3.  Neck pain, myofascial pain, degenerative disc disease, spondylosis, reversal of lordosis, intermittent upper limb neuropathic pain, overall relatively symptomatically controlled    4.  Shoulder pain, sprain strain, relatively controlled    5.  Pelvic pain, endometriosis, prior laparoscopies, gynecology consulted/following    6.  Comorbid medical issues, including prior right inguinal hernia surgery 3/2019, with care per primary care provider, including general surgery      DISCUSSION/PLAN:    - I discussed management options. I reviewed symptomatic care    - I reviewed home exercise program, with medical precautions, with gynecology and general surgery activity/restrictions    - Continue efforts to obtain additional records    - The patient can consider complementary trials with acupuncture, superficial massage therapy, or TENS unit    - I reviewed medication monitoring.  I reviewed medication adjustments.     - I wrote prescription for:    - Baclofen 10 mg tab; Take 1 Tab by mouth 2 times a day as needed. for muscle spasms  Dispense: 30 Tab; Refill: 1, methocarbamol prescription canceled due to lack of effect    - I reviewed risks, side effects, and interactions of medications, including NSAIDs and over-the-counter medications. I reviewed further symptomatic medications.    - I reviewed additional diagnostic options, including further/advanced imaging, electrodiagnostic testing, vascular studies, and further lab screen    - I reviewed additional therapeutic options, including injection/interventional therapy and additional consultative input    - I reviewed psychosocial interventions    - I encourage the patient to stop or decrease cigarette use    - Return to the department after the above-noted diagnostic studies or an as-needed basis      Please note that this dictation was created using voice recognition software. I have  made every reasonable attempt to correct obvious errors but there may be errors of grammar and content that I may have overlooked prior to finalization of this note.

## 2019-08-20 ENCOUNTER — TELEPHONE (OUTPATIENT)
Dept: PHYSICAL MEDICINE AND REHAB | Facility: MEDICAL CENTER | Age: 34
End: 2019-08-20

## 2019-08-20 NOTE — TELEPHONE ENCOUNTER
Marian said she fell going into her house a few hours ago. She said she is in a lot of pain. I advised her to go to ER. She asked about going to ER at [facility]. I let her know if she went to [facility] ER they would be able to see Dr. Brannon' last note and orders for MRI's.     She wanted number to call to get scheduled for the MRI's. I gave her number, but still advised her to get checked out because of fall/injury.

## 2019-08-28 ENCOUNTER — HOSPITAL ENCOUNTER (OUTPATIENT)
Dept: RADIOLOGY | Facility: MEDICAL CENTER | Age: 34
End: 2019-08-28

## 2019-08-28 ENCOUNTER — NON-PROVIDER VISIT (OUTPATIENT)
Dept: OCCUPATIONAL MEDICINE | Facility: CLINIC | Age: 34
End: 2019-08-28

## 2019-08-28 ENCOUNTER — TELEPHONE (OUTPATIENT)
Dept: PHYSICAL MEDICINE AND REHAB | Facility: MEDICAL CENTER | Age: 34
End: 2019-08-28

## 2019-08-28 ENCOUNTER — HOSPITAL ENCOUNTER (OUTPATIENT)
Facility: MEDICAL CENTER | Age: 34
End: 2019-08-28
Attending: NURSE PRACTITIONER
Payer: COMMERCIAL

## 2019-08-28 DIAGNOSIS — Z02.89 ENCOUNTER FOR OCCUPATIONAL HEALTH ASSESSMENT: ICD-10-CM

## 2019-08-28 DIAGNOSIS — Z02.1 PRE-EMPLOYMENT DRUG SCREENING: ICD-10-CM

## 2019-08-28 PROCEDURE — 86480 TB TEST CELL IMMUN MEASURE: CPT | Performed by: NURSE PRACTITIONER

## 2019-08-28 PROCEDURE — 80305 DRUG TEST PRSMV DIR OPT OBS: CPT | Performed by: NURSE PRACTITIONER

## 2019-08-28 NOTE — TELEPHONE ENCOUNTER
Outside images are in chart   Received CT abdomen/pelvis, 5/5/19 done at [place]. Scanned into chart. Called and asked for imaging to be pushed to [facility].   I spoke with Bela at [facility] to upload.

## 2019-08-29 ENCOUNTER — OFFICE VISIT (OUTPATIENT)
Dept: OCCUPATIONAL MEDICINE | Facility: CLINIC | Age: 34
End: 2019-08-29

## 2019-08-29 VITALS
DIASTOLIC BLOOD PRESSURE: 70 MMHG | HEIGHT: 63 IN | BODY MASS INDEX: 27.46 KG/M2 | TEMPERATURE: 98.9 F | OXYGEN SATURATION: 95 % | SYSTOLIC BLOOD PRESSURE: 120 MMHG | HEART RATE: 79 BPM | WEIGHT: 155 LBS

## 2019-08-29 DIAGNOSIS — Z02.1 PRE-EMPLOYMENT HEALTH SCREENING EXAMINATION: ICD-10-CM

## 2019-08-29 PROCEDURE — 8915 PR COMPREHENSIVE PHYSICAL: Performed by: NURSE PRACTITIONER

## 2019-08-29 RX ORDER — SERTRALINE HYDROCHLORIDE 100 MG/1
100 TABLET, FILM COATED ORAL DAILY
COMMUNITY
End: 2020-04-28

## 2019-08-30 LAB
GAMMA INTERFERON BACKGROUND BLD IA-ACNC: 0.03 IU/ML
M TB IFN-G BLD-IMP: NEGATIVE
M TB IFN-G CD4+ BCKGRND COR BLD-ACNC: 0 IU/ML
MITOGEN IGNF BCKGRD COR BLD-ACNC: >10 IU/ML
QFT TB2 - NIL TBQ2: 0 IU/ML

## 2019-09-10 ENCOUNTER — OFFICE VISIT (OUTPATIENT)
Dept: PHYSICAL MEDICINE AND REHAB | Facility: MEDICAL CENTER | Age: 34
End: 2019-09-10
Payer: MEDICAID

## 2019-09-10 VITALS
WEIGHT: 156.53 LBS | OXYGEN SATURATION: 96 % | TEMPERATURE: 98.2 F | BODY MASS INDEX: 27.73 KG/M2 | HEIGHT: 63 IN | HEART RATE: 90 BPM | DIASTOLIC BLOOD PRESSURE: 62 MMHG | SYSTOLIC BLOOD PRESSURE: 118 MMHG

## 2019-09-10 DIAGNOSIS — F43.10 PTSD (POST-TRAUMATIC STRESS DISORDER): ICD-10-CM

## 2019-09-10 DIAGNOSIS — M54.50 LUMBOSACRAL PAIN: ICD-10-CM

## 2019-09-10 DIAGNOSIS — M41.9 SCOLIOSIS OF THORACIC SPINE, UNSPECIFIED SCOLIOSIS TYPE: ICD-10-CM

## 2019-09-10 DIAGNOSIS — F17.200 TOBACCO DEPENDENCE: ICD-10-CM

## 2019-09-10 DIAGNOSIS — M47.814 SPONDYLOSIS OF THORACIC REGION WITHOUT MYELOPATHY OR RADICULOPATHY: ICD-10-CM

## 2019-09-10 PROCEDURE — 99214 OFFICE O/P EST MOD 30 MIN: CPT | Performed by: PHYSICAL MEDICINE & REHABILITATION

## 2019-09-10 RX ORDER — MELOXICAM 7.5 MG/1
7.5 TABLET ORAL DAILY
Qty: 30 TAB | Refills: 1 | Status: SHIPPED | OUTPATIENT
Start: 2019-09-10 | End: 2019-09-27

## 2019-09-10 ASSESSMENT — PATIENT HEALTH QUESTIONNAIRE - PHQ9
SUM OF ALL RESPONSES TO PHQ QUESTIONS 1-9: 23
CLINICAL INTERPRETATION OF PHQ2 SCORE: 6
5. POOR APPETITE OR OVEREATING: 3 - NEARLY EVERY DAY

## 2019-09-10 ASSESSMENT — PAIN SCALES - GENERAL: PAINLEVEL: 8=MODERATE-SEVERE PAIN

## 2019-09-10 NOTE — PROGRESS NOTES
"Follow up patient note  Pain Medicine, Interventional spine and sports physiatry, Physical medicine rehabilitation      Chief complaint:   Chief Complaint   Patient presents with   • Follow-Up   Axial back pain       HISTORY    Please see new patient note dated 07/19/2019 by Dr Brannon  for more details.     HPI  Patient identification: Marian Kent 33 y.o. female with pain in her mid and low back pain presents for follow-up of this pain    Interval history: Since the last visit, she has not been able to have her MRI thoracic and lumbar spine films as she has a history of panic attacks and PTSD.  She is scheduled, but reports that she had to be taken out of the MRI machine in the past for anxiety and she was yelling.      Pain is a 8/10 on the VAS.  Pain is constant.  Physical therapy has not helped.  Methocarbamol, baclofen and flexeril have not been helpful.    She sees a psychiatrist.    Her pain is primarily axial.  It is worse after work.  She has a job as a caregiver.  She does not report upper or lower extremity paresthesias.      In the past, she was treated by another provider and was treated with opioids, but she reports that she was discharged as she had multiple no-shows.    PHQ-9: 23  ORT: 2    ROS   Gen: emesis, gassy ,for the last few days    Red Flags :  Fever, Chills, Sweats: Denies  Involuntary Weight Loss: Denies  Bowel/Bladder Incontinence: Denies  Saddle Anesthesia: Denies    PMHx:   Past Medical History:   Diagnosis Date   • Anesthesia     PONV   • Anxiety 2/19/2019   • Breath shortness     occas. no O2   • EP (ectopic pregnancy)    • Gynecological disorder     adhesions/cysts   • Hydronephrosis right 1/3/2014    stone   • Pain 03/2019    pelvic, back   • Pain 03/2019    \"Stomach\"   • Pain management     With Dr. Barger in [place]   • Placenta previa diagnosed with US at [facility] 12/17/2013   • PONV (postoperative nausea and vomiting)    • Psychiatric problem     depressions, anxiety   • PTSD " (post-traumatic stress disorder)        PSHx:   Past Surgical History:   Procedure Laterality Date   • INGUINAL HERNIA REPAIR ROBOTIC Left 3/28/2019    Procedure: INGUINAL HERNIA REPAIR ROBOTIC WITH MESH PLACEMENT  ;  Surgeon: Chris Cooley M.D.;  Location: SURGERY SAME DAY [facility];  Service: General   • PELVISCOPY N/A 2018    Procedure: PELVISCOPY-DIAGNOSTIC;  Surgeon: Mark Mariano M.D.;  Location: SURGERY SAME DAY [facility];  Service: Gynecology   • LAPAROSCOPY  2017    Procedure: Exploratory laparoscopy;  Surgeon: Mark Mariano M.D.;  Location: SURGERY [facility];  Service: Gynecology   • LAPAROSCOPIC LYSIS OF ADHESIONS  2017    Procedure: LAPAROSCOPIC LYSIS OF ADHESIONS;  Surgeon: Mark Mariano M.D.;  Location: SURGERY [facility];  Service: Gynecology   • CYSTOSCOPY  2016    Procedure: CYSTOSCOPY;  Surgeon: Mark Mariano M.D.;  Location: SURGERY SAME DAY [facility];  Service:    • HYSTERECTOMY LAPAROSCOPY  2016    Procedure: HYSTERECTOMY LAPAROSCOPY RIGHT SALPINGECTOMY;  Surgeon: Mark Mariano M.D.;  Location: SURGERY SAME DAY [facility];  Service:    • PELVISCOPY N/A 2016    Procedure: PELVISCOPY Diagnostic;  Surgeon: Mark Mariano M.D.;  Location: SURGERY [facility];  Service:    • REPEAT C SECTION  2014    Performed by Mark Mariano M.D. at [facility]   • PELVISCOPY  2011    Performed by MARK MARIANO at SURGERY [facility]   • SALPINGECTOMY  2011    Performed by MARK MARIANO at [facility]   • REPEAT C SECTION  2007   • PRIMARY C SECTION  2006   • GYN SURGERY       x 2   • [facility]  DELIVERY SER   &        Family history   Denies neuromuscular disease  Family History   Problem Relation Age of Onset   • Cancer Father 45        colon   • Other Maternal Grandfather    • Cancer Paternal Grandmother         breast   • Diabetes Paternal Grandmother    • Heart  Disease Neg Hx    • Stroke Neg Hx        Medications:   Current Outpatient Medications   Medication   • meloxicam (MOBIC) 7.5 MG Tab   • ALPRAZolam (XANAX PO)   • sertraline (ZOLOFT) 100 MG Tab   • aripiprazole (ABILIFY) 5 MG tablet   • baclofen (LIORESAL) 10 MG Tab   • propranolol (INDERAL) 20 MG Tab   • Multiple Vitamin (DAILY-CARRIE PO)     No current facility-administered medications for this visit.        Allergies:   Allergies   Allergen Reactions   • Vicodin [Hydrocodone-Acetaminophen] Itching       Social Hx:   Social History     Socioeconomic History   • Marital status: Single     Spouse name: Not on file   • Number of children: Not on file   • Years of education: Not on file   • Highest education level: Not on file   Occupational History   • Not on file   Social Needs   • Financial resource strain: Not on file   • Food insecurity:     Worry: Not on file     Inability: Not on file   • Transportation needs:     Medical: Not on file     Non-medical: Not on file   Tobacco Use   • Smoking status: Current Every Day Smoker     Packs/day: 1.00     Years: 10.00     Pack years: 10.00     Types: Cigarettes   • Smokeless tobacco: Never Used   Substance and Sexual Activity   • Alcohol use: No     Alcohol/week: 0.0 oz   • Drug use: No   • Sexual activity: Not Currently     Partners: Male     Birth control/protection: Pill   Lifestyle   • Physical activity:     Days per week: Not on file     Minutes per session: Not on file   • Stress: Not on file   Relationships   • Social connections:     Talks on phone: Not on file     Gets together: Not on file     Attends [employer] service: Not on file     Active member of club or organization: Not on file     Attends meetings of clubs or organizations: Not on file     Relationship status: Not on file   • Intimate partner violence:     Fear of current or ex partner: Not on file     Emotionally abused: Not on file     Physically abused: Not on file     Forced sexual activity: Not on  "file   Other Topics Concern   • Not on file   Social History Narrative    ** Merged History Encounter **              EXAMINATION     Physical Exam:   Vitals: /62 (BP Location: Left arm, Patient Position: Sitting, BP Cuff Size: Small adult)   Pulse 90   Temp 36.8 °C (98.2 °F) (Temporal)   Ht 1.6 m (5' 3\")   Wt 71 kg (156 lb 8.4 oz)   SpO2 96%     Constitutional:   Body Habitus: Body mass index is 27.73 kg/m².  Cooperation: Fully cooperates with exam  Appearance: Well-groomed no disheveled, in no acute distress    Respiratory-  breathing comfortable on room air, no audible wheezing  Cardiovascular- capillary refills less than 2 seconds. No lower extremity edema is noted.   Psychiatric- alert and oriented ×3. Normal affect.   Spine:  No focal motor deficits in the upper extremities, lower extremities with giveaway weakness in DF and PF bilaterally  Sensation is grossly intact in the upper and lower extremities bilaterally  Reflexes are 2+ biceps, triceps, patella and achilles bilaterally  Spurlings is negative bilaterally.  Seated SLR is negative bilaterally    Gait: Normal gait without assist device.  Adequate toe clearance bilaterally          MEDICAL DECISION MAKING    DATA    Labs:   Lab Results   Component Value Date/Time    SODIUM 139 06/22/2018 02:52 PM    POTASSIUM 3.6 06/22/2018 02:52 PM    CHLORIDE 110 06/22/2018 02:52 PM    CO2 23 06/22/2018 02:52 PM    GLUCOSE 85 06/22/2018 02:52 PM    BUN 12 06/22/2018 02:52 PM    CREATININE 0.66 06/22/2018 02:52 PM    CREATININE 0.7 06/19/2006 06:40 AM        Lab Results   Component Value Date/Time    PROTHROMBTM 13.8 08/19/2015 09:40 PM    INR 1.04 08/19/2015 09:40 PM        Lab Results   Component Value Date/Time    WBC 6.1 07/10/2018 04:13 AM    RBC 3.65 (L) 07/10/2018 04:13 AM    HEMOGLOBIN 12.3 07/10/2018 04:13 AM    HEMATOCRIT 37.0 07/10/2018 04:13 AM    .4 (H) 07/10/2018 04:13 AM    MCH 33.7 (H) 07/10/2018 04:13 AM    MCHC 33.2 (L) 07/10/2018 " 04:13 AM    MPV 8.5 (L) 07/10/2018 04:13 AM    NEUTSPOLYS 74.50 (H) 07/10/2018 04:13 AM    LYMPHOCYTES 18.30 (L) 07/10/2018 04:13 AM    MONOCYTES 6.60 07/10/2018 04:13 AM    EOSINOPHILS 0.00 07/10/2018 04:13 AM    BASOPHILS 0.30 07/10/2018 04:13 AM        No results found for: HBA1C       Imaging: I personally reviewed following images    X-ray thoracolumbar spine: 08/07/2019  There is minimal scoliosis in the thoracolumbar spine.  There is mild degenerative change in thoracic spine.  No fractures      I reviewed the following radiology reports  Xray thoracolumbar spine 08/7/2019    Minimal degenerative changes in the lower thoracic spine.    Minimal scoliosis.          Results for orders placed during the hospital encounter of 02/23/16   CT-ABDOMEN-PELVIS WITH    Impression CT ABDOMEN    1.  No acute abnormality.  2.  Atrophic left kidney with areas suggesting scarring.    CT PELVIS    1.  No acute abnormality.                             DIAGNOSIS   Visit Diagnoses     ICD-10-CM   1. Scoliosis of thoracic spine, unspecified scoliosis type M41.9   2. Lumbosacral pain M54.5   3. Spondylosis of thoracic region without myelopathy or radiculopathy M47.814   4. Tobacco dependence F17.200   5. PTSD (post-traumatic stress disorder) F43.10         ASSESSMENT and PLAN:     Marian Kent 33 y.o. female with complaint of axial pain.    Marian was seen today for follow-up.    Diagnoses and all orders for this visit:    Scoliosis of thoracic spine, unspecified scoliosis type  -     meloxicam (MOBIC) 7.5 MG Tab; Take 1 Tab by mouth every day for 30 days.  -     REFERRAL TO PHYSICAL THERAPY Reason for Therapy: Eval/Treat/Report    Lumbosacral pain  -     Pain Management Screen    Spondylosis of thoracic region without myelopathy or radiculopathy  -     meloxicam (MOBIC) 7.5 MG Tab; Take 1 Tab by mouth every day for 30 days.  -     REFERRAL TO PHYSICAL THERAPY Reason for Therapy: Eval/Treat/Report  -     Pain Management  Screen    Tobacco dependence    PTSD (post-traumatic stress disorder)  -     Patient has been identified as having a positive depression screening. Appropriate orders and counseling have been given.      1. Discussed trial of aquatic therapy/PT  2. Trial of mobic.  Discussed that she should not take this with any other anti-inflammatories.  3. Discussed that she will continue with plans for MRI thoracic and lumbar spine.  We discussed that she will use her current benzodiazepines to assist.  4. UDS planned.  5. We discussed, though that use of opioids, which she has taken before with relief, would not be an idea recommendation with her current, chronic use of benzodiazepines.   6. Plan to reassess after MRI.  7. Encouraged her to discontinue smoking.  This may be a contributing factor to her having pain.  She reports that she smokes about 1/2 PPD.    Follow up: after imaging studies.    Thank you for allowing me to participate in the care of this patient. If you have any questions please not hesitate to contact me.    Please note that this dictation was created using voice recognition software. I have made every reasonable attempt to correct obvious errors but there may be errors of grammar and content that I may have overlooked prior to finalization of this note.      Jono Page MD  Interventional Spine and Sports Physiatry  Physical Medicine and Rehabilitation  [facility]  9/10/2019

## 2019-09-20 ENCOUNTER — HOSPITAL ENCOUNTER (OUTPATIENT)
Dept: RADIOLOGY | Facility: MEDICAL CENTER | Age: 34
End: 2019-09-20
Attending: PHYSICAL MEDICINE & REHABILITATION
Payer: MEDICAID

## 2019-09-20 VITALS — HEIGHT: 63 IN | BODY MASS INDEX: 26.58 KG/M2 | WEIGHT: 150 LBS

## 2019-09-20 DIAGNOSIS — M47.816 LUMBAR SPONDYLOSIS: ICD-10-CM

## 2019-09-20 DIAGNOSIS — M41.9 SCOLIOSIS, UNSPECIFIED SCOLIOSIS TYPE, UNSPECIFIED SPINAL REGION: ICD-10-CM

## 2019-09-20 DIAGNOSIS — M47.814 SPONDYLOSIS OF THORACIC REGION WITHOUT MYELOPATHY OR RADICULOPATHY: ICD-10-CM

## 2019-09-20 DIAGNOSIS — M54.50 LUMBOSACRAL PAIN: ICD-10-CM

## 2019-09-20 DIAGNOSIS — M54.9 MID BACK PAIN: ICD-10-CM

## 2019-09-20 PROCEDURE — A9270 NON-COVERED ITEM OR SERVICE: HCPCS | Performed by: RADIOLOGY

## 2019-09-20 PROCEDURE — 700102 HCHG RX REV CODE 250 W/ 637 OVERRIDE(OP): Performed by: RADIOLOGY

## 2019-09-20 PROCEDURE — 72146 MRI CHEST SPINE W/O DYE: CPT

## 2019-09-20 PROCEDURE — 72148 MRI LUMBAR SPINE W/O DYE: CPT

## 2019-09-20 RX ORDER — DIAZEPAM 5 MG/1
10 TABLET ORAL
Status: COMPLETED | OUTPATIENT
Start: 2019-09-20 | End: 2019-09-20

## 2019-09-20 RX ADMIN — DIAZEPAM 10 MG: 5 TABLET ORAL at 12:38

## 2019-09-20 NOTE — DISCHARGE INSTRUCTIONS
MRI ADULT DISCHARGE INSTRUCTIONS    You have been medicated today for your scan. Please follow the instructions below to ensure your safe recovery. If you have any questions or problems, feel free to call us at [phone] or [phone].     1.   Have someone stay with you to assist you as needed.    2.   Do not drive or operate any mechanical devices.    3.   Do not perform any activity that requires concentration. Make no major decisions over the next 24 hours.     4.   Be careful changing positions from laying down to sitting or standing, as you may become dizzy.     5.   Do not drink alcohol for 48 hours.    6.   There are no restrictions for eating your normal meals. Drink fluids.    7.   You may continue your usual medications for pain, tranquilizers, muscle relaxants or sedatives when awake.     8.   Tomorrow, you may continue your normal daily activities.     9.   Pressure dressing on 10 - 15 minutes. If swelling or bleeding occurs when removed, continue placing direct pressure on injection site for another 5 minutes, or until bleeding stops.   Diazepam (VALIUM) Oral solution  What is this medicine?  You were prescribed DIAZEPAM (dye [place] e paul) for the procedure you had today. This medication is a benzodiazepine. It is used to treat anxiety and nervousness. It also can help treat alcohol withdrawal, relax muscles, and treat certain types of seizures.  This medicine may be used for other purposes; ask your health care provider or pharmacist if you have questions.  What side effects may I notice from receiving this medicine?  Side effects that you should report to your doctor or health care professional as soon as possible:  • allergic reactions like skin rash, itching or hives, swelling of the face, lips, or tongue  • angry, confused, depressed, other mood changes  • breathing problems  • feeling faint or lightheaded, falls  • muscle cramps  • problems with balance, talking,  walking  • restlessness  • tremors  • trouble passing urine or change in the amount of urine  • unusually weak or tired  Side effects that usually do not require medical attention (report to your doctor or health care professional if they continue or are bothersome):  • difficulty sleeping, nightmares  • dizziness, drowsiness, clumsiness, or unsteadiness, a hangover effect  • headache  • nausea, vomiting  This list may not describe all possible side effects. Call your doctor for medical advice about side effects. You may report side effects to FDA at 6-187-XPQ-[phone].    I have been informed of and understand the above discharge instructions.

## 2019-09-27 ENCOUNTER — OFFICE VISIT (OUTPATIENT)
Dept: PHYSICAL MEDICINE AND REHAB | Facility: MEDICAL CENTER | Age: 34
End: 2019-09-27
Payer: MEDICAID

## 2019-09-27 VITALS
BODY MASS INDEX: 28.67 KG/M2 | DIASTOLIC BLOOD PRESSURE: 86 MMHG | HEART RATE: 98 BPM | OXYGEN SATURATION: 97 % | SYSTOLIC BLOOD PRESSURE: 118 MMHG | WEIGHT: 161.82 LBS | TEMPERATURE: 98.1 F | HEIGHT: 63 IN

## 2019-09-27 DIAGNOSIS — M79.18 MYOFASCIAL PAIN: ICD-10-CM

## 2019-09-27 DIAGNOSIS — M50.30 OTHER CERVICAL DISC DEGENERATION, UNSPECIFIED CERVICAL REGION: ICD-10-CM

## 2019-09-27 DIAGNOSIS — M62.838 MUSCLE SPASM: ICD-10-CM

## 2019-09-27 DIAGNOSIS — F32.A DEPRESSION, UNSPECIFIED DEPRESSION TYPE: ICD-10-CM

## 2019-09-27 DIAGNOSIS — M54.50 LUMBOSACRAL PAIN: ICD-10-CM

## 2019-09-27 DIAGNOSIS — R29.2: ICD-10-CM

## 2019-09-27 DIAGNOSIS — M41.9 SCOLIOSIS OF THORACIC SPINE, UNSPECIFIED SCOLIOSIS TYPE: ICD-10-CM

## 2019-09-27 DIAGNOSIS — M47.814 SPONDYLOSIS OF THORACIC REGION WITHOUT MYELOPATHY OR RADICULOPATHY: ICD-10-CM

## 2019-09-27 DIAGNOSIS — F43.10 PTSD (POST-TRAUMATIC STRESS DISORDER): ICD-10-CM

## 2019-09-27 PROCEDURE — 99215 OFFICE O/P EST HI 40 MIN: CPT | Performed by: PHYSICAL MEDICINE & REHABILITATION

## 2019-09-27 RX ORDER — CYCLOBENZAPRINE HCL 10 MG
10 TABLET ORAL 3 TIMES DAILY PRN
Qty: 90 TAB | Refills: 0 | Status: SHIPPED | OUTPATIENT
Start: 2019-09-27 | End: 2019-11-01

## 2019-09-27 ASSESSMENT — PAIN SCALES - GENERAL: PAINLEVEL: 8=MODERATE-SEVERE PAIN

## 2019-09-27 ASSESSMENT — PATIENT HEALTH QUESTIONNAIRE - PHQ9
5. POOR APPETITE OR OVEREATING: 3 - NEARLY EVERY DAY
CLINICAL INTERPRETATION OF PHQ2 SCORE: 6
SUM OF ALL RESPONSES TO PHQ QUESTIONS 1-9: 25

## 2019-09-27 NOTE — PROGRESS NOTES
"Follow up patient note  Pain Medicine, Interventional spine and sports physiatry, Physical medicine rehabilitation      Chief complaint:   Chief Complaint   Patient presents with   • Follow-Up   Axial spine pain       HISTORY    Please see new patient note dated 07/19/2019 by Dr Brannon  for more details.     HPI  Patient identification: Marian Kent 33 y.o. female with pain in her mid and low back pain presents for follow-up of this pain    Interval history: Since the last visit, she has had continued pain all over her spine and body.  She reports that pain is worse lying down.  Her pain was so significant that she went to the ER at [facility].  She reports that they gave her oxycodone and flexeril and that these helped her pain.    Pain is constant and 8/10 on the VAS.  No numbness or tingling.     She sees a psychiatrist, Dr. Paula Quintanilla.  The patient reports that she was told to stop her zoloft.    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    In the past, she was treated by another provider and was treated with opioids, but she reports that she was discharged as she had multiple no-shows.    PHQ-9: 25  ORT: 2    ROS Unchanged from 09/10/2019 except as noted  Gen: emesis, gassy ,for the last few days  GYN: reports history of endometrosis, worsened abdominal pain    Red Flags :  Fever, Chills, Sweats: Denies  Involuntary Weight Loss: Denies  Bowel/Bladder Incontinence: Denies  Saddle Anesthesia: Denies    PMHx:   Past Medical History:   Diagnosis Date   • Anesthesia     PONV   • Anxiety 2/19/2019   • Breath shortness     occas. no O2   • EP (ectopic pregnancy)    • Gynecological disorder     adhesions/cysts   • Hydronephrosis right 1/3/2014    stone   • Pain 03/2019    pelvic, back   • Pain 03/2019    \"Stomach\"   • Pain management     With Dr. Barger in [place]   • Placenta previa diagnosed with US at [facility] 12/17/2013   • PONV (postoperative nausea and vomiting)    • Psychiatric problem     depressions, " anxiety   • PTSD (post-traumatic stress disorder)        PSHx:   Past Surgical History:   Procedure Laterality Date   • INGUINAL HERNIA REPAIR ROBOTIC Left 3/28/2019    Procedure: INGUINAL HERNIA REPAIR ROBOTIC WITH MESH PLACEMENT  ;  Surgeon: Chris Cooley M.D.;  Location: SURGERY SAME DAY [facility];  Service: General   • PELVISCOPY N/A 2018    Procedure: PELVISCOPY-DIAGNOSTIC;  Surgeon: Mark Mariano M.D.;  Location: SURGERY SAME DAY [facility];  Service: Gynecology   • LAPAROSCOPY  2017    Procedure: Exploratory laparoscopy;  Surgeon: Mark Mariano M.D.;  Location: SURGERY [facility];  Service: Gynecology   • LAPAROSCOPIC LYSIS OF ADHESIONS  2017    Procedure: LAPAROSCOPIC LYSIS OF ADHESIONS;  Surgeon: Mark Mariano M.D.;  Location: SURGERY [facility];  Service: Gynecology   • CYSTOSCOPY  2016    Procedure: CYSTOSCOPY;  Surgeon: Mark Mariano M.D.;  Location: SURGERY SAME DAY [facility];  Service:    • HYSTERECTOMY LAPAROSCOPY  2016    Procedure: HYSTERECTOMY LAPAROSCOPY RIGHT SALPINGECTOMY;  Surgeon: Mark Mariano M.D.;  Location: SURGERY SAME DAY [facility];  Service:    • PELVISCOPY N/A 2016    Procedure: PELVISCOPY Diagnostic;  Surgeon: Mark Mariano M.D.;  Location: SURGERY [facility];  Service:    • REPEAT C SECTION  2014    Performed by Mark Mariano M.D. at [facility]   • PELVISCOPY  2011    Performed by MARK MARIANO at SURGERY [facility]   • SALPINGECTOMY  2011    Performed by MARK MARIANO at [facility]   • REPEAT C SECTION  2007   • PRIMARY C SECTION  2006   • GYN SURGERY       x 2   • [facility]  DELIVERY SER   &        Family history   Denies neuromuscular disease  Family History   Problem Relation Age of Onset   • Cancer Father 45        colon   • Other Maternal Grandfather    • Cancer Paternal Grandmother         breast   • Diabetes Paternal  Grandmother    • Heart Disease Neg Hx    • Stroke Neg Hx        Medications:   Current Outpatient Medications   Medication   • cyclobenzaprine (FLEXERIL) 10 MG Tab   • ALPRAZolam (XANAX PO)   • sertraline (ZOLOFT) 100 MG Tab   • aripiprazole (ABILIFY) 5 MG tablet   • propranolol (INDERAL) 20 MG Tab   • Multiple Vitamin (DAILY-CARRIE PO)     No current facility-administered medications for this visit.        Allergies:   Allergies   Allergen Reactions   • Toradol Hives   • Vicodin [Hydrocodone-Acetaminophen] Itching       Social Hx:   Social History     Socioeconomic History   • Marital status: Single     Spouse name: Not on file   • Number of children: Not on file   • Years of education: Not on file   • Highest education level: Not on file   Occupational History   • Not on file   Social Needs   • Financial resource strain: Not on file   • Food insecurity:     Worry: Not on file     Inability: Not on file   • Transportation needs:     Medical: Not on file     Non-medical: Not on file   Tobacco Use   • Smoking status: Current Every Day Smoker     Packs/day: 1.00     Years: 10.00     Pack years: 10.00     Types: Cigarettes   • Smokeless tobacco: Never Used   Substance and Sexual Activity   • Alcohol use: No     Alcohol/week: 0.0 oz   • Drug use: No   • Sexual activity: Not Currently     Partners: Male     Birth control/protection: Pill   Lifestyle   • Physical activity:     Days per week: Not on file     Minutes per session: Not on file   • Stress: Not on file   Relationships   • Social connections:     Talks on phone: Not on file     Gets together: Not on file     Attends Orthodoxy service: Not on file     Active member of club or organization: Not on file     Attends meetings of clubs or organizations: Not on file     Relationship status: Not on file   • Intimate partner violence:     Fear of current or ex partner: Not on file     Emotionally abused: Not on file     Physically abused: Not on file     Forced sexual  "activity: Not on file   Other Topics Concern   • Not on file   Social History Narrative    ** Merged History Encounter **              EXAMINATION     Physical Exam:   Vitals: /86 (BP Location: Left arm, Patient Position: Sitting, BP Cuff Size: Small adult)   Pulse 98   Temp 36.7 °C (98.1 °F) (Temporal)   Ht 1.6 m (5' 3\")   Wt 73.4 kg (161 lb 13.1 oz)   SpO2 97%     Constitutional:   Body Habitus: Body mass index is 28.66 kg/m².  Cooperation: Fully cooperates with exam  Appearance: Well-groomed no disheveled, in  anocute distress    Respiratory-  breathing comfortable on room air, no audible wheezing  Cardiovascular- capillary refills less than 2 seconds. No lower extremity edema is noted.   Psychiatric- alert and oriented ×3. Normal affect.   Spine:  No focal motor deficits in the upper or lower extremities bilaterally  Sensation is grossly intact in the upper and lower extremities bilaterally  Reflexes are 2+ biceps, triceps, patella and achilles bilaterally  Lieberman's is positive bilaterally  Spurlings is negative bilaterally.  Seated SLR is negative bilaterally    Gait: Normal gait without assist device.         MEDICAL DECISION MAKING    DATA    UDS 09/10/2019  Positive for THC and benzodizepines    Labs:   Lab Results   Component Value Date/Time    SODIUM 139 06/22/2018 02:52 PM    POTASSIUM 3.6 06/22/2018 02:52 PM    CHLORIDE 110 06/22/2018 02:52 PM    CO2 23 06/22/2018 02:52 PM    GLUCOSE 85 06/22/2018 02:52 PM    BUN 12 06/22/2018 02:52 PM    CREATININE 0.66 06/22/2018 02:52 PM    CREATININE 0.7 06/19/2006 06:40 AM        Lab Results   Component Value Date/Time    PROTHROMBTM 13.8 08/19/2015 09:40 PM    INR 1.04 08/19/2015 09:40 PM        Lab Results   Component Value Date/Time    WBC 6.1 07/10/2018 04:13 AM    RBC 3.65 (L) 07/10/2018 04:13 AM    HEMOGLOBIN 12.3 07/10/2018 04:13 AM    HEMATOCRIT 37.0 07/10/2018 04:13 AM    .4 (H) 07/10/2018 04:13 AM    MCH 33.7 (H) 07/10/2018 04:13 AM    " MCHC 33.2 (L) 07/10/2018 04:13 AM    MPV 8.5 (L) 07/10/2018 04:13 AM    NEUTSPOLYS 74.50 (H) 07/10/2018 04:13 AM    LYMPHOCYTES 18.30 (L) 07/10/2018 04:13 AM    MONOCYTES 6.60 07/10/2018 04:13 AM    EOSINOPHILS 0.00 07/10/2018 04:13 AM    BASOPHILS 0.30 07/10/2018 04:13 AM        No results found for: HBA1C       Imaging: I personally reviewed following images    MRI thoracic spine 09/20/2019  No significant central or foraminal canal narrowing in the thoracic spine  There is mild levoscoliosis    MRI lumbar spine 09/20/2019  There is facet arthropathy at L5-S1  No significant central or neuroforaminal narrowing in the lumbar spine    X-ray thoracolumbar spine: 08/07/2019  There is minimal scoliosis in the thoracolumbar spine.  There is mild degenerative change in thoracic spine.  No fractures      I reviewed the following radiology reports  MRI thoracic spine 09/20/2019         1.  Mild convex left scoliotic curvature.    2.  No central canal or neural foraminal narrowing. No significant degenerative disc disease.     MRI lumbar spine 09/20/2019  No significant degenerative disc disease. There is mild facet degeneration bilaterally at L5-S1. No central canal or neuroforaminal narrowing.    Xray thoracolumbar spine 08/7/2019    Minimal degenerative changes in the lower thoracic spine.    Minimal scoliosis.          Results for orders placed during the hospital encounter of 02/23/16   CT-ABDOMEN-PELVIS WITH    Impression CT ABDOMEN    1.  No acute abnormality.  2.  Atrophic left kidney with areas suggesting scarring.    CT PELVIS    1.  No acute abnormality.               DIAGNOSIS   Visit Diagnoses     ICD-10-CM   1. Scoliosis of thoracic spine, unspecified scoliosis type M41.9   2. Spondylosis of thoracic region without myelopathy or radiculopathy M47.814   3. Muscle spasm M62.838   4. Lumbosacral pain M54.5   5. Other cervical disc degeneration, unspecified cervical region M50.30   6. Lieberman's reflex positive  R29.2         ASSESSMENT and PLAN:     Marian Kent 33 y.o. female with complaint of axial pain.    Marian was seen today for follow-up.    Diagnoses and all orders for this visit:    Scoliosis of thoracic spine, unspecified scoliosis type    Spondylosis of thoracic region without myelopathy or radiculopathy    Muscle spasm  -     cyclobenzaprine (FLEXERIL) 10 MG Tab; Take 1 Tab by mouth 3 times a day as needed for Muscle Spasms for up to 30 days.    Lumbosacral pain    Other cervical disc degeneration, unspecified cervical region    Lieberman's reflex positive         1. Discussed getting records from [facility] visit  2. Discussed Lieberman's and plan to get MRI cervical spine  3. Encouraged her to follow-up with her psychiatrist.  If she is no longer taking zoloft, duloxetine could be considered.  4. Pain psychology referral discussed  5. Trial flexeril. She had previously thought that this did not help with her pain, but thinks that most recently, it was helpful.    6. Current pathology does not support use of chronic opioids.    7. Reassess after MRI cervical spine  8. Encouraged her to follow-up with her OB/GYN regarding history of endometriosis.  Thusfar, no significant pathology that could account for her axial spine pain.    Follow up: after imaging studies.    Thank you for allowing me to participate in the care of this patient. If you have any questions please not hesitate to contact me.    Please note that this dictation was created using voice recognition software. I have made every reasonable attempt to correct obvious errors but there may be errors of grammar and content that I may have overlooked prior to finalization of this note.      Jono Page MD  Interventional Spine and Sports Physiatry  Physical Medicine and Rehabilitation  [facility]

## 2019-11-01 ENCOUNTER — OFFICE VISIT (OUTPATIENT)
Dept: PHYSICAL MEDICINE AND REHAB | Facility: MEDICAL CENTER | Age: 34
End: 2019-11-01
Payer: MEDICAID

## 2019-11-01 VITALS
DIASTOLIC BLOOD PRESSURE: 74 MMHG | OXYGEN SATURATION: 95 % | SYSTOLIC BLOOD PRESSURE: 118 MMHG | TEMPERATURE: 98 F | HEART RATE: 86 BPM | HEIGHT: 63 IN | BODY MASS INDEX: 28.71 KG/M2 | WEIGHT: 162.04 LBS

## 2019-11-01 DIAGNOSIS — M79.10 MYALGIA: ICD-10-CM

## 2019-11-01 DIAGNOSIS — M54.50 LUMBOSACRAL PAIN: ICD-10-CM

## 2019-11-01 DIAGNOSIS — F32.A DEPRESSION, UNSPECIFIED DEPRESSION TYPE: ICD-10-CM

## 2019-11-01 DIAGNOSIS — R20.2 PARESTHESIA: ICD-10-CM

## 2019-11-01 DIAGNOSIS — M62.838 MUSCLE SPASM: ICD-10-CM

## 2019-11-01 DIAGNOSIS — F43.10 PTSD (POST-TRAUMATIC STRESS DISORDER): ICD-10-CM

## 2019-11-01 DIAGNOSIS — M41.9 SCOLIOSIS, UNSPECIFIED SCOLIOSIS TYPE, UNSPECIFIED SPINAL REGION: ICD-10-CM

## 2019-11-01 PROCEDURE — 99214 OFFICE O/P EST MOD 30 MIN: CPT | Performed by: PHYSICAL MEDICINE & REHABILITATION

## 2019-11-01 RX ORDER — CYCLOBENZAPRINE HCL 10 MG
10 TABLET ORAL
Qty: 30 TAB | Refills: 0
Start: 2019-11-01 | End: 2019-12-01

## 2019-11-01 ASSESSMENT — PATIENT HEALTH QUESTIONNAIRE - PHQ9
5. POOR APPETITE OR OVEREATING: 3 - NEARLY EVERY DAY
CLINICAL INTERPRETATION OF PHQ2 SCORE: 5
SUM OF ALL RESPONSES TO PHQ QUESTIONS 1-9: 21

## 2019-11-01 ASSESSMENT — PAIN SCALES - GENERAL: PAINLEVEL: 9=SEVERE PAIN

## 2019-11-01 NOTE — PROGRESS NOTES
Follow up patient note  Pain Medicine, Interventional spine and sports physiatry, Physical medicine rehabilitation      Chief complaint:   Chief Complaint   Patient presents with   • Follow-Up   Axial spine pain       HISTORY    Please see new patient note dated 07/19/2019 by Dr Brannon  for more details.     HPI  Patient identification: Marian Kent 34 y.o. female with pain in her mid and low back pain presents for follow-up of this pain    Interval history: Since the last visit, she has had continued pain all over her spine and body.  She reports that she had been going to physical therapy, but this had to be changed as the person quit and no future visits were scheduled.  I don't have records of this treatment.    Her pain is primarily in the low back and radiates into the gluteal region bilaterally.  She feels like it is getting worse.    She is still taking zoloft under the care of Dr. Paula Quintanilla.  I am not sure if they plan to discontinue this or not.  It seems that the patient had discontinued it on her own.     Pain is constant and 9/10 on the VAS.  No numbness or tingling in the legs, but she reports pins and needles, aching, stabbing and cramping in the low back.    Her work as a caregiver worsens pain, by her report.    She has not been sleeping well, reports that it is second to the pain.  She has not been to see psychology, because she works with a psychiatrist and did not understand the purpose of the referral.    She has joined  fitness.      PHQ-9: 21  ORT: 2    ROS Unchanged from 09/27/2019 except as noted  Gen: emesis, gassy ,for the last few days  GYN: reports history of endometrosis, worsened abdominal pain    Red Flags :  Fever, Chills, Sweats: Denies  Involuntary Weight Loss: Denies  Bowel/Bladder Incontinence: Denies  Saddle Anesthesia: Denies    PMHx:   Past Medical History:   Diagnosis Date   • Anesthesia     PONV   • Anxiety 2/19/2019   • Breath shortness     occas. no O2   •  "EP (ectopic pregnancy)    • Gynecological disorder     adhesions/cysts   • Hydronephrosis right 1/3/2014    stone   • Pain 03/2019    pelvic, back   • Pain 03/2019    \"Stomach\"   • Pain management     With Dr. Barger in [place]   • Placenta previa diagnosed with US at [facility] 12/17/2013   • PONV (postoperative nausea and vomiting)    • Psychiatric problem     depressions, anxiety   • PTSD (post-traumatic stress disorder)        PSHx:   Past Surgical History:   Procedure Laterality Date   • INGUINAL HERNIA REPAIR ROBOTIC Left 3/28/2019    Procedure: INGUINAL HERNIA REPAIR ROBOTIC WITH MESH PLACEMENT  ;  Surgeon: Chris Cooley M.D.;  Location: SURGERY SAME DAY [facility];  Service: General   • PELVISCOPY N/A 7/9/2018    Procedure: PELVISCOPY-DIAGNOSTIC;  Surgeon: Mark Mariano M.D.;  Location: SURGERY SAME DAY [facility];  Service: Gynecology   • LAPAROSCOPY  12/9/2017    Procedure: Exploratory laparoscopy;  Surgeon: Mark Mariano M.D.;  Location: SURGERY [facility];  Service: Gynecology   • LAPAROSCOPIC LYSIS OF ADHESIONS  12/9/2017    Procedure: LAPAROSCOPIC LYSIS OF ADHESIONS;  Surgeon: Mark Mariano M.D.;  Location: SURGERY [facility];  Service: Gynecology   • CYSTOSCOPY  8/8/2016    Procedure: CYSTOSCOPY;  Surgeon: Mark Mariano M.D.;  Location: SURGERY SAME DAY [facility];  Service:    • HYSTERECTOMY LAPAROSCOPY  8/8/2016    Procedure: HYSTERECTOMY LAPAROSCOPY RIGHT SALPINGECTOMY;  Surgeon: Mark Mariano M.D.;  Location: SURGERY SAME DAY [facility];  Service:    • PELVISCOPY N/A 5/20/2016    Procedure: PELVISCOPY Diagnostic;  Surgeon: Mark Mariano M.D.;  Location: SURGERY [facility];  Service:    • REPEAT C SECTION  5/23/2014    Performed by Mark Mariano M.D. at [facility]   • PELVISCOPY  1/23/2011    Performed by MARK MARIANO at SURGERY [facility]   • SALPINGECTOMY  1/23/2011    Performed by MARK MARIANO at [facility]   • REPEAT C " SECTION  2007   • PRIMARY C SECTION  2006   • GYN SURGERY       x 2   • [facility]  DELIVERY SER   &        Family history   Denies neuromuscular disease  Family History   Problem Relation Age of Onset   • Cancer Father 45        colon   • Other Maternal Grandfather    • Cancer Paternal Grandmother         breast   • Diabetes Paternal Grandmother    • Heart Disease Neg Hx    • Stroke Neg Hx        Medications:   Current Outpatient Medications   Medication   • cyclobenzaprine (FLEXERIL) 10 MG Tab   • sertraline (ZOLOFT) 100 MG Tab   • aripiprazole (ABILIFY) 5 MG tablet   • ALPRAZolam (XANAX PO)   • propranolol (INDERAL) 20 MG Tab   • Multiple Vitamin (DAILY-CARRIE PO)     No current facility-administered medications for this visit.        Allergies:   Allergies   Allergen Reactions   • Toradol Hives   • Vicodin [Hydrocodone-Acetaminophen] Itching       Social Hx:   Social History     Socioeconomic History   • Marital status: Single     Spouse name: Not on file   • Number of children: Not on file   • Years of education: Not on file   • Highest education level: Not on file   Occupational History   • Not on file   Social Needs   • Financial resource strain: Not on file   • Food insecurity:     Worry: Not on file     Inability: Not on file   • Transportation needs:     Medical: Not on file     Non-medical: Not on file   Tobacco Use   • Smoking status: Current Every Day Smoker     Packs/day: 1.00     Years: 10.00     Pack years: 10.00     Types: Cigarettes   • Smokeless tobacco: Never Used   Substance and Sexual Activity   • Alcohol use: No     Alcohol/week: 0.0 oz   • Drug use: No   • Sexual activity: Not Currently     Partners: Male     Birth control/protection: Pill   Lifestyle   • Physical activity:     Days per week: Not on file     Minutes per session: Not on file   • Stress: Not on file   Relationships   • Social connections:     Talks on phone: Not on file     Gets together: Not on  "file     Attends Denominational service: Not on file     Active member of club or organization: Not on file     Attends meetings of clubs or organizations: Not on file     Relationship status: Not on file   • Intimate partner violence:     Fear of current or ex partner: Not on file     Emotionally abused: Not on file     Physically abused: Not on file     Forced sexual activity: Not on file   Other Topics Concern   •  Service No   • Blood Transfusions No   • Caffeine Concern No   • Occupational Exposure Yes   • Hobby Hazards No   • Sleep Concern Yes   • Stress Concern No   • Weight Concern Yes   • Special Diet No   • Back Care No   • Exercise Yes   • Bike Helmet No   • Seat Belt Yes   • Self-Exams No   Social History Narrative    ** Merged History Encounter **              EXAMINATION     Physical Exam:   Vitals: /74 (BP Location: Right arm, Patient Position: Sitting, BP Cuff Size: Large adult long)   Pulse 86   Temp 36.7 °C (98 °F) (Temporal)   Ht 1.6 m (5' 3\")   Wt 73.5 kg (162 lb 0.6 oz)   SpO2 95%     Constitutional:   Body Habitus: Body mass index is 28.7 kg/m².  Cooperation: Fully cooperates with exam  Appearance: Well-groomed no disheveled, in no acute distress    Respiratory-  breathing comfortable on room air, no audible wheezing  Cardiovascular- capillary refills less than 2 seconds. No lower extremity edema is noted.   Psychiatric- alert and oriented ×3. Normal affect.   Spine:  Tender points over the lumbar and thoracic paraspinals to superficial touch.  No focal motor deficits in the upper or lower extremities bilaterally  Sensation is grossly intact in the upper and lower extremities bilaterally  Reflexes are 2+ biceps, triceps, patella and achilles bilaterally  Lieberman's is positive bilaterally  Seated SLR is negative bilaterally    Gait: Normal gait without assist device.         MEDICAL DECISION MAKING    DATA    UDS 09/10/2019  Positive for THC and benzodizepines    Labs:   Lab " Results   Component Value Date/Time    SODIUM 139 06/22/2018 02:52 PM    POTASSIUM 3.6 06/22/2018 02:52 PM    CHLORIDE 110 06/22/2018 02:52 PM    CO2 23 06/22/2018 02:52 PM    GLUCOSE 85 06/22/2018 02:52 PM    BUN 12 06/22/2018 02:52 PM    CREATININE 0.66 06/22/2018 02:52 PM    CREATININE 0.7 06/19/2006 06:40 AM        Lab Results   Component Value Date/Time    PROTHROMBTM 13.8 08/19/2015 09:40 PM    INR 1.04 08/19/2015 09:40 PM        Lab Results   Component Value Date/Time    WBC 6.1 07/10/2018 04:13 AM    RBC 3.65 (L) 07/10/2018 04:13 AM    HEMOGLOBIN 12.3 07/10/2018 04:13 AM    HEMATOCRIT 37.0 07/10/2018 04:13 AM    .4 (H) 07/10/2018 04:13 AM    MCH 33.7 (H) 07/10/2018 04:13 AM    MCHC 33.2 (L) 07/10/2018 04:13 AM    MPV 8.5 (L) 07/10/2018 04:13 AM    NEUTSPOLYS 74.50 (H) 07/10/2018 04:13 AM    LYMPHOCYTES 18.30 (L) 07/10/2018 04:13 AM    MONOCYTES 6.60 07/10/2018 04:13 AM    EOSINOPHILS 0.00 07/10/2018 04:13 AM    BASOPHILS 0.30 07/10/2018 04:13 AM        No results found for: HBA1C       Imaging: I personally reviewed following images    MRI thoracic spine 09/20/2019  No significant central or foraminal canal narrowing in the thoracic spine  There is mild levoscoliosis    MRI lumbar spine 09/20/2019  There is facet arthropathy at L5-S1  No significant central or neuroforaminal narrowing in the lumbar spine    X-ray thoracolumbar spine: 08/07/2019  There is minimal scoliosis in the thoracolumbar spine.  There is mild degenerative change in thoracic spine.  No fractures      I reviewed the following radiology reports  MRI thoracic spine 09/20/2019         1.  Mild convex left scoliotic curvature.    2.  No central canal or neural foraminal narrowing. No significant degenerative disc disease.     MRI lumbar spine 09/20/2019  No significant degenerative disc disease. There is mild facet degeneration bilaterally at L5-S1. No central canal or neuroforaminal narrowing.    Xray thoracolumbar spine  08/7/2019    Minimal degenerative changes in the lower thoracic spine.    Minimal scoliosis.          Results for orders placed during the hospital encounter of 02/23/16   CT-ABDOMEN-PELVIS WITH    Impression CT ABDOMEN    1.  No acute abnormality.  2.  Atrophic left kidney with areas suggesting scarring.    CT PELVIS    1.  No acute abnormality.               DIAGNOSIS   Visit Diagnoses     ICD-10-CM   1. Muscle spasm M62.838   2. Myalgia M79.10   3. Paresthesia R20.2   4. Lumbosacral pain M54.5   5. PTSD (post-traumatic stress disorder) F43.10   6. Depression, unspecified depression type F32.9   7. Scoliosis, unspecified scoliosis type, unspecified spinal region M41.9         ASSESSMENT and PLAN:     Marian Kent 34 y.o. female with complaint of axial pain.    Marian was seen today for follow-up.    Diagnoses and all orders for this visit:    Muscle spasm  -     VITAMIN D,25 HYDROXY; Future  -     REFERRAL TO PHYSICAL THERAPY Reason for Therapy: Eval/Treat/Report  -     cyclobenzaprine (FLEXERIL) 10 MG Tab; Take 1 Tab by mouth 1 time daily as needed for Muscle Spasms for up to 30 days.    Myalgia  -     VITAMIN D,25 HYDROXY; Future  -     TSH WITH REFLEX TO FT4; Future  -     REFERRAL TO PHYSICAL THERAPY Reason for Therapy: Eval/Treat/Report    Paresthesia  -     VITAMIN B12; Future  -     TSH WITH REFLEX TO FT4; Future    Lumbosacral pain  -     REFERRAL TO PHYSICAL THERAPY Reason for Therapy: Eval/Treat/Report    PTSD (post-traumatic stress disorder)  -     Patient has been identified as having a positive depression screening. Appropriate orders and counseling have been given.    Depression, unspecified depression type  -     Patient has been identified as having a positive depression screening. Appropriate orders and counseling have been given.    Scoliosis, unspecified scoliosis type, unspecified spinal region           1. Print information about pain psychology referral  2. Referral to physical therapy  placed again  3. Check vitamin D, TSH and vitamin B12  4. Advised that she follow-up with psychology.  Continue with her Psychiatrist.  5. Would recommend use of duloxetine, if this would work with her current medications to assist with pain and depression.  6. Discussed that she can continue prn flexeril at night.    7. Current pathology does not support use of chronic opioids.    8. Reassess after MRI cervical spine  9. Encouraged her to follow-up with her OB/GYN regarding history of endometriosis.      Follow up: after imaging studies.    Thank you for allowing me to participate in the care of this patient. If you have any questions please not hesitate to contact me.    Please note that this dictation was created using voice recognition software. I have made every reasonable attempt to correct obvious errors but there may be errors of grammar and content that I may have overlooked prior to finalization of this note.      Jono Page MD  Interventional Spine and Sports Physiatry  Physical Medicine and Rehabilitation  [facility]

## 2020-02-21 ENCOUNTER — OFFICE VISIT (OUTPATIENT)
Dept: MEDICAL GROUP | Facility: MEDICAL CENTER | Age: 35
End: 2020-02-21
Attending: FAMILY MEDICINE
Payer: MEDICAID

## 2020-02-21 VITALS
BODY MASS INDEX: 27.64 KG/M2 | HEART RATE: 72 BPM | TEMPERATURE: 97.9 F | RESPIRATION RATE: 16 BRPM | HEIGHT: 63 IN | SYSTOLIC BLOOD PRESSURE: 92 MMHG | DIASTOLIC BLOOD PRESSURE: 64 MMHG | OXYGEN SATURATION: 94 % | WEIGHT: 156 LBS

## 2020-02-21 DIAGNOSIS — Z12.11 SCREENING FOR MALIGNANT NEOPLASM OF COLON: ICD-10-CM

## 2020-02-21 DIAGNOSIS — K52.9 ACUTE GASTROENTERITIS: ICD-10-CM

## 2020-02-21 DIAGNOSIS — R10.10 PAIN OF UPPER ABDOMEN: ICD-10-CM

## 2020-02-21 DIAGNOSIS — F32.1 CURRENT MODERATE EPISODE OF MAJOR DEPRESSIVE DISORDER, UNSPECIFIED WHETHER RECURRENT (HCC): ICD-10-CM

## 2020-02-21 PROCEDURE — 99213 OFFICE O/P EST LOW 20 MIN: CPT | Performed by: FAMILY MEDICINE

## 2020-02-21 RX ORDER — ONDANSETRON 4 MG/1
4 TABLET, FILM COATED ORAL EVERY 6 HOURS PRN
Qty: 20 TAB | Refills: 1 | Status: SHIPPED | OUTPATIENT
Start: 2020-02-21 | End: 2021-02-17

## 2020-02-21 RX ORDER — DICYCLOMINE HYDROCHLORIDE 10 MG/1
10 CAPSULE ORAL
Qty: 40 CAP | Refills: 1 | Status: SHIPPED | OUTPATIENT
Start: 2020-02-21 | End: 2020-04-28 | Stop reason: SDUPTHER

## 2020-02-21 ASSESSMENT — PATIENT HEALTH QUESTIONNAIRE - PHQ9
SUM OF ALL RESPONSES TO PHQ QUESTIONS 1-9: 21
CLINICAL INTERPRETATION OF PHQ2 SCORE: 5
5. POOR APPETITE OR OVEREATING: 3 - NEARLY EVERY DAY

## 2020-02-21 NOTE — PROGRESS NOTES
"Subjective:      Marain Kent is a 34 y.o. female who presents with No chief complaint on file.            HPI 1.  Acute gastroenteritis/abdominal pain-patient reports onset 2 weeks ago of diffuse central abdominal cramping along with diarrhea at least every other day and emesis at least every other day.  She has been seen 3 times at [facility] emergency room.  She reports blood and urine was checked.  She has not had any imaging.  She reports she has had fevers in the range of 100 degrees on 2 separate occasions recently.  Diarrhea can be up to 10 times per day and is yellow in color.  Emesis can be up to 6 times per day and is also yellow in color.  Patient recently has been eating intermittent solids.  2.  Depression/anxiety-patient is followed at [place], currently is only taking Zoloft and is off Abilify and Xanax.  She sees a counselor every week and psychiatry once a month.  ROS negative for dysuria, suicidal ideation, confusion       Objective:     BP (!) 92/64 (BP Location: Right arm, Patient Position: Sitting, BP Cuff Size: Adult)   Pulse 72   Temp 36.6 °C (97.9 °F) (Temporal)   Resp 16   Ht 1.6 m (5' 2.99\")   Wt 70.8 kg (156 lb)   LMP 07/22/2016 (Exact Date)   SpO2 94%   BMI 27.64 kg/m²      Physical Exam  Gen.- alert, cooperative, in no acute distress  Neck- midline trachea, thyroid not enlarged or tender,supple, no cervical adenopathy  Chest-clear to auscultation and percussion with normal breath sounds. No retractions. Chest wall nontender  Cardiac- regular rhythm and rate. No murmur, thrill, or heave  Abdomen-normal contour.  Normal pitched bowel sounds.  Mild diffuse tenderness with no focal rebound tenderness or palpable mass    Psych-normal affect with good eye contact. Normal grooming. Oriented x4.  PHQ 9 score-20       Assessment/Plan:       1. Acute gastroenteritis      2. Pain of upper abdomen      3. Current moderate episode of major depressive disorder, unspecified whether " recurrent (HCC)    Plan: 1.  Discussed initial clear liquids with advancement to a brat diet in 48 to 72 hours and then onto solid food  2.  If patient's GI symptoms are persistent over the next 4 days she will collect a CBC, can panel and stool culture  3 Rx Zofran 4 mg for nausea, and Bentyl 10 mg for abdominal cramping  4.  GI referral sent for screening colonoscopy due to father's history of colon cancer discovered at age 36  5.  Revisit in 2 weeks as needed

## 2020-03-06 ENCOUNTER — TELEPHONE (OUTPATIENT)
Dept: MEDICAL GROUP | Facility: MEDICAL CENTER | Age: 35
End: 2020-03-06

## 2020-03-13 ENCOUNTER — OFFICE VISIT (OUTPATIENT)
Dept: MEDICAL GROUP | Facility: MEDICAL CENTER | Age: 35
End: 2020-03-13
Attending: FAMILY MEDICINE
Payer: MEDICAID

## 2020-03-13 VITALS
HEART RATE: 60 BPM | HEIGHT: 63 IN | SYSTOLIC BLOOD PRESSURE: 98 MMHG | RESPIRATION RATE: 16 BRPM | WEIGHT: 159 LBS | OXYGEN SATURATION: 95 % | TEMPERATURE: 98.1 F | DIASTOLIC BLOOD PRESSURE: 64 MMHG | BODY MASS INDEX: 28.17 KG/M2

## 2020-03-13 DIAGNOSIS — K62.89 RECTAL PAIN: ICD-10-CM

## 2020-03-13 DIAGNOSIS — R30.0 DYSURIA: ICD-10-CM

## 2020-03-13 LAB
APPEARANCE UR: CLEAR
BILIRUB UR STRIP-MCNC: NEGATIVE MG/DL
COLOR UR AUTO: YELLOW
GLUCOSE UR STRIP.AUTO-MCNC: NEGATIVE MG/DL
KETONES UR STRIP.AUTO-MCNC: NEGATIVE MG/DL
LEUKOCYTE ESTERASE UR QL STRIP.AUTO: NEGATIVE
NITRITE UR QL STRIP.AUTO: NEGATIVE
PH UR STRIP.AUTO: 6.5 [PH] (ref 5–8)
PROT UR QL STRIP: 6.5 MG/DL
RBC UR QL AUTO: NEGATIVE
SP GR UR STRIP.AUTO: 1.02
UROBILINOGEN UR STRIP-MCNC: NEGATIVE MG/DL

## 2020-03-13 PROCEDURE — 81002 URINALYSIS NONAUTO W/O SCOPE: CPT | Performed by: FAMILY MEDICINE

## 2020-03-13 PROCEDURE — 99213 OFFICE O/P EST LOW 20 MIN: CPT | Performed by: FAMILY MEDICINE

## 2020-03-13 NOTE — PROGRESS NOTES
"Subjective:      Marian Kent is a 34 y.o. female who presents with Abdominal Pain            HPI 1.  Rectal pain-patient reports onset of persistent rectal pain beginning 3 days ago.  She denies any history of trauma or passing any large or hard stools.  She has not seen any blood in her stool.  She reports currently she is passing average formed brown stools once or twice daily which is her usual bowel habit.  She did have a colonoscopy and upper endoscopy performed 1 week ago with no abnormal findings on the colon study.  Patient has not seen any blood in the toilet bowl.  She denies any anal intercourse.  2.  Dysuria-patient reports mild pain with urination over the past 2 days.  She is not sure if she has any unusual frequency.  ROS negative for current fever, positive for persistent mid to lower abdominal pain, negative for hematuria       Objective:     BP (!) 98/64 (BP Location: Left arm, Patient Position: Sitting, BP Cuff Size: Adult)   Pulse 60   Temp 36.7 °C (98.1 °F) (Temporal)   Resp 16   Ht 1.6 m (5' 2.99\")   Wt 72.1 kg (159 lb)   LMP 07/22/2016 (Exact Date)   SpO2 95%   BMI 28.17 kg/m²      Physical Exam  General-alert cooperative female in no acute distress  Rectal-normal external anal appearance without swelling or fissures.  There is no redness.  Gentle digital rectal exam revealed no palpable internal hemorrhoids masses or thickening.   Abdomen-1+ tender in the right and left lower quadrant without palpable mass.  Normal bowel sounds  UA-negative for leukocytes, nitrites, RBCs     Assessment/Plan:       1. Dysuria    - POCT Urinalysis    2. Rectal pain    Plan: 1.  Patient has upcoming GI evaluation in 1 week-we will have her review the rectal pain situation with GI  2.  Increase daily fluid intake, no antibiotic indicated for dysuria  3.  Patient is encouraged to start the Bentyl ordered earlier for her crampy abdominal pain  "

## 2020-04-09 ENCOUNTER — TELEPHONE (OUTPATIENT)
Dept: MEDICAL GROUP | Facility: MEDICAL CENTER | Age: 35
End: 2020-04-09

## 2020-04-09 ENCOUNTER — TELEPHONE (OUTPATIENT)
Dept: HEALTH INFORMATION MANAGEMENT | Facility: OTHER | Age: 35
End: 2020-04-09

## 2020-04-09 NOTE — TELEPHONE ENCOUNTER
1. Caller Name: China Laila Tillotson                      Call Back [phone]  [facility] PCP or Specialty Provider: Yes         2.  Does patient have any active symptoms of respiratory illness (fever OR cough OR shortness of breath OR sore throat)? No.    3.  Does patient have any comoribidities? None     4.  Has the patient traveled in the last 14 days OR had any known contact with someone who is suspected or confirmed to have COVID-19?  No.    5. Disposition: Cleared by RN Triage; OK to keep/schedule appointment    Note routed to [facility] Provider: [facility] only.

## 2020-04-09 NOTE — TELEPHONE ENCOUNTER
Left message with patient about no show to appointment today 4/9/20.  Explained this was her 2nd no show and the no show policy.

## 2020-04-28 ENCOUNTER — OFFICE VISIT (OUTPATIENT)
Dept: MEDICAL GROUP | Facility: MEDICAL CENTER | Age: 35
End: 2020-04-28
Attending: FAMILY MEDICINE
Payer: MEDICAID

## 2020-04-28 VITALS
TEMPERATURE: 97.6 F | OXYGEN SATURATION: 95 % | RESPIRATION RATE: 16 BRPM | HEART RATE: 68 BPM | HEIGHT: 63 IN | WEIGHT: 160 LBS | BODY MASS INDEX: 28.35 KG/M2 | SYSTOLIC BLOOD PRESSURE: 88 MMHG | DIASTOLIC BLOOD PRESSURE: 60 MMHG

## 2020-04-28 DIAGNOSIS — R10.10 PAIN OF UPPER ABDOMEN: ICD-10-CM

## 2020-04-28 PROCEDURE — 99213 OFFICE O/P EST LOW 20 MIN: CPT | Performed by: FAMILY MEDICINE

## 2020-04-28 RX ORDER — CLONAZEPAM 1 MG/1
TABLET ORAL
COMMUNITY
Start: 2020-04-13 | End: 2021-03-18 | Stop reason: SDUPTHER

## 2020-04-28 RX ORDER — FLUOXETINE HYDROCHLORIDE 20 MG/1
CAPSULE ORAL
COMMUNITY
Start: 2020-04-13 | End: 2020-10-20

## 2020-04-28 RX ORDER — TRAZODONE HYDROCHLORIDE 50 MG/1
TABLET ORAL NIGHTLY
COMMUNITY
Start: 2020-04-13 | End: 2022-03-25

## 2020-04-28 RX ORDER — DICYCLOMINE HYDROCHLORIDE 10 MG/1
10 CAPSULE ORAL
Qty: 40 CAP | Refills: 1 | Status: SHIPPED | OUTPATIENT
Start: 2020-04-28 | End: 2021-03-18

## 2020-04-28 NOTE — PROGRESS NOTES
"Subjective:      Marian Kent is a 34 y.o. female who presents with Abdominal Pain            HPI 1.  Mid/upper abdominal pain-patient reports about a 1 month history of crampy left and right-sided mid abdominal pain with a concentration of pain on the right side.  She reports she has decreased appetite and food seems to make the right-sided pain intensified.  She also reports persistent nausea but typically no emesis.  She reports occasional feelings of heartburn and substernal burning but that is perhaps 1 time per week and responds to Tums.  She is having formed stools and no black or bloody stools since being taken off the Zoloft by her psychiatric provider several weeks ago.  (Currently on fluoxetine).  Her uterus was removed due to endometriosis in the past.  She also is to receive a pelvic ultrasound per her gynecologist.    ROS negative for dysuria, fever, rash, diarrhea, rectal pain       Objective:     BP (!) 88/60   Pulse 68   Temp 36.4 °C (97.6 °F) (Temporal)   Resp 16   Ht 1.6 m (5' 2.99\")   Wt 72.6 kg (160 lb)   LMP 07/22/2016 (Exact Date)   SpO2 95%   BMI 28.35 kg/m²      Physical Exam  Gen.- alert, cooperative, in no acute distress  Neck- midline trachea, thyroid not enlarged or tender,supple, no cervical adenopathy  Chest-clear to auscultation and percussion with normal breath sounds. No retractions. Chest wall nontender  Cardiac- regular rhythm and rate. No murmur, thrill, or heave  Abdomen-normal contour.  1-2+ tender to palpation broadly in the right upper quadrant with no palpable mass or rebound tenderness.          Assessment/Plan:       1. Pain of upper abdomen    - dicyclomine (BENTYL) 10 MG Cap; Take 1 Cap by mouth 4 Times a Day,Before Meals and at Bedtime.  Dispense: 40 Cap; Refill: 1  - US-ABDOMEN COMPLETE SURVEY; Future  Plan: 1.  Trial of dicyclomine 10 mg up to 3 times per day if needed for crampy abdominal pain  2.  Abdominal ultrasound to evaluate right upper quadrant " anatomy  3.  Revisit within 3 to 4 weeks as needed

## 2020-04-28 NOTE — LETTER
April 28, 2020    Re:    Marian Tituson  [address]  [place] [address]        Dear Sir,    Marian has been evaluated today and is clinically clear of any infectious process.  Therefore she is cleared to return to work on 4/29/2020 without restriction.            Sincerely,        Jay Meng M.D.    Electronically Signed

## 2020-05-08 ENCOUNTER — HOSPITAL ENCOUNTER (OUTPATIENT)
Dept: RADIOLOGY | Facility: MEDICAL CENTER | Age: 35
End: 2020-05-08
Attending: FAMILY MEDICINE
Payer: MEDICAID

## 2020-05-08 DIAGNOSIS — R10.10 PAIN OF UPPER ABDOMEN: ICD-10-CM

## 2020-05-08 PROCEDURE — 76700 US EXAM ABDOM COMPLETE: CPT

## 2020-06-26 ENCOUNTER — TELEPHONE (OUTPATIENT)
Dept: MEDICAL GROUP | Facility: MEDICAL CENTER | Age: 35
End: 2020-06-26

## 2020-06-26 NOTE — TELEPHONE ENCOUNTER
Left message with patient about no show to appointment today 6/26/20.  Explained this was her 3rd no show, the no show policy and that she can only schedule appointments same day.

## 2020-10-20 ENCOUNTER — PRE-ADMISSION TESTING (OUTPATIENT)
Dept: ADMISSIONS | Facility: MEDICAL CENTER | Age: 35
End: 2020-10-20
Attending: SPECIALIST
Payer: MEDICAID

## 2020-10-20 DIAGNOSIS — Z01.812 PRE-OPERATIVE LABORATORY EXAMINATION: ICD-10-CM

## 2020-10-20 LAB
ANION GAP SERPL CALC-SCNC: 10 MMOL/L (ref 7–16)
BUN SERPL-MCNC: 13 MG/DL (ref 8–22)
CALCIUM SERPL-MCNC: 9.4 MG/DL (ref 8.5–10.5)
CHLORIDE SERPL-SCNC: 102 MMOL/L (ref 96–112)
CO2 SERPL-SCNC: 24 MMOL/L (ref 20–33)
COVID ORDER STATUS COVID19: NORMAL
CREAT SERPL-MCNC: 0.87 MG/DL (ref 0.5–1.4)
ERYTHROCYTE [DISTWIDTH] IN BLOOD BY AUTOMATED COUNT: 41.3 FL (ref 35.9–50)
GLUCOSE SERPL-MCNC: 83 MG/DL (ref 65–99)
HCT VFR BLD AUTO: 44.4 % (ref 37–47)
HGB BLD-MCNC: 15.3 G/DL (ref 12–16)
MCH RBC QN AUTO: 33 PG (ref 27–33)
MCHC RBC AUTO-ENTMCNC: 34.5 G/DL (ref 33.6–35)
MCV RBC AUTO: 95.7 FL (ref 81.4–97.8)
PLATELET # BLD AUTO: 263 K/UL (ref 164–446)
PMV BLD AUTO: 8.9 FL (ref 9–12.9)
POTASSIUM SERPL-SCNC: 4 MMOL/L (ref 3.6–5.5)
RBC # BLD AUTO: 4.64 M/UL (ref 4.2–5.4)
SARS-COV-2 RNA RESP QL NAA+PROBE: NOTDETECTED
SODIUM SERPL-SCNC: 136 MMOL/L (ref 135–145)
SPECIMEN SOURCE: NORMAL
WBC # BLD AUTO: 6 K/UL (ref 4.8–10.8)

## 2020-10-20 PROCEDURE — 80048 BASIC METABOLIC PNL TOTAL CA: CPT

## 2020-10-20 PROCEDURE — 36415 COLL VENOUS BLD VENIPUNCTURE: CPT

## 2020-10-20 PROCEDURE — U0003 INFECTIOUS AGENT DETECTION BY NUCLEIC ACID (DNA OR RNA); SEVERE ACUTE RESPIRATORY SYNDROME CORONAVIRUS 2 (SARS-COV-2) (CORONAVIRUS DISEASE [COVID-19]), AMPLIFIED PROBE TECHNIQUE, MAKING USE OF HIGH THROUGHPUT TECHNOLOGIES AS DESCRIBED BY CMS-2020-01-R: HCPCS

## 2020-10-20 PROCEDURE — 85027 COMPLETE CBC AUTOMATED: CPT

## 2020-10-20 RX ORDER — MIRTAZAPINE 30 MG/1
30 TABLET, FILM COATED ORAL NIGHTLY
COMMUNITY
End: 2021-03-18 | Stop reason: SDUPTHER

## 2020-10-20 RX ORDER — ESCITALOPRAM OXALATE 20 MG/1
20 TABLET ORAL DAILY
COMMUNITY
End: 2021-03-18 | Stop reason: SDUPTHER

## 2020-10-22 ENCOUNTER — HOSPITAL ENCOUNTER (OUTPATIENT)
Facility: MEDICAL CENTER | Age: 35
End: 2020-10-22
Attending: SPECIALIST | Admitting: SPECIALIST
Payer: MEDICAID

## 2020-10-22 ENCOUNTER — ANESTHESIA (OUTPATIENT)
Dept: SURGERY | Facility: MEDICAL CENTER | Age: 35
End: 2020-10-22
Payer: MEDICAID

## 2020-10-22 ENCOUNTER — ANESTHESIA EVENT (OUTPATIENT)
Dept: SURGERY | Facility: MEDICAL CENTER | Age: 35
End: 2020-10-22
Payer: MEDICAID

## 2020-10-22 VITALS
WEIGHT: 166.23 LBS | OXYGEN SATURATION: 91 % | RESPIRATION RATE: 18 BRPM | HEIGHT: 63 IN | SYSTOLIC BLOOD PRESSURE: 117 MMHG | DIASTOLIC BLOOD PRESSURE: 78 MMHG | TEMPERATURE: 97.7 F | HEART RATE: 77 BPM | BODY MASS INDEX: 29.45 KG/M2

## 2020-10-22 DIAGNOSIS — G89.18 POSTOPERATIVE PAIN: Primary | ICD-10-CM

## 2020-10-22 PROBLEM — N99.4 POSTPROCEDURAL PELVIC PERITONEAL ADHESIONS: Status: ACTIVE | Noted: 2020-10-22

## 2020-10-22 PROCEDURE — 700102 HCHG RX REV CODE 250 W/ 637 OVERRIDE(OP): Performed by: SPECIALIST

## 2020-10-22 PROCEDURE — 700111 HCHG RX REV CODE 636 W/ 250 OVERRIDE (IP): Performed by: ANESTHESIOLOGY

## 2020-10-22 PROCEDURE — A9270 NON-COVERED ITEM OR SERVICE: HCPCS | Performed by: ANESTHESIOLOGY

## 2020-10-22 PROCEDURE — 700101 HCHG RX REV CODE 250: Performed by: ANESTHESIOLOGY

## 2020-10-22 PROCEDURE — 500800 HCHG LAPAROSCOPIC J/L HOOK: Performed by: SPECIALIST

## 2020-10-22 PROCEDURE — 500002 HCHG ADHESIVE, DERMABOND: Performed by: SPECIALIST

## 2020-10-22 PROCEDURE — A9270 NON-COVERED ITEM OR SERVICE: HCPCS | Performed by: SPECIALIST

## 2020-10-22 PROCEDURE — 160036 HCHG PACU - EA ADDL 30 MINS PHASE I: Performed by: SPECIALIST

## 2020-10-22 PROCEDURE — 501579 HCHG TROCAR, STEP 5MM: Performed by: SPECIALIST

## 2020-10-22 PROCEDURE — 160035 HCHG PACU - 1ST 60 MINS PHASE I: Performed by: SPECIALIST

## 2020-10-22 PROCEDURE — 160009 HCHG ANES TIME/MIN: Performed by: SPECIALIST

## 2020-10-22 PROCEDURE — 500886 HCHG PACK, LAPAROSCOPY: Performed by: SPECIALIST

## 2020-10-22 PROCEDURE — 160025 RECOVERY II MINUTES (STATS): Performed by: SPECIALIST

## 2020-10-22 PROCEDURE — 160041 HCHG SURGERY MINUTES - EA ADDL 1 MIN LEVEL 4: Performed by: SPECIALIST

## 2020-10-22 PROCEDURE — 160002 HCHG RECOVERY MINUTES (STAT): Performed by: SPECIALIST

## 2020-10-22 PROCEDURE — 160046 HCHG PACU - 1ST 60 MINS PHASE II: Performed by: SPECIALIST

## 2020-10-22 PROCEDURE — 700102 HCHG RX REV CODE 250 W/ 637 OVERRIDE(OP): Performed by: ANESTHESIOLOGY

## 2020-10-22 PROCEDURE — 160029 HCHG SURGERY MINUTES - 1ST 30 MINS LEVEL 4: Performed by: SPECIALIST

## 2020-10-22 PROCEDURE — 500854 HCHG NEEDLE, INSUFFLATION FOR STEP: Performed by: SPECIALIST

## 2020-10-22 PROCEDURE — 700105 HCHG RX REV CODE 258: Performed by: SPECIALIST

## 2020-10-22 PROCEDURE — 501577 HCHG TROCAR, STEP 11MM: Performed by: SPECIALIST

## 2020-10-22 PROCEDURE — 501838 HCHG SUTURE GENERAL: Performed by: SPECIALIST

## 2020-10-22 PROCEDURE — 160048 HCHG OR STATISTICAL LEVEL 1-5: Performed by: SPECIALIST

## 2020-10-22 RX ORDER — MIDAZOLAM HYDROCHLORIDE 1 MG/ML
INJECTION INTRAMUSCULAR; INTRAVENOUS PRN
Status: DISCONTINUED | OUTPATIENT
Start: 2020-10-22 | End: 2020-10-22 | Stop reason: SURG

## 2020-10-22 RX ORDER — MEPERIDINE HYDROCHLORIDE 25 MG/ML
12.5 INJECTION INTRAMUSCULAR; INTRAVENOUS; SUBCUTANEOUS
Status: DISCONTINUED | OUTPATIENT
Start: 2020-10-22 | End: 2020-10-22 | Stop reason: HOSPADM

## 2020-10-22 RX ORDER — LIDOCAINE HYDROCHLORIDE 20 MG/ML
INJECTION, SOLUTION EPIDURAL; INFILTRATION; INTRACAUDAL; PERINEURAL PRN
Status: DISCONTINUED | OUTPATIENT
Start: 2020-10-22 | End: 2020-10-22 | Stop reason: SURG

## 2020-10-22 RX ORDER — SODIUM CHLORIDE, SODIUM LACTATE, POTASSIUM CHLORIDE, CALCIUM CHLORIDE 600; 310; 30; 20 MG/100ML; MG/100ML; MG/100ML; MG/100ML
INJECTION, SOLUTION INTRAVENOUS CONTINUOUS
Status: DISCONTINUED | OUTPATIENT
Start: 2020-10-22 | End: 2020-10-22 | Stop reason: HOSPADM

## 2020-10-22 RX ORDER — OXYCODONE HYDROCHLORIDE AND ACETAMINOPHEN 5; 325 MG/1; MG/1
2 TABLET ORAL
Status: COMPLETED | OUTPATIENT
Start: 2020-10-22 | End: 2020-10-22

## 2020-10-22 RX ORDER — METOPROLOL TARTRATE 1 MG/ML
1 INJECTION, SOLUTION INTRAVENOUS
Status: DISCONTINUED | OUTPATIENT
Start: 2020-10-22 | End: 2020-10-22 | Stop reason: HOSPADM

## 2020-10-22 RX ORDER — MIDAZOLAM HYDROCHLORIDE 1 MG/ML
1 INJECTION INTRAMUSCULAR; INTRAVENOUS
Status: DISCONTINUED | OUTPATIENT
Start: 2020-10-22 | End: 2020-10-22 | Stop reason: HOSPADM

## 2020-10-22 RX ORDER — DEXAMETHASONE SODIUM PHOSPHATE 4 MG/ML
INJECTION, SOLUTION INTRA-ARTICULAR; INTRALESIONAL; INTRAMUSCULAR; INTRAVENOUS; SOFT TISSUE PRN
Status: DISCONTINUED | OUTPATIENT
Start: 2020-10-22 | End: 2020-10-22 | Stop reason: SURG

## 2020-10-22 RX ORDER — OXYCODONE HYDROCHLORIDE AND ACETAMINOPHEN 5; 325 MG/1; MG/1
1 TABLET ORAL EVERY 6 HOURS PRN
Qty: 28 TAB | Refills: 0 | Status: SHIPPED | OUTPATIENT
Start: 2020-10-22 | End: 2020-10-29

## 2020-10-22 RX ORDER — SIMETHICONE 80 MG
80 TABLET,CHEWABLE ORAL EVERY 8 HOURS PRN
Status: DISCONTINUED | OUTPATIENT
Start: 2020-10-22 | End: 2020-10-22 | Stop reason: HOSPADM

## 2020-10-22 RX ORDER — HYDROMORPHONE HYDROCHLORIDE 1 MG/ML
0.1 INJECTION, SOLUTION INTRAMUSCULAR; INTRAVENOUS; SUBCUTANEOUS
Status: DISCONTINUED | OUTPATIENT
Start: 2020-10-22 | End: 2020-10-22 | Stop reason: HOSPADM

## 2020-10-22 RX ORDER — HYDROMORPHONE HYDROCHLORIDE 1 MG/ML
0.4 INJECTION, SOLUTION INTRAMUSCULAR; INTRAVENOUS; SUBCUTANEOUS
Status: DISCONTINUED | OUTPATIENT
Start: 2020-10-22 | End: 2020-10-22 | Stop reason: HOSPADM

## 2020-10-22 RX ORDER — SUCCINYLCHOLINE/SOD CL,ISO/PF 200MG/10ML
SYRINGE (ML) INTRAVENOUS PRN
Status: DISCONTINUED | OUTPATIENT
Start: 2020-10-22 | End: 2020-10-22 | Stop reason: SURG

## 2020-10-22 RX ORDER — PROMETHAZINE HYDROCHLORIDE 25 MG/1
12.5 SUPPOSITORY RECTAL EVERY 4 HOURS PRN
Status: DISCONTINUED | OUTPATIENT
Start: 2020-10-22 | End: 2020-10-22 | Stop reason: HOSPADM

## 2020-10-22 RX ORDER — OXYCODONE HYDROCHLORIDE AND ACETAMINOPHEN 5; 325 MG/1; MG/1
1 TABLET ORAL
Status: COMPLETED | OUTPATIENT
Start: 2020-10-22 | End: 2020-10-22

## 2020-10-22 RX ORDER — IBUPROFEN 800 MG/1
800 TABLET ORAL EVERY 8 HOURS PRN
Qty: 30 TAB | Refills: 0 | Status: SHIPPED | OUTPATIENT
Start: 2020-10-22 | End: 2021-03-18

## 2020-10-22 RX ORDER — HALOPERIDOL 5 MG/ML
1 INJECTION INTRAMUSCULAR
Status: DISCONTINUED | OUTPATIENT
Start: 2020-10-22 | End: 2020-10-22 | Stop reason: HOSPADM

## 2020-10-22 RX ORDER — ONDANSETRON 2 MG/ML
INJECTION INTRAMUSCULAR; INTRAVENOUS PRN
Status: DISCONTINUED | OUTPATIENT
Start: 2020-10-22 | End: 2020-10-22 | Stop reason: SURG

## 2020-10-22 RX ORDER — ONDANSETRON 2 MG/ML
4 INJECTION INTRAMUSCULAR; INTRAVENOUS
Status: COMPLETED | OUTPATIENT
Start: 2020-10-22 | End: 2020-10-22

## 2020-10-22 RX ORDER — DIPHENHYDRAMINE HYDROCHLORIDE 50 MG/ML
12.5 INJECTION INTRAMUSCULAR; INTRAVENOUS
Status: DISCONTINUED | OUTPATIENT
Start: 2020-10-22 | End: 2020-10-22 | Stop reason: HOSPADM

## 2020-10-22 RX ORDER — HYDROMORPHONE HYDROCHLORIDE 1 MG/ML
0.2 INJECTION, SOLUTION INTRAMUSCULAR; INTRAVENOUS; SUBCUTANEOUS
Status: DISCONTINUED | OUTPATIENT
Start: 2020-10-22 | End: 2020-10-22 | Stop reason: HOSPADM

## 2020-10-22 RX ORDER — LABETALOL HYDROCHLORIDE 5 MG/ML
5 INJECTION, SOLUTION INTRAVENOUS
Status: DISCONTINUED | OUTPATIENT
Start: 2020-10-22 | End: 2020-10-22 | Stop reason: HOSPADM

## 2020-10-22 RX ADMIN — HYDROMORPHONE HYDROCHLORIDE 0.4 MG: 1 INJECTION, SOLUTION INTRAMUSCULAR; INTRAVENOUS; SUBCUTANEOUS at 14:27

## 2020-10-22 RX ADMIN — POVIDONE IODINE 15 ML: 100 SOLUTION TOPICAL at 11:02

## 2020-10-22 RX ADMIN — OXYCODONE HYDROCHLORIDE AND ACETAMINOPHEN 2 TABLET: 5; 325 TABLET ORAL at 13:01

## 2020-10-22 RX ADMIN — EPHEDRINE SULFATE 25 MG: 50 INJECTION, SOLUTION INTRAVENOUS at 11:07

## 2020-10-22 RX ADMIN — FENTANYL CITRATE 50 MCG: 50 INJECTION, SOLUTION INTRAMUSCULAR; INTRAVENOUS at 12:30

## 2020-10-22 RX ADMIN — LIDOCAINE HYDROCHLORIDE 50 MG: 20 INJECTION, SOLUTION EPIDURAL; INFILTRATION; INTRACAUDAL at 11:07

## 2020-10-22 RX ADMIN — ONDANSETRON 4 MG: 2 INJECTION INTRAMUSCULAR; INTRAVENOUS at 14:27

## 2020-10-22 RX ADMIN — PROPOFOL 200 MG: 10 INJECTION, EMULSION INTRAVENOUS at 11:07

## 2020-10-22 RX ADMIN — ONDANSETRON 4 MG: 2 INJECTION INTRAMUSCULAR; INTRAVENOUS at 11:07

## 2020-10-22 RX ADMIN — SODIUM CHLORIDE, POTASSIUM CHLORIDE, SODIUM LACTATE AND CALCIUM CHLORIDE: 600; 310; 30; 20 INJECTION, SOLUTION INTRAVENOUS at 11:02

## 2020-10-22 RX ADMIN — MEPERIDINE HYDROCHLORIDE 12.5 MG: 25 INJECTION INTRAMUSCULAR; INTRAVENOUS; SUBCUTANEOUS at 12:46

## 2020-10-22 RX ADMIN — MIDAZOLAM HYDROCHLORIDE 2 MG: 1 INJECTION, SOLUTION INTRAMUSCULAR; INTRAVENOUS at 11:07

## 2020-10-22 RX ADMIN — FENTANYL CITRATE 50 MCG: 50 INJECTION, SOLUTION INTRAMUSCULAR; INTRAVENOUS at 12:44

## 2020-10-22 RX ADMIN — Medication 100 MG: at 11:07

## 2020-10-22 RX ADMIN — MEPERIDINE HYDROCHLORIDE 12.5 MG: 25 INJECTION INTRAMUSCULAR; INTRAVENOUS; SUBCUTANEOUS at 12:30

## 2020-10-22 RX ADMIN — HYDROMORPHONE HYDROCHLORIDE 0.2 MG: 1 INJECTION, SOLUTION INTRAMUSCULAR; INTRAVENOUS; SUBCUTANEOUS at 13:22

## 2020-10-22 RX ADMIN — DEXAMETHASONE SODIUM PHOSPHATE 4 MG: 4 INJECTION, SOLUTION INTRA-ARTICULAR; INTRALESIONAL; INTRAMUSCULAR; INTRAVENOUS; SOFT TISSUE at 11:07

## 2020-10-22 RX ADMIN — FENTANYL CITRATE 100 MCG: 50 INJECTION, SOLUTION INTRAMUSCULAR; INTRAVENOUS at 11:07

## 2020-10-22 ASSESSMENT — PAIN SCALES - GENERAL: PAIN_LEVEL: 3

## 2020-10-22 ASSESSMENT — PAIN DESCRIPTION - PAIN TYPE
TYPE: SURGICAL PAIN

## 2020-10-22 NOTE — H&P
Marian Kent          YOB: 1985  Date of today's procedure:  Facility: [facility]    ID: The patient is a very pleasant 34-year-old (almost 35-year-old) multipara (para-3, with 3 previous  sections).    Chief Complaint: The patient complains of pelvic pain.    History of Present Illness: The patient has been having complains of pelvic pain. She has been diagnosed as having endometriosis. She has had multiple laparoscopies including laparoscopies with cauterization of endometriosis lesions. She has had previous hysterectomy. On 2018 I did perform a laparoscopy with laparoscopic cauterization of endometriosis on the right ovary. Of note, it was on 2016, slightly more than four years ago, that I performed a total laparoscopic hysterectomy and right salpingectomy. She is scheduled today for laparoscopy, both diagnostic and if necessary and depending on findings at the time of laparoscopy, operative. I have discussed with her and explained to her in detail and at length what diagnostic and operative laparoscopy is and what diagnostic and operative laparoscopy involves and I have discussed with her and explained to her in detail and at length the risks and benefits and alternatives of diagnostic and operative laparoscopy. After our discussions and after answering her questions she told me that she very much wishes for us to proceed with laparoscopy, both diagnostic and if necessary and depending on findings at the time of laparoscopy, operative.    Past Medical History: The patient says she has a history of depression and panic attacks.    Past Surgical History: The patient has had 3 previous  sections. She has had multiple laparoscopies. She has had previous laparoscopic hysterectomy. On 2019 Dr. Cooley performed a left inguinal hernia repair, with mesh, using the da Espinoza robot.    Medications: The patient has taken Percocet and she  has taken Cymbalta and Klonopin.    Allergies: The patient says that she has no known drug allergies.    Social History: The patient smokes. She denies consuming alcoholic beverages. She denies using recreational drugs.    Review of Systems  General: The patient denies any fevers, chills, sweats.  Pulmonary: The patient denies any coughing, wheezing, chest pain, shortness of breath.  Cardiovascular: The patient denies any palpitations, dyspnea, chest pain.  Gastrointestinal: The patient denies any nausea, vomiting, diarrhea, constipation, hematochezia, melena, history of hepatitis, history of jaundice.  Genitourinary: The patient complains of pelvic pain. She denies vaginal bleeding.  Musculoskeletal: The patient denies any arthralgias or myalgias.   Neurological: No headaches or syncope or seizures.     Physical Exam:   Vital Signs: The patient's vital signs are stable and she is afebrile.  General: The patient appears well developed and well nourished and relaxed and alert and comfortable and in no apparent distress.    HEENT :  Normo-cephalic, atraumatic, pupils equal, round, reactive to light and accommodation, extra ocular motions intact, pharynx clear; there is no thyromegaly. There is no cervical lymphadenopathy.  Chest: Heart regular rate and rhythm, with no murmurs or rubs or gallops; the lungs are clear to auscultation bilaterally.  Abdomen: The abdomen is soft and flat and non-tender and non-distended. There is no hepatomegaly. There is no splenomegaly. There is a Pfannenstiel scar.  Pelvic: Speculum exam reveals absence of the cervix and reveals no vulvar or vaginal lesions and no vaginal discharge. Bimanual exam reveals absence of the uterus and reveals no pelvic masses and no adnexal masses either on the right or the left and reveals no obvious indisputable evidence of tenderness to palpation of the vaginal cuff.  Extremities: No clubbing or cyanosis or edema.   Neurological: non-focal.     Assessment:    Pelvic pain.  History of endometriosis.  Status post previous hysterectomy.    Plan:   We will proceed today with laparoscopy, both diagnostic and if necessary and depending on findings at the time of laparoscopy, operative. Please see above.            ________________________  Mark Price M.D.

## 2020-10-22 NOTE — OR NURSING
1347 report from Belle MOLINA. Pt over to phase 2 on room air. Pt resting comfortably in gurney. Pt SO brought to bedside.     1415 D/C instructions given to pat and family. All questions answered. Pt up to bathroom. Able to void. Dressed with assistance    1427 pt states 8/10 pain. Medicated per MAR and heat pack provided. Pt resting in recliner.     1453 pt states readiness to discharge. IV removed. Pt d/c to home with family. Escorted out in wheelchair. All belongings sent with pt.

## 2020-10-22 NOTE — DISCHARGE INSTRUCTIONS
ACTIVITY: Rest and take it easy for the first 24 hours.  A responsible adult is recommended to remain with you during that time.  It is normal to feel sleepy.  We encourage you to not do anything that requires balance, judgment or coordination.    MILD FLU-LIKE SYMPTOMS ARE NORMAL. YOU MAY EXPERIENCE GENERALIZED MUSCLE ACHES, THROAT IRRITATION, HEADACHE AND/OR SOME NAUSEA.    FOR 24 HOURS DO NOT:  Drive, operate machinery or run household appliances.  Drink beer or alcoholic beverages.   Make important decisions or sign legal documents.    SPECIAL INSTRUCTIONS: Please see attached instructions    DIET: To avoid nausea, slowly advance diet as tolerated, avoiding spicy or greasy foods for the first day.  Add more substantial food to your diet according to your physician's instructions.  Babies can be fed formula or breast milk as soon as they are hungry.  INCREASE FLUIDS AND FIBER TO AVOID CONSTIPATION.    SURGICAL DRESSING/BATHING: Please see attached instructions    FOLLOW-UP APPOINTMENT:  A follow-up appointment should be arranged with your doctor in 2 Weeks; call to schedule.    You should CALL YOUR PHYSICIAN if you develop:  Fever greater than 101 degrees F.  Pain not relieved by medication, or persistent nausea or vomiting.  Excessive bleeding (blood soaking through dressing) or unexpected drainage from the wound.  Extreme redness or swelling around the incision site, drainage of pus or foul smelling drainage.  Inability to urinate or empty your bladder within 8 hours.  Problems with breathing or chest pain.    You should call 911 if you develop problems with breathing or chest pain.  If you are unable to contact your doctor or surgical center, you should go to the nearest emergency room or urgent care center.  Physician's telephone #: Dr. Price [phone]    If any questions arise, call your doctor.  If your doctor is not available, please feel free to call the Surgical Center at [phone]. The Contact  Center is open Monday through Friday 7AM to 5PM and may speak to a nurse at [phone], or toll free at [phone].     A registered nurse may call you a few days after your surgery to see how you are doing after your procedure.    MEDICATIONS: Resume taking daily medication.  Take prescribed pain medication with food.  If no medication is prescribed, you may take non-aspirin pain medication if needed.  PAIN MEDICATION CAN BE VERY CONSTIPATING.  Take a stool softener or laxative such as senokot, pericolace, or milk of magnesia if needed.    Prescription given for Percocet and Ibuprofen.  Last pain medication given at 1:04pm, next dose of Percocet may be taken after 7:00pm. Ibuprofen may be taken upon discharge.    If your physician has prescribed pain medication that includes Acetaminophen (Tylenol), do not take additional Acetaminophen (Tylenol) while taking the prescribed medication.    Depression / Suicide Risk    As you are discharged from this [facility] facility, it is important to learn how to keep safe from harming yourself.    Recognize the warning signs:  · Abrupt changes in personality, positive or negative- including increase in energy   · Giving away possessions  · Change in eating patterns- significant weight changes-  positive or negative  · Change in sleeping patterns- unable to sleep or sleeping all the time   · Unwillingness or inability to communicate  · Depression  · Unusual sadness, discouragement and loneliness  · Talk of wanting to die  · Neglect of personal appearance   · Rebelliousness- reckless behavior  · Withdrawal from people/activities they love  · Confusion- inability to concentrate     If you or a loved one observes any of these behaviors or has concerns about self-harm, here's what you can do:  · Talk about it- your feelings and reasons for harming yourself  · Remove any means that you might use to hurt yourself (examples: pills, rope, extension cords, firearm)  · Get  professional help from the community (Mental Health, Substance Abuse, psychological counseling)  · Do not be alone:Call your Safe Contact- someone whom you trust who will be there for you.  · Call your local CRISIS HOTLINE 094-1561 or 870-897-1571  · Call your local Children's Mobile Crisis Response Team [place] (849) 842-6048 or www.RaveMobileSafety.com  · Call the toll free National Suicide Prevention Hotlines   · National Suicide Prevention Lifeline 938-285-CAKH (7569)  · National Hope Line Network 800-SUICIDE (124-1235)

## 2020-10-22 NOTE — OR SURGEON
Immediate Post OP Note    PreOp Diagnosis:   Pelvic pain.  History of endometriosis.  Status post previous hysterectomy.    PostOp Diagnosis:   Pelvic pain.  History of endometriosis.  Status post previous hysterectomy.    Procedure(s):  PELVISCOPY - DIAGNOSTIC AND OPERATIVE. LYSIS OF ADHESIONS, CAUTERIZATION OF ENDOMETRIOSIS RIGHT OVARY - Wound Class: Clean    Surgeon(s):  Mark Price M.D.    Anesthesiologist/Type of Anesthesia:  Anesthesiologist: Todd Palacios M.D./General    Surgical Staff:  Circulator: Rylie Shaw R.N.  Scrub Person: Nathaniel Galvez    Specimens removed if any:  None.     Estimated Blood Loss:   Less than 10 cc's     Findings:   During laparoscopy absence of the uterus is noted.   The vaginal cuff is normal.   There are small serosal cysts covering both ovaries, and one medium serosal cyst on the left ovary.   There is an endometriosis lesion on the inferior aspect of the right ovary.   Otherwise both ovaries are normal.   Both ovarian fossae are normal.   The cul-de-sac is normal.   There are adhesions of the omentum to the anterior abdominal wall on the left and also adhesions of the adipose tissue associated with the bowel to the anterior abdominal wall on the left. When these adhesions are lysed or while these adhesions are being lysed mesh is seen on/in the anterior wall on the left. These adhesions of the omentum and of the adipose tissue associated with the bowel were adherent to this mesh.     Complications:   None.         10/22/2020 12:14 PM Mark Price M.D.

## 2020-10-22 NOTE — ANESTHESIA PROCEDURE NOTES
Airway    Date/Time: 10/22/2020 11:07 AM  Performed by: Todd Palacios M.D.  Authorized by: Todd Palacios M.D.     Location:  OR  Urgency:  Elective  Indications for Airway Management:  Anesthesia      Spontaneous Ventilation: absent    Sedation Level:  Deep  Preoxygenated: Yes    Patient Position:  Sniffing  Final Airway Type:  Endotracheal airway  Final Endotracheal Airway:  ETT  Cuffed: Yes    Technique Used for Successful ETT Placement:  Direct laryngoscopy    Insertion Site:  Oral  Blade Type:  Elena  Laryngoscope Blade/Videolaryngoscope Blade Size:  3  ETT Size (mm):  7.0  Measured from:  Teeth  ETT to Teeth (cm):  18  Placement Verified by: auscultation and capnometry    Cormack-Lehane Classification:  Grade I - full view of glottis  Number of Attempts at Approach:  1

## 2020-10-22 NOTE — ANESTHESIA POSTPROCEDURE EVALUATION
Patient: Marian Kent    Procedure Summary     Date: 10/22/20 Room / Location: [facility] ROOM 28 / SURGERY SAME DAY [facility]    Anesthesia Start: 1107 Anesthesia Stop:     Procedure: PELVISCOPY - DIAGNOSTIC AND OPERATIVE. LYSIS OF ADHESIONS, CAUTERIZATION OF ENDOMETRIOSIS RIGHT OVARY Diagnosis: (PELVIC PAIN, ENDOMETRIOSIS)    Surgeon: Mark Price M.D. Responsible Provider: Todd Palacios M.D.    Anesthesia Type: general ASA Status: 2          Final Anesthesia Type: general  Last vitals  BP   Blood Pressure: 108/79    Temp   36.1 °C (97 °F)    Pulse   Pulse: 65   Resp   19    SpO2   96 %      Anesthesia Post Evaluation    Patient location during evaluation: PACU  Patient participation: complete - patient participated  Level of consciousness: awake and alert  Pain score: 3    Airway patency: patent  Anesthetic complications: no  Cardiovascular status: hemodynamically stable  Respiratory status: acceptable  Hydration status: euvolemic    PONV: none           Nurse Pain Score: 0 (NPRS)

## 2020-10-22 NOTE — ANESTHESIA TIME REPORT
Anesthesia Start and Stop Event Times     Date Time Event    10/22/2020 1059 Ready for Procedure     1107 Anesthesia Start     1218 Anesthesia Stop        Responsible Staff  10/22/20    Name Role Begin End    Todd Palacios M.D. Anesth 1107 1218        Preop Diagnosis (Free Text):  Pre-op Diagnosis     PELVIC PAIN, ENDOMETRIOSIS        Preop Diagnosis (Codes):    Post op Diagnosis  Pelvic adhesions      Premium Reason  Non-Premium    Comments:

## 2020-10-22 NOTE — OR NURSING
1217- Pt to PACU from OR. Report from anesthesia and OR RN. 8L O2 via mask. Oral airway dc'd upon arrival. Respirations even and unlabored. VSS.  2 lap abdominal incisions with gauze and tegaderm, CDI    1221- pt titrated to 4L O2    1230- pt medicated with IV Pain medication    1240- pt tolerating sips of water, states pain to throat and neck and stabbing pain to abdomen     1244- pt medicated with subsequent dose    1304- pt medicated with oral pain medication for 8/10 pain    1314- Pt resting, appears comfortable. Respirations even and unlabored. No needs at this time.     1355- pt meets criteria for phase 2, report to Reny MOLINA. Pt moved to PACU2

## 2020-11-08 NOTE — OP REPORT
DATE OF SERVICE:  10/22/2020    PREOPERATIVE DIAGNOSES:  1.  Pelvic pain.  2.  History of endometriosis.  3.  Status post previous hysterectomy.    POSTOPERATIVE DIAGNOSES:  1.  Pelvic pain.  2.  History of endometriosis.  3.  Status post previous hysterectomy.  4.  There are adhesions of the omentum to the anterior abdominal wall on the   left and also adhesions of adipose tissue associated with the bowel to the   anterior abdominal wall on the left.    PROCEDURES:  Laparoscopy with laparoscopic lysis of adhesions and laparoscopic   cauterization of endometriosis on the right ovary.    SURGEON:  Mark Price MD    ANESTHESIA:  General endotracheal tube anesthesia.    ANESTHESIOLOGIST:  Dr. Todd Palacios.    FINDINGS:  1.  During laparoscopy, absence of the uterus is noted.  2.  The vaginal cuff is well visualized and is normal and there are no   adhesions involving the vaginal cuff.  3.  There are small serosal cysts covering both ovaries and 1 medium serosal   cyst on the left ovary.  4.  There is an endometriosis lesion on the inferior aspect of the right   ovary.  5.  Otherwise, both ovaries are normal.  6.  Both ovarian fossae are normal.  7.  The cul-de-sac is normal.  8.  There are adhesions of the omentum to the anterior abdominal wall on the   left and also adhesions of the adipose tissue associated with the bowel to the   anterior abdominal wall on the left.  While these adhesions are being lysed,   mesh is seen on/in the anterior abdominal wall on the left.  These adhesions   of the omentum and adipose tissue associated with the bowel are adherent to   this mesh.    SPECIMENS:  None.    COMPLICATIONS:  None.    ESTIMATED BLOOD LOSS:  Less than 10 mL    DESCRIPTION OF PROCEDURE:  After the appropriate consents have been obtained,   the patient was taken to the operating room and given general anesthesia.  She   is prepped and draped in the dorsal lithotomy position and the bladder was   emptied with a  catheter.  With the patient in the dorsal lithotomy position, a   sponge stick was placed in the vaginal vault.  Attention was directed to the   abdomen where a small (approximately 1 cm) horizontal infraumbilical incision   was made with a scalpel.  A Veress needle was advanced through this incision   into the peritoneal cavity and proper placement in the peritoneal cavity was   verified with palmar hanging drop technique.  The peritoneal cavity was then   insufflated with approximately 2-3 liters of carbon dioxide gas.  The Veress   needle was removed and a 5 mm port was inserted through the infraumbilical   incision into the peritoneal cavity utilizing the VersaStep trocar system.    The central portion of this port was removed.  The 5 mm 0-degree laparoscope   was inserted through the remaining sleeve and proper entry in the peritoneal   cavity verified visually with laparoscope.  The patient was placed in   Trendelenburg position.  A 5 mm port was placed suprapubically under direct   laparoscopic visualization utilizing the VersaStep trocar system.  Accessory   ports were placed through this incision.  Findings are as noted above and   pictures were taken.  Adhesions to the anterior abdominal wall in the left are   meticulously lysed with the monopolar cautery instrument.  Also, scissors   without cautery are used with this lysis of adhesions involving the mesh   located in the left lower quadrant.  After lysis of adhesions excellent   hemostasis was observed.  Both ovaries are examined and both ovarian fossae   are examined.  An endometriosis lesion on the right ovary is seen and is   cauterized with the monopolar cautery instrument.  Pictures were taken.    Hemostasis was noted to be excellent.  The patient was taken out of   Trendelenburg position.  The accessory instruments removed and laparoscope was   removed.  Air was allowed to evacuate the peritoneal cavity.  Both ports were   removed.  Skin incisions  were reapproximated with placement of interrupted   buried sutures of 4-0 Monocryl placed in the dermis.  The vaginal sponge stick   was removed.  The procedure was terminated with final lap and needle counts   reported to be correct x2 at the end of the procedure.  The patient tolerated   the procedure well and sent to postanesthesia recovery in stable condition.       ____________________________________     Mark Price MD    MED / NTS    DD:  11/08/2020 14:10:25  DT:  11/08/2020 15:47:01    D#:  [id number]  Job#:  [id number]    cc: MADIE DUKE MD

## 2021-02-17 ENCOUNTER — OFFICE VISIT (OUTPATIENT)
Dept: MEDICAL GROUP | Facility: MEDICAL CENTER | Age: 36
End: 2021-02-17
Attending: NURSE PRACTITIONER
Payer: MEDICAID

## 2021-02-17 VITALS
OXYGEN SATURATION: 99 % | WEIGHT: 160.1 LBS | DIASTOLIC BLOOD PRESSURE: 68 MMHG | RESPIRATION RATE: 16 BRPM | TEMPERATURE: 98 F | BODY MASS INDEX: 28.37 KG/M2 | SYSTOLIC BLOOD PRESSURE: 92 MMHG | HEIGHT: 63 IN | HEART RATE: 82 BPM

## 2021-02-17 DIAGNOSIS — G43.809 OTHER MIGRAINE WITHOUT STATUS MIGRAINOSUS, NOT INTRACTABLE: ICD-10-CM

## 2021-02-17 DIAGNOSIS — N15.9 KIDNEY INFECTION: Primary | ICD-10-CM

## 2021-02-17 DIAGNOSIS — R10.9 FLANK PAIN: ICD-10-CM

## 2021-02-17 DIAGNOSIS — R11.0 NAUSEA: ICD-10-CM

## 2021-02-17 LAB
APPEARANCE UR: NORMAL
BILIRUB UR STRIP-MCNC: NEGATIVE MG/DL
COLOR UR AUTO: NORMAL
GLUCOSE UR STRIP.AUTO-MCNC: NEGATIVE MG/DL
KETONES UR STRIP.AUTO-MCNC: NEGATIVE MG/DL
LEUKOCYTE ESTERASE UR QL STRIP.AUTO: NEGATIVE
NITRITE UR QL STRIP.AUTO: NEGATIVE
PH UR STRIP.AUTO: 5.5 [PH] (ref 5–8)
PROT UR QL STRIP: NEGATIVE MG/DL
RBC UR QL AUTO: NEGATIVE
SP GR UR STRIP.AUTO: 1.02
UROBILINOGEN UR STRIP-MCNC: 0.2 MG/DL

## 2021-02-17 PROCEDURE — 99212 OFFICE O/P EST SF 10 MIN: CPT | Performed by: NURSE PRACTITIONER

## 2021-02-17 PROCEDURE — 99213 OFFICE O/P EST LOW 20 MIN: CPT | Performed by: NURSE PRACTITIONER

## 2021-02-17 PROCEDURE — 81002 URINALYSIS NONAUTO W/O SCOPE: CPT | Performed by: NURSE PRACTITIONER

## 2021-02-17 RX ORDER — PSEUDOEPHED/ACETAMINOPH/DIPHEN 30MG-500MG
TABLET ORAL
COMMUNITY
Start: 2020-11-24 | End: 2022-03-25

## 2021-02-17 RX ORDER — BUTALBITAL, ACETAMINOPHEN AND CAFFEINE 50; 325; 40 MG/1; MG/1; MG/1
1-2 TABLET ORAL EVERY 4 HOURS PRN
Qty: 60 TABLET | Refills: 0 | Status: SHIPPED | OUTPATIENT
Start: 2021-02-17 | End: 2021-03-19

## 2021-02-17 RX ORDER — CEPHALEXIN 500 MG/1
CAPSULE ORAL
COMMUNITY
Start: 2021-01-29 | End: 2022-03-17

## 2021-02-17 RX ORDER — GINSENG 100 MG
CAPSULE ORAL
COMMUNITY
Start: 2020-12-13 | End: 2022-03-17

## 2021-02-17 RX ORDER — METHYLPREDNISOLONE 4 MG/1
TABLET ORAL
COMMUNITY
Start: 2020-09-21 | End: 2021-03-18

## 2021-02-17 RX ORDER — ONDANSETRON 4 MG/1
4 TABLET, FILM COATED ORAL EVERY 4 HOURS PRN
Qty: 10 TABLET | Refills: 11 | Status: SHIPPED | OUTPATIENT
Start: 2021-02-17 | End: 2022-03-17

## 2021-02-17 RX ORDER — LIDOCAINE HYDROCHLORIDE 10 MG/ML
INJECTION, SOLUTION EPIDURAL; INFILTRATION; INTRACAUDAL; PERINEURAL
COMMUNITY
Start: 2020-12-13 | End: 2022-03-17

## 2021-02-17 RX ORDER — ONDANSETRON 4 MG/1
TABLET, ORALLY DISINTEGRATING ORAL
COMMUNITY
Start: 2020-11-24 | End: 2021-02-17

## 2021-02-17 ASSESSMENT — ENCOUNTER SYMPTOMS
WHEEZING: 0
PALPITATIONS: 0
FLANK PAIN: 1
DIARRHEA: 0
WEIGHT LOSS: 0
NAUSEA: 1
COUGH: 0
FEVER: 0
BLOOD IN STOOL: 0
VOMITING: 1
SHORTNESS OF BREATH: 0
CHILLS: 0
CONSTIPATION: 0
ABDOMINAL PAIN: 0

## 2021-02-17 NOTE — PROGRESS NOTES
Chief Complaint   Patient presents with   • Pyelonephritis       Subjective:     HPI:   Marian Kent is a 35 y.o. female here to discuss the evaluation and management of:        Kidney infection  She went in to [place] ER for flank pain- she was told that she had a kidney infection.  She was given cipro- she has been taking it since Saturday.  She reports that now she is having anterior abd/pelvis pain.  She denies fever, blood in her urine.  She reports urgency, frequency, and dysuria.  The dysuria is worse today.  She is having trouble keeping liquids down 2/2 to nausea and some vomiting.        ROS  Review of Systems   Constitutional: Negative for chills, fever, malaise/fatigue and weight loss.   Respiratory: Negative for cough, shortness of breath and wheezing.    Cardiovascular: Negative for chest pain, palpitations and leg swelling.   Gastrointestinal: Positive for nausea and vomiting. Negative for abdominal pain, blood in stool, constipation and diarrhea.   Genitourinary: Positive for dysuria, flank pain, frequency and urgency. Negative for hematuria.         Allergies   Allergen Reactions   • Toradol Hives   • Vicodin [Hydrocodone-Acetaminophen] Itching   • Tramadol Hives       Current medicines (including changes today)  Current Outpatient Medications   Medication Sig Dispense Refill   • ondansetron (ZOFRAN) 4 MG Tab tablet Take 1 tablet by mouth every four hours as needed for Nausea/Vomiting. 10 tablet 11   • butalbital/apap/caffeine -40 mg (FIORICET) -40 MG Tab Take 1-2 Tablets by mouth every four hours as needed for Headache or Migraine for up to 30 days. 60 tablet 0   • escitalopram (LEXAPRO) 20 MG tablet Take 20 mg by mouth every day.     • ACETAMINOPHEN EXTRA STRENGTH 500 MG Tab      • bacitracin 500 UNIT/GM ointment      • cephALEXin (KEFLEX) 500 MG Cap TAKE 1 CAPSULE BY MOUTH 4 TIMES A DAY FOR 7 DAYS     • lidocaine PF (XYLOCAINE-MPF) 1 % Solution      • methylPREDNISolone  "(MEDROL) 4 MG Tablet Therapy Pack Take  by mouth.     • Escitalopram Oxalate (LEXAPRO PO) Take  by mouth.     • Mirtazapine (REMERON PO) Take  by mouth.     • TRAZODONE HCL PO Take  by mouth.     • ibuprofen (MOTRIN) 800 MG Tab Take 1 Tab by mouth every 8 hours as needed for Moderate Pain. (Patient not taking: Reported on 2/17/2021) 30 Tab 0   • mirtazapine (REMERON) 30 MG Tab tablet Take 30 mg by mouth every evening.     • dicyclomine (BENTYL) 10 MG Cap Take 1 Cap by mouth 4 Times a Day,Before Meals and at Bedtime. (Patient not taking: Reported on 2/17/2021) 40 Cap 1   • clonazePAM (KLONOPIN) 1 MG Tab      • traZODone (DESYREL) 50 MG Tab        No current facility-administered medications for this visit.       Social History     Tobacco Use   • Smoking status: Current Every Day Smoker     Packs/day: 1.00     Years: 10.00     Pack years: 10.00     Types: Cigarettes   • Smokeless tobacco: Never Used   Substance Use Topics   • Alcohol use: No     Alcohol/week: 0.0 oz   • Drug use: No       Patient Active Problem List    Diagnosis Date Noted   • Kidney infection 02/17/2021   • Postprocedural pelvic peritoneal adhesions 10/22/2020   • Tobacco abuse disorder 02/19/2019   • Endometriosis 02/19/2019   • Anxiety 02/19/2019   • Left inguinal hernia 02/19/2019   • Pelvic pain 08/08/2016   • Female pelvic pain 05/20/2016       Family History   Problem Relation Age of Onset   • Cancer Father 45        colon   • Other Maternal Grandfather    • Cancer Paternal Grandmother         breast   • Diabetes Paternal Grandmother    • Heart Disease Neg Hx    • Stroke Neg Hx           Objective:     BP (!) 92/68 (BP Location: Left arm, Patient Position: Sitting, BP Cuff Size: Adult)   Pulse 82   Temp 36.7 °C (98 °F) (Temporal)   Resp 16   Ht 1.6 m (5' 3\")   Wt 72.6 kg (160 lb 1.6 oz)   SpO2 99%  Body mass index is 28.36 kg/m².    Physical Exam:  Physical Exam   Constitutional: She is oriented to person, place, and time and " well-developed, well-nourished, and in no distress. No distress.   HENT:   Head: Normocephalic.   Right Ear: Tympanic membrane and external ear normal.   Left Ear: Tympanic membrane and external ear normal.   Eyes: Pupils are equal, round, and reactive to light. Conjunctivae and EOM are normal.   Neck: No tracheal deviation present.   Cardiovascular: Normal rate, regular rhythm, normal heart sounds and intact distal pulses.   Pulmonary/Chest: Effort normal and breath sounds normal.   Abdominal: Soft. Bowel sounds are normal. There is abdominal tenderness.   Musculoskeletal:         General: Normal range of motion.      Cervical back: Normal range of motion and neck supple.   Lymphadenopathy:        Head (right side): No preauricular adenopathy present.        Head (left side): No preauricular adenopathy present.     She has no cervical adenopathy.   Neurological: She is alert and oriented to person, place, and time. She has normal sensation, normal strength and intact cranial nerves. Gait normal.   Skin: Skin is warm and dry.   Psychiatric: Affect and judgment normal.       Assessment and Plan:     The following treatment plan was discussed:    1. Kidney infection  POCT Urinalysis  - New problem, unstable.  Her UA was normal in office, no fever, no hematuria.  Her urine was dark yellow, suspect dehydration.  Pt has had poor PO intake 2/2 nausea.  Will medication with nausea.  I have suggested taking zofran and OTC AZO standard and then increasing fluid intake.  Very low suspicion for infection.  She may also have a kidney stones, but low suspicion as well. US abd ordered.  We discussed ER precautions in depth, pt and her mother verbalized understanding.  Encouraged rest.      2. Flank pain  US-ABDOMEN COMPLETE SURVEY  -See above   3. Nausea  ondansetron (ZOFRAN) 4 MG Tab tablet  -See above   4. Other migraine without status migrainosus, not intractable  butalbital/apap/caffeine -40 mg (FIORICET) -40 MG  Tab  -Chronic problem, stable.  Medication.       Any change or worsening of signs or symptoms, patient encouraged to follow-up or report to emergency room for further evaluation. Patient verbalizes understanding and agrees.    Follow-Up: Return if symptoms worsen or fail to improve.      PLEASE NOTE: This dictation was created using voice recognition software. I have made every reasonable attempt to correct obvious errors, but I expect that there are errors of grammar and possibly content that I did not discover before finalizing the note.

## 2021-02-17 NOTE — ASSESSMENT & PLAN NOTE
She went in to [place] ER for flank pain- she was told that she had a kidney infection.  She was given cipro- she has been taking it since Saturday.  She reports that now she is having anterior abd/pelvis pain.  She denies fever, blood in her urine.  She reports urgency, frequency, and dysuria.  The dysuria is worse today.  She is having trouble keeping liquids down 2/2 to nausea and some vomiting.

## 2021-02-17 NOTE — LETTER
February 17, 2021       Patient: Marian Kent   YOB: 1985   Date of Visit: 2/17/2021          To Whom It May Concern:    In my medical opinion, I recommend that Marian Kent remain out of work from 2/13/21 until 2/21/21, she may return to work on Monday 2/22/21.    If you have any questions or concerns, please don't hesitate to call [phone]          Sincerely,          TARUN MaxwellRWillianN.  Electronically Signed

## 2021-03-04 ENCOUNTER — HOSPITAL ENCOUNTER (OUTPATIENT)
Dept: RADIOLOGY | Facility: MEDICAL CENTER | Age: 36
End: 2021-03-04
Attending: NURSE PRACTITIONER
Payer: MEDICAID

## 2021-03-04 DIAGNOSIS — R10.9 FLANK PAIN: ICD-10-CM

## 2021-03-04 PROCEDURE — 76700 US EXAM ABDOM COMPLETE: CPT

## 2021-03-05 NOTE — RESULT ENCOUNTER NOTE
Please call pt and let them know That I did not see anything on ultrasound to explain her pain.  If she is continuing to experience pain I recommend that she follow-up with Dr. Meng. thanks

## 2021-03-10 ENCOUNTER — NURSE TRIAGE (OUTPATIENT)
Dept: HEALTH INFORMATION MANAGEMENT | Facility: OTHER | Age: 36
End: 2021-03-10

## 2021-03-10 NOTE — TELEPHONE ENCOUNTER
Regarding: seeking medical advice  ----- Message from Yudi Yañez sent at 3/10/2021  2:58 PM PST -----  Severe abdominal pain

## 2021-03-10 NOTE — TELEPHONE ENCOUNTER
"Pt has been having constant lower abdominal pain for the past couple of days. 7/10 pain and diarrhea. Was diagnosed with IBS but stating that the medication is not helping. Noticed some blood on toilet paper when wiping but none in stool that can be seen. Advised UC per protocol.     Reason for Disposition  • Constant abdominal pain lasting > 2 hours    Additional Information  • Negative: Passed out (i.e., fainted, collapsed and was not responding)  • Negative: Shock suspected (e.g., cold/pale/clammy skin, too weak to stand, low BP, rapid pulse)  • Negative: Sounds like a life-threatening emergency to the triager  • Negative: Chest pain  • Negative: Pain is mainly in upper abdomen (if needed ask: 'is it mainly above the belly button?')  • Negative: Abdominal pain and pregnant > 20 weeks  • Negative: Abdominal pain and pregnant < 20 weeks  • Negative: SEVERE abdominal pain (e.g., excruciating)  • Negative: Vomiting red blood or black (coffee ground) material  • Negative: Bloody, black, or tarry bowel movements    Answer Assessment - Initial Assessment Questions  1. LOCATION: \"Where does it hurt?\"       Lower abdomen  2. RADIATION: \"Does the pain shoot anywhere else?\" (e.g., chest, back)      no  3. ONSET: \"When did the pain begin?\" (e.g., minutes, hours or days ago)       today  4. SUDDEN: \"Gradual or sudden onset?\"      sudden  5. PATTERN \"Does the pain come and go, or is it constant?\"     - If constant: \"Is it getting better, staying the same, or worsening?\"       (Note: Constant means the pain never goes away completely; most serious pain is constant and it progresses)      - If intermittent: \"How long does it last?\" \"Do you have pain now?\"      (Note: Intermittent means the pain goes away completely between bouts)      constant  6. SEVERITY: \"How bad is the pain?\"  (e.g., Scale 1-10; mild, moderate, or severe)    - MILD (1-3): doesn't interfere with normal activities, abdomen soft and not tender to touch     - " "MODERATE (4-7): interferes with normal activities or awakens from sleep, tender to touch     - SEVERE (8-10): excruciating pain, doubled over, unable to do any normal activities       Moderate to severe  7. RECURRENT SYMPTOM: \"Have you ever had this type of abdominal pain before?\" If so, ask: \"When was the last time?\" and \"What happened that time?\"       no  8. CAUSE: \"What do you think is causing the abdominal pain?\"      unknown  9. RELIEVING/AGGRAVATING FACTORS: \"What makes it better or worse?\" (e.g., movement, antacids, bowel movement)      unknown  10. OTHER SYMPTOMS: \"Has there been any vomiting, diarrhea, constipation, or urine problems?\"        diarrhea  11. PREGNANCY: \"Is there any chance you are pregnant?\" \"When was your last menstrual period?\"        no    Protocols used: ABDOMINAL PAIN - FEMALE-A-OH    "

## 2021-03-18 ENCOUNTER — OFFICE VISIT (OUTPATIENT)
Dept: MEDICAL GROUP | Facility: MEDICAL CENTER | Age: 36
End: 2021-03-18
Attending: NURSE PRACTITIONER
Payer: MEDICAID

## 2021-03-18 VITALS
BODY MASS INDEX: 29.38 KG/M2 | RESPIRATION RATE: 14 BRPM | OXYGEN SATURATION: 96 % | HEIGHT: 63 IN | SYSTOLIC BLOOD PRESSURE: 96 MMHG | DIASTOLIC BLOOD PRESSURE: 70 MMHG | TEMPERATURE: 97.2 F | HEART RATE: 104 BPM | WEIGHT: 165.8 LBS

## 2021-03-18 DIAGNOSIS — F41.9 ANXIETY: ICD-10-CM

## 2021-03-18 DIAGNOSIS — R10.84 GENERALIZED ABDOMINAL PAIN: ICD-10-CM

## 2021-03-18 PROCEDURE — 99213 OFFICE O/P EST LOW 20 MIN: CPT | Performed by: NURSE PRACTITIONER

## 2021-03-18 PROCEDURE — 99212 OFFICE O/P EST SF 10 MIN: CPT | Performed by: NURSE PRACTITIONER

## 2021-03-18 RX ORDER — METHYLPREDNISOLONE 4 MG/1
TABLET ORAL
Qty: 21 TABLET | Refills: 0 | Status: SHIPPED | OUTPATIENT
Start: 2021-03-18 | End: 2021-03-18

## 2021-03-18 RX ORDER — ESCITALOPRAM OXALATE 20 MG/1
20 TABLET ORAL DAILY
Qty: 30 TABLET | Refills: 3 | Status: SHIPPED | OUTPATIENT
Start: 2021-03-18 | End: 2022-03-17

## 2021-03-18 RX ORDER — CLONAZEPAM 1 MG/1
1 TABLET ORAL
Qty: 10 TABLET | Refills: 0 | Status: SHIPPED | OUTPATIENT
Start: 2021-03-18 | End: 2021-03-28

## 2021-03-18 RX ORDER — MIRTAZAPINE 30 MG/1
30 TABLET, FILM COATED ORAL
Qty: 30 TABLET | Refills: 3 | Status: SHIPPED | OUTPATIENT
Start: 2021-03-18 | End: 2022-03-17

## 2021-03-18 RX ORDER — CYCLOBENZAPRINE HCL 5 MG
5-10 TABLET ORAL 3 TIMES DAILY PRN
Qty: 90 TABLET | Refills: 3 | Status: SHIPPED | OUTPATIENT
Start: 2021-03-18 | End: 2022-03-17

## 2021-03-18 RX ORDER — METHYLPREDNISOLONE 4 MG/1
TABLET ORAL
Qty: 21 TABLET | Refills: 0 | Status: SHIPPED | OUTPATIENT
Start: 2021-03-18 | End: 2022-03-17

## 2021-03-18 RX ORDER — CLOTRIMAZOLE 1 %
CREAM (GRAM) TOPICAL
COMMUNITY
Start: 2021-03-11 | End: 2022-03-17

## 2021-03-18 RX ORDER — ONDANSETRON 4 MG/1
4 TABLET, FILM COATED ORAL EVERY 4 HOURS PRN
Qty: 30 TABLET | Refills: 5 | Status: SHIPPED | OUTPATIENT
Start: 2021-03-18 | End: 2022-03-17

## 2021-03-18 ASSESSMENT — ENCOUNTER SYMPTOMS
WEIGHT LOSS: 0
NAUSEA: 1
DIARRHEA: 1
WHEEZING: 0
FEVER: 0
CONSTIPATION: 0
VOMITING: 1
PALPITATIONS: 0
ABDOMINAL PAIN: 1
CHILLS: 0
BLOOD IN STOOL: 0
COUGH: 0
SHORTNESS OF BREATH: 0

## 2021-03-18 ASSESSMENT — PATIENT HEALTH QUESTIONNAIRE - PHQ9
SUM OF ALL RESPONSES TO PHQ QUESTIONS 1-9: 23
5. POOR APPETITE OR OVEREATING: 3 - NEARLY EVERY DAY
CLINICAL INTERPRETATION OF PHQ2 SCORE: 6

## 2021-03-18 NOTE — ASSESSMENT & PLAN NOTE
Recent ER visit.  They suspect Crohn's.  She continues to have lower abd pain.  Her rectum is sore- the ER gave her some cream that is helpful.  Clotrimazole was the cream.  She gets nausea with vomiting.  She has eliminated dairy.  She reports she has been taking ibuprofen and Tylenol near daily without benefit.

## 2021-03-18 NOTE — ASSESSMENT & PLAN NOTE
Patient reports that she lost her psychiatrist.  She is requesting a refill of her clonazepam that she uses as needed for panic attacks.  Lexapro 20 mg and mirtazapine 30 mg.  She is requesting referral to psychiatry.  She reports that her anxiety is relatively well controlled, she uses clonazepam rarely.

## 2021-03-18 NOTE — PROGRESS NOTES
No chief complaint on file.      Subjective:     HPI:   Marian Kent is a 35 y.o. female here to discuss the evaluation and management of:        Generalized abdominal pain  Recent ER visit.  They suspect Crohn's.  She continues to have lower abd pain.  Her rectum is sore- the ER gave her some cream that is helpful.  Clotrimazole was the cream.  She gets nausea with vomiting.  She has eliminated dairy.  She reports she has been taking ibuprofen and Tylenol near daily without benefit.      Anxiety  Patient reports that she lost her psychiatrist.  She is requesting a refill of her clonazepam that she uses as needed for panic attacks.  Lexapro 20 mg and mirtazapine 30 mg.  She is requesting referral to psychiatry.  She reports that her anxiety is relatively well controlled, she uses clonazepam rarely.      ROS  Review of Systems   Constitutional: Negative for chills, fever, malaise/fatigue and weight loss.   Respiratory: Negative for cough, shortness of breath and wheezing.    Cardiovascular: Negative for chest pain, palpitations and leg swelling.   Gastrointestinal: Positive for abdominal pain, diarrhea, nausea and vomiting. Negative for blood in stool and constipation.         Allergies   Allergen Reactions   • Toradol Hives   • Vicodin [Hydrocodone-Acetaminophen] Itching   • Tramadol Hives       Current medicines (including changes today)  Current Outpatient Medications   Medication Sig Dispense Refill   • cyclobenzaprine (FLEXERIL) 5 mg tablet Take 1-2 Tablets by mouth 3 times a day as needed for Moderate Pain. 90 tablet 3   • ondansetron (ZOFRAN) 4 MG Tab tablet Take 1 tablet by mouth every four hours as needed for Nausea/Vomiting. 30 tablet 5   • clonazePAM (KLONOPIN) 1 MG Tab Take 1 tablet by mouth 1 time a day as needed (panic attack) for up to 10 days. 10 tablet 0   • escitalopram (LEXAPRO) 20 MG tablet Take 1 tablet by mouth every day. 30 tablet 3   • mirtazapine (REMERON) 30 MG Tab tablet Take 1  tablet by mouth every bedtime. 30 tablet 3   • methylPREDNISolone (MEDROL DOSEPAK) 4 MG Tablet Therapy Pack Follow package instructions 21 tablet 0   • ACETAMINOPHEN EXTRA STRENGTH 500 MG Tab      • ondansetron (ZOFRAN) 4 MG Tab tablet Take 1 tablet by mouth every four hours as needed for Nausea/Vomiting. 10 tablet 11   • butalbital/apap/caffeine -40 mg (FIORICET) -40 MG Tab Take 1-2 Tablets by mouth every four hours as needed for Headache or Migraine for up to 30 days. 60 tablet 0   • traZODone (DESYREL) 50 MG Tab      • clotrimazole (LOTRIMIN) 1 % Cream      • bacitracin 500 UNIT/GM ointment      • cephALEXin (KEFLEX) 500 MG Cap TAKE 1 CAPSULE BY MOUTH 4 TIMES A DAY FOR 7 DAYS     • lidocaine PF (XYLOCAINE-MPF) 1 % Solution      • Escitalopram Oxalate (LEXAPRO PO) Take  by mouth.     • Mirtazapine (REMERON PO) Take  by mouth.       No current facility-administered medications for this visit.       Social History     Tobacco Use   • Smoking status: Current Every Day Smoker     Packs/day: 1.00     Years: 10.00     Pack years: 10.00     Types: Cigarettes   • Smokeless tobacco: Never Used   Substance Use Topics   • Alcohol use: No     Alcohol/week: 0.0 oz   • Drug use: No       Patient Active Problem List    Diagnosis Date Noted   • Generalized abdominal pain 03/18/2021   • Kidney infection 02/17/2021   • Postprocedural pelvic peritoneal adhesions 10/22/2020   • Tobacco abuse disorder 02/19/2019   • Endometriosis 02/19/2019   • Anxiety 02/19/2019   • Left inguinal hernia 02/19/2019   • Pelvic pain 08/08/2016   • Female pelvic pain 05/20/2016       Family History   Problem Relation Age of Onset   • Cancer Father 45        colon   • Other Maternal Grandfather    • Cancer Paternal Grandmother         breast   • Diabetes Paternal Grandmother    • Heart Disease Neg Hx    • Stroke Neg Hx           Objective:     There were no vitals taken for this visit. There is no height or weight on file to calculate  BMI.    Physical Exam:  Physical Exam   Constitutional: She is oriented to person, place, and time and well-developed, well-nourished, and in no distress. No distress.   HENT:   Head: Normocephalic.   Right Ear: Tympanic membrane and external ear normal.   Left Ear: Tympanic membrane and external ear normal.   Eyes: Pupils are equal, round, and reactive to light. Conjunctivae and EOM are normal.   Neck: No tracheal deviation present.   Cardiovascular: Normal rate, regular rhythm, normal heart sounds and intact distal pulses.   Pulmonary/Chest: Effort normal and breath sounds normal.   Abdominal: Soft. Bowel sounds are hyperactive.   Musculoskeletal:         General: Normal range of motion.      Cervical back: Normal range of motion and neck supple.   Lymphadenopathy:        Head (right side): No preauricular adenopathy present.        Head (left side): No preauricular adenopathy present.     She has no cervical adenopathy.   Neurological: She is alert and oriented to person, place, and time. She has normal sensation, normal strength and intact cranial nerves. Gait normal.   Skin: Skin is warm and dry.   Psychiatric: Affect and judgment normal.       Assessment and Plan:     The following treatment plan was discussed:    1. Generalized abdominal pain  REFERRAL TO GASTROENTEROLOGY    cyclobenzaprine (FLEXERIL) 5 mg tablet    ondansetron (ZOFRAN) 4 MG Tab tablet    methylPREDNISolone (MEDROL DOSEPAK) 4 MG Tablet Therapy Pack  -Chronic problem, unstable.  Urgent referral to GI.  Patient is interested in trying muscle relaxer, will try cyclobenzaprine as needed.  Zofran refilled.  Will try Medrol Dosepak to see if this improves her pain.  I have advised the patient to stop NSAID use, particularly while taking steroids, patient verbalized understanding.  ER precautions reviewed.   2. Anxiety  REFERRAL TO PSYCHIATRY    clonazePAM (KLONOPIN) 1 MG Tab    escitalopram (LEXAPRO) 20 MG tablet    mirtazapine (REMERON) 30 MG Tab  tablet  -Chronic problem, stable.  Referral to psychiatry.  Medication refill.       Any change or worsening of signs or symptoms, patient encouraged to follow-up or report to emergency room for further evaluation. Patient verbalizes understanding and agrees.    Follow-Up: Return for After appointment with GI or sooner as needed.      PLEASE NOTE: This dictation was created using voice recognition software. I have made every reasonable attempt to correct obvious errors, but I expect that there are errors of grammar and possibly content that I did not discover before finalizing the note.

## 2021-03-22 ENCOUNTER — TELEPHONE (OUTPATIENT)
Dept: MEDICAL GROUP | Facility: MEDICAL CENTER | Age: 36
End: 2021-03-22

## 2021-03-22 NOTE — TELEPHONE ENCOUNTER
1. Caller Name: CHINA ADILSON TILLOTSON                        Call Back Number: [phone] (home)       How would the patient prefer to be contacted with a response: Phone call do NOT leave a detailed message    Pt. States that since her last appointment, she has been in a lot of pain and has been throwing up, only being able to hold coffee down. She states she has been taking flexeril and zofran but still struggles with these issues and is waiting on gastro referral. Pt had to call into work yesterday and is planning on doing so today as well. She would like to know if she could get a doctors note for the call-ins and some advice.

## 2021-03-23 ENCOUNTER — NURSE TRIAGE (OUTPATIENT)
Dept: HEALTH INFORMATION MANAGEMENT | Facility: OTHER | Age: 36
End: 2021-03-23

## 2021-03-23 NOTE — TELEPHONE ENCOUNTER
"Would like something different to treat the abdominal pain & needs note for missed work.      No active covid symptoms, no known covid exposure.      Reason for Disposition  • Patient wants to be seen    Additional Information  • Negative: Passed out (i.e., fainted, collapsed and was not responding)  • Negative: Shock suspected (e.g., cold/pale/clammy skin, too weak to stand, low BP, rapid pulse)  • Negative: Visible sweat on face or sweat is dripping down    Answer Assessment - Initial Assessment Questions  1. LOCATION: \"Where does it hurt?\"       Lower abdomen & bottom, Has IBS, went to [facility] recently & diagnosed w/chron's, PCP gave steroids & flexural, but saolmón like different med for pain  2. RADIATION: \"Does the pain shoot anywhere else?\" (e.g., chest, back)      no  3. ONSET: \"When did the pain begin?\" (e.g., minutes, hours or days ago)       2 weeks ago, more persistent & aggressive last few days, last saw PCP last week  4. SUDDEN: \"Gradual or sudden onset?\"      gradual  5. PATTERN \"Does the pain come and go, or is it constant?\"     - If constant: \"Is it getting better, staying the same, or worsening?\"       (Note: Constant means the pain never goes away completely; most serious pain is constant and it progresses)      - If intermittent: \"How long does it last?\" \"Do you have pain now?\"      (Note: Intermittent means the pain goes away completely between bouts)      Constant for last few days  6. SEVERITY: \"How bad is the pain?\"  (e.g., Scale 1-10; mild, moderate, or severe)     - MILD (1-3): doesn't interfere with normal activities, abdomen soft and not tender to touch      - MODERATE (4-7): interferes with normal activities or awakens from sleep, tender to touch      - SEVERE (8-10): excruciating pain, doubled over, unable to do any normal activities        7  7. RECURRENT SYMPTOM: \"Have you ever had this type of abdominal pain before?\" If so, ask: \"When was the last time?\" and \"What happened that " "time?\"       No not like this  8. AGGRAVATING FACTORS: \"Does anything seem to cause this pain?\" (e.g., foods, stress, alcohol)      Dairy products, sweets  9. CARDIAC SYMPTOMS: \"Do you have any of the following symptoms: chest pain, difficulty breathing, sweating, nausea?\"      no  10. OTHER SYMPTOMS: \"Do you have any other symptoms?\" (e.g., fever, vomiting, diarrhea)        Vomiting, nausea, decreased appetitie  11. PREGNANCY: \"Is there any chance you are pregnant?\" \"When was your last menstrual period?\"        No, no uterus    Protocols used: ABDOMINAL PAIN - UPPER-A-OH      "

## 2021-03-23 NOTE — TELEPHONE ENCOUNTER
----- Message from Stephanie Tilley sent at 3/23/2021 12:48 PM PDT -----  Pt called in as she has been seen in a non [facility] er for stomach pain and is still experiencing pain. She said her pain level is at a 7 and she is not sure if she soul go to the er or be seen by her pcp. There is not a same day apt available with her pcp so she is wondering if she should go to the er. She is rating the pain as a level 7.

## 2021-03-24 DIAGNOSIS — R10.84 GENERALIZED ABDOMINAL PAIN: ICD-10-CM

## 2021-03-24 RX ORDER — SUCRALFATE 1 G/1
1 TABLET ORAL
Qty: 60 TABLET | Refills: 0 | Status: SHIPPED | OUTPATIENT
Start: 2021-03-24 | End: 2022-03-17

## 2021-03-24 NOTE — TELEPHONE ENCOUNTER
Pt. Notified/ understood. Canceled appt upon pts. Request. Pt states she will reach out to [facility].

## 2021-03-24 NOTE — TELEPHONE ENCOUNTER
Please call the pt and let her know I wrote a work note for 3/22-3/25.  Ill place the GI info below, she needs to schedule ASAP.  I have sent another medication to her pharmacy to see if this helps.  If she is unable to keep food or water down, develops a fever or rigid abdomen, or her pain increases she needs to go to the ER. Thanks    [facility]  [facility] [place] [address] [id number]  Phone: [phone]

## 2021-07-21 ENCOUNTER — TELEPHONE (OUTPATIENT)
Dept: MEDICAL GROUP | Facility: MEDICAL CENTER | Age: 36
End: 2021-07-21

## 2021-07-21 NOTE — TELEPHONE ENCOUNTER
"1. Caller Name: CHINA ADILSON TILOTSON                        Call Back Number: [phone] (home)       How would the patient prefer to be contacted with a response: Phone call do NOT leave a detailed message    Pt called at states that she is having a really bad IBS flare up. She states that Dr. Meng prescribed her dicyclomine, but it is not working. She is requesting a different medication becuase imodium is not working either. She states that she has diarrhea and her \"butt hurts\" from going so much. She aslo states that she has vomiting as well. Please advise.  "

## 2021-07-22 DIAGNOSIS — K58.0 IRRITABLE BOWEL SYNDROME WITH DIARRHEA: ICD-10-CM

## 2021-07-22 RX ORDER — CHOLESTYRAMINE 4 G/9G
1 POWDER, FOR SUSPENSION ORAL
Qty: 30 EACH | Refills: 1 | Status: SHIPPED | OUTPATIENT
Start: 2021-07-22 | End: 2021-09-13

## 2021-07-22 NOTE — PROGRESS NOTES
Patient reports an IBS diarrhea flare, Imodium and Bentyl have been ineffective.  Cholestyramine 4 g daily ordered.

## 2021-07-26 ENCOUNTER — TELEPHONE (OUTPATIENT)
Dept: CARDIOLOGY | Facility: MEDICAL CENTER | Age: 36
End: 2021-07-26

## 2021-07-26 NOTE — TELEPHONE ENCOUNTER
Called patient in regards to her NP appointment with JOANNA 07/27/2021. Calling to verify if pt has seen a previous cardiologist, if we have most recent blood work and imaging that's cardiology related within his chart. LVM for patient to call back the office with this information. Verbally informed them of the time & date of NP appointment.

## 2021-07-27 PROBLEM — R45.851 SUICIDAL IDEATION: Status: ACTIVE | Noted: 2021-07-19

## 2021-08-20 NOTE — TELEPHONE ENCOUNTER
Called patient in regards to upcoming appointment scheduled with [facility] on 09/07/2021. Primarily being seen for a cardiac clearance, she states she is having mild chest pain & SOB. Confirmed no other cardiologist has been seen.     Confirmed appointment date, time, & location. Advised to please wear a mask and to call main office if their were any questions or concerns.

## 2021-12-13 ENCOUNTER — APPOINTMENT (OUTPATIENT)
Dept: RADIOLOGY | Facility: MEDICAL CENTER | Age: 36
End: 2021-12-13
Attending: EMERGENCY MEDICINE
Payer: MEDICAID

## 2021-12-13 ENCOUNTER — APPOINTMENT (OUTPATIENT)
Dept: RADIOLOGY | Facility: MEDICAL CENTER | Age: 36
End: 2021-12-13
Payer: MEDICAID

## 2021-12-13 ENCOUNTER — APPOINTMENT (OUTPATIENT)
Dept: RADIOLOGY | Facility: MEDICAL CENTER | Age: 36
End: 2021-12-13
Attending: SURGERY
Payer: MEDICAID

## 2021-12-13 ENCOUNTER — HOSPITAL ENCOUNTER (EMERGENCY)
Facility: MEDICAL CENTER | Age: 36
End: 2021-12-13
Attending: EMERGENCY MEDICINE
Payer: MEDICAID

## 2021-12-13 VITALS
DIASTOLIC BLOOD PRESSURE: 83 MMHG | HEIGHT: 63 IN | BODY MASS INDEX: 24.8 KG/M2 | RESPIRATION RATE: 14 BRPM | TEMPERATURE: 97.5 F | HEART RATE: 62 BPM | SYSTOLIC BLOOD PRESSURE: 122 MMHG | OXYGEN SATURATION: 91 % | WEIGHT: 140 LBS

## 2021-12-13 DIAGNOSIS — S00.83XA CONTUSION OF FACE, INITIAL ENCOUNTER: ICD-10-CM
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
## 2021-12-13 DIAGNOSIS — S01.112A LEFT EYELID LACERATION, INITIAL ENCOUNTER: ICD-10-CM

## 2021-12-13 DIAGNOSIS — S06.6X0A: ICD-10-CM

## 2021-12-13 DIAGNOSIS — S70.12XA CONTUSION OF LEFT THIGH, INITIAL ENCOUNTER: ICD-10-CM

## 2021-12-13 PROBLEM — M25.562 ACUTE PAIN OF LEFT KNEE: Status: ACTIVE | Noted: 2021-12-13

## 2021-12-13 PROBLEM — M54.50 ACUTE MIDLINE LOW BACK PAIN WITHOUT SCIATICA: Status: ACTIVE | Noted: 2021-12-13

## 2021-12-13 PROBLEM — Z86.59 HISTORY OF BIPOLAR DISORDER: Status: ACTIVE | Noted: 2021-12-13

## 2021-12-13 PROBLEM — S06.6X9A TRAUMATIC SUBARACHNOID HEMORRHAGE WITH LOSS OF CONSCIOUSNESS (HCC): Status: ACTIVE | Noted: 2021-12-13

## 2021-12-13 LAB
ABO GROUP BLD: NORMAL
ALBUMIN SERPL BCP-MCNC: 4.1 G/DL (ref 3.2–4.9)
ALBUMIN/GLOB SERPL: 2.3 G/DL
ALP SERPL-CCNC: 47 U/L (ref 30–99)
ALT SERPL-CCNC: 15 U/L (ref 2–50)
ANION GAP SERPL CALC-SCNC: 10 MMOL/L (ref 7–16)
AST SERPL-CCNC: 18 U/L (ref 12–45)
BILIRUB SERPL-MCNC: 0.2 MG/DL (ref 0.1–1.5)
BLD GP AB SCN SERPL QL: NORMAL
BUN SERPL-MCNC: 7 MG/DL (ref 8–22)
CALCIUM SERPL-MCNC: 8 MG/DL (ref 8.5–10.5)
CHLORIDE SERPL-SCNC: 112 MMOL/L (ref 96–112)
CO2 SERPL-SCNC: 20 MMOL/L (ref 20–33)
CREAT SERPL-MCNC: 0.53 MG/DL (ref 0.5–1.4)
ERYTHROCYTE [DISTWIDTH] IN BLOOD BY AUTOMATED COUNT: 46 FL (ref 35.9–50)
ETHANOL BLD-MCNC: <10.1 MG/DL (ref 0–10)
GLOBULIN SER CALC-MCNC: 1.8 G/DL (ref 1.9–3.5)
GLUCOSE SERPL-MCNC: 90 MG/DL (ref 65–99)
HCG SERPL QL: NEGATIVE
HCT VFR BLD AUTO: 40.4 % (ref 37–47)
HGB BLD-MCNC: 13.7 G/DL (ref 12–16)
MCH RBC QN AUTO: 33.7 PG (ref 27–33)
MCHC RBC AUTO-ENTMCNC: 33.9 G/DL (ref 33.6–35)
MCV RBC AUTO: 99.5 FL (ref 81.4–97.8)
PLATELET # BLD AUTO: 204 K/UL (ref 164–446)
PMV BLD AUTO: 8.5 FL (ref 9–12.9)
POTASSIUM SERPL-SCNC: 3.9 MMOL/L (ref 3.6–5.5)
PROT SERPL-MCNC: 5.9 G/DL (ref 6–8.2)
RBC # BLD AUTO: 4.06 M/UL (ref 4.2–5.4)
RH BLD: NORMAL
SODIUM SERPL-SCNC: 142 MMOL/L (ref 135–145)
WBC # BLD AUTO: 4.5 K/UL (ref 4.8–10.8)

## 2021-12-13 PROCEDURE — 72128 CT CHEST SPINE W/O DYE: CPT

## 2021-12-13 PROCEDURE — 70486 CT MAXILLOFACIAL W/O DYE: CPT

## 2021-12-13 PROCEDURE — 700111 HCHG RX REV CODE 636 W/ 250 OVERRIDE (IP): Performed by: EMERGENCY MEDICINE

## 2021-12-13 PROCEDURE — 73551 X-RAY EXAM OF FEMUR 1: CPT | Mod: LT

## 2021-12-13 PROCEDURE — 72131 CT LUMBAR SPINE W/O DYE: CPT

## 2021-12-13 PROCEDURE — 73590 X-RAY EXAM OF LOWER LEG: CPT | Mod: LT

## 2021-12-13 PROCEDURE — 80053 COMPREHEN METABOLIC PANEL: CPT

## 2021-12-13 PROCEDURE — 86900 BLOOD TYPING SEROLOGIC ABO: CPT

## 2021-12-13 PROCEDURE — 73562 X-RAY EXAM OF KNEE 3: CPT | Mod: LT

## 2021-12-13 PROCEDURE — 304217 HCHG IRRIGATION SYSTEM

## 2021-12-13 PROCEDURE — 84703 CHORIONIC GONADOTROPIN ASSAY: CPT

## 2021-12-13 PROCEDURE — 96375 TX/PRO/DX INJ NEW DRUG ADDON: CPT

## 2021-12-13 PROCEDURE — 82077 ASSAY SPEC XCP UR&BREATH IA: CPT

## 2021-12-13 PROCEDURE — 303353 HCHG DERMABOND SKIN ADHESIVE

## 2021-12-13 PROCEDURE — 304999 HCHG REPAIR-SIMPLE/INTERMED LEVEL 1

## 2021-12-13 PROCEDURE — 86901 BLOOD TYPING SEROLOGIC RH(D): CPT

## 2021-12-13 PROCEDURE — 99285 EMERGENCY DEPT VISIT HI MDM: CPT

## 2021-12-13 PROCEDURE — 700101 HCHG RX REV CODE 250: Performed by: EMERGENCY MEDICINE

## 2021-12-13 PROCEDURE — 71260 CT THORAX DX C+: CPT

## 2021-12-13 PROCEDURE — 85027 COMPLETE CBC AUTOMATED: CPT

## 2021-12-13 PROCEDURE — 305948 HCHG GREEN TRAUMA ACT PRE-NOTIFY NO CC

## 2021-12-13 PROCEDURE — 700117 HCHG RX CONTRAST REV CODE 255: Performed by: EMERGENCY MEDICINE

## 2021-12-13 PROCEDURE — 700102 HCHG RX REV CODE 250 W/ 637 OVERRIDE(OP): Performed by: SURGERY

## 2021-12-13 PROCEDURE — A9270 NON-COVERED ITEM OR SERVICE: HCPCS | Performed by: SURGERY

## 2021-12-13 PROCEDURE — 99282 EMERGENCY DEPT VISIT SF MDM: CPT | Performed by: SURGERY

## 2021-12-13 PROCEDURE — 86850 RBC ANTIBODY SCREEN: CPT

## 2021-12-13 PROCEDURE — 70450 CT HEAD/BRAIN W/O DYE: CPT

## 2021-12-13 PROCEDURE — 96374 THER/PROPH/DIAG INJ IV PUSH: CPT

## 2021-12-13 PROCEDURE — 72125 CT NECK SPINE W/O DYE: CPT

## 2021-12-13 RX ORDER — QUETIAPINE FUMARATE 200 MG/1
200 TABLET, FILM COATED ORAL
Status: SHIPPED | COMMUNITY
End: 2022-01-24

## 2021-12-13 RX ORDER — LORAZEPAM 1 MG/1
1.5 TABLET ORAL 2 TIMES DAILY PRN
Status: SHIPPED | COMMUNITY
End: 2022-01-24

## 2021-12-13 RX ORDER — HYDROMORPHONE HYDROCHLORIDE 1 MG/ML
1 INJECTION, SOLUTION INTRAMUSCULAR; INTRAVENOUS; SUBCUTANEOUS ONCE
Status: COMPLETED | OUTPATIENT
Start: 2021-12-13 | End: 2021-12-13

## 2021-12-13 RX ORDER — IBUPROFEN 200 MG
200-600 TABLET ORAL EVERY 6 HOURS PRN
Status: SHIPPED | COMMUNITY
End: 2022-03-25

## 2021-12-13 RX ORDER — OXYCODONE HYDROCHLORIDE 5 MG/1
5 TABLET ORAL
Status: DISCONTINUED | OUTPATIENT
Start: 2021-12-13 | End: 2021-12-13 | Stop reason: HOSPADM

## 2021-12-13 RX ORDER — GABAPENTIN 300 MG/1
300 CAPSULE ORAL ONCE
Status: COMPLETED | OUTPATIENT
Start: 2021-12-13 | End: 2021-12-13

## 2021-12-13 RX ORDER — NAPROXEN 500 MG/1
500 TABLET ORAL 2 TIMES DAILY WITH MEALS
Qty: 20 TABLET | Refills: 0 | Status: SHIPPED | OUTPATIENT
Start: 2021-12-13 | End: 2022-03-17

## 2021-12-13 RX ORDER — OXYCODONE HYDROCHLORIDE AND ACETAMINOPHEN 5; 325 MG/1; MG/1
1 TABLET ORAL EVERY 4 HOURS PRN
Qty: 15 TABLET | Refills: 0 | Status: SHIPPED | OUTPATIENT
Start: 2021-12-13 | End: 2021-12-18

## 2021-12-13 RX ORDER — MORPHINE SULFATE 4 MG/ML
INJECTION INTRAVENOUS
Status: COMPLETED | OUTPATIENT
Start: 2021-12-13 | End: 2021-12-13

## 2021-12-13 RX ORDER — OXYCODONE AND ACETAMINOPHEN 10; 325 MG/1; MG/1
1 TABLET ORAL EVERY 4 HOURS PRN
Qty: 15 TABLET | Refills: 0 | Status: SHIPPED | OUTPATIENT
Start: 2021-12-13 | End: 2021-12-13

## 2021-12-13 RX ORDER — NAPROXEN 500 MG/1
500 TABLET ORAL 2 TIMES DAILY WITH MEALS
Qty: 20 TABLET | Refills: 0 | Status: SHIPPED | OUTPATIENT
Start: 2021-12-13 | End: 2021-12-13 | Stop reason: SDUPTHER

## 2021-12-13 RX ORDER — ONDANSETRON 2 MG/ML
INJECTION INTRAMUSCULAR; INTRAVENOUS
Status: COMPLETED | OUTPATIENT
Start: 2021-12-13 | End: 2021-12-13

## 2021-12-13 RX ORDER — LIDOCAINE HYDROCHLORIDE AND EPINEPHRINE 10; 10 MG/ML; UG/ML
5 INJECTION, SOLUTION INFILTRATION; PERINEURAL ONCE
Status: COMPLETED | OUTPATIENT
Start: 2021-12-13 | End: 2021-12-13

## 2021-12-13 RX ORDER — OXYCODONE HYDROCHLORIDE 10 MG/1
10 TABLET ORAL
Status: DISCONTINUED | OUTPATIENT
Start: 2021-12-13 | End: 2021-12-13 | Stop reason: HOSPADM

## 2021-12-13 RX ORDER — ACETAMINOPHEN 500 MG
1000 TABLET ORAL ONCE
Status: COMPLETED | OUTPATIENT
Start: 2021-12-13 | End: 2021-12-13

## 2021-12-13 RX ORDER — ACETAMINOPHEN 500 MG
500-1000 TABLET ORAL EVERY 6 HOURS PRN
Status: SHIPPED | COMMUNITY
End: 2022-02-14

## 2021-12-13 RX ADMIN — ONDANSETRON 4 MG: 2 INJECTION INTRAMUSCULAR; INTRAVENOUS at 12:37

## 2021-12-13 RX ADMIN — GABAPENTIN 300 MG: 300 CAPSULE ORAL at 16:33

## 2021-12-13 RX ADMIN — ACETAMINOPHEN 1000 MG: 500 TABLET ORAL at 16:33

## 2021-12-13 RX ADMIN — MORPHINE SULFATE 4 MG: 4 INJECTION INTRAVENOUS at 12:37

## 2021-12-13 RX ADMIN — HYDROMORPHONE HYDROCHLORIDE 1 MG: 1 INJECTION, SOLUTION INTRAMUSCULAR; INTRAVENOUS; SUBCUTANEOUS at 14:03

## 2021-12-13 RX ADMIN — LIDOCAINE HYDROCHLORIDE,EPINEPHRINE BITARTRATE 5 ML: 10; .01 INJECTION, SOLUTION INFILTRATION; PERINEURAL at 18:15

## 2021-12-13 RX ADMIN — IOHEXOL 100 ML: 350 INJECTION, SOLUTION INTRAVENOUS at 13:05

## 2021-12-13 RX ADMIN — OXYCODONE 5 MG: 5 TABLET ORAL at 18:04

## 2021-12-13 NOTE — Clinical Note
Marian ROBISON was seen and treated in our emergency department on 12/6/2021.  She may return to work on 12/20/2021.       If you have any questions or concerns, please don't hesitate to call.      Abdirizak Chavarria D.O.

## 2021-12-13 NOTE — ED NOTES
Med rec completed per patient  Allergies reviewed  No PO Antibiotics in the last 30 days     Preferred pharmacy is [facility]
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
## 2021-12-13 NOTE — Clinical Note
[place]-[facility] was seen and treated in our emergency department on 12/6/2021.  She may return to work on 12/20/2021.       If you have any questions or concerns, please don't hesitate to call.      Abdirizak Chavarria D.O.

## 2021-12-14 NOTE — ED NOTES
Pt  A/Ox3 unlaboured breathing. Asking for PO intake and if d/c is in the plan. Neuros WNL. Pain in mid-back and left knee. ICE applied.
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
## 2021-12-14 NOTE — PROGRESS NOTES
Trauma Surgery Follow-Up:    See at bedside for re-eval.  GCS remains 15.    Getting set up for ER discharge by Dr. Chavarria.  Discussed potential symptoms to look for with TBI - nausea, dizziness, headache, problems with focus and short term memory.  Her only complaint in this regard is a headache.  Advised to avoid use of aspirin and NSAIDs, voiced understanding.  Also outlined that her musculoskeletal pain could potentially worsen in the next 24-48 hours and to treat with Tylenol, prescribed narcotics, and mobilization.    Roque Junior MD, FACS

## 2021-12-14 NOTE — ED NOTES
Pt A/Ox3 unlaboured breathing VSS. Agrees and understands d/c teaching. Wheelchair off unit. D/C to home with girlfriend.

## 2021-12-14 NOTE — ED NOTES
Pt ambulated to BR tolerated well. Educated pain  Re pain Rx for d/c. Pts girlfriend picking her up
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
## 2021-12-15 ENCOUNTER — TELEPHONE (OUTPATIENT)
Dept: MEDICAL GROUP | Facility: MEDICAL CENTER | Age: 36
End: 2021-12-15

## 2021-12-16 NOTE — TELEPHONE ENCOUNTER
Phone Number Called: [phone]    Call outcome: Spoke to patient regarding message below.    Message: Spoke with pt regarding appointment. She said she was still feeling dizzy and had a headache and back ache. I told her that Rayna recommended going back to the ER instead based on her symptoms. She said she was going to go to ER and would let us know what they said.

## 2022-01-24 ENCOUNTER — OFFICE VISIT (OUTPATIENT)
Dept: MEDICAL GROUP | Facility: MEDICAL CENTER | Age: 37
End: 2022-01-24
Attending: FAMILY MEDICINE
Payer: MEDICAID

## 2022-01-24 DIAGNOSIS — R63.4 UNEXPLAINED WEIGHT LOSS: ICD-10-CM

## 2022-01-24 DIAGNOSIS — R19.7 DIARRHEA, UNSPECIFIED TYPE: ICD-10-CM

## 2022-01-24 DIAGNOSIS — T14.8XXA BRUISING: ICD-10-CM

## 2022-01-24 PROCEDURE — 99213 OFFICE O/P EST LOW 20 MIN: CPT | Performed by: FAMILY MEDICINE

## 2022-01-24 PROCEDURE — 99212 OFFICE O/P EST SF 10 MIN: CPT | Performed by: FAMILY MEDICINE

## 2022-01-24 NOTE — PROGRESS NOTES
Subjective     Marian Kent is a 36 y.o. female who presents with Bleeding/Bruising (unexplained, sluggish, tired, weak, loosing wt, vaginal pain)            HPI 1.  Unexplained weight loss-unfortunately patient arrived late and was roomed with only 5 minutes left in our appointment.  She reports a history over the past 2 months of increased weakness, markedly decreased appetite, approximately 25 pounds of weight loss.  She reports her close to fit more loosely.  She also notices increased bruising that is mysterious to her.  Medications are notable for being placed on carbamazepine about 6 months ago.  No new medication additions or deletions took place in the past 2 months.  2.  Diarrhea-patient reports he has been told she has IBS in the past.  She reports she has had increased frequency of diarrhea 2-3 times per day, yellow to brown in color.  No black or bloody stools.  Denies any unexplained fevers    ROS patient notes some lower abdominal tenderness over the past 2 weeks and a feeling of discomfort in her vagina.  She does have an upcoming appointment in 2 days with her gynecologist on that issue.  Not reporting any diffuse rashes, ankle edema.  Does report nausea with rare emesis     Social history-not currently working, lives with her children and friend and her children      Objective     LMP 07/22/2016 (Exact Date)    Vitals: /62, pulse 82, respirations 18, temp 90 7.7F, O2 saturation 97%, weight 139 pounds 9.6 ounces, height 5 foot 3 inches, BMI 24.73  Physical Exam  Gen.- alert, cooperative, in no acute distress  Neck- midline trachea, thyroid not enlarged or tender,supple, no cervical adenopathy  Chest-clear to auscultation and percussion with normal breath sounds. No retractions. Chest wall nontender  Cardiac- regular rhythm and rate. No murmur, thrill, or heave  Abdomen-mildly tender over the right and left lower quadrants (x2 weeks).    Skin-Skin is clear without rash, edema, redness, or  cyanosis.  No bruises seen today.                     Assessment & Plan        1. Unexplained weight loss    - Comp Metabolic Panel; Future  - TSH; Future  - Sed Rate; Future  - HIV ANTIBODIES; Future    2. Bruising  - CBC WITH DIFFERENTIAL; Future    3. Diarrhea, unspecified type    Plan: 1.  Check CMP, TSH, sedimentation rate, HIV, CBC

## 2022-01-25 VITALS
SYSTOLIC BLOOD PRESSURE: 112 MMHG | HEART RATE: 82 BPM | OXYGEN SATURATION: 97 % | DIASTOLIC BLOOD PRESSURE: 62 MMHG | HEIGHT: 63 IN | BODY MASS INDEX: 24.73 KG/M2 | TEMPERATURE: 97.7 F | WEIGHT: 139.6 LBS | RESPIRATION RATE: 18 BRPM

## 2022-01-25 ASSESSMENT — FIBROSIS 4 INDEX: FIB4 SCORE: 0.8

## 2022-02-07 ENCOUNTER — HOSPITAL ENCOUNTER (OUTPATIENT)
Dept: LAB | Facility: MEDICAL CENTER | Age: 37
End: 2022-02-07
Attending: FAMILY MEDICINE
Payer: MEDICAID

## 2022-02-07 DIAGNOSIS — T14.8XXA BRUISING: ICD-10-CM

## 2022-02-07 DIAGNOSIS — R63.4 UNEXPLAINED WEIGHT LOSS: ICD-10-CM

## 2022-02-07 LAB
ALBUMIN SERPL BCP-MCNC: 4.6 G/DL (ref 3.2–4.9)
ALBUMIN/GLOB SERPL: 2.4 G/DL
ALP SERPL-CCNC: 60 U/L (ref 30–99)
ALT SERPL-CCNC: 59 U/L (ref 2–50)
ANION GAP SERPL CALC-SCNC: 9 MMOL/L (ref 7–16)
AST SERPL-CCNC: 34 U/L (ref 12–45)
BASOPHILS # BLD AUTO: 0.4 % (ref 0–1.8)
BASOPHILS # BLD: 0.02 K/UL (ref 0–0.12)
BILIRUB SERPL-MCNC: 0.4 MG/DL (ref 0.1–1.5)
BUN SERPL-MCNC: 8 MG/DL (ref 8–22)
CALCIUM SERPL-MCNC: 9 MG/DL (ref 8.5–10.5)
CHLORIDE SERPL-SCNC: 106 MMOL/L (ref 96–112)
CO2 SERPL-SCNC: 23 MMOL/L (ref 20–33)
CREAT SERPL-MCNC: 0.74 MG/DL (ref 0.5–1.4)
EOSINOPHIL # BLD AUTO: 0 K/UL (ref 0–0.51)
EOSINOPHIL NFR BLD: 0 % (ref 0–6.9)
ERYTHROCYTE [DISTWIDTH] IN BLOOD BY AUTOMATED COUNT: 46.7 FL (ref 35.9–50)
ERYTHROCYTE [SEDIMENTATION RATE] IN BLOOD BY WESTERGREN METHOD: 5 MM/HOUR (ref 0–25)
GLOBULIN SER CALC-MCNC: 1.9 G/DL (ref 1.9–3.5)
GLUCOSE SERPL-MCNC: 86 MG/DL (ref 65–99)
HCT VFR BLD AUTO: 42.1 % (ref 37–47)
HGB BLD-MCNC: 14.3 G/DL (ref 12–16)
HIV 1+2 AB+HIV1 P24 AG SERPL QL IA: NORMAL
IMM GRANULOCYTES # BLD AUTO: 0.01 K/UL (ref 0–0.11)
IMM GRANULOCYTES NFR BLD AUTO: 0.2 % (ref 0–0.9)
LYMPHOCYTES # BLD AUTO: 1.76 K/UL (ref 1–4.8)
LYMPHOCYTES NFR BLD: 38.9 % (ref 22–41)
MCH RBC QN AUTO: 33.9 PG (ref 27–33)
MCHC RBC AUTO-ENTMCNC: 34 G/DL (ref 33.6–35)
MCV RBC AUTO: 99.8 FL (ref 81.4–97.8)
MONOCYTES # BLD AUTO: 0.29 K/UL (ref 0–0.85)
MONOCYTES NFR BLD AUTO: 6.4 % (ref 0–13.4)
NEUTROPHILS # BLD AUTO: 2.45 K/UL (ref 2–7.15)
NEUTROPHILS NFR BLD: 54.1 % (ref 44–72)
NRBC # BLD AUTO: 0 K/UL
NRBC BLD-RTO: 0 /100 WBC
PLATELET # BLD AUTO: 258 K/UL (ref 164–446)
PMV BLD AUTO: 8.6 FL (ref 9–12.9)
POTASSIUM SERPL-SCNC: 4.3 MMOL/L (ref 3.6–5.5)
PROT SERPL-MCNC: 6.5 G/DL (ref 6–8.2)
RBC # BLD AUTO: 4.22 M/UL (ref 4.2–5.4)
SODIUM SERPL-SCNC: 138 MMOL/L (ref 135–145)
TSH SERPL DL<=0.005 MIU/L-ACNC: 1.02 UIU/ML (ref 0.38–5.33)
WBC # BLD AUTO: 4.5 K/UL (ref 4.8–10.8)

## 2022-02-07 PROCEDURE — 85652 RBC SED RATE AUTOMATED: CPT

## 2022-02-07 PROCEDURE — 80053 COMPREHEN METABOLIC PANEL: CPT

## 2022-02-07 PROCEDURE — 84443 ASSAY THYROID STIM HORMONE: CPT

## 2022-02-07 PROCEDURE — 36415 COLL VENOUS BLD VENIPUNCTURE: CPT

## 2022-02-07 PROCEDURE — 87389 HIV-1 AG W/HIV-1&-2 AB AG IA: CPT

## 2022-02-07 PROCEDURE — 85025 COMPLETE CBC W/AUTO DIFF WBC: CPT

## 2022-02-09 ENCOUNTER — APPOINTMENT (OUTPATIENT)
Dept: ADMISSIONS | Facility: MEDICAL CENTER | Age: 37
End: 2022-02-09
Payer: MEDICAID

## 2022-02-14 ENCOUNTER — PRE-ADMISSION TESTING (OUTPATIENT)
Dept: ADMISSIONS | Facility: MEDICAL CENTER | Age: 37
End: 2022-02-14
Attending: SPECIALIST
Payer: MEDICAID

## 2022-02-14 VITALS — HEIGHT: 63 IN | BODY MASS INDEX: 24.8 KG/M2 | WEIGHT: 140 LBS

## 2022-02-14 ASSESSMENT — FIBROSIS 4 INDEX: FIB4 SCORE: 0.6

## 2022-02-15 ENCOUNTER — PRE-ADMISSION TESTING (OUTPATIENT)
Dept: ADMISSIONS | Facility: MEDICAL CENTER | Age: 37
End: 2022-02-15
Attending: SPECIALIST
Payer: MEDICAID

## 2022-02-15 ENCOUNTER — TELEPHONE (OUTPATIENT)
Dept: MEDICAL GROUP | Facility: MEDICAL CENTER | Age: 37
End: 2022-02-15
Payer: MEDICAID

## 2022-02-15 DIAGNOSIS — Z01.812 PRE-OPERATIVE LABORATORY EXAMINATION: ICD-10-CM

## 2022-02-15 LAB
ANION GAP SERPL CALC-SCNC: 9 MMOL/L (ref 7–16)
BUN SERPL-MCNC: 11 MG/DL (ref 8–22)
CALCIUM SERPL-MCNC: 9.2 MG/DL (ref 8.5–10.5)
CHLORIDE SERPL-SCNC: 107 MMOL/L (ref 96–112)
CO2 SERPL-SCNC: 25 MMOL/L (ref 20–33)
CREAT SERPL-MCNC: 0.58 MG/DL (ref 0.5–1.4)
ERYTHROCYTE [DISTWIDTH] IN BLOOD BY AUTOMATED COUNT: 44.9 FL (ref 35.9–50)
GLUCOSE SERPL-MCNC: 99 MG/DL (ref 65–99)
HCG SERPL QL: NEGATIVE
HCT VFR BLD AUTO: 40.3 % (ref 37–47)
HGB BLD-MCNC: 14.1 G/DL (ref 12–16)
MCH RBC QN AUTO: 34.4 PG (ref 27–33)
MCHC RBC AUTO-ENTMCNC: 35 G/DL (ref 33.6–35)
MCV RBC AUTO: 98.3 FL (ref 81.4–97.8)
PLATELET # BLD AUTO: 235 K/UL (ref 164–446)
PMV BLD AUTO: 8.2 FL (ref 9–12.9)
POTASSIUM SERPL-SCNC: 4.3 MMOL/L (ref 3.6–5.5)
RBC # BLD AUTO: 4.1 M/UL (ref 4.2–5.4)
SARS-COV-2 RNA RESP QL NAA+PROBE: NOTDETECTED
SODIUM SERPL-SCNC: 141 MMOL/L (ref 135–145)
SPECIMEN SOURCE: NORMAL
WBC # BLD AUTO: 4.3 K/UL (ref 4.8–10.8)

## 2022-02-15 PROCEDURE — 80048 BASIC METABOLIC PNL TOTAL CA: CPT

## 2022-02-15 PROCEDURE — C9803 HOPD COVID-19 SPEC COLLECT: HCPCS

## 2022-02-15 PROCEDURE — 85027 COMPLETE CBC AUTOMATED: CPT

## 2022-02-15 PROCEDURE — U0005 INFEC AGEN DETEC AMPLI PROBE: HCPCS

## 2022-02-15 PROCEDURE — 84703 CHORIONIC GONADOTROPIN ASSAY: CPT

## 2022-02-15 PROCEDURE — 36415 COLL VENOUS BLD VENIPUNCTURE: CPT

## 2022-02-15 PROCEDURE — U0003 INFECTIOUS AGENT DETECTION BY NUCLEIC ACID (DNA OR RNA); SEVERE ACUTE RESPIRATORY SYNDROME CORONAVIRUS 2 (SARS-COV-2) (CORONAVIRUS DISEASE [COVID-19]), AMPLIFIED PROBE TECHNIQUE, MAKING USE OF HIGH THROUGHPUT TECHNOLOGIES AS DESCRIBED BY CMS-2020-01-R: HCPCS

## 2022-02-15 NOTE — TELEPHONE ENCOUNTER
1. Caller Name:   [place]                        Call Back Number: [phone] (home)         How would the patient prefer to be contacted with a response: Phone call OK to leave a detailed message    2. Patient is requesting lab results dated: 02/07/2022    3. Confirmed results are in chart. Patient advised they will be contacted once interpreted by provider.

## 2022-02-16 NOTE — OR NURSING
COVID-19 Pre-surgery screenin. Do you have an undiagnosed respiratory illness or symptoms such as coughing or sneezing? no(Yes/No)  a. Onset of Sx no  b. Acute vs. chronic respiratory illness no    2. Do you have an unexplained fever greater than 100.4 degrees Fahrenheit or 38 degrees Celsius?     no (Yes/No)    3. Have you had direct exposure to a patient who tested positive for Covid-19?    no (Yes/No)    4. Have you had any loss of your sense of taste or smell? Have you had N/V or sore throat? no    Patient has been informed of visitor policy and asked to wear a mask upon entering the hospital   yes (Yes/No)

## 2022-02-17 ENCOUNTER — HOSPITAL ENCOUNTER (OUTPATIENT)
Facility: MEDICAL CENTER | Age: 37
End: 2022-02-17
Attending: SPECIALIST | Admitting: SPECIALIST
Payer: MEDICAID

## 2022-02-17 ENCOUNTER — ANESTHESIA EVENT (OUTPATIENT)
Dept: SURGERY | Facility: MEDICAL CENTER | Age: 37
End: 2022-02-17
Payer: MEDICAID

## 2022-02-17 ENCOUNTER — ANESTHESIA (OUTPATIENT)
Dept: SURGERY | Facility: MEDICAL CENTER | Age: 37
End: 2022-02-17
Payer: MEDICAID

## 2022-02-17 VITALS
HEIGHT: 63 IN | BODY MASS INDEX: 25.7 KG/M2 | SYSTOLIC BLOOD PRESSURE: 111 MMHG | DIASTOLIC BLOOD PRESSURE: 80 MMHG | WEIGHT: 145.06 LBS | OXYGEN SATURATION: 94 % | TEMPERATURE: 97.4 F | RESPIRATION RATE: 12 BRPM | HEART RATE: 71 BPM

## 2022-02-17 DIAGNOSIS — G89.18 POST-OP PAIN: ICD-10-CM

## 2022-02-17 PROCEDURE — 501579 HCHG TROCAR, STEP 5MM: Performed by: SPECIALIST

## 2022-02-17 PROCEDURE — 501838 HCHG SUTURE GENERAL: Performed by: SPECIALIST

## 2022-02-17 PROCEDURE — 700111 HCHG RX REV CODE 636 W/ 250 OVERRIDE (IP)

## 2022-02-17 PROCEDURE — 160009 HCHG ANES TIME/MIN: Performed by: SPECIALIST

## 2022-02-17 PROCEDURE — 160035 HCHG PACU - 1ST 60 MINS PHASE I: Performed by: SPECIALIST

## 2022-02-17 PROCEDURE — 500002 HCHG ADHESIVE, DERMABOND: Performed by: SPECIALIST

## 2022-02-17 PROCEDURE — 700111 HCHG RX REV CODE 636 W/ 250 OVERRIDE (IP): Performed by: ANESTHESIOLOGY

## 2022-02-17 PROCEDURE — 500800 HCHG LAPAROSCOPIC J/L HOOK: Performed by: SPECIALIST

## 2022-02-17 PROCEDURE — 160025 RECOVERY II MINUTES (STATS): Performed by: SPECIALIST

## 2022-02-17 PROCEDURE — 700101 HCHG RX REV CODE 250: Performed by: ANESTHESIOLOGY

## 2022-02-17 PROCEDURE — 160039 HCHG SURGERY MINUTES - EA ADDL 1 MIN LEVEL 3: Performed by: SPECIALIST

## 2022-02-17 PROCEDURE — 700105 HCHG RX REV CODE 258: Performed by: SPECIALIST

## 2022-02-17 PROCEDURE — 500854 HCHG NEEDLE, INSUFFLATION FOR STEP: Performed by: SPECIALIST

## 2022-02-17 PROCEDURE — 160002 HCHG RECOVERY MINUTES (STAT): Performed by: SPECIALIST

## 2022-02-17 PROCEDURE — 501577 HCHG TROCAR, STEP 11MM: Performed by: SPECIALIST

## 2022-02-17 PROCEDURE — 160048 HCHG OR STATISTICAL LEVEL 1-5: Performed by: SPECIALIST

## 2022-02-17 PROCEDURE — A9270 NON-COVERED ITEM OR SERVICE: HCPCS | Performed by: ANESTHESIOLOGY

## 2022-02-17 PROCEDURE — 160046 HCHG PACU - 1ST 60 MINS PHASE II: Performed by: SPECIALIST

## 2022-02-17 PROCEDURE — 160028 HCHG SURGERY MINUTES - 1ST 30 MINS LEVEL 3: Performed by: SPECIALIST

## 2022-02-17 PROCEDURE — 160047 HCHG PACU  - EA ADDL 30 MINS PHASE II: Performed by: SPECIALIST

## 2022-02-17 PROCEDURE — 700102 HCHG RX REV CODE 250 W/ 637 OVERRIDE(OP): Performed by: ANESTHESIOLOGY

## 2022-02-17 PROCEDURE — 500886 HCHG PACK, LAPAROSCOPY: Performed by: SPECIALIST

## 2022-02-17 RX ORDER — SODIUM CHLORIDE, SODIUM LACTATE, POTASSIUM CHLORIDE, CALCIUM CHLORIDE 600; 310; 30; 20 MG/100ML; MG/100ML; MG/100ML; MG/100ML
INJECTION, SOLUTION INTRAVENOUS CONTINUOUS
Status: ACTIVE | OUTPATIENT
Start: 2022-02-17 | End: 2022-02-17

## 2022-02-17 RX ORDER — DEXAMETHASONE SODIUM PHOSPHATE 4 MG/ML
INJECTION, SOLUTION INTRA-ARTICULAR; INTRALESIONAL; INTRAMUSCULAR; INTRAVENOUS; SOFT TISSUE PRN
Status: DISCONTINUED | OUTPATIENT
Start: 2022-02-17 | End: 2022-02-17 | Stop reason: SURG

## 2022-02-17 RX ORDER — MAGNESIUM SULFATE HEPTAHYDRATE 40 MG/ML
INJECTION, SOLUTION INTRAVENOUS PRN
Status: DISCONTINUED | OUTPATIENT
Start: 2022-02-17 | End: 2022-02-17 | Stop reason: SURG

## 2022-02-17 RX ORDER — ONDANSETRON 2 MG/ML
INJECTION INTRAMUSCULAR; INTRAVENOUS PRN
Status: DISCONTINUED | OUTPATIENT
Start: 2022-02-17 | End: 2022-02-17 | Stop reason: SURG

## 2022-02-17 RX ORDER — ONDANSETRON 2 MG/ML
4 INJECTION INTRAMUSCULAR; INTRAVENOUS
Status: DISCONTINUED | OUTPATIENT
Start: 2022-02-17 | End: 2022-02-17 | Stop reason: HOSPADM

## 2022-02-17 RX ORDER — MIDAZOLAM HYDROCHLORIDE 1 MG/ML
INJECTION INTRAMUSCULAR; INTRAVENOUS
Status: COMPLETED
Start: 2022-02-17 | End: 2022-02-17

## 2022-02-17 RX ORDER — HYDRALAZINE HYDROCHLORIDE 20 MG/ML
5 INJECTION INTRAMUSCULAR; INTRAVENOUS
Status: DISCONTINUED | OUTPATIENT
Start: 2022-02-17 | End: 2022-02-17 | Stop reason: HOSPADM

## 2022-02-17 RX ORDER — ACETAMINOPHEN 500 MG
1000 TABLET ORAL ONCE
Status: COMPLETED | OUTPATIENT
Start: 2022-02-17 | End: 2022-02-17

## 2022-02-17 RX ORDER — MIDAZOLAM HYDROCHLORIDE 1 MG/ML
1 INJECTION INTRAMUSCULAR; INTRAVENOUS
Status: DISCONTINUED | OUTPATIENT
Start: 2022-02-17 | End: 2022-02-17 | Stop reason: HOSPADM

## 2022-02-17 RX ORDER — ROCURONIUM BROMIDE 10 MG/ML
INJECTION, SOLUTION INTRAVENOUS PRN
Status: DISCONTINUED | OUTPATIENT
Start: 2022-02-17 | End: 2022-02-17 | Stop reason: SURG

## 2022-02-17 RX ORDER — HYDROMORPHONE HYDROCHLORIDE 1 MG/ML
0.4 INJECTION, SOLUTION INTRAMUSCULAR; INTRAVENOUS; SUBCUTANEOUS
Status: DISCONTINUED | OUTPATIENT
Start: 2022-02-17 | End: 2022-02-17 | Stop reason: HOSPADM

## 2022-02-17 RX ORDER — LABETALOL HYDROCHLORIDE 5 MG/ML
5 INJECTION, SOLUTION INTRAVENOUS
Status: DISCONTINUED | OUTPATIENT
Start: 2022-02-17 | End: 2022-02-17 | Stop reason: HOSPADM

## 2022-02-17 RX ORDER — GABAPENTIN 300 MG/1
300 CAPSULE ORAL ONCE
Status: COMPLETED | OUTPATIENT
Start: 2022-02-17 | End: 2022-02-17

## 2022-02-17 RX ORDER — HYDROMORPHONE HYDROCHLORIDE 1 MG/ML
0.2 INJECTION, SOLUTION INTRAMUSCULAR; INTRAVENOUS; SUBCUTANEOUS
Status: DISCONTINUED | OUTPATIENT
Start: 2022-02-17 | End: 2022-02-17 | Stop reason: HOSPADM

## 2022-02-17 RX ORDER — MIDAZOLAM HYDROCHLORIDE 1 MG/ML
INJECTION INTRAMUSCULAR; INTRAVENOUS PRN
Status: DISCONTINUED | OUTPATIENT
Start: 2022-02-17 | End: 2022-02-17 | Stop reason: SURG

## 2022-02-17 RX ORDER — HYDROMORPHONE HYDROCHLORIDE 2 MG/1
2 TABLET ORAL EVERY 4 HOURS PRN
Qty: 20 TABLET | Refills: 0 | Status: SHIPPED | OUTPATIENT
Start: 2022-02-17 | End: 2022-02-24

## 2022-02-17 RX ORDER — IBUPROFEN 800 MG/1
800 TABLET ORAL EVERY 8 HOURS PRN
Qty: 30 TABLET | Refills: 0 | Status: SHIPPED | OUTPATIENT
Start: 2022-02-17 | End: 2022-03-25

## 2022-02-17 RX ORDER — HALOPERIDOL 5 MG/ML
1 INJECTION INTRAMUSCULAR
Status: DISCONTINUED | OUTPATIENT
Start: 2022-02-17 | End: 2022-02-17 | Stop reason: HOSPADM

## 2022-02-17 RX ORDER — DIPHENHYDRAMINE HYDROCHLORIDE 50 MG/ML
12.5 INJECTION INTRAMUSCULAR; INTRAVENOUS
Status: DISCONTINUED | OUTPATIENT
Start: 2022-02-17 | End: 2022-02-17 | Stop reason: HOSPADM

## 2022-02-17 RX ORDER — HYDROMORPHONE HYDROCHLORIDE 1 MG/ML
0.1 INJECTION, SOLUTION INTRAMUSCULAR; INTRAVENOUS; SUBCUTANEOUS
Status: DISCONTINUED | OUTPATIENT
Start: 2022-02-17 | End: 2022-02-17 | Stop reason: HOSPADM

## 2022-02-17 RX ORDER — MEPERIDINE HYDROCHLORIDE 25 MG/ML
6.25 INJECTION INTRAMUSCULAR; INTRAVENOUS; SUBCUTANEOUS
Status: DISCONTINUED | OUTPATIENT
Start: 2022-02-17 | End: 2022-02-17 | Stop reason: HOSPADM

## 2022-02-17 RX ORDER — LIDOCAINE HYDROCHLORIDE 20 MG/ML
INJECTION, SOLUTION EPIDURAL; INFILTRATION; INTRACAUDAL; PERINEURAL PRN
Status: DISCONTINUED | OUTPATIENT
Start: 2022-02-17 | End: 2022-02-17 | Stop reason: SURG

## 2022-02-17 RX ADMIN — ONDANSETRON 4 MG: 2 INJECTION INTRAMUSCULAR; INTRAVENOUS at 15:26

## 2022-02-17 RX ADMIN — FENTANYL CITRATE 50 MCG: 50 INJECTION, SOLUTION INTRAMUSCULAR; INTRAVENOUS at 16:15

## 2022-02-17 RX ADMIN — HYDROMORPHONE HYDROCHLORIDE 0.4 MG: 1 INJECTION, SOLUTION INTRAMUSCULAR; INTRAVENOUS; SUBCUTANEOUS at 16:59

## 2022-02-17 RX ADMIN — LIDOCAINE HYDROCHLORIDE 100 MG: 20 INJECTION, SOLUTION EPIDURAL; INFILTRATION; INTRACAUDAL at 14:46

## 2022-02-17 RX ADMIN — FENTANYL CITRATE 50 MCG: 50 INJECTION, SOLUTION INTRAMUSCULAR; INTRAVENOUS at 16:01

## 2022-02-17 RX ADMIN — SODIUM CHLORIDE, POTASSIUM CHLORIDE, SODIUM LACTATE AND CALCIUM CHLORIDE: 600; 310; 30; 20 INJECTION, SOLUTION INTRAVENOUS at 14:29

## 2022-02-17 RX ADMIN — GABAPENTIN 300 MG: 300 CAPSULE ORAL at 14:28

## 2022-02-17 RX ADMIN — SUGAMMADEX 200 MG: 100 INJECTION, SOLUTION INTRAVENOUS at 15:28

## 2022-02-17 RX ADMIN — HYDROMORPHONE HYDROCHLORIDE 0.2 MG: 1 INJECTION, SOLUTION INTRAMUSCULAR; INTRAVENOUS; SUBCUTANEOUS at 16:48

## 2022-02-17 RX ADMIN — FENTANYL CITRATE 50 MCG: 50 INJECTION, SOLUTION INTRAMUSCULAR; INTRAVENOUS at 15:09

## 2022-02-17 RX ADMIN — ACETAMINOPHEN 1000 MG: 500 TABLET ORAL at 14:28

## 2022-02-17 RX ADMIN — ROCURONIUM BROMIDE 50 MG: 10 INJECTION, SOLUTION INTRAVENOUS at 14:46

## 2022-02-17 RX ADMIN — DEXAMETHASONE SODIUM PHOSPHATE 8 MG: 4 INJECTION, SOLUTION INTRA-ARTICULAR; INTRALESIONAL; INTRAMUSCULAR; INTRAVENOUS; SOFT TISSUE at 14:53

## 2022-02-17 RX ADMIN — MIDAZOLAM HYDROCHLORIDE 1 MG: 1 INJECTION, SOLUTION INTRAMUSCULAR; INTRAVENOUS at 16:01

## 2022-02-17 RX ADMIN — PROPOFOL 50 MG: 10 INJECTION, EMULSION INTRAVENOUS at 15:31

## 2022-02-17 RX ADMIN — HYDROCODONE BITARTRATE AND ACETAMINOPHEN 15 MG: 7.5; 325 SOLUTION ORAL at 16:06

## 2022-02-17 RX ADMIN — MIDAZOLAM HYDROCHLORIDE 1 MG: 1 INJECTION, SOLUTION INTRAMUSCULAR; INTRAVENOUS at 16:15

## 2022-02-17 RX ADMIN — MAGNESIUM SULFATE HEPTAHYDRATE 4 G: 40 INJECTION, SOLUTION INTRAVENOUS at 14:53

## 2022-02-17 RX ADMIN — FENTANYL CITRATE 50 MCG: 50 INJECTION, SOLUTION INTRAMUSCULAR; INTRAVENOUS at 15:15

## 2022-02-17 RX ADMIN — HYDROMORPHONE HYDROCHLORIDE 0.4 MG: 1 INJECTION, SOLUTION INTRAMUSCULAR; INTRAVENOUS; SUBCUTANEOUS at 16:43

## 2022-02-17 RX ADMIN — HYDROMORPHONE HYDROCHLORIDE 0.4 MG: 1 INJECTION, SOLUTION INTRAMUSCULAR; INTRAVENOUS; SUBCUTANEOUS at 17:05

## 2022-02-17 RX ADMIN — FENTANYL CITRATE 50 MCG: 50 INJECTION, SOLUTION INTRAMUSCULAR; INTRAVENOUS at 14:36

## 2022-02-17 RX ADMIN — PROPOFOL 200 MG: 10 INJECTION, EMULSION INTRAVENOUS at 14:46

## 2022-02-17 RX ADMIN — MIDAZOLAM HYDROCHLORIDE 2 MG: 1 INJECTION, SOLUTION INTRAMUSCULAR; INTRAVENOUS at 14:36

## 2022-02-17 ASSESSMENT — FIBROSIS 4 INDEX: FIB4 SCORE: 0.66

## 2022-02-17 ASSESSMENT — PAIN DESCRIPTION - PAIN TYPE
TYPE: SURGICAL PAIN

## 2022-02-17 NOTE — OR SURGEON
Immediate Post OP Note    PreOp Diagnosis:   Pelvic pain.  History of endometriosis.  Status post previous hysterectomy.    PostOp Diagnosis:   Pelvic pain.  History of endometriosis.  Status post previous hysterectomy.  Endometriosis lesions are seen on the right ovary.    Procedure(s):  LAPAROSCOPY - DIAGNOSTIC and operative - Wound Class: Clean  cauterization ,ENDOMETRIOSIS lesion right ovary - Wound Class: Clean    Surgeon(s):  Mark Price M.D.    Anesthesiologist/Type of Anesthesia:  Anesthesiologist: America Gonzalez M.D./General    Surgical Staff:  Circulator: Pura Kaur R.N.  Scrub Person: Hari Peña    Specimens removed if any:  None    Estimated Blood Loss:   Less than 20 cc.    Findings:   During laparoscopy absence of the uterus is noted.  Absence of both fallopian tubes is noted.  Both ovaries are seen.  There are 2 endometriosis lesions on the right ovary.  There are no intraperitoneal pelvic adhesions.    Complications:   None.      2/17/2022 3:59 PM Mark Price M.D.

## 2022-02-17 NOTE — OR NURSING
1547 - Pt to PACU 9 from OR.  Bedside report from anesthesiologist and RN.  Attached to monitoring, VSS, breathing is calm and unlabored. 6L mask.  Abdomen inspected, dressings in place are clean/dry/intact.  No bleeding on isma-pad.  No signs of pain or nausea.     1615 - room air, medicated for pain and anxiety.  Has warm pack on abdomen.  Discharge instructions gone over with SO Nicole.  Nicole reports having patient's phone.     1720 - Assisted patient getting dressed.  Pt walked to bathroom with standby assistance, able to void.      1735 - Pt stable to discharge.  Instructions given, patient and SO Nicole verbalize understanding.  IV And armbands removed.  Taken via wheelchair to car.  Pt has all belongings with them.

## 2022-02-17 NOTE — ANESTHESIA PREPROCEDURE EVALUATION
Case: [id number] Date/Time: 02/17/22 1415    Procedure: LAPAROSCOPY - DIAGNOSTIC AND OPERATIVE (Abdomen)    Anesthesia type: General    Pre-op diagnosis: PELVIC PAIN, DYSPAREUNIA    Location: [facility] ROOM 25 / SURGERY SAME DAY [facility]    Surgeons: Mark Price M.D.          Relevant Problems   NEURO   (positive) History of bipolar disorder   (positive) Traumatic subarachnoid hemorrhage with loss of consciousness (HCC)      CARDIAC   (positive) Traumatic subarachnoid hemorrhage with loss of consciousness (HCC)         (positive) Kidney infection   pelvic pain  anxiety    Physical Exam    Airway   Mallampati: II  TM distance: >3 FB  Neck ROM: full       Cardiovascular - normal exam  Rhythm: regular  Rate: normal  (-) murmur     Dental - normal exam             Pulmonary - normal exam  Breath sounds clear to auscultation     Abdominal    Neurological - normal exam               Anesthesia Plan    ASA 2       Plan - general       Airway plan will be ETT          Induction: intravenous    Postoperative Plan: Postoperative administration of opioids is intended.    Pertinent diagnostic labs and testing reviewed    Informed Consent:    Anesthetic plan and risks discussed with patient.

## 2022-02-17 NOTE — H&P
Acute Bronchitis   WHAT YOU NEED TO KNOW:   Acute bronchitis is swelling and irritation in the air passages of your lungs  This irritation may cause you to cough or have other breathing problems  Acute bronchitis often starts because of another illness, such as a cold or the flu  The illness spreads from your nose and throat to your windpipe and airways  Bronchitis is often called a chest cold  Acute bronchitis lasts about 3 to 6 weeks and is usually not a serious illness  Your cough can last for several weeks  DISCHARGE INSTRUCTIONS:   Return to the emergency department if:   · You cough up blood  · Your lips or fingernails turn blue  · You feel like you are not getting enough air when you breathe  Contact your healthcare provider if:   · You have a fever  · Your breathing problems do not go away or get worse  · Your cough does not get better within 4 weeks  · You have questions or concerns about your condition or care  Self-care:   · Get more rest   Rest helps your body to heal  Slowly start to do more each day  Rest when you feel it is needed  · Avoid irritants in the air  Avoid chemicals, fumes, and dust  Wear a face mask if you must work around dust or fumes  Stay inside on days when air pollution levels are high  If you have allergies, stay inside when pollen counts are high  Do not use aerosol products, such as spray-on deodorant, bug spray, and hair spray  · Do not smoke or be around others who smoke  Nicotine and other chemicals in cigarettes and cigars damages the cilia that move mucus out of your lungs  Ask your healthcare provider for information if you currently smoke and need help to quit  E-cigarettes or smokeless tobacco still contain nicotine  Talk to your healthcare provider before you use these products  · Drink liquids as directed  Liquids help keep your air passages moist and help you cough up mucus   You may need to drink more liquids when you have acute Marian Kent          YOB: 1985  Date of today's procedure:   Facility: [facility]     ID: The patient is a very pleasant 34-year-old (almost 35-year-old) multipara (para-3, with 3 previous  sections).     Chief Complaint: The patient complains of pelvic pain.     History of Present Illness: The patient has been having complains of pelvic pain again. She has been diagnosed as having endometriosis. She has had multiple laparoscopies including laparoscopies with cauterization of endometriosis lesions. She has had previous hysterectomy. On 2018 I did perform a laparoscopy with laparoscopic cauterization of endometriosis on the right ovary. Of note, it was on 2016 that I performed a total laparoscopic hysterectomy and right salpingectomy. On 2020 I performed laparoscopy with laparoscopic lysis of adhesions and laparoscopic cauterization of endometriosis on the right ovary.  She is scheduled today for laparoscopy, both diagnostic and if necessary and depending on findings at the time of laparoscopy, operative. I have discussed with her and explained to the patient in detail and at length what diagnostic and operative laparoscopy is and what diagnostic and operative laparoscopy involves and I have discussed with her and explained to her in detail and at length the risks and benefits and alternatives of diagnostic and operative laparoscopy. After our discussions and after answering her questions she told me that she very much wishes for us to proceed with laparoscopy, both diagnostic and if necessary and depending on findings at the time of laparoscopy, operative.     Past Medical History: The patient says she has a history of depression and panic attacks.     Past Surgical History: The patient has had 3 previous  sections. She has had multiple laparoscopies. She has had previous laparoscopic hysterectomy.  On March 28, 2019 Dr. Cooley performed a left inguinal hernia repair, with mesh, using the da Espinoza robot.     Medications: The patient has taken Percocet and she has taken Cymbalta and Klonopin.     Allergies: The patient says that she thinks that she might be allergic to Percocet.     Social History: The patient smokes. She denies consuming alcoholic beverages. She denies using recreational drugs.     Review of Systems  General: The patient denies any fevers, chills, sweats.  Pulmonary: The patient denies any coughing, wheezing, chest pain, shortness of breath.  Cardiovascular: The patient denies any palpitations, dyspnea, chest pain.  Gastrointestinal: The patient denies any nausea, vomiting, diarrhea, constipation, hematochezia, melena, history of hepatitis, history of jaundice.  Genitourinary: The patient complains of pelvic pain. She denies vaginal bleeding.  Musculoskeletal: The patient denies any arthralgias or myalgias.   Neurological: No headaches or syncope or seizures.      Physical Exam:   Vital Signs: The patient's vital signs are stable and she is afebrile.  General: The patient appears well developed and well nourished and relaxed and alert and comfortable and in no apparent distress.    HEENT :  Normo-cephalic, atraumatic, pupils equal, round, reactive to light and accommodation, extra ocular motions intact, pharynx clear; there is no thyromegaly. There is no cervical lymphadenopathy.  Chest: Heart regular rate and rhythm, with no murmurs or rubs or gallops; the lungs are clear to auscultation bilaterally.  Abdomen: The abdomen is soft and flat and non-tender and non-distended. There is no hepatomegaly. There is no splenomegaly. There is a Pfannenstiel scar.  Bimanual exam reveals absence of the uterus and reveals no pelvic masses and no adnexal masses either on the right or the left and reveals no obvious indisputable evidence of tenderness to palpation of the vaginal cuff.  Extremities: No clubbing  bronchitis  Ask how much liquid to drink each day and which liquids are best for you  · Use a humidifier or vaporizer  Use a cool mist humidifier or a vaporizer to increase air moisture in your home  This may make it easier for you to breathe and help decrease your cough  Decrease risk for acute bronchitis:   · Get the vaccinations you need  Ask your healthcare provider if you should get vaccinated against the flu or pneumonia  · Prevent the spread of germs  You can decrease your risk of acute bronchitis and other illnesses by doing the following:     [facility] your hands often with soap and water  Carry germ-killing hand lotion or gel with you  You can use the lotion or gel to clean your hands when soap and water are not available  ¨ Do not touch your eyes, nose, or mouth unless you have washed your hands first     ¨ Always cover your mouth when you cough to prevent the spread of germs  It is best to cough into a tissue or your shirt sleeve instead of into your hand  Ask those around you cover their mouths when they cough  ¨ Try to avoid people who have a cold or the flu  If you are sick, stay away from others as much as possible  Medicines: Your healthcare provider may  give you any of the following:  · Ibuprofen or acetaminophen  are medicines that help lower your fever  They are available without a doctor's order  Ask your healthcare provider which medicine is right for you  Ask how much to take and how often to take it  Follow directions  These medicines can cause stomach bleeding if not taken correctly  Ibuprofen can cause kidney damage  Do not take ibuprofen if you have kidney disease, an ulcer, or allergies to aspirin  Acetaminophen can cause liver damage  Do not take more than 4,000 milligrams in 24 hours  · Decongestants  help loosen mucus in your lungs and make it easier to cough up  This can help you breathe easier  · Cough suppressants  decrease your urge to cough   If your cough or cyanosis or edema.   Neurological: non-focal.      Assessment:   Pelvic pain.  History of endometriosis.  Status post previous hysterectomy.     Plan:   We will proceed today with laparoscopy, both diagnostic and if necessary and depending on findings at the time of laparoscopy, operative. Please see above.                 ________________________  Mark Price M.D.   produces mucus, do not take a cough suppressant unless your healthcare provider tells you to  Your healthcare provider may suggest that you take a cough suppressant at night so you can rest     · Inhalers  may be given  Your healthcare provider may give you one or more inhalers to help you breathe easier and cough less  An inhaler gives your medicine to open your airways  Ask your healthcare provider to show you how to use your inhaler correctly  · Take your medicine as directed  Contact your healthcare provider if you think your medicine is not helping or if you have side effects  Tell him of her if you are allergic to any medicine  Keep a list of the medicines, vitamins, and herbs you take  Include the amounts, and when and why you take them  Bring the list or the pill bottles to follow-up visits  Carry your medicine list with you in case of an emergency  Follow up with your healthcare provider as directed:  Write down questions you have so you will remember to ask them during your follow-up visits  © 2017 [employer] Information is for End User's use only and may not be sold, redistributed or otherwise used for commercial purposes  All illustrations and images included in CareNotes® are the copyrighted property of [employer]  or [employer]  The above information is an  only  It is not intended as medical advice for individual conditions or treatments  Talk to your doctor, nurse or pharmacist before following any medical regimen to see if it is safe and effective for you

## 2022-02-17 NOTE — ANESTHESIA PROCEDURE NOTES
----- Message from Laurie Ignacio sent at 5/8/2019 10:32 AM CDT -----  Contact: na  Name of Who is Calling: Na with [facility]         What is the request in detail: orders for 3d mammogram was not received she is requesting it be  refaxed to [phone]     Can the clinic reply by [facility]: no     What Number to Call Back if not in [facility]: [phone] ext [phone]        Airway    Date/Time: 2/17/2022 2:48 PM  Performed by: America Gonzalez M.D.  Authorized by: America Gonzalez M.D.     Location:  OR  Urgency:  Elective  Indications for Airway Management:  Anesthesia      Spontaneous Ventilation: absent    Sedation Level:  Deep  Preoxygenated: Yes    Patient Position:  Sniffing  Final Airway Type:  Endotracheal airway  Final Endotracheal Airway:  ETT  Cuffed: Yes    Technique Used for Successful ETT Placement:  Direct laryngoscopy    Insertion Site:  Oral  Blade Type:  Elena  Laryngoscope Blade/Videolaryngoscope Blade Size:  3  ETT Size (mm):  7.0  Measured from:  Teeth  ETT to Teeth (cm):  22  Placement Verified by: auscultation and capnometry    Cormack-Lehane Classification:  Grade I - full view of glottis  Number of Attempts at Approach:  1

## 2022-02-17 NOTE — ANESTHESIA POSTPROCEDURE EVALUATION
Patient: Marian Kent    Procedure Summary     Date: 02/17/22 Room / Location: [facility] ROOM 25 / SURGERY SAME DAY [facility]    Anesthesia Start: 1441 Anesthesia Stop: 1551    Procedures:       LAPAROSCOPY - DIAGNOSTIC and operative (Abdomen)      cauterization ,ENDOMETRIOSIS lesion right ovary (Right Abdomen) Diagnosis: (PELVIC PAIN, DYSPAREUNIA)    Surgeons: Mark Price M.D. Responsible Provider: America Gonzalez M.D.    Anesthesia Type: general ASA Status: 2          Final Anesthesia Type: general  Last vitals  BP   Blood Pressure: 113/75, NIBP: 111/71    Temp   36.3 °C (97.4 °F)    Pulse   88   Resp   20    SpO2   94 %      Anesthesia Post Evaluation    Patient location during evaluation: PACU  Patient participation: complete - patient cannot participate  Level of consciousness: sleepy but conscious    Airway patency: patent  Anesthetic complications: no  Cardiovascular status: adequate  Respiratory status: acceptable  Hydration status: acceptable    PONV: none          No complications documented.     Nurse Pain Score: 0 (NPRS)

## 2022-02-17 NOTE — ANESTHESIA TIME REPORT
Anesthesia Start and Stop Event Times     Date Time Event    2/17/2022 1422 Ready for Procedure     1441 Anesthesia Start     1551 Anesthesia Stop        Responsible Staff  02/17/22    Name Role Begin End    America Gonzalez M.D. Anesth 1441 1551        Preop Diagnosis (Free Text):  Pre-op Diagnosis     PELVIC PAIN, DYSPAREUNIA        Preop Diagnosis (Codes):    Premium Reason  H. Other (comment)    Comments: locum

## 2022-02-18 NOTE — DISCHARGE INSTRUCTIONS
ACTIVITY: Rest and take it easy for the first 24 hours.  A responsible adult is recommended to remain with you during that time.  It is normal to feel sleepy.  We encourage you to not do anything that requires balance, judgment or coordination.    MILD FLU-LIKE SYMPTOMS ARE NORMAL. YOU MAY EXPERIENCE GENERALIZED MUSCLE ACHES, THROAT IRRITATION, HEADACHE AND/OR SOME NAUSEA.    FOR 24 HOURS DO NOT:  Drive, operate machinery or run household appliances.  Drink beer or alcoholic beverages.   Make important decisions or sign legal documents.    SPECIAL INSTRUCTIONS: See Handout    DIET: To avoid nausea, slowly advance diet as tolerated, avoiding spicy or greasy foods for the first day.  Add more substantial food to your diet according to your physician's instructions.  Babies can be fed formula or breast milk as soon as they are hungry.  INCREASE FLUIDS AND FIBER TO AVOID CONSTIPATION.    SURGICAL DRESSING/BATHING: Ok to shower tomorrow, avoid submerging in water (hot tub, pool, bath) until ok with doctor    FOLLOW-UP APPOINTMENT:  A follow-up appointment should be arranged with Dr. Price [phone]; call to schedule.    You should CALL YOUR PHYSICIAN if you develop:  Fever greater than 101 degrees F.  Pain not relieved by medication, or persistent nausea or vomiting.  Excessive bleeding (blood soaking through dressing) or unexpected drainage from the wound.  Extreme redness or swelling around the incision site, drainage of pus or foul smelling drainage.  Inability to urinate or empty your bladder within 8 hours.  Problems with breathing or chest pain.    You should call 911 if you develop problems with breathing or chest pain.  If you are unable to contact your doctor or surgical center, you should go to the nearest emergency room or urgent care center.    Physician's telephone #: Dr. Price [phone]    If any questions arise, call your doctor.  If your doctor is not available, please feel free to call the  Surgical Center at [phone].     A registered nurse may call you a few days after your surgery to see how you are doing after your procedure.    MEDICATIONS: Resume taking daily medication.  Take prescribed pain medication with food.  If no medication is prescribed, you may take non-aspirin pain medication if needed.  PAIN MEDICATION CAN BE VERY CONSTIPATING.  Take a stool softener or laxative such as senokot, pericolace, or milk of magnesia if needed.    Prescription given for: Hydromorphone & Ibuprofen.  Last pain medication given: Tylenol and Gabapentin given at 2:30pm.  Hycet given at 4:00pm    If your physician has prescribed pain medication that includes Acetaminophen (Tylenol), do not take additional Acetaminophen (Tylenol) while taking the prescribed medication.    Depression / Suicide Risk    As you are discharged from this [facility] facility, it is important to learn how to keep safe from harming yourself.    Recognize the warning signs:  · Abrupt changes in personality, positive or negative- including increase in energy   · Giving away possessions  · Change in eating patterns- significant weight changes-  positive or negative  · Change in sleeping patterns- unable to sleep or sleeping all the time   · Unwillingness or inability to communicate  · Depression  · Unusual sadness, discouragement and loneliness  · Talk of wanting to die  · Neglect of personal appearance   · Rebelliousness- reckless behavior  · Withdrawal from people/activities they love  · Confusion- inability to concentrate     If you or a loved one observes any of these behaviors or has concerns about self-harm, here's what you can do:  · Talk about it- your feelings and reasons for harming yourself  · Remove any means that you might use to hurt yourself (examples: pills, rope, extension cords, firearm)  · Get professional help from the community (Mental Health, Substance Abuse, psychological counseling)  · Do not be alone:Call your  Safe Contact- someone whom you trust who will be there for you.  · Call your local CRISIS HOTLINE 873-3707 or 046-015-2588  · Call your local Children's Mobile Crisis Response Team [place] (053) 561-2207 or www.Ctrax  · Call the toll free National Suicide Prevention Hotlines   · National Suicide Prevention Lifeline 611-261-ROJE (9357)  · National [employer] Network 800-SUICIDE (080-4816)

## 2022-02-18 NOTE — OP REPORT
DATE OF SERVICE:  02/17/2022     PREOPERATIVE DIAGNOSES:  1.  Pelvic pain.  2.  History of endometriosis.  3.  Status post previous hysterectomy.     POSTOPERATIVE DIAGNOSES:  1.  Pelvic pain.  2.  History of endometriosis.  3.  Status post previous hysterectomy.  4.  Endometriosis lesions are seen on the right ovary.     PROCEDURES:  Laparoscopy with laparoscopic cauterization of endometriosis   lesions on the right ovary.     SURGEON:  aMrk Price MD     ANESTHESIA:  General endotracheal tube anesthesia.     ANESTHESIOLOGIST:  America Gonzalez MD     FINDINGS:  During laparoscopy absence of uterus is noted.  Absence of both   fallopian tubes is noted.  Both ovaries were seen.  Endometriosis lesions on   the right ovary.  There are no intraperitoneal pelvic adhesions.     SPECIMENS:  None.     COMPLICATIONS:  None.     ESTIMATED BLOOD LOSS:  Less than 20 mL.     DESCRIPTION OF PROCEDURE:  After appropriate consents have been obtained, the   patient was taken to the operating room and given general anesthesia.  She was   prepped and draped in the dorsal lithotomy position.  The bladder was and the   Good catheter was noted to be in place and draining urine.  Attention was   directed to the abdomen where a small (approximately 1-1.5 cm) horizontal   infraumbilical incision is made with a scalpel.  A Veress needle was advanced   through this incision into the peritoneal cavity and proper placement in the   peritoneal cavity is verified with a Matta hanging drop technique.  The   peritoneal cavity was then insufflated with approximately 2-3 liters of carbon   dioxide gas.  The Veress needle was removed and a 10-12 mm port was   introduced through the infraumbilical incision into the peritoneal cavity,   using the VersaStep trocar system.  The central portion of this port was   removed and the 10 mm 0-degree laparoscope was inserted through the remaining   sleeve and proper entry in the peritoneal cavity was verified  visually with   laparoscope.  The patient was placed in Trendelenburg position.  A 5 mm port   was placed suprapubically under direct laparoscopic visualization utilizing   the VersaStep trocar system.  The patient was placed in further Trendelenburg   position and a Prestige instrument is placed through the suprapubic port and   used to manipulate structures and findings are as noted above and pictures   were taken.  Then, a monopolar cautery instrument namely the J hook was placed   through the suprapubic port and used to cauterize 2 separate endometriosis   lesions on the right ovary. Again pictures were taken.  The suction irrigation   instrument is used to copiously irrigate and drained the pelvis.  The suction   irrigation instrument was used to evacuate pneumoperitoneum and the patient   was taken out of Trendelenburg position and the suction instrument was removed   and laparoscope was removed and both ports were removed.  The fascia   underneath the infraumbilical incision was identified with use of S   retractors, reapproximated with placement of simple interrupted suture using   Vicryl.  Skin incisions were reapproximated with placement of multiple   interrupted buried sutures of 4-0 Monocryl and 3 such sutures were placed for   the infraumbilical incision and one for the suprapubic incision.  The   procedure was terminated.  Final lap and needle counts reported to be correct   at the end of the procedure.  The patient tolerated the procedure well and   sent to postanesthesia recovery in stable condition.        ______________________________  Mark Price MD    MED/JODY    DD:  02/17/2022 16:09  DT:  02/17/2022 17:41    Job#:  [id number]    CC:America Gonzalez MD(User)

## 2022-03-08 ENCOUNTER — TELEPHONE (OUTPATIENT)
Dept: MEDICAL GROUP | Facility: MEDICAL CENTER | Age: 37
End: 2022-03-08

## 2022-03-08 DIAGNOSIS — K58.0 IRRITABLE BOWEL SYNDROME WITH DIARRHEA: ICD-10-CM

## 2022-03-08 RX ORDER — CHOLESTYRAMINE 4 G/9G
POWDER, FOR SUSPENSION ORAL
Qty: 60 EACH | Refills: 5 | Status: SHIPPED | OUTPATIENT
Start: 2022-03-08 | End: 2022-03-25

## 2022-03-08 NOTE — TELEPHONE ENCOUNTER
"Pt informed. Pt also stated that her current flare up is \"really bad\" withe the pain and diarrhea. She stated that she can't get into see her GI until Friday and in the meantime she has been taking tylenol, motrin and using the heating pad- pt states this is not helping and is wondering if there is anything Nicole can prescribe or suggest to get her thru until she sees GI on Friday, please advise   "

## 2022-03-08 NOTE — TELEPHONE ENCOUNTER
Pt lvm stating that she would like her lab results, she also stated that she is having a flare up and the last time this happened, Nicole prescribed her a powder drink for it and it helped- she was wondering if Nicole remembers what this was and if she could possibly prescribe it again, please advise

## 2022-03-08 NOTE — TELEPHONE ENCOUNTER
Sent some questran with refills ot [place] pharmacy.  She is not anemic, but her WBC is a touch low.  Her kidney function is normal, her liver function is normal.  Her sed rate looking at inflammation was normal.  Thyroid is normal.  HIV negative.

## 2022-03-17 ENCOUNTER — TELEPHONE (OUTPATIENT)
Dept: CARDIOLOGY | Facility: MEDICAL CENTER | Age: 37
End: 2022-03-17

## 2022-03-17 ENCOUNTER — OFFICE VISIT (OUTPATIENT)
Dept: MEDICAL GROUP | Facility: MEDICAL CENTER | Age: 37
End: 2022-03-17
Attending: PHYSICIAN ASSISTANT
Payer: MEDICAID

## 2022-03-17 VITALS
BODY MASS INDEX: 26.24 KG/M2 | SYSTOLIC BLOOD PRESSURE: 82 MMHG | TEMPERATURE: 98.8 F | HEART RATE: 94 BPM | RESPIRATION RATE: 17 BRPM | DIASTOLIC BLOOD PRESSURE: 62 MMHG | WEIGHT: 148.1 LBS | OXYGEN SATURATION: 97 % | HEIGHT: 63 IN

## 2022-03-17 DIAGNOSIS — F41.9 ANXIETY: ICD-10-CM

## 2022-03-17 DIAGNOSIS — R10.84 GENERALIZED ABDOMINAL PAIN: ICD-10-CM

## 2022-03-17 DIAGNOSIS — K58.0 IRRITABLE BOWEL SYNDROME WITH DIARRHEA: ICD-10-CM

## 2022-03-17 DIAGNOSIS — Z86.59 HISTORY OF BIPOLAR DISORDER: ICD-10-CM

## 2022-03-17 PROCEDURE — 99212 OFFICE O/P EST SF 10 MIN: CPT | Performed by: PHYSICIAN ASSISTANT

## 2022-03-17 PROCEDURE — 99214 OFFICE O/P EST MOD 30 MIN: CPT | Performed by: PHYSICIAN ASSISTANT

## 2022-03-17 RX ORDER — GABAPENTIN 300 MG/1
300 CAPSULE ORAL 3 TIMES DAILY
Qty: 90 CAPSULE | Refills: 0 | Status: SHIPPED | OUTPATIENT
Start: 2022-03-17 | End: 2022-03-25

## 2022-03-17 RX ORDER — QUETIAPINE FUMARATE 50 MG/1
50 TABLET, FILM COATED ORAL 2 TIMES DAILY
Qty: 60 TABLET | Refills: 3 | Status: SHIPPED | OUTPATIENT
Start: 2022-03-17 | End: 2022-06-16

## 2022-03-17 ASSESSMENT — FIBROSIS 4 INDEX: FIB4 SCORE: 0.66

## 2022-03-17 NOTE — TELEPHONE ENCOUNTER
LVM asking if patient has seen another Cardiologist in the past or had any cardiac testing done outside of [facility] to call with information so that records can be requested prior to appointment.scheduled with Dr. Graves on 3/31/2022.

## 2022-03-17 NOTE — ASSESSMENT & PLAN NOTE
Marian presents today for follow up. She reports that she has a history of IBS with diarrhea and constipation.  She reports symptoms of chronic generalized abdominal pain, nausea and vomiting.  She describes the pain as a cramping/tightening of the abdomen.  She has tried multiple medications that include Bentyl 10 mg 2-3 times a day which has had no effect on her pain, Prevalite powder which caused an increase in nausea and vomiting, sucralfate which cause increased diarrhea, Lactaid tablets, Pepto-Bismol, omeprazole, Tums, Zofran and promethazine and Tylenol without any benefit in her current symptoms.  She reports that she tried to get in with her gastroenterology provider but reports that they cannot get her in until a month from now in which she has a colonoscopy and endoscopy scheduled to rule in or out Crohn's disease..  She reports that her pain has been so severe that it has kept her out of work and she therefore presents today for other options regarding pain control.

## 2022-03-17 NOTE — PROGRESS NOTES
Chief Complaint   Patient presents with   • Follow-Up     Subjective:     HPI:   Marian Kent is a 36 y.o. female here to discuss the evaluation and management of:    Generalized abdominal pain  Marian presents today for follow up. She reports that she has a history of IBS with diarrhea and constipation.  She reports symptoms of chronic generalized abdominal pain, nausea and vomiting.  She describes the pain as a cramping/tightening of the abdomen.  She has tried multiple medications that include Bentyl 10 mg 2-3 times a day which has had no effect on her pain, Prevalite powder which caused an increase in nausea and vomiting, sucralfate which cause increased diarrhea, Lactaid tablets, Pepto-Bismol, omeprazole, Tums, Zofran and promethazine and Tylenol without any benefit in her current symptoms.  She reports that she tried to get in with her gastroenterology provider but reports that they cannot get her in until a month from now in which she has a colonoscopy and endoscopy scheduled to rule in or out Crohn's disease..  She reports that her pain has been so severe that it has kept her out of work and she therefore presents today for other options regarding pain control.    History of bipolar disorder  [place] presents today for follow-up.  She reports that her psychiatrist just recently passed away.  She has been off of all of her psychiatric medications but reports that her anxiety is worsening and her mood swings have increased.  She is concerned regarding this and would like to get started back on her Seroquel and clonazepam in the meantime of getting reestablished with a new psychiatrist.  She is requesting a referral for this.    ROS  See HPI.     Allergies   Allergen Reactions   • Percocet [Apap-Fd&C Red #40 [place]-Oxycodone] Hives   • Oxycodone-Acetaminophen    • Toradol Hives   • Tramadol Hives       Current medicines (including changes today)  Current Outpatient Medications   Medication Sig Dispense  Refill   • QUEtiapine (SEROQUEL) 50 MG tablet Take 1 Tablet by mouth 2 times a day. 60 Tablet 3   • gabapentin (NEURONTIN) 300 MG Cap Take 1 Capsule by mouth 3 times a day. 90 Capsule 0   • cefdinir (OMNICEF) 300 MG Cap Take 300 mg by mouth.     • cefdinir (OMNICEF) 300 MG Cap TAKE 1 CAPSULE BY MOUTH TWICE A DAY FOR 10 DAYS     • Clobetasol Propionate 0.05 % Lotion Apply 1 Application topically 2 times a day. APPLY TO AFFECTED AREA TWICE A DAY     • dicyclomine (BENTYL) 10 MG Cap Take 10 mg by mouth.     • dicyclomine (BENTYL) 10 MG Cap TAKE 1 CAP BY MOUTH BEFORE MEALS AND AT BEDTIME     • BANOPHEN 25 MG capsule 1 CAP BY MOUTH 3 TIMES A DAY , AS NEEDED :FOR ITCHING     • diphenhydrAMINE (BENADRYL) 50 MG/ML Solution      • HYDROcodone-acetaminophen (NORCO) 5-325 MG Tab per tablet TAKE 1 TABLET BY MOUTH EVERY 6 HOURS FOR 2 DAYS AS NEEDED FOR PAIN     • hydrOXYzine pamoate (VISTARIL) 25 MG Cap      • oxyCODONE-acetaminophen (PERCOCET) 5-325 MG Tab      • sucralfate (CARAFATE) 1 GM/10ML Suspension      • cholestyramine (QUESTRAN) 4 g packet TAKE 4 G BY MOUTH 1 TIME A DAY AS NEEDED FOR DIARRHEA 60 Each 5   • ibuprofen (MOTRIN) 800 MG Tab Take 1 Tablet by mouth every 8 hours as needed for Mild Pain or Moderate Pain. 30 Tablet 0   • ibuprofen (MOTRIN) 200 MG Tab Take 200-600 mg by mouth every 6 hours as needed for Mild Pain.     • ACETAMINOPHEN EXTRA STRENGTH 500 MG Tab      • traZODone (DESYREL) 50 MG Tab Take  by mouth every evening. prn       No current facility-administered medications for this visit.       Social History     Tobacco Use   • Smoking status: Current Every Day Smoker     Packs/day: 1.00     Years: 10.00     Pack years: 10.00     Types: Cigarettes   • Smokeless tobacco: Never Used   Vaping Use   • Vaping Use: Never used   Substance Use Topics   • Alcohol use: No     Alcohol/week: 0.0 oz     Comment: none since10/21   • Drug use: No       Patient Active Problem List    Diagnosis Date Noted   • Acute  "midline low back pain without sciatica 12/13/2021   • Traumatic subarachnoid hemorrhage with loss of consciousness (HCC) 12/13/2021   • Acute pain of left knee 12/13/2021   • History of bipolar disorder 12/13/2021   • Suicidal ideation 07/19/2021   • Generalized abdominal pain 03/18/2021   • Kidney infection 02/17/2021   • Postprocedural pelvic peritoneal adhesions 10/22/2020   • Tobacco abuse disorder 02/19/2019   • Endometriosis 02/19/2019   • Anxiety 02/19/2019   • Left inguinal hernia 02/19/2019   • Pelvic pain 08/08/2016   • Female pelvic pain 05/20/2016       Family History   Problem Relation Age of Onset   • Cancer Father 45        colon   • Other Maternal Grandfather    • Cancer Paternal Grandmother         breast   • Diabetes Paternal Grandmother    • Heart Disease Neg Hx    • Stroke Neg Hx         Objective:     BP (!) 82/62 (BP Location: Left arm, Patient Position: Sitting, BP Cuff Size: Adult)   Pulse 94   Temp 37.1 °C (98.8 °F) (Skin)   Resp 17   Ht 1.6 m (5' 3\")   Wt 67.2 kg (148 lb 1.6 oz)   SpO2 97%  Body mass index is 26.23 kg/m².    Physical Exam:  Physical Exam  Vitals reviewed.   Constitutional:       Appearance: Normal appearance.   HENT:      Head: Normocephalic.      Right Ear: External ear normal.      Left Ear: External ear normal.   Eyes:      Pupils: Pupils are equal, round, and reactive to light.   Cardiovascular:      Rate and Rhythm: Normal rate and regular rhythm.      Heart sounds: Normal heart sounds.   Pulmonary:      Effort: Pulmonary effort is normal.      Breath sounds: Normal breath sounds.   Abdominal:      General: Abdomen is flat.      Palpations: Abdomen is soft.      Tenderness: There is abdominal tenderness.   Neurological:      General: No focal deficit present.      Mental Status: She is alert and oriented to person, place, and time.   Psychiatric:         Mood and Affect: Mood normal.         Behavior: Behavior normal.       Assessment and Plan:     The following " treatment plan was discussed:    1. Generalized abdominal pain,  Irritable bowel syndrome with diarrhea  - This is a chronic condition.  - Patient has failed multiple over-the-counter and prescriptive medications for her symptoms.  - Plan: Seeing as pain is her is concerning symptom, we will trial gabapentin 300 mg up to 3 times daily.  Patient has been educated on the use and potential side effect profile of this medication.  Return to clinic for follow-up with PCP if symptoms persist or worsen.  - gabapentin (NEURONTIN) 300 MG Cap; Take 1 Capsule by mouth 3 times a day.  Dispense: 90 Capsule; Refill: 0    2. History of bipolar disorder, Anxiety  - This is a chronic condition.  - Plan: Start patient back on Seroquel 50 mg nightly.  I will message the patient's PCP about starting her back on her clonazepam in the meantime of her establishing with psychiatry.  Her PCP will reach out to her in regards to this for follow-up if she is in agreement to providing the medication while the patient establishes care.  Urgent Referral to Psychiatry placed.  - QUEtiapine (SEROQUEL) 50 MG tablet; Take 1 Tablet by mouth 2 times a day.  Dispense: 60 Tablet; Refill: 3    Any change or worsening of signs or symptoms, patient encouraged to follow-up or report to emergency room for further evaluation. Patient verbalizes understanding and agrees.    Follow-Up: Return if symptoms worsen or fail to improve.      PLEASE NOTE: This dictation was created using voice recognition software. I have made every reasonable attempt to correct obvious errors, but I expect that there are errors of grammar and possibly content that I did not discover before finalizing the note.

## 2022-03-17 NOTE — ASSESSMENT & PLAN NOTE
Marian presents today for follow-up.  She reports that her psychiatrist just recently passed away.  She has been off of all of her psychiatric medications but reports that her anxiety is worsening and her mood swings have increased.  She is concerned regarding this and would like to get started back on her Seroquel and clonazepam in the meantime of getting reestablished with a new psychiatrist.  She is requesting a referral for this.

## 2022-03-25 ENCOUNTER — OFFICE VISIT (OUTPATIENT)
Dept: MEDICAL GROUP | Facility: MEDICAL CENTER | Age: 37
End: 2022-03-25
Attending: FAMILY MEDICINE
Payer: MEDICAID

## 2022-03-25 ENCOUNTER — HOSPITAL ENCOUNTER (OUTPATIENT)
Facility: MEDICAL CENTER | Age: 37
End: 2022-03-25
Attending: FAMILY MEDICINE
Payer: MEDICAID

## 2022-03-25 VITALS
RESPIRATION RATE: 16 BRPM | WEIGHT: 146.7 LBS | SYSTOLIC BLOOD PRESSURE: 118 MMHG | HEIGHT: 63 IN | BODY MASS INDEX: 25.99 KG/M2 | HEART RATE: 102 BPM | TEMPERATURE: 98.9 F | OXYGEN SATURATION: 97 % | DIASTOLIC BLOOD PRESSURE: 86 MMHG

## 2022-03-25 DIAGNOSIS — R10.84 GENERALIZED ABDOMINAL PAIN: ICD-10-CM

## 2022-03-25 DIAGNOSIS — F41.9 ANXIETY: ICD-10-CM

## 2022-03-25 DIAGNOSIS — Z86.59 HISTORY OF BIPOLAR DISORDER: ICD-10-CM

## 2022-03-25 PROCEDURE — 99214 OFFICE O/P EST MOD 30 MIN: CPT | Performed by: FAMILY MEDICINE

## 2022-03-25 PROCEDURE — 99213 OFFICE O/P EST LOW 20 MIN: CPT | Performed by: FAMILY MEDICINE

## 2022-03-25 PROCEDURE — 80307 DRUG TEST PRSMV CHEM ANLYZR: CPT

## 2022-03-25 RX ORDER — HYDROCODONE BITARTRATE AND ACETAMINOPHEN 5; 325 MG/1; MG/1
1 TABLET ORAL EVERY 8 HOURS PRN
Qty: 28 TABLET | Refills: 0 | Status: SHIPPED | OUTPATIENT
Start: 2022-03-25 | End: 2022-04-01

## 2022-03-25 RX ORDER — HYDROCODONE BITARTRATE AND ACETAMINOPHEN 7.5; 325 MG/1; MG/1
1 TABLET ORAL EVERY 8 HOURS PRN
Qty: 28 TABLET | Refills: 0 | Status: CANCELLED | OUTPATIENT
Start: 2022-03-25 | End: 2022-04-01

## 2022-03-25 RX ORDER — NALOXONE HYDROCHLORIDE 4 MG/.1ML
SPRAY NASAL
Qty: 2 EACH | Refills: 1 | Status: SHIPPED | OUTPATIENT
Start: 2022-03-25 | End: 2023-01-19

## 2022-03-25 RX ORDER — PROPRANOLOL HYDROCHLORIDE 20 MG/1
20 TABLET ORAL 3 TIMES DAILY PRN
Qty: 60 TABLET | Refills: 0 | Status: SHIPPED | OUTPATIENT
Start: 2022-03-25 | End: 2022-06-16

## 2022-03-25 ASSESSMENT — ANXIETY QUESTIONNAIRES
2. NOT BEING ABLE TO STOP OR CONTROL WORRYING: NEARLY EVERY DAY
3. WORRYING TOO MUCH ABOUT DIFFERENT THINGS: NEARLY EVERY DAY
6. BECOMING EASILY ANNOYED OR IRRITABLE: NEARLY EVERY DAY
4. TROUBLE RELAXING: NEARLY EVERY DAY
GAD7 TOTAL SCORE: 21
7. FEELING AFRAID AS IF SOMETHING AWFUL MIGHT HAPPEN: NEARLY EVERY DAY
5. BEING SO RESTLESS THAT IT IS HARD TO SIT STILL: NEARLY EVERY DAY
1. FEELING NERVOUS, ANXIOUS, OR ON EDGE: NEARLY EVERY DAY

## 2022-03-25 ASSESSMENT — PATIENT HEALTH QUESTIONNAIRE - PHQ9
SUM OF ALL RESPONSES TO PHQ QUESTIONS 1-9: 25
5. POOR APPETITE OR OVEREATING: 3 - NEARLY EVERY DAY
CLINICAL INTERPRETATION OF PHQ2 SCORE: 6

## 2022-03-25 ASSESSMENT — FIBROSIS 4 INDEX: FIB4 SCORE: 0.66

## 2022-03-25 NOTE — ASSESSMENT & PLAN NOTE
"[place] presents again for abdominal pain  She reports she has tried \"everything\", and pain has been on and off for 2 months and much worse over the last 2 weeks.  Tried gabapentin, ibuprofen (12 tabs), tylenol, aleve (12 tabs), bentyl - nothing has helped.   Patient had procedures done for endometriosis and to remove ovarian cysts. She's had a partial hysterectomy and also. Her last procedures were done 1 month ago and she reports her pain is worse.   She reports she swings back and forth between diarrhea and constipation and has tried dietary changes, imodium, fiber, gasx, tums, lidocaine patches. 70% diarrhea, 30% constipation.   She has an EGD/colonoscopy scheduled in 2 weeks  She has multiple studies ordered from GI (media tab visit 3/11) that have not been submitted yet.   She reports pain is worse with eating.   She was given sucralfate and she didn't like it. Also given omeprazole, and she reports that she took it for 5 days     Analgesia: yes, 7-8/10 to 3-4/10  Activity Level: yes  Adverse Effects: no, no constipation or drowsiness   Aberrant Behavior: no, but multiple CS prescriptions over the last year  Affect and Mood: severely depressed, anxious    History of pain: as above  PHQ9: 25 - no active SI, but has thoughts of being better off dead.   Prior imaging: multiple scans   Prior treatments: multiple medications  Response to prior treatments: poor     Any CS last dose: 3/8  Last dose of currently prescribed meds: 3/8  Frequency:   Current MME:       Opioid Risk Score: 1    Most recent UDS reviewed today and is consistent with prescribed medications.    I have reviewed the medical records, the Prescription Monitoring Program and I have determined that controlled substance treatment is medically indicated.           "

## 2022-03-25 NOTE — LETTER
March 25, 2022    To Whom It May Concern:         This is confirmation that Marian Kent attended her scheduled appointment with Rosey Ceja M.D. on 3/25/22. She may to return to work on Sunday, 3/27/22.          If you have any questions please do not hesitate to call me at the phone number listed below.    Sincerely,          Rosey Ceja M.D.   [phone]

## 2022-03-26 DIAGNOSIS — R10.84 GENERALIZED ABDOMINAL PAIN: ICD-10-CM

## 2022-03-26 NOTE — PROGRESS NOTES
"Subjective:     CC:   Chief Complaint   Patient presents with   • Anxiety   • GI Problem         HPI:     Generalized abdominal pain  [place] presents again for abdominal pain  She reports she has tried \"everything\", and pain has been on and off for 2 months and much worse over the last 2 weeks.  Tried gabapentin, ibuprofen (12 tabs), tylenol, aleve (12 tabs), bentyl - nothing has helped.   Patient had procedures done for endometriosis and to remove ovarian cysts. She's had a partial hysterectomy and also. Her last procedures were done 1 month ago and she reports her pain is worse.   She reports she swings back and forth between diarrhea and constipation and has tried dietary changes, imodium, fiber, gasx, tums, lidocaine patches. 70% diarrhea, 30% constipation.   She has an EGD/colonoscopy scheduled in 2 weeks  She has multiple studies ordered from GI (media tab visit 3/11) that have not been submitted yet.   She reports pain is worse with eating.   She was given sucralfate and she didn't like it. Also given omeprazole, and she reports that she took it for 5 days     Analgesia: yes, 7-8/10 to 3-4/10  Activity Level: yes  Adverse Effects: no, no constipation or drowsiness   Aberrant Behavior: no, but multiple CS prescriptions over the last year  Affect and Mood: severely depressed, anxious    History of pain: as above  PHQ9: 25 - no active SI, but has thoughts of being better off dead.   Prior imaging: multiple scans   Prior treatments: multiple medications  Response to prior treatments: poor     Any CS last dose: 3/8  Last dose of currently prescribed meds: 3/8  Frequency:   Current MME:       Opioid Risk Score: 1    Most recent UDS reviewed today and is consistent with prescribed medications.    I have reviewed the medical records, the Prescription Monitoring Program and I have determined that controlled substance treatment is medically indicated.             Anxiety  Patient reports worsening of her anxiety.  She " reports history of bipolar disorder.  She reports recently losing her psychiatrist, however she did report this about 1 year ago.  It does appear that she used to be getting monthly clonazepam's, 1 mg 30 tablets at a time.  She reports significant panic attacks.  She reports that she has tried hydroxyzine 100 mg at a time previously without benefit.  For some reason she reports that she stopped all of her medications.  She was previously on Seroquel, which has recently been refilled.  Depression Screening    Little interest or pleasure in doing things?  3 - nearly every day   Feeling down, depressed , or hopeless? 3 - nearly every day   Trouble falling or staying asleep, or sleeping too much?  3 - nearly every day   Feeling tired or having little energy?  3 - nearly every day   Poor appetite or overeating?  3 - nearly every day   Feeling bad about yourself - or that you are a failure or have let yourself or your family down? 3 - nearly every day   Trouble concentrating on things, such as reading the newspaper or watching television? 3 - nearly every day   Moving or speaking so slowly that other people could have noticed.  Or the opposite - being so fidgety or restless that you have been moving around a lot more than usual?  2 - more than half the days   Thoughts that you would be better off dead, or of hurting yourself?  2 - more than half the days   Patient Health Questionnaire Score: 25       If depressive symptoms identified deferred to follow up visit unless specifically addressed in assesment and plan.    Interpretation of PHQ-9 Total Score   Score Severity   1-4 No Depression   5-9 Mild Depression   10-14 Moderate Depression   15-19 Moderately Severe Depression   20-27 Severe Depression    MIGUEL-7 Questionnaire    Feeling nervous, anxious, or on edge: Nearly every day  Not being able to sop or control worrying: Nearly every day  Worrying too much about different things: Nearly every day  Trouble relaxing: Nearly  "every day  Being so restless that it's hard to sit still: Nearly every day  Becoming easily annoyed or irritable: Nearly every day  Feeling afraid as if something awful might happen: Nearly every day  Total: 21    Interpretation of MIGUEL 7 Total Score   Score Severity :  0-4 No Anxiety   5-9 Mild Anxiety  10-14 Moderate Anxiety  15-21 Severe Anxiety          Past Medical History:   Diagnosis Date   • Anesthesia 02/14/2022    PONV/history of motion sickness   • Anxiety 2/19/2019   • Bipolar 1 disorder (HCC) 02/14/2022   • Breath shortness     occas. no O2   • EP (ectopic pregnancy)    • Gynecological disorder     adhesions/cysts   • Hydronephrosis right 1/3/2014    stone   • Pain 02/14/2022    pelvic, back   • Pain 03/2019    \"Stomach\"   • Pain management     With Dr. Barger in [place]   • Placenta previa diagnosed with US at [facility] 12/17/2013   • PONV (postoperative nausea and vomiting)    • Psychiatric problem 02/14/2022    depressions, anxiety       Social History     Tobacco Use   • Smoking status: Current Every Day Smoker     Packs/day: 1.00     Years: 10.00     Pack years: 10.00     Types: Cigarettes   • Smokeless tobacco: Never Used   Vaping Use   • Vaping Use: Never used   Substance Use Topics   • Alcohol use: No     Alcohol/week: 0.0 oz     Comment: none since10/21   • Drug use: No       Current Outpatient Medications Ordered in Epic   Medication Sig Dispense Refill   • propranolol (INDERAL) 20 MG Tab Take 1 Tablet by mouth 3 times a day as needed. 60 Tablet 0   • Naloxone (NARCAN) 4 MG/0.1ML Liquid Use one as needed for overdose of opiates. Call 911 after use 2 Each 1   • HYDROcodone-acetaminophen (NORCO) 5-325 MG Tab per tablet Take 1 Tablet by mouth every 8 hours as needed for up to 7 days. 28 Tablet 0   • Clobetasol Propionate 0.05 % Lotion Apply 1 Application topically 2 times a day. APPLY TO AFFECTED AREA TWICE A DAY (Patient not taking: Reported on 3/25/2022)     • diphenhydrAMINE (BENADRYL) 50 MG/ML " "Solution  (Patient not taking: Reported on 3/25/2022)     • QUEtiapine (SEROQUEL) 50 MG tablet Take 1 Tablet by mouth 2 times a day. (Patient not taking: Reported on 3/25/2022) 60 Tablet 3     No current Epic-ordered facility-administered medications on file.       Allergies:  Percocet [apap-fd&c red #40 [place]-oxycodone], Oxycodone-acetaminophen, Toradol, and Tramadol        Objective:       Exam:  /86 (BP Location: Left arm, Patient Position: Sitting, BP Cuff Size: Adult)   Pulse (!) 102   Temp 37.2 °C (98.9 °F) (Temporal)   Resp 16   Ht 1.6 m (5' 3\")   Wt 66.5 kg (146 lb 11.2 oz)   LMP 07/22/2016 (Exact Date)   SpO2 97%   BMI 25.99 kg/m²  Body mass index is 25.99 kg/m².    Constitutional: Alert, no distress, well-groomed.  Respiratory: Unlabored respiratory effort, no cough.  MSK: moves all extremities.  Psych: Alert and oriented x3, normal affect and mood.          Labs: Reviewed labs done in ER 2/15/2022, no significant abnormalities.  Reviewed CT chest abdomen pelvis 12/13/2021, which was negative  Reviewed ER notes from [place].  [facility]    Assessment & Plan:     36 y.o. female with the following -     1. Generalized abdominal pain  - URINE DRUG SCREEN W/CONF (SEND OUT); Future  - Naloxone (NARCAN) 4 MG/0.1ML Liquid; Use one as needed for overdose of opiates. Call 911 after use  Dispense: 2 Each; Refill: 1  - HYDROcodone-acetaminophen (NORCO) 5-325 MG Tab per tablet; Take 1 Tablet by mouth every 8 hours as needed for up to 7 days.  Dispense: 28 Tablet; Refill: 0  [facility].  She has multiple stool studies and other studies that have not been completed, I did encourage patient to get these done as is a vital part of her diagnostic work-up.  Also consider adhesions as she has had multiple intra-abdominal procedures.  Consider a general surgery evaluation.    She has been to multiple ERs, including [facility]" "[facility].  While this patient is unknown to me, I am concerned about drug-seeking behavior, including very short trials of multiple nonnarcotic medications that \"did not help at all\".  PDMP was reviewed with multiple short-term narcotic prescription given from different providers.  I was especially concerned about her reporting some suicidal ideation, however she denies having a plan, reports that she would not hurt herself or harm herself.  I did tell her that I was concerned about giving her potentially harmful drugs, and she states that she would absolutely not hurt herself as she has children and they need to well to her.  I have also provided Narcan and counseled her on use.  I am providing a short-term prescription also with a urine drug test, which should come back completely negative based on her report.  I will defer further prescription of narcotics to her PCP.    2. History of bipolar disorder  Patient reports she has follow-up with psychiatry in 3 days    3. Anxiety  - propranolol (INDERAL) 20 MG Tab; Take 1 Tablet by mouth 3 times a day as needed.  Dispense: 60 Tablet; Refill: 0  Patient was requesting benzodiazepines for her severe panic disorder, however since she had stopped her Seroquel and is also requesting narcotics, I did advise her that she can only receive 1 prescription, and she has chosen to get narcotics.  She reports hydroxyzine did not work for her a maximum dose previously.  I have ordered propranolol for her to use as needed.  She was warned on potential side effects.    Other orders  - Consent for Opiate Prescription      Return if symptoms worsen or fail to improve.    Please note that this dictation was created using voice recognition software. I have made every reasonable attempt to correct obvious errors, but I expect that there are errors of grammar and possibly content that I did not discover before finalizing the note.      "

## 2022-03-26 NOTE — ASSESSMENT & PLAN NOTE
Patient reports worsening of her anxiety.  She reports history of bipolar disorder.  She reports recently losing her psychiatrist, however she did report this about 1 year ago.  It does appear that she used to be getting monthly clonazepam's, 1 mg 30 tablets at a time.  She reports significant panic attacks.  She reports that she has tried hydroxyzine 100 mg at a time previously without benefit.  For some reason she reports that she stopped all of her medications.  She was previously on Seroquel, which has recently been refilled.  Depression Screening    Little interest or pleasure in doing things?  3 - nearly every day   Feeling down, depressed , or hopeless? 3 - nearly every day   Trouble falling or staying asleep, or sleeping too much?  3 - nearly every day   Feeling tired or having little energy?  3 - nearly every day   Poor appetite or overeating?  3 - nearly every day   Feeling bad about yourself - or that you are a failure or have let yourself or your family down? 3 - nearly every day   Trouble concentrating on things, such as reading the newspaper or watching television? 3 - nearly every day   Moving or speaking so slowly that other people could have noticed.  Or the opposite - being so fidgety or restless that you have been moving around a lot more than usual?  2 - more than half the days   Thoughts that you would be better off dead, or of hurting yourself?  2 - more than half the days   Patient Health Questionnaire Score: 25       If depressive symptoms identified deferred to follow up visit unless specifically addressed in assesment and plan.    Interpretation of PHQ-9 Total Score   Score Severity   1-4 No Depression   5-9 Mild Depression   10-14 Moderate Depression   15-19 Moderately Severe Depression   20-27 Severe Depression    MIGUEL-7 Questionnaire    Feeling nervous, anxious, or on edge: Nearly every day  Not being able to sop or control worrying: Nearly every day  Worrying too much about different  things: Nearly every day  Trouble relaxing: Nearly every day  Being so restless that it's hard to sit still: Nearly every day  Becoming easily annoyed or irritable: Nearly every day  Feeling afraid as if something awful might happen: Nearly every day  Total: 21    Interpretation of MIGUEL 7 Total Score   Score Severity :  0-4 No Anxiety   5-9 Mild Anxiety  10-14 Moderate Anxiety  15-21 Severe Anxiety

## 2022-03-28 LAB
AMPHET CTO UR CFM-MCNC: NEGATIVE NG/ML
BARBITURATES CTO UR CFM-MCNC: NEGATIVE NG/ML
BENZODIAZ CTO UR CFM-MCNC: NEGATIVE NG/ML
CANNABINOIDS CTO UR CFM-MCNC: POSITIVE NG/ML
COCAINE CTO UR CFM-MCNC: NEGATIVE NG/ML
DRUG COMMENT 753798: NORMAL
METHADONE CTO UR CFM-MCNC: NEGATIVE NG/ML
OPIATES CTO UR CFM-MCNC: NEGATIVE NG/ML
PCP CTO UR CFM-MCNC: NEGATIVE NG/ML
PROPOXYPH CTO UR CFM-MCNC: NEGATIVE NG/ML

## 2022-03-30 LAB — THC UR CFM-MCNC: 215 NG/ML

## 2022-04-06 ENCOUNTER — OFFICE VISIT (OUTPATIENT)
Dept: MEDICAL GROUP | Facility: MEDICAL CENTER | Age: 37
End: 2022-04-06
Attending: NURSE PRACTITIONER
Payer: MEDICAID

## 2022-04-06 VITALS
WEIGHT: 144.9 LBS | BODY MASS INDEX: 25.68 KG/M2 | HEIGHT: 63 IN | HEART RATE: 93 BPM | SYSTOLIC BLOOD PRESSURE: 96 MMHG | DIASTOLIC BLOOD PRESSURE: 66 MMHG | RESPIRATION RATE: 16 BRPM | OXYGEN SATURATION: 97 % | TEMPERATURE: 97.8 F

## 2022-04-06 DIAGNOSIS — R10.84 GENERALIZED ABDOMINAL PAIN: ICD-10-CM

## 2022-04-06 DIAGNOSIS — G89.4 CHRONIC PAIN SYNDROME: ICD-10-CM

## 2022-04-06 DIAGNOSIS — M62.838 MUSCLE SPASM: ICD-10-CM

## 2022-04-06 PROCEDURE — 99214 OFFICE O/P EST MOD 30 MIN: CPT | Performed by: NURSE PRACTITIONER

## 2022-04-06 PROCEDURE — 99212 OFFICE O/P EST SF 10 MIN: CPT | Performed by: NURSE PRACTITIONER

## 2022-04-06 RX ORDER — TRAZODONE HYDROCHLORIDE 100 MG/1
TABLET ORAL
COMMUNITY
Start: 2022-03-30 | End: 2022-09-16

## 2022-04-06 RX ORDER — CARIPRAZINE 1.5 MG/1
CAPSULE, GELATIN COATED ORAL
COMMUNITY
Start: 2022-03-30 | End: 2022-06-16

## 2022-04-06 RX ORDER — METHOCARBAMOL 500 MG/1
500 TABLET, FILM COATED ORAL 2 TIMES DAILY PRN
Qty: 60 TABLET | Refills: 0 | Status: SHIPPED | OUTPATIENT
Start: 2022-04-06 | End: 2022-09-20

## 2022-04-06 RX ORDER — CLONAZEPAM 0.5 MG/1
TABLET ORAL
COMMUNITY
Start: 2022-03-30 | End: 2022-07-07

## 2022-04-06 RX ORDER — METHOCARBAMOL 500 MG/1
500 TABLET, FILM COATED ORAL 2 TIMES DAILY PRN
Qty: 60 TABLET | Refills: 0 | Status: SHIPPED | OUTPATIENT
Start: 2022-04-06 | End: 2022-04-06

## 2022-04-06 ASSESSMENT — FIBROSIS 4 INDEX: FIB4 SCORE: 0.66

## 2022-04-07 NOTE — PROGRESS NOTES
Chief Complaint   Patient presents with   • Follow-Up       Subjective:     HPI:   Marian Kent is a 36 y.o. female here to discuss the evaluation and management of:      Problem   Generalized Abdominal Pain    Patient states she has been dealing with abdominal pain and cramping for quite some time now.  Has seen multiple providers in the past and has gone to multiple different emergency rooms.  Patient states she has a colonoscopy/endoscopy scheduled for the 14th.  Patient inquiring as to whether it is abnormal for the provider to request her to go to cardiology to be cleared for procedure.  Patient states the hydrocodone she was given at last visit worked to help the pain however did not fully take away and made her very sleepy and would like something different.         ROS  See HPI     Allergies   Allergen Reactions   • Percocet [Apap-Fd&C Red #40 [place]-Oxycodone] Hives   • Oxycodone-Acetaminophen    • Toradol Hives   • Tramadol Hives       Current medicines (including changes today)  Current Outpatient Medications   Medication Sig Dispense Refill   • clonazePAM (KLONOPIN) 0.5 MG Tab TAKE 1.5 TABLET EVERY DAY AS NEEDED FOR PANIC.     • VRAYLAR 1.5 MG Cap TAKE 1 CAPSULE AT BEDTIME FOR MOOD     • traZODone (DESYREL) 100 MG Tab TAKE 1 TABLET AT BEDTIME AS NEEDED FOR SLEEP. CAN TAKE HALF A TABLET     • methocarbamol (ROBAXIN) 500 MG Tab Take 1 Tablet by mouth 2 times a day as needed (muscle spasms). 60 Tablet 0   • Naloxone (NARCAN) 4 MG/0.1ML Liquid Use one as needed for overdose of opiates. Call 911 after use 2 Each 1   • propranolol (INDERAL) 20 MG Tab Take 1 Tablet by mouth 3 times a day as needed. (Patient not taking: Reported on 4/6/2022) 60 Tablet 0   • Clobetasol Propionate 0.05 % Lotion Apply 1 Application topically 2 times a day. APPLY TO AFFECTED AREA TWICE A DAY (Patient not taking: No sig reported)     • diphenhydrAMINE (BENADRYL) 50 MG/ML Solution  (Patient not taking: No sig reported)     •  "QUEtiapine (SEROQUEL) 50 MG tablet Take 1 Tablet by mouth 2 times a day. (Patient not taking: No sig reported) 60 Tablet 3     No current facility-administered medications for this visit.       Social History     Tobacco Use   • Smoking status: Current Every Day Smoker     Packs/day: 1.00     Years: 10.00     Pack years: 10.00     Types: Cigarettes   • Smokeless tobacco: Never Used   Vaping Use   • Vaping Use: Never used   Substance Use Topics   • Alcohol use: No     Alcohol/week: 0.0 oz     Comment: none since10/21   • Drug use: No       Patient Active Problem List    Diagnosis Date Noted   • Acute midline low back pain without sciatica 12/13/2021   • Traumatic subarachnoid hemorrhage with loss of consciousness (HCC) 12/13/2021   • Acute pain of left knee 12/13/2021   • History of bipolar disorder 12/13/2021   • Suicidal ideation 07/19/2021   • Generalized abdominal pain 03/18/2021   • Kidney infection 02/17/2021   • Postprocedural pelvic peritoneal adhesions 10/22/2020   • Tobacco abuse disorder 02/19/2019   • Endometriosis 02/19/2019   • Anxiety 02/19/2019   • Left inguinal hernia 02/19/2019   • Pelvic pain 08/08/2016   • Female pelvic pain 05/20/2016       Family History   Problem Relation Age of Onset   • Cancer Father 45        colon   • Other Maternal Grandfather    • Cancer Paternal Grandmother         breast   • Diabetes Paternal Grandmother    • Heart Disease Neg Hx    • Stroke Neg Hx           Objective:     BP (!) 96/66 (BP Location: Right arm, Patient Position: Sitting, BP Cuff Size: Adult)   Pulse 93   Temp 36.6 °C (97.8 °F) (Temporal)   Resp 16   Ht 1.6 m (5' 3\")   Wt 65.7 kg (144 lb 14.4 oz)   SpO2 97%  Body mass index is 25.67 kg/m².    Physical Exam:  Physical Exam  Vitals reviewed.   Constitutional:       General: She is awake.      Appearance: Normal appearance. She is well-developed.   HENT:      Head: Normocephalic.   Cardiovascular:      Rate and Rhythm: Normal rate.   Pulmonary:      " Effort: Pulmonary effort is normal. No respiratory distress.   Musculoskeletal:      Cervical back: Neck supple.   Skin:     General: Skin is warm and dry.   Neurological:      Mental Status: She is alert and oriented to person, place, and time.   Psychiatric:         Mood and Affect: Mood normal.         Behavior: Behavior normal. Behavior is cooperative.              Assessment and Plan:     The following treatment plan was discussed:    Problem List Items Addressed This Visit     Generalized abdominal pain     Ongoing problem-  Patient here for follow-up and would like further narcotic medications as she states she cannot take pain associated with her stomach and cramping.  Patient states Bentyl and several other narcotics as well as over-the-counter medications all have not worked for her.  Patient states previous hydrocodone prescription provided to her did give her some relief however she would like to try something different.  Reviewed  [facility] and patient was noted to have several providers with several different prescriptions and filled a clonazepam prescription less than 6 days ago.  Discussed with patient that because she had fill the benzodiazepine prescriptions after last provider explained that she cannot be on both that we would be unable to provide her any further narcotic prescriptions.  Patient requesting anything to possibly help with muscle aches, will allow for Robaxin twice daily and prescription sent  Encourage patient to continue to follow-up with GI and obtain her colonoscopy/endoscopy  Patient smelled very intensely of marijuana, and this may be contributing to her abdominal pain  Referral to pain management sent         Relevant Orders    Referral to Pain Management      Other Visit Diagnoses     Muscle spasm        Relevant Medications    methocarbamol (ROBAXIN) 500 MG Tab    Chronic pain syndrome        Relevant Medications    clonazePAM (KLONOPIN) 0.5 MG Tab    traZODone (DESYREL) 100  MG Tab    methocarbamol (ROBAXIN) 500 MG Tab    Other Relevant Orders    Referral to Pain Management          Any change or worsening of signs or symptoms, patient encouraged to follow-up or report to emergency room for further evaluation. Patient verbalizes understanding and agrees.    Follow-Up:Follow up as needed with your PCP     PLEASE NOTE: This dictation was created using voice recognition software. I have made every reasonable attempt to correct obvious errors, but I expect that there are errors of grammar and possibly content that I did not discover before finalizing the note.

## 2022-04-07 NOTE — ASSESSMENT & PLAN NOTE
Ongoing problem-  Patient here for follow-up and would like further narcotic medications as she states she cannot take pain associated with her stomach and cramping.  Patient states Bentyl and several other narcotics as well as over-the-counter medications all have not worked for her.  Patient states previous hydrocodone prescription provided to her did give her some relief however she would like to try something different.  Reviewed  [facility] and patient was noted to have several providers with several different prescriptions and filled a clonazepam prescription less than 6 days ago.  Discussed with patient that because she had fill the benzodiazepine prescriptions after last provider explained that she cannot be on both that we would be unable to provide her any further narcotic prescriptions.  Patient requesting anything to possibly help with muscle aches, will allow for Robaxin twice daily and prescription sent  Encourage patient to continue to follow-up with GI and obtain her colonoscopy/endoscopy  Patient smelled very intensely of marijuana, and this may be contributing to her abdominal pain  Referral to pain management sent

## 2022-04-12 ENCOUNTER — TELEPHONE (OUTPATIENT)
Dept: MEDICAL GROUP | Facility: MEDICAL CENTER | Age: 37
End: 2022-04-12

## 2022-04-12 NOTE — TELEPHONE ENCOUNTER
Pt lvm stating that she saw Khushbu Chowdarys on 04/06 for her GI issues, she was prescribed Robaxin for pain. Pt states that this is not helping at all and is wondering if there is anything else that can be prescribed until she is able to get into see pain management. She also stated that her colonoscopy and endoscopy are on 04/14, please advise

## 2022-05-03 ENCOUNTER — TELEPHONE (OUTPATIENT)
Dept: CARDIOLOGY | Facility: MEDICAL CENTER | Age: 37
End: 2022-05-03

## 2022-05-03 NOTE — TELEPHONE ENCOUNTER
Spoke with pt who confirmed this will be first time meeting with a cardiologist. Per pt at this time would like to cancel her NP appt with ANKUSH until further notice due to personal reasons. Appt with ANKUSH canceled. Per pt would call back to reschedule when ready.    NP appt with ANKUSH canceled.

## 2022-05-31 ENCOUNTER — OFFICE VISIT (OUTPATIENT)
Dept: MEDICAL GROUP | Facility: MEDICAL CENTER | Age: 37
End: 2022-05-31
Attending: NURSE PRACTITIONER
Payer: MEDICAID

## 2022-05-31 VITALS
OXYGEN SATURATION: 95 % | TEMPERATURE: 97.8 F | WEIGHT: 152 LBS | SYSTOLIC BLOOD PRESSURE: 128 MMHG | HEIGHT: 63 IN | HEART RATE: 73 BPM | RESPIRATION RATE: 14 BRPM | BODY MASS INDEX: 26.93 KG/M2 | DIASTOLIC BLOOD PRESSURE: 82 MMHG

## 2022-05-31 DIAGNOSIS — R10.84 GENERALIZED ABDOMINAL PAIN: ICD-10-CM

## 2022-05-31 PROCEDURE — 99213 OFFICE O/P EST LOW 20 MIN: CPT | Performed by: NURSE PRACTITIONER

## 2022-05-31 RX ORDER — PREDNISONE 10 MG/1
TABLET ORAL
Qty: 74 TABLET | Refills: 0 | Status: SHIPPED | OUTPATIENT
Start: 2022-05-31 | End: 2022-06-16

## 2022-05-31 RX ORDER — ONDANSETRON 4 MG/1
4 TABLET, ORALLY DISINTEGRATING ORAL EVERY 6 HOURS PRN
Qty: 10 TABLET | Refills: 5 | Status: SHIPPED | OUTPATIENT
Start: 2022-05-31 | End: 2022-06-16 | Stop reason: SDUPTHER

## 2022-05-31 RX ORDER — MELOXICAM 7.5 MG/1
7.5 TABLET ORAL DAILY
COMMUNITY
End: 2022-09-16

## 2022-05-31 RX ORDER — HYDROCODONE BITARTRATE AND ACETAMINOPHEN 10; 325 MG/1; MG/1
1 TABLET ORAL EVERY 8 HOURS PRN
Qty: 21 TABLET | Refills: 0 | Status: SHIPPED | OUTPATIENT
Start: 2022-05-31 | End: 2022-06-07

## 2022-05-31 RX ORDER — NALOXONE HYDROCHLORIDE 4 MG/.1ML
SPRAY NASAL
Qty: 1 EACH | Refills: 0 | Status: SHIPPED | OUTPATIENT
Start: 2022-05-31 | End: 2022-09-16

## 2022-05-31 ASSESSMENT — ENCOUNTER SYMPTOMS
DIARRHEA: 1
PALPITATIONS: 0
ABDOMINAL PAIN: 1
WEIGHT LOSS: 0
WHEEZING: 0
BLOOD IN STOOL: 1
CHILLS: 0
COUGH: 0
FEVER: 0
CONSTIPATION: 0
SHORTNESS OF BREATH: 0

## 2022-05-31 ASSESSMENT — FIBROSIS 4 INDEX: FIB4 SCORE: 0.66

## 2022-05-31 NOTE — LETTER
May 31, 2022       Patient: Marian Kent   YOB: 1985   Date of Visit: 5/31/2022         To Whom It May Concern:    In my medical opinion, I recommend that Marian Kent be excused from work on 5/24/22 through 6/1/22.  She was seen in ER on 5/30/22 and in office with myself today, 5/31/22.    If you have any questions or concerns, please don't hesitate to call [phone]          Sincerely,          YEVGENIY Maxwell.  Electronically Signed

## 2022-06-01 NOTE — ASSESSMENT & PLAN NOTE
Ongoing abd pain, this flare started about 5 days ago.  She was seen in ER last night and given methocarbamol and meloxicam.  She has taken these without benefit.  She has frequent BMs- blood in stool.  She reports she has a scope planned with GI in 9/22- pending cardiology clearance next month.  They are suspecting Crohn's disease- but have not confirmed.  She stopped her clonazepam several days ago bc her pain is worse than her anxiety right now.    Cocaine/Crack

## 2022-06-01 NOTE — PROGRESS NOTES
Chief Complaint   Patient presents with   • Abdominal Pain   • Hospital Follow-up       Subjective:     HPI:   Marian Kent is a 36 y.o. female here to discuss the evaluation and management of:        Generalized abdominal pain  Ongoing abd pain, this flare started about 5 days ago.  She was seen in ER last night and given methocarbamol and meloxicam.  She has taken these without benefit.  She has frequent BMs- blood in stool.  She reports she has a scope planned with GI in 9/22- pending cardiology clearance next month.  They are suspecting Crohn's disease- but have not confirmed.  She stopped her clonazepam several days ago bc her pain is worse than her anxiety right now.       ROS  Review of Systems   Constitutional: Positive for malaise/fatigue. Negative for chills, fever and weight loss.   Respiratory: Negative for cough, shortness of breath and wheezing.    Cardiovascular: Negative for chest pain, palpitations and leg swelling.   Gastrointestinal: Positive for abdominal pain, blood in stool and diarrhea. Negative for constipation.         Allergies   Allergen Reactions   • Percocet [Apap-Fd&C Red #40 [place]-Oxycodone] Hives   • Oxycodone-Acetaminophen    • Tramadol Hives       Current medicines (including changes today)  Current Outpatient Medications   Medication Sig Dispense Refill   • meloxicam (MOBIC) 7.5 MG Tab Take 7.5 mg by mouth every day.     • predniSONE (DELTASONE) 10 MG Tab Take 4 tabs (40 mg) by mouth once daily for 7 days, Take 3 tabs (30 mg) by mouth once daily for 7 days, Take 2 tabs (20 mg) by mouth once daily for 7 days, Take 1 tab (10 mg) by mouth once daily for 7 days, Take 0.5 tab (5 mg) by mouth once daily for 7 days then stop. 74 Tablet 0   • HYDROcodone/acetaminophen (NORCO)  MG Tab Take 1 Tablet by mouth every 8 hours as needed for Moderate Pain for up to 7 days. 21 Tablet 0   • Naloxone (NARCAN) 4 MG/0.1ML Liquid Use for suspected narcotic overdose. Call 911 1 Each 0   •  ondansetron (ZOFRAN ODT) 4 MG TABLET DISPERSIBLE Take 1 Tablet by mouth every 6 hours as needed for Nausea. 10 Tablet 5   • clonazePAM (KLONOPIN) 0.5 MG Tab TAKE 1.5 TABLET EVERY DAY AS NEEDED FOR PANIC.     • traZODone (DESYREL) 100 MG Tab TAKE 1 TABLET AT BEDTIME AS NEEDED FOR SLEEP. CAN TAKE HALF A TABLET     • methocarbamol (ROBAXIN) 500 MG Tab Take 1 Tablet by mouth 2 times a day as needed (muscle spasms). 60 Tablet 0   • Naloxone (NARCAN) 4 MG/0.1ML Liquid Use one as needed for overdose of opiates. Call 911 after use 2 Each 1   • VRAYLAR 1.5 MG Cap TAKE 1 CAPSULE AT BEDTIME FOR MOOD (Patient not taking: Reported on 5/31/2022)     • propranolol (INDERAL) 20 MG Tab Take 1 Tablet by mouth 3 times a day as needed. (Patient not taking: Reported on 4/6/2022) 60 Tablet 0   • QUEtiapine (SEROQUEL) 50 MG tablet Take 1 Tablet by mouth 2 times a day. (Patient not taking: No sig reported) 60 Tablet 3     No current facility-administered medications for this visit.       Social History     Tobacco Use   • Smoking status: Current Every Day Smoker     Packs/day: 1.00     Years: 10.00     Pack years: 10.00     Types: Cigarettes   • Smokeless tobacco: Never Used   Vaping Use   • Vaping Use: Never used   Substance Use Topics   • Alcohol use: No     Alcohol/week: 0.0 oz     Comment: none since10/21   • Drug use: No       Patient Active Problem List    Diagnosis Date Noted   • Acute midline low back pain without sciatica 12/13/2021   • Traumatic subarachnoid hemorrhage with loss of consciousness (HCC) 12/13/2021   • Acute pain of left knee 12/13/2021   • History of bipolar disorder 12/13/2021   • Suicidal ideation 07/19/2021   • Generalized abdominal pain 03/18/2021   • Kidney infection 02/17/2021   • Postprocedural pelvic peritoneal adhesions 10/22/2020   • Tobacco abuse disorder 02/19/2019   • Endometriosis 02/19/2019   • Anxiety 02/19/2019   • Left inguinal hernia 02/19/2019   • Pelvic pain 08/08/2016   • Female pelvic pain  05/20/2016       Family History   Problem Relation Age of Onset   • Cancer Father 45        colon   • Other Maternal Grandfather    • Cancer Paternal Grandmother         breast   • Diabetes Paternal Grandmother    • Heart Disease Neg Hx    • Stroke Neg Hx           Objective:     /82 (BP Location: Left arm, Patient Position: Sitting, BP Cuff Size: Adult)   Pulse 73   Temp 36.6 °C (97.8 °F) (Temporal)   Resp 14   Wt 68.9 kg (152 lb)   SpO2 95%  Body mass index is 26.93 kg/m².    Physical Exam:  Physical Exam  Constitutional:       General: She is not in acute distress.  HENT:      Head: Normocephalic.      Right Ear: Tympanic membrane and external ear normal.      Left Ear: Tympanic membrane and external ear normal.   Eyes:      Conjunctiva/sclera: Conjunctivae normal.      Pupils: Pupils are equal, round, and reactive to light.   Neck:      Trachea: No tracheal deviation.   Cardiovascular:      Rate and Rhythm: Normal rate and regular rhythm.      Heart sounds: Normal heart sounds.   Pulmonary:      Effort: Pulmonary effort is normal.      Breath sounds: Normal breath sounds.   Abdominal:      General: Bowel sounds are normal.      Palpations: Abdomen is soft.      Tenderness: There is generalized abdominal tenderness.   Musculoskeletal:         General: Normal range of motion.      Cervical back: Normal range of motion and neck supple.   Lymphadenopathy:      Head:      Right side of head: No preauricular adenopathy.      Left side of head: No preauricular adenopathy.      Cervical: No cervical adenopathy.   Skin:     General: Skin is warm and dry.   Neurological:      Mental Status: She is alert and oriented to person, place, and time.      Cranial Nerves: Cranial nerves are intact.      Sensory: Sensation is intact.      Gait: Gait is intact.   Psychiatric:         Mood and Affect: Affect normal.         Judgment: Judgment normal.       Acute pain   This is a recurrent problem.   Patient is complaining  of pain x 5 day(s) located in her abd.  Pain is constant, described as cramping and a 9/10 on the pain scale.   Treatments tried include:Tylenol, NSAIDs, heat, ice, rest, movement     Is the pain medication improving the patient’s symptoms: Never prescribed  Any adverse effects: Never prescribed  Alcohol or illicit drug use:   She  reports no history of alcohol use.  She  reports no history of drug use.     History of controlled substance used in a way other than prescribed? No     Any early refills of a controlled substance: No  History of lost or stolen controlled substance prescription: No  Any aberrant behavior or intoxication while on a controlled substance: No  Has the patient self-modified their dose or frequency of the medication :No  Compliant with treatment recommendations and plan: Yes  Any major health change to the patient: No  Concerns for misuse, abuse or addiction: No  /NarxCheck report reviewed: Yes  History of abnormal drug screening: No  I have assessed the patient’s risk for abuse, dependency, and addiction using the validated Opioid Risk Tool.     Opioid Risk Score: 4       Interpretation of Opioid Risk Score   Score 0-3 = Low risk of abuse. Do UDS at least once per year.  Score 4-7 = Moderate risk of abuse. Do UDS 1-4 times per year.  Score 8+ = High risk of abuse. Refer to specialist.     I have conducted a physical exam and documented findings.     I certify that I have obtained and reviewed her medical history. I have also made a good shruthi effort to obtain applicable records from other providers who have treated the patient.  I have reviewed the patient's prescription history as maintained by the [place] Prescription Monitoring Program.      Given the above, I believe the benefits of controlled substance therapy outweigh the risks. The reasons for prescribing controlled substances include non-narcotic, oral analgesic alternatives have been inadequate for pain control. Accordingly, I have  discussed the risk and benefits, treatment plan, and alternative therapies with the patient. Patient was advised that this medicine is intended for short term (no more than 14 days)/intermittent use only and not intended to be an ongoing prescription.    Assessment and Plan:     The following treatment plan was discussed:    1. Generalized abdominal pain  predniSONE (DELTASONE) 10 MG Tab    HYDROcodone/acetaminophen (NORCO)  MG Tab    Naloxone (NARCAN) 4 MG/0.1ML Liquid    ondansetron (ZOFRAN ODT) 4 MG TABLET DISPERSIBLE    Consent for Opiate Prescription  - Chronic problem, unstable.  Crohn's is suspected by GI- considering her level of pain I am inclined to treat this as a flare.  Prednisone taper, norco prn, narcan provided.  We discussed the dangers associated with taking narcotics and benzodiazepines-patient verbalized that she is not taking any clonazepam currently and has not for several days.  She reports that she will not take any of her benzos while using her pain medication.  We discussed indications and usage of Narcan-patient verbalized understanding.  ER precautions reviewed as well-patient verbalized understanding.            Any change or worsening of signs or symptoms, patient encouraged to follow-up or report to emergency room for further evaluation. Patient verbalizes understanding and agrees.    Follow-Up: Return in about 2 weeks (around 6/14/2022) for Routine follow up.      PLEASE NOTE: This dictation was created using voice recognition software. I have made every reasonable attempt to correct obvious errors, but I expect that there are errors of grammar and possibly content that I did not discover before finalizing the note.

## 2022-06-01 NOTE — PATIENT INSTRUCTIONS
Prednisone taper-  Take 4 tabs (40 mg) by mouth once daily for 7 days, Take 3 tabs (30 mg) by mouth once daily for 7 days, Take 2 tabs (20 mg) by mouth once daily for 7 days, Take 1 tab (10 mg) by mouth once daily for 7 days, Take 0.5 tab (5 mg) by mouth once daily for 7 days then stop.    Ask your work about an accomodation- I can complete paperwork.

## 2022-06-16 ENCOUNTER — OFFICE VISIT (OUTPATIENT)
Dept: MEDICAL GROUP | Facility: MEDICAL CENTER | Age: 37
End: 2022-06-16
Attending: PHYSICIAN ASSISTANT
Payer: MEDICAID

## 2022-06-16 VITALS
DIASTOLIC BLOOD PRESSURE: 60 MMHG | HEIGHT: 63 IN | RESPIRATION RATE: 17 BRPM | BODY MASS INDEX: 26.61 KG/M2 | OXYGEN SATURATION: 95 % | HEART RATE: 96 BPM | TEMPERATURE: 98.2 F | WEIGHT: 150.2 LBS | SYSTOLIC BLOOD PRESSURE: 82 MMHG

## 2022-06-16 DIAGNOSIS — R10.84 GENERALIZED ABDOMINAL PAIN: ICD-10-CM

## 2022-06-16 PROCEDURE — 99212 OFFICE O/P EST SF 10 MIN: CPT | Performed by: PHYSICIAN ASSISTANT

## 2022-06-16 PROCEDURE — 99213 OFFICE O/P EST LOW 20 MIN: CPT | Performed by: PHYSICIAN ASSISTANT

## 2022-06-16 RX ORDER — ONDANSETRON 4 MG/1
4 TABLET, ORALLY DISINTEGRATING ORAL EVERY 6 HOURS PRN
Qty: 10 TABLET | Refills: 5 | Status: SHIPPED | OUTPATIENT
Start: 2022-06-16 | End: 2022-06-30

## 2022-06-16 RX ORDER — HYDROCODONE BITARTRATE AND ACETAMINOPHEN 10; 325 MG/1; MG/1
1-2 TABLET ORAL EVERY 8 HOURS PRN
Qty: 21 TABLET | Refills: 0 | Status: SHIPPED | OUTPATIENT
Start: 2022-06-16 | End: 2022-06-23

## 2022-06-16 RX ORDER — PREDNISONE 20 MG/1
20 TABLET ORAL 3 TIMES DAILY
Qty: 42 TABLET | Refills: 0 | Status: SHIPPED | OUTPATIENT
Start: 2022-06-16 | End: 2022-06-28 | Stop reason: SDUPTHER

## 2022-06-16 RX ORDER — PREDNISONE 10 MG/1
TABLET ORAL
Qty: 231 TABLET | Refills: 0 | Status: SHIPPED | OUTPATIENT
Start: 2022-06-29 | End: 2022-06-28

## 2022-06-16 ASSESSMENT — FIBROSIS 4 INDEX: FIB4 SCORE: 0.66

## 2022-06-16 NOTE — PROGRESS NOTES
Chief Complaint   Patient presents with   • Follow-Up     Subjective:     HPI:   Marian Kent is a 36 y.o. female here to discuss the evaluation and management of:    Generalized abdominal pain  Marian presents today for follow-up.  She has been experiencing severe generalized abdominal pain and bloody diarrhea for some time now.  GI has very high suspicion for Crohn's versus ulcerative colitis.  She will therefore be undergoing a colonoscopy endoscopy in August.  In the meantime, she has been treated by her PCP with prednisone, Norco and antinausea medication.  She reports that she has had significant improvement with the pain medication in which she has been able to work.  She reports that the prednisone also gave her some relief but she feels the dose was not high enough.  When she is not on these medications she experiences severe pain in which she is unable to work and provide for family.  She is in a lot of pain during her visit today.  She is requesting refills of these medications and potentially higher dose of prednisone to get her to her scheduled procedures.    ROS  See HPI.     Allergies   Allergen Reactions   • Percocet [Apap-Fd&C Red #40 [place]-Oxycodone] Hives   • Oxycodone-Acetaminophen    • Tramadol Hives     Current medicines (including changes today)  Current Outpatient Medications   Medication Sig Dispense Refill   • predniSONE (DELTASONE) 20 MG Tab Take 1 Tablet by mouth in the morning, at noon, and at bedtime for 14 days. 42 Tablet 0   • ondansetron (ZOFRAN ODT) 4 MG TABLET DISPERSIBLE Take 1 Tablet by mouth every 6 hours as needed for Nausea. 10 Tablet 5   • HYDROcodone/acetaminophen (NORCO)  MG Tab Take 1-2 Tablets by mouth every 8 hours as needed for Severe Pain for up to 7 days. 21 Tablet 0   • [START ON 6/29/2022] predniSONE (DELTASONE) 10 MG Tab Take 5.5 tabs by mouth once daily x 7 days, Then, take 5 tabs x 7 days, Take 4.5 tabs x 7 days, Take 4 tab x 7 days, Take 3.5 x 7  days, Take 3 tab x 7 days, Take 2.5 tab x 7 days, Take 2 tab by mouth x 7 days, Take 1.5 tab x 7 days, Take 1 tab x 7 days, Take 0.5 mg tab x 7 days, then stop. 231 Tablet 0   • meloxicam (MOBIC) 7.5 MG Tab Take 7.5 mg by mouth every day.     • Naloxone (NARCAN) 4 MG/0.1ML Liquid Use for suspected narcotic overdose. Call 911 1 Each 0   • clonazePAM (KLONOPIN) 0.5 MG Tab TAKE 1.5 TABLET EVERY DAY AS NEEDED FOR PANIC.     • traZODone (DESYREL) 100 MG Tab TAKE 1 TABLET AT BEDTIME AS NEEDED FOR SLEEP. CAN TAKE HALF A TABLET     • methocarbamol (ROBAXIN) 500 MG Tab Take 1 Tablet by mouth 2 times a day as needed (muscle spasms). 60 Tablet 0   • Naloxone (NARCAN) 4 MG/0.1ML Liquid Use one as needed for overdose of opiates. Call 911 after use 2 Each 1     No current facility-administered medications for this visit.     Social History     Tobacco Use   • Smoking status: Current Every Day Smoker     Packs/day: 1.00     Years: 10.00     Pack years: 10.00     Types: Cigarettes   • Smokeless tobacco: Never Used   Vaping Use   • Vaping Use: Never used   Substance Use Topics   • Alcohol use: No     Alcohol/week: 0.0 oz     Comment: none since10/21   • Drug use: No     Patient Active Problem List    Diagnosis Date Noted   • Acute midline low back pain without sciatica 12/13/2021   • Traumatic subarachnoid hemorrhage with loss of consciousness (HCC) 12/13/2021   • Acute pain of left knee 12/13/2021   • History of bipolar disorder 12/13/2021   • Suicidal ideation 07/19/2021   • Generalized abdominal pain 03/18/2021   • Kidney infection 02/17/2021   • Postprocedural pelvic peritoneal adhesions 10/22/2020   • Tobacco abuse disorder 02/19/2019   • Endometriosis 02/19/2019   • Anxiety 02/19/2019   • Left inguinal hernia 02/19/2019   • Pelvic pain 08/08/2016   • Female pelvic pain 05/20/2016     Family History   Problem Relation Age of Onset   • Cancer Father 45        colon   • Other Maternal Grandfather    • Cancer Paternal Grandmother   "       breast   • Diabetes Paternal Grandmother    • Heart Disease Neg Hx    • Stroke Neg Hx         Objective:     BP (!) 82/60 (BP Location: Left arm, Patient Position: Sitting, BP Cuff Size: Adult)   Pulse 96   Temp 36.8 °C (98.2 °F) (Skin)   Resp 17   Ht 1.6 m (5' 3\")   Wt 68.1 kg (150 lb 3.2 oz)   SpO2 95%  Body mass index is 26.61 kg/m².    Physical Exam:  Physical Exam  Vitals reviewed.   Constitutional:       Appearance: Normal appearance.   Eyes:      Pupils: Pupils are equal, round, and reactive to light.   Cardiovascular:      Rate and Rhythm: Normal rate and regular rhythm.      Heart sounds: Normal heart sounds.   Pulmonary:      Effort: Pulmonary effort is normal.      Breath sounds: Normal breath sounds.   Abdominal:      General: Abdomen is flat.      Palpations: Abdomen is soft.      Tenderness: There is generalized abdominal tenderness. There is guarding.   Neurological:      General: No focal deficit present.      Mental Status: She is alert and oriented to person, place, and time.       Assessment and Plan:     The following treatment plan was discussed:    1. Generalized abdominal pain  - This is a chronic severe condition.  - Plan: We will try an extended prednisone taper (recommendation via UpToDate) as listed out below. In addition, I have refilled her pain and anti-nausea medication. Patient has been educated on the use and potential side effect profile of these medications. She verbalizes understanding and risk of these medications. PDMP reviewed and appropriate. CS agreement is on file.   - predniSONE (DELTASONE) 20 MG Tab; Take 1 Tablet by mouth in the morning, at noon, and at bedtime for 14 days.  Dispense: 42 Tablet; Refill: 0  - ondansetron (ZOFRAN ODT) 4 MG TABLET DISPERSIBLE; Take 1 Tablet by mouth every 6 hours as needed for Nausea.  Dispense: 10 Tablet; Refill: 5  - HYDROcodone/acetaminophen (NORCO)  MG Tab; Take 1-2 Tablets by mouth every 8 hours as needed for Severe " Pain for up to 7 days.  Dispense: 21 Tablet; Refill: 0  - predniSONE (DELTASONE) 10 MG Tab; Take 5.5 tabs by mouth once daily x 7 days, Then, take 5 tabs x 7 days, Take 4.5 tabs x 7 days, Take 4 tab x 7 days, Take 3.5 x 7 days, Take 3 tab x 7 days, Take 2.5 tab x 7 days, Take 2 tab by mouth x 7 days, Take 1.5 tab x 7 days, Take 1 tab x 7 days, Take 0.5 mg tab x 7 days, then stop.  Dispense: 231 Tablet; Refill: 0     Any change or worsening of signs or symptoms, patient encouraged to follow-up or report to emergency room for further evaluation. Patient verbalizes understanding and agrees.    Follow-Up: Return if symptoms worsen or fail to improve.      PLEASE NOTE: This dictation was created using voice recognition software. I have made every reasonable attempt to correct obvious errors, but I expect that there are errors of grammar and possibly content that I did not discover before finalizing the note.

## 2022-06-16 NOTE — ASSESSMENT & PLAN NOTE
Marian presents today for follow-up.  She has been experiencing severe generalized abdominal pain and bloody diarrhea for some time now.  GI has very high suspicion for Crohn's versus ulcerative colitis.  She will therefore be undergoing a colonoscopy endoscopy in August.  In the meantime, she has been treated by her PCP with prednisone, Norco and antinausea medication.  She reports that she has had significant improvement with the pain medication in which she has been able to work.  She reports that the prednisone also gave her some relief but she feels the dose was not high enough.  When she is not on these medications she experiences severe pain in which she is unable to work and provide for family.  She is in a lot of pain during her visit today.  She is requesting refills of these medications and potentially higher dose of prednisone to get her to her scheduled procedures.

## 2022-06-24 ENCOUNTER — TELEPHONE (OUTPATIENT)
Dept: MEDICAL GROUP | Facility: MEDICAL CENTER | Age: 37
End: 2022-06-24

## 2022-06-24 NOTE — TELEPHONE ENCOUNTER
1. Name: China Laila Tillotson  Call Back Number: [phone] (home)         How would the patient prefer to be contacted with a response: Phone call OK to leave a detailed message    2. FMLA (Leave) paperwork received from patient requiring provider signature/paperwork to be filled out.     3. Paperwork placed in MA folder/basket to process.     Patient stopped by the office, stated she needs Leave paperwork to be filled out for her work. She stated she needs this to be completed and faxed by June 30th. I notified patient that Nicole is out of the office on Fridays and she's only in on Tuesdays and Thursdays. I notified patient that usually paperwork needs an appointment but Nicole is booked all next week. I notified patient I could sent a message to see if Nicole is willing to fill out paperwork without an appointment. Patient would like to be contacted to be notified if this can be done. She is aware Nicole wont be in the office until Tuesday 6/28.

## 2022-06-28 DIAGNOSIS — R10.84 GENERALIZED ABDOMINAL PAIN: ICD-10-CM

## 2022-06-28 RX ORDER — PREDNISONE 20 MG/1
20 TABLET ORAL 2 TIMES DAILY
Qty: 22 TABLET | Refills: 0 | Status: SHIPPED | OUTPATIENT
Start: 2022-06-28 | End: 2022-07-07 | Stop reason: SDUPTHER

## 2022-06-28 NOTE — PROGRESS NOTES
Increase steroids for abd pain   - hold bp meds for now as bps have been in the 100-120 systolic range

## 2022-06-30 ENCOUNTER — OFFICE VISIT (OUTPATIENT)
Dept: MEDICAL GROUP | Facility: MEDICAL CENTER | Age: 37
End: 2022-06-30
Attending: PHYSICIAN ASSISTANT
Payer: MEDICAID

## 2022-06-30 VITALS
SYSTOLIC BLOOD PRESSURE: 102 MMHG | BODY MASS INDEX: 27.27 KG/M2 | WEIGHT: 153.9 LBS | HEIGHT: 63 IN | OXYGEN SATURATION: 97 % | TEMPERATURE: 97.3 F | DIASTOLIC BLOOD PRESSURE: 80 MMHG | RESPIRATION RATE: 17 BRPM | HEART RATE: 103 BPM

## 2022-06-30 DIAGNOSIS — R10.84 GENERALIZED ABDOMINAL PAIN: ICD-10-CM

## 2022-06-30 PROCEDURE — 99213 OFFICE O/P EST LOW 20 MIN: CPT | Performed by: PHYSICIAN ASSISTANT

## 2022-06-30 PROCEDURE — 99212 OFFICE O/P EST SF 10 MIN: CPT | Performed by: PHYSICIAN ASSISTANT

## 2022-06-30 RX ORDER — ONDANSETRON 8 MG/1
8 TABLET, ORALLY DISINTEGRATING ORAL EVERY 8 HOURS PRN
Qty: 15 TABLET | Refills: 0 | Status: SHIPPED | OUTPATIENT
Start: 2022-06-30 | End: 2023-01-19

## 2022-06-30 RX ORDER — HYDROCODONE BITARTRATE AND ACETAMINOPHEN 10; 325 MG/1; MG/1
1-2 TABLET ORAL EVERY 8 HOURS PRN
Qty: 21 TABLET | Refills: 0 | Status: SHIPPED | OUTPATIENT
Start: 2022-06-30 | End: 2022-07-07

## 2022-06-30 ASSESSMENT — FIBROSIS 4 INDEX: FIB4 SCORE: 0.66

## 2022-06-30 NOTE — PROGRESS NOTES
Chief Complaint   Patient presents with   • Follow-Up     Subjective:     HPI:   Marian Kent is a 36 y.o. female here to discuss the evaluation and management of:    Generalized abdominal pain  Marian presents today for follow up. She reports continued severe abdominal pain. She states that the elongated prednisone taper did not improve her symptoms. She does reports benefit in her symptoms from Norco and Zofran. She presents today for a refill on her medications.     ROS  See HPI.     Allergies   Allergen Reactions   • Percocet [Apap-Fd&C Red #40 [place]-Oxycodone] Hives   • Oxycodone-Acetaminophen    • Tramadol Hives       Current medicines (including changes today)  Current Outpatient Medications   Medication Sig Dispense Refill   • HYDROcodone/acetaminophen (NORCO)  MG Tab Take 1-2 Tablets by mouth every 8 hours as needed for Severe Pain for up to 7 days. 21 Tablet 0   • ondansetron (ZOFRAN ODT) 8 MG TABLET DISPERSIBLE Take 1 Tablet by mouth every 8 hours as needed for Nausea. 15 Tablet 0   • predniSONE (DELTASONE) 20 MG Tab Take 1 Tablet by mouth 2 times a day for 11 days. 22 Tablet 0   • meloxicam (MOBIC) 7.5 MG Tab Take 7.5 mg by mouth every day.     • Naloxone (NARCAN) 4 MG/0.1ML Liquid Use for suspected narcotic overdose. Call 911 1 Each 0   • clonazePAM (KLONOPIN) 0.5 MG Tab TAKE 1.5 TABLET EVERY DAY AS NEEDED FOR PANIC.     • traZODone (DESYREL) 100 MG Tab TAKE 1 TABLET AT BEDTIME AS NEEDED FOR SLEEP. CAN TAKE HALF A TABLET     • methocarbamol (ROBAXIN) 500 MG Tab Take 1 Tablet by mouth 2 times a day as needed (muscle spasms). 60 Tablet 0   • Naloxone (NARCAN) 4 MG/0.1ML Liquid Use one as needed for overdose of opiates. Call 911 after use 2 Each 1     No current facility-administered medications for this visit.     Social History     Tobacco Use   • Smoking status: Current Every Day Smoker     Packs/day: 1.00     Years: 10.00     Pack years: 10.00     Types: Cigarettes   • Smokeless tobacco:  "Never Used   Vaping Use   • Vaping Use: Never used   Substance Use Topics   • Alcohol use: No     Alcohol/week: 0.0 oz     Comment: none since10/21   • Drug use: No     Patient Active Problem List    Diagnosis Date Noted   • Acute midline low back pain without sciatica 12/13/2021   • Traumatic subarachnoid hemorrhage with loss of consciousness (HCC) 12/13/2021   • Acute pain of left knee 12/13/2021   • History of bipolar disorder 12/13/2021   • Suicidal ideation 07/19/2021   • Generalized abdominal pain 03/18/2021   • Kidney infection 02/17/2021   • Postprocedural pelvic peritoneal adhesions 10/22/2020   • Tobacco abuse disorder 02/19/2019   • Endometriosis 02/19/2019   • Anxiety 02/19/2019   • Left inguinal hernia 02/19/2019   • Pelvic pain 08/08/2016   • Female pelvic pain 05/20/2016     Family History   Problem Relation Age of Onset   • Cancer Father 45        colon   • Other Maternal Grandfather    • Cancer Paternal Grandmother         breast   • Diabetes Paternal Grandmother    • Heart Disease Neg Hx    • Stroke Neg Hx       Objective:     /80 (BP Location: Left arm, Patient Position: Sitting, BP Cuff Size: Adult)   Pulse (!) 103   Temp 36.3 °C (97.3 °F) (Skin)   Resp 17   Ht 1.6 m (5' 3\")   Wt 69.8 kg (153 lb 14.4 oz)   SpO2 97%  Body mass index is 27.26 kg/m².    Physical Exam:  Physical Exam  Vitals reviewed.   Constitutional:       Appearance: Normal appearance.   HENT:      Head: Normocephalic.      Right Ear: External ear normal.      Left Ear: External ear normal.   Eyes:      Pupils: Pupils are equal, round, and reactive to light.   Cardiovascular:      Rate and Rhythm: Normal rate and regular rhythm.      Heart sounds: Normal heart sounds.   Pulmonary:      Effort: Pulmonary effort is normal.      Breath sounds: Normal breath sounds.   Neurological:      Mental Status: She is alert.       Assessment and Plan:     The following treatment plan was discussed:    1. Generalized abdominal " pain  - This is a chronic, severe condition.  - Plan: Refill of Norco and Zofran has been sent to the pharmacy for the patient. PDMP reviewed and appropriate.  - HYDROcodone/acetaminophen (NORCO)  MG Tab; Take 1-2 Tablets by mouth every 8 hours as needed for Severe Pain for up to 7 days.  Dispense: 21 Tablet; Refill: 0  - ondansetron (ZOFRAN ODT) 8 MG TABLET DISPERSIBLE; Take 1 Tablet by mouth every 8 hours as needed for Nausea.  Dispense: 15 Tablet; Refill: 0     Any change or worsening of signs or symptoms, patient encouraged to follow-up or report to emergency room for further evaluation. Patient verbalizes understanding and agrees.    Follow-Up: Return if symptoms worsen or fail to improve.    PLEASE NOTE: This dictation was created using voice recognition software. I have made every reasonable attempt to correct obvious errors, but I expect that there are errors of grammar and possibly content that I did not discover before finalizing the note.

## 2022-06-30 NOTE — ASSESSMENT & PLAN NOTE
Marian presents today for follow up. She reports continued severe abdominal pain. She states that the elongated prednisone taper did not improve her symptoms. She does reports benefit in her symptoms from Norco and Zofran. She presents today for a refill on her medications.

## 2022-07-07 ENCOUNTER — OFFICE VISIT (OUTPATIENT)
Dept: MEDICAL GROUP | Facility: MEDICAL CENTER | Age: 37
End: 2022-07-07
Attending: NURSE PRACTITIONER
Payer: MEDICAID

## 2022-07-07 ENCOUNTER — TELEPHONE (OUTPATIENT)
Dept: MEDICAL GROUP | Facility: MEDICAL CENTER | Age: 37
End: 2022-07-07

## 2022-07-07 VITALS
BODY MASS INDEX: 27.05 KG/M2 | HEART RATE: 97 BPM | TEMPERATURE: 97.9 F | WEIGHT: 152.7 LBS | HEIGHT: 63 IN | DIASTOLIC BLOOD PRESSURE: 82 MMHG | OXYGEN SATURATION: 98 % | SYSTOLIC BLOOD PRESSURE: 100 MMHG | RESPIRATION RATE: 18 BRPM

## 2022-07-07 DIAGNOSIS — F41.9 ANXIETY: ICD-10-CM

## 2022-07-07 DIAGNOSIS — R10.84 GENERALIZED ABDOMINAL PAIN: ICD-10-CM

## 2022-07-07 DIAGNOSIS — K51.919 ULCERATIVE COLITIS WITH COMPLICATION, UNSPECIFIED LOCATION (HCC): ICD-10-CM

## 2022-07-07 PROCEDURE — 99212 OFFICE O/P EST SF 10 MIN: CPT | Performed by: NURSE PRACTITIONER

## 2022-07-07 PROCEDURE — 99213 OFFICE O/P EST LOW 20 MIN: CPT | Performed by: NURSE PRACTITIONER

## 2022-07-07 RX ORDER — HYDROCODONE BITARTRATE AND ACETAMINOPHEN 10; 325 MG/1; MG/1
1-2 TABLET ORAL EVERY 8 HOURS PRN
Qty: 60 TABLET | Refills: 0 | Status: SHIPPED | OUTPATIENT
Start: 2022-07-07 | End: 2022-07-21

## 2022-07-07 RX ORDER — OXYCODONE HYDROCHLORIDE 15 MG/1
15 TABLET, FILM COATED, EXTENDED RELEASE ORAL 2 TIMES DAILY PRN
Qty: 28 TABLET | Refills: 0 | Status: SHIPPED | OUTPATIENT
Start: 2022-07-07 | End: 2022-07-19 | Stop reason: SDUPTHER

## 2022-07-07 RX ORDER — SULFASALAZINE 500 MG/1
500 TABLET ORAL 4 TIMES DAILY
Qty: 120 TABLET | Refills: 3 | Status: SHIPPED | OUTPATIENT
Start: 2022-07-07 | End: 2022-08-02

## 2022-07-07 RX ORDER — PREDNISONE 20 MG/1
20 TABLET ORAL 2 TIMES DAILY
Qty: 28 TABLET | Refills: 0 | Status: SHIPPED | OUTPATIENT
Start: 2022-07-07 | End: 2022-08-02

## 2022-07-07 ASSESSMENT — ENCOUNTER SYMPTOMS
PALPITATIONS: 0
SHORTNESS OF BREATH: 0
FEVER: 0
NAUSEA: 1
COUGH: 0
CONSTIPATION: 0
ABDOMINAL PAIN: 1
WEIGHT LOSS: 1
CHILLS: 0
DIARRHEA: 1
BLOOD IN STOOL: 1
WHEEZING: 0

## 2022-07-07 ASSESSMENT — FIBROSIS 4 INDEX: FIB4 SCORE: 0.66

## 2022-07-07 NOTE — TELEPHONE ENCOUNTER
Phone Number Called: [phone] (home)       Call outcome: Spoke to patient regarding message below.    Message: Patient mention she needs a Dr. Note stating she missed work last Friday due to the issues she going through and also stating she was under your care today.

## 2022-07-07 NOTE — ASSESSMENT & PLAN NOTE
Her pain, diarrhea, and bloody stool continues.  She has been to the ER twice in the last week- they suggested a higher pain med.  She has eaten a very limited diet due to the pain.  She has been eating a very bland diet with soup and crackers.  She has blood in her stool. She has been taking two norco 10 mg with little benefit.

## 2022-07-07 NOTE — LETTER
July 7, 2022       Patient: Marian Kent   YOB: 1985   Date of Visit: 7/7/2022         To Whom It May Concern:    In my medical opinion, I recommend that Marian Kent be excused from work from 7/1/2022 through 7/7/2022.  She has been under my care specifically since February 2021 and with our clinic since 2019.    If you have any questions or concerns, please don't hesitate to call [phone]          Sincerely,          YEVGENIY Maxwell.  Electronically Signed

## 2022-07-07 NOTE — TELEPHONE ENCOUNTER
DOCUMENTATION OF PAR STATUS:    1. Name of Medication & Dose: oxyCODONE HCl ER 15 MG Tablet Extended Release 12 hour Abuse-Deterrent       2. Name of Prescription Coverage Company & phone #: [employer]  3. Date Prior Auth Submitted: 7/7/22    4. What information was given to obtain insurance decision? CHART NOTES, ICD-10 CODES,MEDS HX    5. Prior Auth Status? Pending    6. Patient Notified: yes

## 2022-07-13 ENCOUNTER — TELEPHONE (OUTPATIENT)
Dept: MEDICAL GROUP | Facility: MEDICAL CENTER | Age: 37
End: 2022-07-13
Payer: MEDICAID

## 2022-07-13 NOTE — TELEPHONE ENCOUNTER
FINAL PRIOR AUTHORIZATION STATUS:    1.  Name of Medication & Dose: Norco 5-325mg      2. Prior Auth Status: Approved through 10/11/2022     3. Action Taken: Pharmacy Notified: yes Patient Notified: yes

## 2022-07-13 NOTE — TELEPHONE ENCOUNTER
FINAL PRIOR AUTHORIZATION STATUS:    1.  Name of Medication & Dose: Hydrocodone- Acetamin  mg      2. Prior Auth Status: Approved through 07/13/2022-10/11/2022     3. Action Taken: Pharmacy Notified: N\A Patient Notified: N\A

## 2022-07-19 ENCOUNTER — TELEPHONE (OUTPATIENT)
Dept: MEDICAL GROUP | Facility: MEDICAL CENTER | Age: 37
End: 2022-07-19
Payer: MEDICAID

## 2022-07-19 DIAGNOSIS — K51.919 ULCERATIVE COLITIS WITH COMPLICATION, UNSPECIFIED LOCATION (HCC): ICD-10-CM

## 2022-07-19 DIAGNOSIS — R10.84 GENERALIZED ABDOMINAL PAIN: ICD-10-CM

## 2022-07-19 RX ORDER — OXYCODONE HYDROCHLORIDE 15 MG/1
15 TABLET, FILM COATED, EXTENDED RELEASE ORAL 2 TIMES DAILY PRN
Qty: 28 TABLET | Refills: 0 | Status: SHIPPED | OUTPATIENT
Start: 2022-07-19 | End: 2022-07-20 | Stop reason: SDUPTHER

## 2022-07-19 NOTE — TELEPHONE ENCOUNTER
Pt called because her oxycodone rx was denied by insurance even after a PA was submitted. Pt stated that cash pay at [facility] is very expensive, but she called the [facility] pharmacy on [address] in [place] and found out cash pay for this rx is much cheaper. She is wondering if you would be willing to send a new rx to the [facility] on [address] (I added this pharmacy to her chart). She states that her symptoms are getting bad at times and thinks this would help. Please advise

## 2022-07-19 NOTE — TELEPHONE ENCOUNTER
Lvm informing her that per Nicole script was sent and oxycodone and hydrocodone CANNOT be taken together. Left pt a detailed message at # [phone] per pt's request

## 2022-07-20 ENCOUNTER — TELEPHONE (OUTPATIENT)
Dept: MEDICAL GROUP | Facility: MEDICAL CENTER | Age: 37
End: 2022-07-20
Payer: MEDICAID

## 2022-07-20 DIAGNOSIS — R10.84 GENERALIZED ABDOMINAL PAIN: ICD-10-CM

## 2022-07-20 DIAGNOSIS — K51.919 ULCERATIVE COLITIS WITH COMPLICATION, UNSPECIFIED LOCATION (HCC): ICD-10-CM

## 2022-07-20 RX ORDER — OXYCODONE HYDROCHLORIDE 15 MG/1
15 TABLET, FILM COATED, EXTENDED RELEASE ORAL 2 TIMES DAILY PRN
Qty: 28 TABLET | Refills: 0 | Status: SHIPPED | OUTPATIENT
Start: 2022-07-20 | End: 2022-08-03

## 2022-07-20 NOTE — TELEPHONE ENCOUNTER
Pt called and stated that the oxycodone ER tabs went up in cash price at [facility] from $23 to $272. I looked on Good[facility] and found the same rx for $57.25 at [facility]. Nicole re-sent rx to [facility] [address], Good rx coupon was sent via text message from Good rx website to pt.

## 2022-07-25 ENCOUNTER — TELEPHONE (OUTPATIENT)
Dept: CARDIOLOGY | Facility: MEDICAL CENTER | Age: 37
End: 2022-07-25
Payer: MEDICAID

## 2022-07-25 NOTE — TELEPHONE ENCOUNTER
Spoke to patient in regards to obtaining records for NP appointment with Dr. Kowalski. Per patient has never been treated by a previous cardiologist. Confirmed all recent notes, labs, and cardiac imaging are in Epic. Confirmed with patient appointment time, location, and date. Scheduled on 8/23/2022.

## 2022-07-26 ENCOUNTER — TELEPHONE (OUTPATIENT)
Dept: CARDIOLOGY | Facility: MEDICAL CENTER | Age: 37
End: 2022-07-26
Payer: MEDICAID

## 2022-07-26 NOTE — TELEPHONE ENCOUNTER
Received faxed clearance request from [facility], scanned through [employer]. Pt has NP appt with TW on 08/23/22.

## 2022-08-02 ENCOUNTER — TELEMEDICINE (OUTPATIENT)
Dept: MEDICAL GROUP | Facility: MEDICAL CENTER | Age: 37
End: 2022-08-02
Attending: NURSE PRACTITIONER
Payer: MEDICAID

## 2022-08-02 DIAGNOSIS — K50.10 CROHN'S DISEASE OF COLON WITHOUT COMPLICATION (HCC): ICD-10-CM

## 2022-08-02 DIAGNOSIS — R10.84 GENERALIZED ABDOMINAL PAIN: ICD-10-CM

## 2022-08-02 PROCEDURE — 99214 OFFICE O/P EST MOD 30 MIN: CPT | Mod: 95 | Performed by: NURSE PRACTITIONER

## 2022-08-02 RX ORDER — HYDROCODONE BITARTRATE AND ACETAMINOPHEN 10; 325 MG/1; MG/1
1 TABLET ORAL EVERY 8 HOURS PRN
Qty: 90 TABLET | Refills: 0 | Status: SHIPPED | OUTPATIENT
Start: 2022-09-01 | End: 2022-10-01

## 2022-08-02 RX ORDER — HYDROCODONE BITARTRATE AND ACETAMINOPHEN 10; 325 MG/1; MG/1
1 TABLET ORAL EVERY 8 HOURS PRN
Qty: 90 TABLET | Refills: 0 | Status: SHIPPED | OUTPATIENT
Start: 2022-08-02 | End: 2022-09-01

## 2022-08-02 RX ORDER — PREDNISONE 20 MG/1
20 TABLET ORAL 2 TIMES DAILY
Qty: 60 TABLET | Refills: 1 | Status: SHIPPED | OUTPATIENT
Start: 2022-08-02 | End: 2022-09-01 | Stop reason: SDUPTHER

## 2022-08-02 RX ORDER — MESALAMINE 500 MG/1
1000 CAPSULE, EXTENDED RELEASE ORAL 3 TIMES DAILY
Qty: 180 CAPSULE | Refills: 0 | Status: SHIPPED | OUTPATIENT
Start: 2022-08-02 | End: 2023-01-19

## 2022-08-02 RX ORDER — MESALAMINE 1000 MG/1
1000 SUPPOSITORY RECTAL
Qty: 30 SUPPOSITORY | Refills: 5 | Status: SHIPPED | OUTPATIENT
Start: 2022-08-02 | End: 2022-09-16

## 2022-08-02 ASSESSMENT — FIBROSIS 4 INDEX: FIB4 SCORE: 0.66

## 2022-08-02 NOTE — ASSESSMENT & PLAN NOTE
She reports that the oxycontin 15 mg BID was helpful, but was over $200 out of pocket as it was not covered.  She went to ER on last Friday with bloody stools.  She has been calling GI to try and get in earlier. She is still on the cancellation list.  Her friend picked up her medication and filled her clonazepam- she has not taken this medication since we started pain medication.

## 2022-08-02 NOTE — PROGRESS NOTES
Virtual Visit: Established Patient   This visit was conducted via Zoom using secure and encrypted videoconferencing technology.   The patient was in a private location outside of their home in the state of [place].    The patient's identity was confirmed and verbal consent was obtained for this virtual visit.     Subjective:   CC:   Chief Complaint   Patient presents with   • Medication Refill       Marian Laila Kent is a 36 y.o. female presenting for evaluation and management of:    Abdominal pain/Crohn's disease  - She reports that the oxycontin 15 mg BID was helpful, but was over $200 out of pocket as it was not covered.  She went to ER on last Friday with bloody stools.  She has been calling GI to try and get in earlier. She is still on the cancellation list.  Her friend picked up her medication and filled her clonazepam- she has not taken this medication since we started pain medication.     ROS   Review of Systems   Constitutional: Positive for malaise/fatigue and weight loss. Negative for chills and fever.   Respiratory: Negative for cough, shortness of breath and wheezing.    Cardiovascular: Negative for chest pain, palpitations and leg swelling.   Gastrointestinal: Positive for abdominal pain, blood in stool and diarrhea. Negative for constipation.         Current medicines (including changes today)  Current Outpatient Medications   Medication Sig Dispense Refill   • HYDROcodone/acetaminophen (NORCO)  MG Tab Take 1 Tablet by mouth every 8 hours as needed for Moderate Pain for up to 30 days. 90 Tablet 0   • [START ON 9/1/2022] HYDROcodone/acetaminophen (NORCO)  MG Tab Take 1 Tablet by mouth every 8 hours as needed for Moderate Pain for up to 30 days. 90 Tablet 0   • predniSONE (DELTASONE) 20 MG Tab Take 1 Tablet by mouth 2 times a day. 60 Tablet 1   • mesalamine extended-release (PENTASA) 500 MG capsule Take 2 Capsules by mouth 3 times a day. 180 Capsule 0   • mesalamine (CANASA) 1000 MG Suppos  Insert 1 Suppository into the rectum at bedtime. 30 Suppository 5   • oxyCODONE HCl ER 15 MG Tablet Extended Release 12 hour Abuse-Deterrent Take 15 mg by mouth 2 times a day as needed (severe pain) for up to 14 days. 28 Tablet 0   • ondansetron (ZOFRAN ODT) 8 MG TABLET DISPERSIBLE Take 1 Tablet by mouth every 8 hours as needed for Nausea. 15 Tablet 0   • meloxicam (MOBIC) 7.5 MG Tab Take 7.5 mg by mouth every day.     • Naloxone (NARCAN) 4 MG/0.1ML Liquid Use for suspected narcotic overdose. Call 911 1 Each 0   • traZODone (DESYREL) 100 MG Tab TAKE 1 TABLET AT BEDTIME AS NEEDED FOR SLEEP. CAN TAKE HALF A TABLET     • methocarbamol (ROBAXIN) 500 MG Tab Take 1 Tablet by mouth 2 times a day as needed (muscle spasms). 60 Tablet 0   • Naloxone (NARCAN) 4 MG/0.1ML Liquid Use one as needed for overdose of opiates. Call 911 after use 2 Each 1     No current facility-administered medications for this visit.       Patient Active Problem List    Diagnosis Date Noted   • Acute midline low back pain without sciatica 12/13/2021   • Traumatic subarachnoid hemorrhage with loss of consciousness (HCC) 12/13/2021   • Acute pain of left knee 12/13/2021   • History of bipolar disorder 12/13/2021   • Suicidal ideation 07/19/2021   • Generalized abdominal pain 03/18/2021   • Kidney infection 02/17/2021   • Postprocedural pelvic peritoneal adhesions 10/22/2020   • Tobacco abuse disorder 02/19/2019   • Endometriosis 02/19/2019   • Anxiety 02/19/2019   • Left inguinal hernia 02/19/2019   • Pelvic pain 08/08/2016   • Female pelvic pain 05/20/2016        Objective:   Wt 68.9 kg (152 lb)   LMP 07/22/2016 (Exact Date)   BMI 26.93 kg/m²     Physical Exam:  Constitutional: Alert, no distress, well-groomed.  Skin: No rashes in visible areas.  Eye: Round. Conjunctiva clear, lids normal. No icterus.   ENMT: Lips pink without lesions, good dentition, moist mucous membranes. Phonation normal.  Neck: No masses, no thyromegaly. Moves freely without  pain.  Respiratory: Unlabored respiratory effort, no cough or audible wheeze  Psych: Alert and oriented x3, normal affect and mood.       Overall impression that this patient is benefiting from opioid therapy and that the benefits outweigh the risks of continued use: yes     - Controlled Substance Use Agreement current or updated today   - Urine drug screen current or ordered today   - Additional lab: CMP to assess liver and renal function- reviewed from 7/29/22 at [facility] via [facility]   - Rx refill prescriptions are done for 3 months, No early refills. See orders   - Referral to pain management: no      Assessment and Plan:   The following treatment plan was discussed:       1. Generalized abdominal pain  - HYDROcodone/acetaminophen (NORCO)  MG Tab; Take 1 Tablet by mouth every 8 hours as needed for Moderate Pain for up to 30 days.  Dispense: 90 Tablet; Refill: 0  - HYDROcodone/acetaminophen (NORCO)  MG Tab; Take 1 Tablet by mouth every 8 hours as needed for Moderate Pain for up to 30 days.  Dispense: 90 Tablet; Refill: 0  - predniSONE (DELTASONE) 20 MG Tab; Take 1 Tablet by mouth 2 times a day.  Dispense: 60 Tablet; Refill: 1  - mesalamine extended-release (PENTASA) 500 MG capsule; Take 2 Capsules by mouth 3 times a day.  Dispense: 180 Capsule; Refill: 0  - mesalamine (CANASA) 1000 MG Suppos; Insert 1 Suppository into the rectum at bedtime.  Dispense: 30 Suppository; Refill: 5 - Chronic problem, unstable.  Unfortunately patient is still waiting on her GI procedure.  We will continue to treat as a lower GI lesion of Crohn's disease.  Stop sulfasalazine  and start extended release formulation of mesalamine as I do have concern that she may have a low lesion.  Start Canasa suppository 1 g nightly.  Continue to reach out to GI regarding cancellation list.  We will switch her back to Norco  mg  3 times daily as needed.  Reiterated the importance of not taking any other sedating agents  including benzodiazepine-patient verbalized understanding.  ER precautions reviewed.    2. Crohn's disease of colon without complication (HCC)  - HYDROcodone/acetaminophen (NORCO)  MG Tab; Take 1 Tablet by mouth every 8 hours as needed for Moderate Pain for up to 30 days.  Dispense: 90 Tablet; Refill: 0  - HYDROcodone/acetaminophen (NORCO)  MG Tab; Take 1 Tablet by mouth every 8 hours as needed for Moderate Pain for up to 30 days.  Dispense: 90 Tablet; Refill: 0  - predniSONE (DELTASONE) 20 MG Tab; Take 1 Tablet by mouth 2 times a day.  Dispense: 60 Tablet; Refill: 1  - mesalamine extended-release (PENTASA) 500 MG capsule; Take 2 Capsules by mouth 3 times a day.  Dispense: 180 Capsule; Refill: 0  - mesalamine (CANASA) 1000 MG Suppos; Insert 1 Suppository into the rectum at bedtime.  Dispense: 30 Suppository; Refill: 5      Follow-up: Return in about 2 weeks (around 8/16/2022) for Sulfasalazine efficacy.

## 2022-08-03 VITALS — HEIGHT: 63 IN | BODY MASS INDEX: 26.93 KG/M2 | WEIGHT: 152 LBS

## 2022-08-03 ASSESSMENT — ENCOUNTER SYMPTOMS
PALPITATIONS: 0
FEVER: 0
BLOOD IN STOOL: 1
DIARRHEA: 1
ABDOMINAL PAIN: 1
COUGH: 0
SHORTNESS OF BREATH: 0
CHILLS: 0
WEIGHT LOSS: 1
CONSTIPATION: 0
WHEEZING: 0

## 2022-09-01 ENCOUNTER — TELEPHONE (OUTPATIENT)
Dept: MEDICAL GROUP | Facility: MEDICAL CENTER | Age: 37
End: 2022-09-01

## 2022-09-01 ENCOUNTER — PATIENT MESSAGE (OUTPATIENT)
Dept: MEDICAL GROUP | Facility: MEDICAL CENTER | Age: 37
End: 2022-09-01

## 2022-09-01 ENCOUNTER — OFFICE VISIT (OUTPATIENT)
Dept: MEDICAL GROUP | Facility: MEDICAL CENTER | Age: 37
End: 2022-09-01
Attending: NURSE PRACTITIONER
Payer: MEDICAID

## 2022-09-01 VITALS
OXYGEN SATURATION: 97 % | WEIGHT: 147.7 LBS | HEART RATE: 95 BPM | SYSTOLIC BLOOD PRESSURE: 92 MMHG | TEMPERATURE: 97.7 F | RESPIRATION RATE: 17 BRPM | DIASTOLIC BLOOD PRESSURE: 62 MMHG | HEIGHT: 63 IN | BODY MASS INDEX: 26.17 KG/M2

## 2022-09-01 DIAGNOSIS — K50.10 CROHN'S DISEASE OF COLON WITHOUT COMPLICATION (HCC): ICD-10-CM

## 2022-09-01 DIAGNOSIS — R10.84 GENERALIZED ABDOMINAL PAIN: ICD-10-CM

## 2022-09-01 PROCEDURE — 99213 OFFICE O/P EST LOW 20 MIN: CPT | Performed by: NURSE PRACTITIONER

## 2022-09-01 RX ORDER — PREDNISONE 20 MG/1
20 TABLET ORAL 2 TIMES DAILY
Qty: 60 TABLET | Refills: 1 | Status: SHIPPED | OUTPATIENT
Start: 2022-09-01 | End: 2022-12-30

## 2022-09-01 ASSESSMENT — ENCOUNTER SYMPTOMS
COUGH: 0
PALPITATIONS: 0
ABDOMINAL PAIN: 1
CHILLS: 0
NAUSEA: 1
DIARRHEA: 1
FEVER: 0
CONSTIPATION: 0
BLOOD IN STOOL: 1
WHEEZING: 0
SHORTNESS OF BREATH: 0
WEIGHT LOSS: 0

## 2022-09-01 ASSESSMENT — FIBROSIS 4 INDEX: FIB4 SCORE: 0.66

## 2022-09-01 NOTE — TELEPHONE ENCOUNTER
FINAL PRIOR AUTHORIZATION STATUS:    1.  Name of Medication & Dose: mesalamine extended-release (PENTASA) 500 MG capsule       2. Prior Auth Status: Approved through 9/1/22     3. Action Taken: Pharmacy Notified: N\A Patient Notified: yes

## 2022-09-01 NOTE — PROGRESS NOTES
Chief Complaint   Patient presents with    Follow-Up     Meds check       Subjective:     HPI:   Marian Kent is a 36 y.o. female here to discuss the evaluation and management of:        Generalized abdominal pain  Patient reports that she was unable to get oral or rectal mesalamine.  She is run out of prednisone.  Her abdominal pain has increased significantly.  She has been out of work since Saturday of this week.  She has her colonoscopy planned with [facility] in the next 2 weeks.  She continues to have frequent, bloody stools.  She continues to have significant abdominal pain.  She denies fever.    ROS  Review of Systems   Constitutional:  Positive for malaise/fatigue. Negative for chills, fever and weight loss.   Respiratory:  Negative for cough, shortness of breath and wheezing.    Cardiovascular:  Negative for chest pain, palpitations and leg swelling.   Gastrointestinal:  Positive for abdominal pain, blood in stool, diarrhea and nausea. Negative for constipation.       Allergies   Allergen Reactions    Percocet [Apap-Fd&C Red #40 [place]-Oxycodone] Hives    Oxycodone-Acetaminophen     Tramadol Hives       Current medicines (including changes today)  Current Outpatient Medications   Medication Sig Dispense Refill    predniSONE (DELTASONE) 20 MG Tab Take 1 Tablet by mouth 2 times a day. 60 Tablet 1    HYDROcodone/acetaminophen (NORCO)  MG Tab Take 1 Tablet by mouth every 8 hours as needed for Moderate Pain for up to 30 days. 90 Tablet 0    HYDROcodone/acetaminophen (NORCO)  MG Tab Take 1 Tablet by mouth every 8 hours as needed for Moderate Pain for up to 30 days. 90 Tablet 0    mesalamine extended-release (PENTASA) 500 MG capsule Take 2 Capsules by mouth 3 times a day. 180 Capsule 0    mesalamine (CANASA) 1000 MG Suppos Insert 1 Suppository into the rectum at bedtime. 30 Suppository 5    ondansetron (ZOFRAN ODT) 8 MG TABLET DISPERSIBLE Take 1 Tablet by mouth every 8 hours as needed for  "Nausea. 15 Tablet 0    meloxicam (MOBIC) 7.5 MG Tab Take 7.5 mg by mouth every day.      Naloxone (NARCAN) 4 MG/0.1ML Liquid Use for suspected narcotic overdose. Call 911 1 Each 0    traZODone (DESYREL) 100 MG Tab TAKE 1 TABLET AT BEDTIME AS NEEDED FOR SLEEP. CAN TAKE HALF A TABLET      methocarbamol (ROBAXIN) 500 MG Tab Take 1 Tablet by mouth 2 times a day as needed (muscle spasms). 60 Tablet 0    Naloxone (NARCAN) 4 MG/0.1ML Liquid Use one as needed for overdose of opiates. Call 911 after use 2 Each 1     No current facility-administered medications for this visit.       Social History     Tobacco Use    Smoking status: Every Day     Packs/day: 1.00     Years: 10.00     Pack years: 10.00     Types: Cigarettes    Smokeless tobacco: Never   Vaping Use    Vaping Use: Never used   Substance Use Topics    Alcohol use: No     Alcohol/week: 0.0 oz     Comment: none since10/21    Drug use: No       Patient Active Problem List    Diagnosis Date Noted    Acute midline low back pain without sciatica 12/13/2021    Traumatic subarachnoid hemorrhage with loss of consciousness (HCC) 12/13/2021    Acute pain of left knee 12/13/2021    History of bipolar disorder 12/13/2021    Suicidal ideation 07/19/2021    Generalized abdominal pain 03/18/2021    Kidney infection 02/17/2021    Postprocedural pelvic peritoneal adhesions 10/22/2020    Tobacco abuse disorder 02/19/2019    Endometriosis 02/19/2019    Anxiety 02/19/2019    Left inguinal hernia 02/19/2019    Pelvic pain 08/08/2016    Female pelvic pain 05/20/2016       Family History   Problem Relation Age of Onset    Cancer Father 45        colon    Other Maternal Grandfather     Cancer Paternal Grandmother         breast    Diabetes Paternal Grandmother     Heart Disease Neg Hx     Stroke Neg Hx           Objective:     BP (!) 92/62 (BP Location: Left arm, Patient Position: Sitting, BP Cuff Size: Adult)   Pulse 95   Temp 36.5 °C (97.7 °F) (Skin)   Resp 17   Ht 1.6 m (5' 3\")   " Wt 67 kg (147 lb 11.2 oz)   SpO2 97%  Body mass index is 26.16 kg/m².    Physical Exam:  Physical Exam  Constitutional:       General: She is not in acute distress.  HENT:      Head: Normocephalic.      Right Ear: Tympanic membrane and external ear normal.      Left Ear: Tympanic membrane and external ear normal.   Eyes:      Conjunctiva/sclera: Conjunctivae normal.      Pupils: Pupils are equal, round, and reactive to light.   Neck:      Trachea: No tracheal deviation.   Cardiovascular:      Rate and Rhythm: Normal rate and regular rhythm.      Heart sounds: Normal heart sounds.   Pulmonary:      Effort: Pulmonary effort is normal.      Breath sounds: Normal breath sounds.   Abdominal:      General: Bowel sounds are normal.      Palpations: Abdomen is soft.   Musculoskeletal:         General: Normal range of motion.      Cervical back: Normal range of motion and neck supple.   Lymphadenopathy:      Head:      Right side of head: No preauricular adenopathy.      Left side of head: No preauricular adenopathy.      Cervical: No cervical adenopathy.   Skin:     General: Skin is warm and dry.   Neurological:      Mental Status: She is alert and oriented to person, place, and time.      Sensory: Sensation is intact.      Gait: Gait is intact.   Psychiatric:         Mood and Affect: Mood and affect normal.         Judgment: Judgment normal.       Assessment and Plan:     The following treatment plan was discussed:    1. Generalized abdominal pain  predniSONE (DELTASONE) 20 MG Tab  -Chronic problem, unstable.  We will restart prednisone 40 mg daily until she can get her mesalamine.  Patient will reach out to me when she gets her mesalamine and we will begin her prednisone taper.  Follow plan of care per GI.  ER precautions reviewed.      2. Crohn's disease of colon without complication (HCC)  predniSONE (DELTASONE) 20 MG Tab  -See above          Any change or worsening of signs or symptoms, patient encouraged to follow-up  or report to emergency room for further evaluation. Patient verbalizes understanding and agrees.    Follow-Up: Return in about 6 weeks (around 10/13/2022) for Crohn's.      PLEASE NOTE: This dictation was created using voice recognition software. I have made every reasonable attempt to correct obvious errors, but I expect that there are errors of grammar and possibly content that I did not discover before finalizing the note.

## 2022-09-01 NOTE — LETTER
September 1, 2022       Patient: Marian Kent   YOB: 1985   Date of Visit: 9/1/2022         To Whom It May Concern:    In my medical opinion, I recommend that Marian Kent be excused from work 9/27/22 through 9/2/22.  She may return 9/3/22.     If you have any questions or concerns, please don't hesitate to call [phone]          Sincerely,          TARUN MaxwellRWillianN.  Electronically Signed

## 2022-09-01 NOTE — LETTER
November 23, 2022       Patient: Marian Kent   YOB: 1985   Date of Visit: 9/1/2022         To Whom It May Concern:    In my medical opinion, I recommend that Marian Kent be excused from work from 11/1/2022 through 11/18/2022 due to flare of chronic medical condition.    If you have any questions or concerns, please don't hesitate to call [phone]          Sincerely,          Physician Vashti  Electronically Signed

## 2022-09-01 NOTE — ASSESSMENT & PLAN NOTE
Patient reports that she was unable to get oral or rectal mesalamine.  She is run out of prednisone.  Her abdominal pain has increased significantly.  She has been out of work since Saturday of this week.  She has her colonoscopy planned with [facility] in the next 2 weeks.  She continues to have frequent, bloody stools.  She continues to have significant abdominal pain.  She denies fever.

## 2022-09-01 NOTE — TELEPHONE ENCOUNTER
DOCUMENTATION OF PAR STATUS:    1. Name of Medication & Dose:  Mesalamine ER 500MG er capsules         2. Name of Prescription Coverage Company & phone #: [employer]    3. Date Prior Auth Submitted: 9/1/22    4. What information was given to obtain insurance decision? Chart notes, icd-10 code, med hx.    5. Prior Auth Status? Pending    6. Patient Notified: yes

## 2022-09-01 NOTE — TELEPHONE ENCOUNTER
FINAL PRIOR AUTHORIZATION STATUS:    1.  Name of Medication & Dose:Mesalamine 1000MG suppositories    2. Prior Auth Status: Approved through 9/1/22     3. Action Taken: Pharmacy Notified: N\A Patient Notified: yes

## 2022-09-01 NOTE — TELEPHONE ENCOUNTER
DOCUMENTATION OF PAR STATUS:    1. Name of Medication & Dose:  Canasa 1000MG suppositories    2. Name of Prescription Coverage Company & phone #: [employer]    3. Date Prior Auth Submitted: 09/1/22    4. What information was given to obtain insurance decision?  Chart notes, icd-10 code, med hx    5. Prior Auth Status? Pending    6. Patient Notified: yes

## 2022-09-13 ENCOUNTER — OFFICE VISIT (OUTPATIENT)
Dept: MEDICAL GROUP | Facility: MEDICAL CENTER | Age: 37
End: 2022-09-13
Attending: NURSE PRACTITIONER
Payer: MEDICAID

## 2022-09-13 VITALS
RESPIRATION RATE: 17 BRPM | HEIGHT: 63 IN | BODY MASS INDEX: 26.35 KG/M2 | OXYGEN SATURATION: 99 % | HEART RATE: 89 BPM | SYSTOLIC BLOOD PRESSURE: 100 MMHG | DIASTOLIC BLOOD PRESSURE: 80 MMHG | TEMPERATURE: 97.5 F | WEIGHT: 148.7 LBS

## 2022-09-13 DIAGNOSIS — R10.84 GENERALIZED ABDOMINAL PAIN: ICD-10-CM

## 2022-09-13 DIAGNOSIS — R10.9 ABDOMINAL CRAMPING: ICD-10-CM

## 2022-09-13 PROCEDURE — 99214 OFFICE O/P EST MOD 30 MIN: CPT | Performed by: NURSE PRACTITIONER

## 2022-09-13 PROCEDURE — 99212 OFFICE O/P EST SF 10 MIN: CPT | Performed by: NURSE PRACTITIONER

## 2022-09-13 RX ORDER — DICYCLOMINE HCL 20 MG
20 TABLET ORAL EVERY 6 HOURS
Qty: 30 TABLET | Refills: 1 | Status: SHIPPED | OUTPATIENT
Start: 2022-09-13 | End: 2022-09-13

## 2022-09-13 RX ORDER — DICYCLOMINE HCL 20 MG
20 TABLET ORAL EVERY 6 HOURS
Qty: 30 TABLET | Refills: 1 | Status: SHIPPED | OUTPATIENT
Start: 2022-09-13 | End: 2023-01-19

## 2022-09-13 ASSESSMENT — FIBROSIS 4 INDEX: FIB4 SCORE: 0.66

## 2022-09-13 NOTE — ASSESSMENT & PLAN NOTE
Ongoing-  Encouraged patient that if she just had procedure yesterday she should probably call GI provider that completed and alert them  Explained to patient's that narcotics very rarely help with abdominal pain and that they actually slow down GI tract  Prescribe Bentyl to help with abdominal cramping  Encourage patient to follow-up with her primary care provider if she would like to make adjustments in her pain medication.  Exam essentially benign, positive bowel sounds in all quadrants, no guarding and only slightly tender to left upper quadrant.

## 2022-09-13 NOTE — LETTER
September 13, 2022    To Whom It May Concern:         This is confirmation that Marian Kent attended her scheduled appointment with TARUN KumarRANDREZ on 9/13/22. Please excuse from work for today secondary to medical procedure.          If you have any questions please do not hesitate to call me at the phone number listed below.    Sincerely,          RAMONITA KumarPWillianRWillianN.  [phone]

## 2022-09-13 NOTE — PROGRESS NOTES
Chief Complaint   Patient presents with    Follow-Up     Medication check       Subjective:     HPI:   Marian Kent is a 36 y.o. female here to discuss the evaluation and management of:      Problem   Generalized Abdominal Pain    Patient here today stating she is having increased pain after undergoing endoscopy and colonoscopy yesterday.  Patient states her procedure ended about 1130 yesterday and that she was doing well after the procedure however this morning when she woke up she started noting significant abdominal cramping.  Patient is requesting additional narcotics to help with pain as her normal Norco 10/3/2025 are not covering her pain associated with procedure from yesterday.         ROS  See HPI     Allergies   Allergen Reactions    Percocet [Apap-Fd&C Red #40 [place]-Oxycodone] Hives    Oxycodone-Acetaminophen     Tramadol Hives       Current medicines (including changes today)  Current Outpatient Medications   Medication Sig Dispense Refill    dicyclomine (BENTYL) 20 MG Tab Take 1 Tablet by mouth every 6 hours. 30 Tablet 1    predniSONE (DELTASONE) 20 MG Tab Take 1 Tablet by mouth 2 times a day. 60 Tablet 1    HYDROcodone/acetaminophen (NORCO)  MG Tab Take 1 Tablet by mouth every 8 hours as needed for Moderate Pain for up to 30 days. 90 Tablet 0    mesalamine extended-release (PENTASA) 500 MG capsule Take 2 Capsules by mouth 3 times a day. 180 Capsule 0    mesalamine (CANASA) 1000 MG Suppos Insert 1 Suppository into the rectum at bedtime. 30 Suppository 5    ondansetron (ZOFRAN ODT) 8 MG TABLET DISPERSIBLE Take 1 Tablet by mouth every 8 hours as needed for Nausea. 15 Tablet 0    meloxicam (MOBIC) 7.5 MG Tab Take 7.5 mg by mouth every day.      Naloxone (NARCAN) 4 MG/0.1ML Liquid Use for suspected narcotic overdose. Call 911 1 Each 0    traZODone (DESYREL) 100 MG Tab TAKE 1 TABLET AT BEDTIME AS NEEDED FOR SLEEP. CAN TAKE HALF A TABLET      methocarbamol (ROBAXIN) 500 MG Tab Take 1 Tablet by  "mouth 2 times a day as needed (muscle spasms). 60 Tablet 0    Naloxone (NARCAN) 4 MG/0.1ML Liquid Use one as needed for overdose of opiates. Call 911 after use 2 Each 1     No current facility-administered medications for this visit.       Social History     Tobacco Use    Smoking status: Every Day     Packs/day: 1.00     Years: 10.00     Pack years: 10.00     Types: Cigarettes    Smokeless tobacco: Never   Vaping Use    Vaping Use: Never used   Substance Use Topics    Alcohol use: No     Alcohol/week: 0.0 oz     Comment: none since10/21    Drug use: No       Patient Active Problem List    Diagnosis Date Noted    Acute midline low back pain without sciatica 12/13/2021    Traumatic subarachnoid hemorrhage with loss of consciousness (HCC) 12/13/2021    Acute pain of left knee 12/13/2021    History of bipolar disorder 12/13/2021    Suicidal ideation 07/19/2021    Generalized abdominal pain 03/18/2021    Kidney infection 02/17/2021    Postprocedural pelvic peritoneal adhesions 10/22/2020    Tobacco abuse disorder 02/19/2019    Endometriosis 02/19/2019    Anxiety 02/19/2019    Left inguinal hernia 02/19/2019    Pelvic pain 08/08/2016    Female pelvic pain 05/20/2016       Family History   Problem Relation Age of Onset    Cancer Father 45        colon    Other Maternal Grandfather     Cancer Paternal Grandmother         breast    Diabetes Paternal Grandmother     Heart Disease Neg Hx     Stroke Neg Hx           Objective:     /80 (BP Location: Left arm, Patient Position: Sitting, BP Cuff Size: Adult)   Pulse 89   Temp 36.4 °C (97.5 °F) (Skin)   Resp 17   Ht 1.6 m (5' 3\")   Wt 67.4 kg (148 lb 11.2 oz)   SpO2 99%  Body mass index is 26.34 kg/m².    Physical Exam:  Physical Exam  Vitals reviewed.   Constitutional:       General: She is awake.      Appearance: Normal appearance. She is well-developed.   HENT:      Head: Normocephalic.   Eyes:      Conjunctiva/sclera: Conjunctivae normal.   Cardiovascular:      " Rate and Rhythm: Normal rate.      Heart sounds: Normal heart sounds.   Pulmonary:      Effort: Pulmonary effort is normal. No respiratory distress.   Abdominal:      General: Bowel sounds are normal.      Tenderness: There is abdominal tenderness. There is no guarding or rebound.   Musculoskeletal:      Cervical back: Neck supple.   Skin:     General: Skin is warm and dry.   Neurological:      Mental Status: She is alert and oriented to person, place, and time.   Psychiatric:         Mood and Affect: Mood normal.         Behavior: Behavior normal. Behavior is cooperative.            Assessment and Plan:     The following treatment plan was discussed:    Problem List Items Addressed This Visit       Generalized abdominal pain     Ongoing-  Encouraged patient that if she just had procedure yesterday she should probably call GI provider that completed and alert them  Explained to patient's that narcotics very rarely help with abdominal pain and that they actually slow down GI tract  Prescribe Bentyl to help with abdominal cramping  Encourage patient to follow-up with her primary care provider if she would like to make adjustments in her pain medication.  Exam essentially benign, positive bowel sounds in all quadrants, no guarding and only slightly tender to left upper quadrant.          Other Visit Diagnoses       Abdominal cramping        Relevant Medications    dicyclomine (BENTYL) 20 MG Tab            Any change or worsening of signs or symptoms, patient encouraged to follow-up or report to emergency room for further evaluation. Patient verbalizes understanding and agrees.    Follow-Up: Follow up with PCP as needed       PLEASE NOTE: This dictation was created using voice recognition software. I have made every reasonable attempt to correct obvious errors, but I expect that there are errors of grammar and possibly content that I did not discover before finalizing the note.

## 2022-09-16 ENCOUNTER — OFFICE VISIT (OUTPATIENT)
Dept: MEDICAL GROUP | Facility: MEDICAL CENTER | Age: 37
End: 2022-09-16
Attending: INTERNAL MEDICINE
Payer: MEDICAID

## 2022-09-16 VITALS
SYSTOLIC BLOOD PRESSURE: 110 MMHG | OXYGEN SATURATION: 99 % | DIASTOLIC BLOOD PRESSURE: 78 MMHG | TEMPERATURE: 97.9 F | HEART RATE: 86 BPM | BODY MASS INDEX: 26.22 KG/M2 | WEIGHT: 148 LBS | HEIGHT: 63 IN | RESPIRATION RATE: 16 BRPM

## 2022-09-16 DIAGNOSIS — R12 HEARTBURN: ICD-10-CM

## 2022-09-16 DIAGNOSIS — R10.13 EPIGASTRIC PAIN: ICD-10-CM

## 2022-09-16 DIAGNOSIS — R11.2 NON-INTRACTABLE VOMITING WITH NAUSEA, UNSPECIFIED VOMITING TYPE: ICD-10-CM

## 2022-09-16 PROCEDURE — 99214 OFFICE O/P EST MOD 30 MIN: CPT | Performed by: INTERNAL MEDICINE

## 2022-09-16 PROCEDURE — 99213 OFFICE O/P EST LOW 20 MIN: CPT | Performed by: INTERNAL MEDICINE

## 2022-09-16 RX ORDER — PROMETHAZINE HYDROCHLORIDE 25 MG/1
25 TABLET ORAL EVERY 6 HOURS PRN
Qty: 30 TABLET | Refills: 0 | Status: SHIPPED | OUTPATIENT
Start: 2022-09-16 | End: 2023-01-19

## 2022-09-16 RX ORDER — OMEPRAZOLE 20 MG/1
20 TABLET, DELAYED RELEASE ORAL DAILY
Qty: 28 TABLET | Refills: 1 | Status: SHIPPED | OUTPATIENT
Start: 2022-09-16 | End: 2022-11-15

## 2022-09-16 ASSESSMENT — FIBROSIS 4 INDEX: FIB4 SCORE: 0.66

## 2022-09-16 NOTE — PROGRESS NOTES
Subjective:   Marian Kent is a 36 y.o. female here today for abdominal pain with nausea and vomiting following colonoscopy and endoscopy    Nausea with vomiting  She states that she has been having difficulty keeping down any type of solid foods but she is tolerating water as well as Gatorade, yogurt, and Jell-O.  Bowel movements have been normal and she denies melena or hematochezia.      Epigastric pain  She reports that after she got her colonoscopy on Monday she has had worsening abdominal pain especially in the epigastric area.  This is also been associated with more nausea and vomiting as well as heartburn.  She has been using over-the-counter Tums and her Zofran with incomplete control of her symptoms.  She was seen in clinic 2 days ago and given a prescription for dicyclomine but she states that despite taking this twice a day for the past several days she has not noticed any relief.  She has missed work since Monday and is requesting a note for work excuse.  She has not been able to get in touch with her GI provider although she reports leaving multiple messages.  States that she has been mostly bedbound due to this pain.      Current medicines (including changes today)  Current Outpatient Medications   Medication Sig Dispense Refill    promethazine (PHENERGAN) 25 MG Tab Take 1 Tablet by mouth every 6 hours as needed for Nausea/Vomiting. 30 Tablet 0    omeprazole (PRILOSEC OTC) 20 MG tablet Take 1 Tablet by mouth every day. 28 Tablet 1    fluconazole (DIFLUCAN) 150 MG tablet       traZODone (DESYREL) 50 MG Tab 1 TABLET AT BEDTIME FOR SLEEP - TAKE AS NEEDED      dicyclomine (BENTYL) 20 MG Tab Take 1 Tablet by mouth every 6 hours. 30 Tablet 1    predniSONE (DELTASONE) 20 MG Tab Take 1 Tablet by mouth 2 times a day. 60 Tablet 1    HYDROcodone/acetaminophen (NORCO)  MG Tab Take 1 Tablet by mouth every 8 hours as needed for Moderate Pain for up to 30 days. 90 Tablet 0    ondansetron (ZOFRAN ODT) 8  MG TABLET DISPERSIBLE Take 1 Tablet by mouth every 8 hours as needed for Nausea. 15 Tablet 0    mesalamine extended-release (PENTASA) 500 MG capsule Take 2 Capsules by mouth 3 times a day. 180 Capsule 0    methocarbamol (ROBAXIN) 500 MG Tab Take 1 Tablet by mouth 2 times a day as needed (muscle spasms). (Patient not taking: Reported on 9/16/2022) 60 Tablet 0    Naloxone (NARCAN) 4 MG/0.1ML Liquid Use one as needed for overdose of opiates. Call 911 after use 2 Each 1     No current facility-administered medications for this visit.     She  has a past medical history of Anesthesia (02/14/2022), Anxiety (2/19/2019), Bipolar 1 disorder (HCC) (02/14/2022), Breath shortness, EP (ectopic pregnancy), Gynecological disorder, Hydronephrosis right (1/3/2014), Nausea with vomiting (9/16/2022), Pain (02/14/2022), Pain (03/2019), Pain management, Placenta previa diagnosed with US at [facility] (12/17/2013), PONV (postoperative nausea and vomiting), and Psychiatric problem (02/14/2022).    She has no past medical history of Addisons disease ([facility]), Adrenal disorder (HCC), Allergy, Anemia, Blood transfusion, Clotting disorder (HCC), Cushings syndrome (HCC), Diabetic neuropathy (HCC), Hyperlipidemia, IBD (inflammatory bowel disease), Meningitis, Migraine, Muscle disorder, OSTEOPOROSIS, Parathyroid disorder (HCC), Pituitary disease (HCC), Sickle cell disease (HCC), Substance abuse ([facility]), or Ulcer.         Objective:     Vitals:    09/16/22 1333   BP: 110/78   Pulse: 86   Resp: 16   Temp: 36.6 °C (97.9 °F)   SpO2: 99%     Body mass index is 26.22 kg/m².   Physical Exam:  Constitutional: Alert, no distress.  Skin: Warm, dry, good turgor, no rashes in visible areas.  Eye: Equal, round and reactive, conjunctiva clear, lids normal.  Abdomen: Soft, mild tenderness to palpation epigastric region and over right lower quadrant without rebound or guarding, bowel sounds present, no masses, no hepatosplenomegaly.  Psych: Alert and oriented x3,  normal affect and mood.      Assessment and Plan:   The following treatment plan was discussed    1. Non-intractable vomiting with nausea, unspecified vomiting type  Discussed that her Zofran dose is maximized but we will add on Phenergan as needed.  She will follow-up with her PCP next week.  - promethazine (PHENERGAN) 25 MG Tab; Take 1 Tablet by mouth every 6 hours as needed for Nausea/Vomiting.  Dispense: 30 Tablet; Refill: 0    2. Heartburn  Discussed trial of Prilosec given her heartburn has worsened and this could be contributing to her abdominal pain  - omeprazole (PRILOSEC OTC) 20 MG tablet; Take 1 Tablet by mouth every day.  Dispense: 28 Tablet; Refill: 1    3. Epigastric pain  Unclear why she had so much pain following her colonoscopy.  She does not have any evidence of perforation or intra-abdominal bleeding on history or exam.  At this point, she is on numerous medications for pain control and we discussed adding on the Phenergan and Prilosec to try to help reduce the acid and cut back on her nausea and vomiting as I cannot think of any other classes of pain medication she might benefit from.        Followup: Return in about 1 week (around 9/23/2022).

## 2022-09-16 NOTE — ASSESSMENT & PLAN NOTE
She states that she has been having difficulty keeping down any type of solid foods but she is tolerating water as well as Gatorade, yogurt, and Jell-O.  Bowel movements have been normal and she denies melena or hematochezia.

## 2022-09-16 NOTE — LETTER
September 16, 2022         Patient: Marian Kent   YOB: 1985   Date of Visit: 9/16/2022           To Whom it May Concern:    Marian Kent was seen in my clinic on 9/16/2022. Please excuse her from work from 9/12/22 through 9/16/22.  She may return to work on 9/17/22.    If you have any questions or concerns, please don't hesitate to call.        Sincerely,           Afia Vick M.D.  Electronically Signed

## 2022-09-16 NOTE — ASSESSMENT & PLAN NOTE
She reports that after she got her colonoscopy on Monday she has had worsening abdominal pain especially in the epigastric area.  This is also been associated with more nausea and vomiting as well as heartburn.  She has been using over-the-counter Tums and her Zofran with incomplete control of her symptoms.  She was seen in clinic 2 days ago and given a prescription for dicyclomine but she states that despite taking this twice a day for the past several days she has not noticed any relief.  She has missed work since Monday and is requesting a note for work excuse.  She has not been able to get in touch with her GI provider although she reports leaving multiple messages.

## 2022-09-20 ENCOUNTER — TELEMEDICINE (OUTPATIENT)
Dept: MEDICAL GROUP | Facility: MEDICAL CENTER | Age: 37
End: 2022-09-20
Attending: NURSE PRACTITIONER
Payer: MEDICAID

## 2022-09-20 VITALS — WEIGHT: 148 LBS | RESPIRATION RATE: 16 BRPM | HEIGHT: 63 IN | BODY MASS INDEX: 26.22 KG/M2

## 2022-09-20 DIAGNOSIS — R10.84 GENERALIZED ABDOMINAL PAIN: ICD-10-CM

## 2022-09-20 PROCEDURE — 99213 OFFICE O/P EST LOW 20 MIN: CPT | Mod: 95 | Performed by: NURSE PRACTITIONER

## 2022-09-20 RX ORDER — MESALAMINE 1000 MG/1
1000 SUPPOSITORY RECTAL
Qty: 30 SUPPOSITORY | Refills: 5 | Status: SHIPPED | OUTPATIENT
Start: 2022-09-20 | End: 2023-01-19

## 2022-09-20 RX ORDER — HYDROCODONE BITARTRATE AND ACETAMINOPHEN 10; 325 MG/1; MG/1
1 TABLET ORAL EVERY 6 HOURS PRN
Qty: 90 TABLET | Refills: 0 | Status: SHIPPED | OUTPATIENT
Start: 2022-11-30 | End: 2022-12-30

## 2022-09-20 RX ORDER — GABAPENTIN 300 MG/1
300 CAPSULE ORAL 3 TIMES DAILY
Qty: 90 CAPSULE | Refills: 5 | Status: SHIPPED | OUTPATIENT
Start: 2022-09-20 | End: 2023-01-19

## 2022-09-20 RX ORDER — HYDROCODONE BITARTRATE AND ACETAMINOPHEN 10; 325 MG/1; MG/1
1 TABLET ORAL EVERY 6 HOURS PRN
Qty: 90 TABLET | Refills: 0 | Status: SHIPPED | OUTPATIENT
Start: 2022-10-31 | End: 2022-11-30

## 2022-09-20 RX ORDER — HYDROCODONE BITARTRATE AND ACETAMINOPHEN 10; 325 MG/1; MG/1
1 TABLET ORAL EVERY 6 HOURS PRN
Qty: 90 TABLET | Refills: 0 | Status: SHIPPED | OUTPATIENT
Start: 2022-10-01 | End: 2022-10-31

## 2022-09-20 ASSESSMENT — ENCOUNTER SYMPTOMS
DIARRHEA: 1
PALPITATIONS: 0
NAUSEA: 1
CHILLS: 0
WHEEZING: 0
FEVER: 0
CONSTIPATION: 0
ABDOMINAL PAIN: 1
BLOOD IN STOOL: 1
COUGH: 0
SHORTNESS OF BREATH: 0
WEIGHT LOSS: 0

## 2022-09-20 ASSESSMENT — FIBROSIS 4 INDEX: FIB4 SCORE: 0.66

## 2022-09-20 NOTE — ASSESSMENT & PLAN NOTE
Pt had her colo last week.  She had increased pain beginning the day after.  She was seen by  My partner and given bentyl.  She continues to have pain and bloody BMs.  She has not gotten the results yet.  She was unable to get canasa as it is not covered.  She is asking for more pain medication. She denies Se from the pain meds. She reports she has been able to eat slightly more lately.

## 2022-09-20 NOTE — LETTER
September 20, 2022       Patient: Marian Kent   YOB: 1985   Date of Visit: 9/20/2022         To Whom It May Concern:    In my medical opinion, I recommend that Marian Kent be excused form work today, 9/20/22.  She was seen for a visit today.     If you have any questions or concerns, please don't hesitate to call [phone]          Sincerely,          TARUN MaxwellRWillianN.  Electronically Signed

## 2022-09-20 NOTE — PROGRESS NOTES
Virtual Visit: Established Patient   This visit was conducted via Zoom using secure and encrypted videoconferencing technology.   The patient was in their home in the [facility].    The patient's identity was confirmed and verbal consent was obtained for this virtual visit.     Subjective:   CC: No chief complaint on file.      Marian Kent is a 36 y.o. female presenting for evaluation and management of:    Generalized abdominal pain  - Pt had her colo last week.  She had increased pain beginning the day after.  She was seen by  My partner and given bentyl.  She continues to have pain and bloody BMs.  She has not gotten the results yet.  She was unable to get canasa as it is not covered.  She is asking for more pain medication. She denies Se from the pain meds. She reports she has been able to eat slightly more lately.     ROS   Review of Systems   Constitutional:  Positive for malaise/fatigue. Negative for chills, fever and weight loss.   Respiratory:  Negative for cough, shortness of breath and wheezing.    Cardiovascular:  Negative for chest pain, palpitations and leg swelling.   Gastrointestinal:  Positive for abdominal pain, blood in stool, diarrhea and nausea. Negative for constipation.       Current medicines (including changes today)  Current Outpatient Medications   Medication Sig Dispense Refill    [START ON 10/1/2022] HYDROcodone/acetaminophen (NORCO)  MG Tab Take 1 Tablet by mouth every 6 hours as needed for Moderate Pain for up to 30 days. 90 Tablet 0    gabapentin (NEURONTIN) 300 MG Cap Take 1 Capsule by mouth 3 times a day. 90 Capsule 5    [START ON 10/31/2022] HYDROcodone/acetaminophen (NORCO)  MG Tab Take 1 Tablet by mouth every 6 hours as needed for Moderate Pain for up to 30 days. 90 Tablet 0    [START ON 11/30/2022] HYDROcodone/acetaminophen (NORCO)  MG Tab Take 1 Tablet by mouth every 6 hours as needed for Moderate Pain for up to 30 days. 90 Tablet 0    mesalamine  "(CANASA) 1000 MG Suppos Insert 1 Suppository into the rectum at bedtime. 30 Suppository 5    promethazine (PHENERGAN) 25 MG Tab Take 1 Tablet by mouth every 6 hours as needed for Nausea/Vomiting. 30 Tablet 0    omeprazole (PRILOSEC OTC) 20 MG tablet Take 1 Tablet by mouth every day. 28 Tablet 1    fluconazole (DIFLUCAN) 150 MG tablet       traZODone (DESYREL) 50 MG Tab 1 TABLET AT BEDTIME FOR SLEEP - TAKE AS NEEDED      dicyclomine (BENTYL) 20 MG Tab Take 1 Tablet by mouth every 6 hours. 30 Tablet 1    predniSONE (DELTASONE) 20 MG Tab Take 1 Tablet by mouth 2 times a day. 60 Tablet 1    HYDROcodone/acetaminophen (NORCO)  MG Tab Take 1 Tablet by mouth every 8 hours as needed for Moderate Pain for up to 30 days. 90 Tablet 0    mesalamine extended-release (PENTASA) 500 MG capsule Take 2 Capsules by mouth 3 times a day. 180 Capsule 0    ondansetron (ZOFRAN ODT) 8 MG TABLET DISPERSIBLE Take 1 Tablet by mouth every 8 hours as needed for Nausea. 15 Tablet 0    Naloxone (NARCAN) 4 MG/0.1ML Liquid Use one as needed for overdose of opiates. Call 911 after use 2 Each 1     No current facility-administered medications for this visit.       Patient Active Problem List    Diagnosis Date Noted    Nausea with vomiting 09/16/2022    Heartburn 09/16/2022    Epigastric pain 09/16/2022    Acute midline low back pain without sciatica 12/13/2021    Traumatic subarachnoid hemorrhage with loss of consciousness (HCC) 12/13/2021    Acute pain of left knee 12/13/2021    History of bipolar disorder 12/13/2021    Suicidal ideation 07/19/2021    Generalized abdominal pain 03/18/2021    Kidney infection 02/17/2021    Postprocedural pelvic peritoneal adhesions 10/22/2020    Tobacco abuse disorder 02/19/2019    Endometriosis 02/19/2019    Anxiety 02/19/2019    Left inguinal hernia 02/19/2019    Pelvic pain 08/08/2016    Female pelvic pain 05/20/2016        Objective:   Resp 16   Ht 1.6 m (5' 3\")   Wt 67.1 kg (148 lb)   LMP 07/22/2016 " (Exact Date)   BMI 26.22 kg/m²     Physical Exam:  Constitutional: Alert, no distress, well-groomed.  Skin: No rashes in visible areas.  Eye: Round. Conjunctiva clear, lids normal. No icterus.   ENMT: Lips pink without lesions, good dentition, moist mucous membranes. Phonation normal.  Neck: No masses, no thyromegaly. Moves freely without pain.  Respiratory: Unlabored respiratory effort, no cough or audible wheeze  Psych: Alert and oriented x3, normal affect and mood.     Overall impression that this patient is benefiting from opioid therapy and that the benefits outweigh the risks of continued use: yes     - Controlled Substance Use Agreement current or updated today   - Urine drug screen current or ordered today   - Additional lab: CMP to assess liver and renal function- reviewed   - Rx refill prescriptions are done for 3 months, No early refills. See orders   - Referral to pain management: no    Assessment and Plan:   The following treatment plan was discussed:     1. Generalized abdominal pain  - HYDROcodone/acetaminophen (NORCO)  MG Tab; Take 1 Tablet by mouth every 6 hours as needed for Moderate Pain for up to 30 days.  Dispense: 90 Tablet; Refill: 0  - gabapentin (NEURONTIN) 300 MG Cap; Take 1 Capsule by mouth 3 times a day.  Dispense: 90 Capsule; Refill: 5  - HYDROcodone/acetaminophen (NORCO)  MG Tab; Take 1 Tablet by mouth every 6 hours as needed for Moderate Pain for up to 30 days.  Dispense: 90 Tablet; Refill: 0  - HYDROcodone/acetaminophen (NORCO)  MG Tab; Take 1 Tablet by mouth every 6 hours as needed for Moderate Pain for up to 30 days.  Dispense: 90 Tablet; Refill: 0  - mesalamine (CANASA) 1000 MG Suppos; Insert 1 Suppository into the rectum at bedtime.  Dispense: 30 Suppository; Refill: 5  - Chronic problem, unstable.  Norco refill. Add karena 300 mg TID. Potential side effects and discontinuation criteria were discussed with patient, patient verbalized understanding.  She will reach  out in a week and let me know how she is doing with karena.  Get canasa at our pharmacy next door as it is about $15.     Follow-up: Return in about 4 weeks (around 10/18/2022) for Routine follow up.

## 2022-10-26 ENCOUNTER — OFFICE VISIT (OUTPATIENT)
Dept: MEDICAL GROUP | Facility: MEDICAL CENTER | Age: 37
End: 2022-10-26
Attending: NURSE PRACTITIONER
Payer: MEDICAID

## 2022-10-26 VITALS
SYSTOLIC BLOOD PRESSURE: 92 MMHG | HEIGHT: 63 IN | OXYGEN SATURATION: 98 % | TEMPERATURE: 97.4 F | BODY MASS INDEX: 27.59 KG/M2 | RESPIRATION RATE: 19 BRPM | HEART RATE: 78 BPM | DIASTOLIC BLOOD PRESSURE: 64 MMHG | WEIGHT: 155.7 LBS

## 2022-10-26 DIAGNOSIS — R10.84 GENERALIZED ABDOMINAL PAIN: ICD-10-CM

## 2022-10-26 PROCEDURE — 99214 OFFICE O/P EST MOD 30 MIN: CPT | Performed by: NURSE PRACTITIONER

## 2022-10-26 PROCEDURE — 99213 OFFICE O/P EST LOW 20 MIN: CPT | Performed by: NURSE PRACTITIONER

## 2022-10-26 ASSESSMENT — FIBROSIS 4 INDEX: FIB4 SCORE: 0.66

## 2022-10-26 NOTE — LETTER
October 26, 2022    To Whom It May Concern:         This is confirmation that Marian Laila Kent attended her scheduled appointment with TARUN KumarRANDREZ on 10/26/22. Please excuse from yesterday and today due to illness.          If you have any questions please do not hesitate to call me at the phone number listed below.    Sincerely,          TARUN KumarRWillianN.  [phone]

## 2022-10-26 NOTE — PROGRESS NOTES
No chief complaint on file.      Subjective:     HPI:   Marian Kent is a 36 y.o. female here to discuss the evaluation and management of:      Problem   Generalized Abdominal Pain    Patient continues to complain of generalized abdominal pain however she states that she is starting to feel a lump near her umbilical area.  Patient went to the ER at [facility] and states that they told her that she may need to have her gallbladder removed.  Patient is here requesting referral to have her gallbladder removed as well as work note to excuse her from work.         ROS  See HPI     Allergies   Allergen Reactions    Percocet [Apap-Fd&C Red #40 [place]-Oxycodone] Hives    Oxycodone-Acetaminophen     Tramadol Hives       Current medicines (including changes today)  Current Outpatient Medications   Medication Sig Dispense Refill    HYDROcodone/acetaminophen (NORCO)  MG Tab Take 1 Tablet by mouth every 6 hours as needed for Moderate Pain for up to 30 days. 90 Tablet 0    gabapentin (NEURONTIN) 300 MG Cap Take 1 Capsule by mouth 3 times a day. 90 Capsule 5    [START ON 10/31/2022] HYDROcodone/acetaminophen (NORCO)  MG Tab Take 1 Tablet by mouth every 6 hours as needed for Moderate Pain for up to 30 days. 90 Tablet 0    [START ON 11/30/2022] HYDROcodone/acetaminophen (NORCO)  MG Tab Take 1 Tablet by mouth every 6 hours as needed for Moderate Pain for up to 30 days. 90 Tablet 0    mesalamine (CANASA) 1000 MG Suppos Insert 1 Suppository into the rectum at bedtime. 30 Suppository 5    promethazine (PHENERGAN) 25 MG Tab Take 1 Tablet by mouth every 6 hours as needed for Nausea/Vomiting. 30 Tablet 0    omeprazole (PRILOSEC OTC) 20 MG tablet Take 1 Tablet by mouth every day. 28 Tablet 1    fluconazole (DIFLUCAN) 150 MG tablet       traZODone (DESYREL) 50 MG Tab 1 TABLET AT BEDTIME FOR SLEEP - TAKE AS NEEDED      dicyclomine (BENTYL) 20 MG Tab Take 1 Tablet by mouth every 6 hours. 30 Tablet  1    predniSONE (DELTASONE) 20 MG Tab Take 1 Tablet by mouth 2 times a day. 60 Tablet 1    mesalamine extended-release (PENTASA) 500 MG capsule Take 2 Capsules by mouth 3 times a day. 180 Capsule 0    ondansetron (ZOFRAN ODT) 8 MG TABLET DISPERSIBLE Take 1 Tablet by mouth every 8 hours as needed for Nausea. 15 Tablet 0    Naloxone (NARCAN) 4 MG/0.1ML Liquid Use one as needed for overdose of opiates. Call 911 after use 2 Each 1     No current facility-administered medications for this visit.       Social History     Tobacco Use    Smoking status: Every Day     Packs/day: 1.00     Years: 10.00     Pack years: 10.00     Types: Cigarettes    Smokeless tobacco: Never   Vaping Use    Vaping Use: Never used   Substance Use Topics    Alcohol use: No     Alcohol/week: 0.0 oz     Comment: none since10/21    Drug use: No       Patient Active Problem List    Diagnosis Date Noted    Nausea with vomiting 09/16/2022    Heartburn 09/16/2022    Epigastric pain 09/16/2022    Acute midline low back pain without sciatica 12/13/2021    Traumatic subarachnoid hemorrhage with loss of consciousness (HCC) 12/13/2021    Acute pain of left knee 12/13/2021    History of bipolar disorder 12/13/2021    Suicidal ideation 07/19/2021    Generalized abdominal pain 03/18/2021    Kidney infection 02/17/2021    Postprocedural pelvic peritoneal adhesions 10/22/2020    Tobacco abuse disorder 02/19/2019    Endometriosis 02/19/2019    Anxiety 02/19/2019    Left inguinal hernia 02/19/2019    Pelvic pain 08/08/2016    Female pelvic pain 05/20/2016       Family History   Problem Relation Age of Onset    Cancer Father 45        colon    Other Maternal Grandfather     Cancer Paternal Grandmother         breast    Diabetes Paternal Grandmother     Heart Disease Neg Hx     Stroke Neg Hx           Objective:     BP (!) 92/64 (BP Location: Left arm, Patient Position: Sitting, BP Cuff Size: Adult)   Pulse 78   Temp 36.3 °C (97.4 °F) (Skin)   Resp 19   Ht 1.6 m  "(5' 3\")   Wt 70.6 kg (155 lb 11.2 oz)   SpO2 98%  Body mass index is 27.58 kg/m².    Physical Exam:  Physical Exam  Vitals reviewed.   Constitutional:       General: She is awake.      Appearance: Normal appearance. She is well-developed.   HENT:      Head: Normocephalic.   Eyes:      Conjunctiva/sclera: Conjunctivae normal.      Pupils: Pupils are equal, round, and reactive to light.   Cardiovascular:      Rate and Rhythm: Normal rate.   Pulmonary:      Effort: Pulmonary effort is normal. No respiratory distress.   Musculoskeletal:      Cervical back: Neck supple.   Skin:     General: Skin is warm and dry.   Neurological:      Mental Status: She is alert and oriented to person, place, and time.   Psychiatric:         Mood and Affect: Mood normal.         Behavior: Behavior normal. Behavior is cooperative.            Assessment and Plan:     The following treatment plan was discussed:    Problem List Items Addressed This Visit       Generalized abdominal pain     Ongoing-  Requested records from [facility] as we cannot see imaging they completed  Did review CT scan from less than a month ago from [place] which showed no evidence of gallstones or cholelithiasis/cholecystitis  CT scan from [place] did reveal small fat-containing hernia which may be the lump that she is feeling and thinking it is her gallbladder.  Discussed with patient that her colonoscopy and biopsies revealed no acute findings.  Patient would like to be evaluated for a further specialist above GI to look into her symptoms.  Discussed with patient that she would have to talk to her primary care physician on what the next steps would be.  Provided patient work note for yesterday and today.            Any change or worsening of signs or symptoms, patient encouraged to follow-up or report to emergency room for further evaluation. Patient verbalizes understanding and agrees.    Follow-Up: Follow-up with your primary care " physician      PLEASE NOTE: This dictation was created using voice recognition software. I have made every reasonable attempt to correct obvious errors, but I expect that there are errors of grammar and possibly content that I did not discover before finalizing the note.

## 2022-10-26 NOTE — LETTER
[facility]  MANUEL Maxwell  [address]  [place] [address]  Fax: [phone]   Authorization for Release/Disclosure of   Protected Health Information   Name: MARIAN MORAN : 1985 SSN: [id number]   Address: [address] [place] [address] Phone:    [phone] (home)    I authorize the entity listed below to release/disclose the PHI below to:   [facility]/MANUEL Maxwell and MANUEL Kumar   Provider or Entity Name:  [facility]    Address   City, State, [facility]   Phone:      Fax:     Reason for request: continuity of care   Information to be released:    [  ] LAST COLONOSCOPY,  including any PATH REPORT and follow-up  [  ] LAST FIT/COLOGUARD RESULT [  ] LAST DEXA  [  ] LAST MAMMOGRAM  [  ] LAST PAP  [  ] LAST LABS [  ] RETINA EXAM REPORT  [  ] IMMUNIZATION RECORDS  [ xx ] Release all info      [  ] Check here and initial the line next to each item to release ALL health information INCLUDING  _____ Care and treatment for drug and / or alcohol abuse  _____ HIV testing, infection status, or AIDS  _____ Genetic Testing    DATES OF SERVICE OR TIME PERIOD TO BE DISCLOSED: _____________  I understand and acknowledge that:  * This Authorization may be revoked at any time by you in writing, except if your health information has already been used or disclosed.  * Your health information that will be used or disclosed as a result of you signing this authorization could be re-disclosed by the recipient. If this occurs, your re-disclosed health information may no longer be protected by State or Federal laws.  * You may refuse to sign this Authorization. Your refusal will not affect your ability to obtain treatment.  * This Authorization becomes effective upon signing and will  on (date) __________.      If no date is indicated, this Authorization will  one (1) year from the signature date.    Name: Marian Moran    Signature:   Date:      10/26/2022       PLEASE FAX REQUESTED RECORDS BACK TO: [phone]

## 2022-10-26 NOTE — ASSESSMENT & PLAN NOTE
Ongoing-  Requested records from [facility] as we cannot see imaging they completed  Did review CT scan from less than a month ago from [place] which showed no evidence of gallstones or cholelithiasis/cholecystitis  CT scan from [place] did reveal small fat-containing hernia which may be the lump that she is feeling and thinking it is her gallbladder.  Discussed with patient that her colonoscopy and biopsies revealed no acute findings.  Patient would like to be evaluated for a further specialist above GI to look into her symptoms.  Discussed with patient that she would have to talk to her primary care physician on what the next steps would be.  Provided patient work note for yesterday and today.

## 2022-11-03 ENCOUNTER — TELEPHONE (OUTPATIENT)
Dept: MEDICAL GROUP | Facility: MEDICAL CENTER | Age: 37
End: 2022-11-03
Payer: MEDICAID

## 2022-11-10 DIAGNOSIS — R12 HEARTBURN: ICD-10-CM

## 2022-11-11 NOTE — TELEPHONE ENCOUNTER
Received request via: Pharmacy    Was the patient seen in the last year in this department? Yes    Does the patient have an active prescription (recently filled or refills available) for medication(s) requested? No    Does the patient have group home Plus and need 100 day supply (blood pressure, diabetes and cholesterol meds only)? Patient does not have [facility]

## 2022-11-15 ENCOUNTER — TELEPHONE (OUTPATIENT)
Dept: MEDICAL GROUP | Facility: MEDICAL CENTER | Age: 37
End: 2022-11-15
Payer: MEDICAID

## 2022-11-15 RX ORDER — OMEPRAZOLE 20 MG/1
20 TABLET, DELAYED RELEASE ORAL DAILY
Qty: 28 TABLET | Refills: 1 | Status: SHIPPED | OUTPATIENT
Start: 2022-11-15 | End: 2023-01-18

## 2022-11-15 NOTE — TELEPHONE ENCOUNTER
1. Caller Name: [place]                        Call Back Number: [phone] (home)         How would the patient prefer to be contacted with a response: Phone call OK to leave a detailed message    Pt is requesting a phone call from you to discuss  her medications and some issues she is having. Please contact pt.

## 2022-11-18 ENCOUNTER — OFFICE VISIT (OUTPATIENT)
Dept: MEDICAL GROUP | Facility: MEDICAL CENTER | Age: 37
End: 2022-11-18
Attending: NURSE PRACTITIONER
Payer: MEDICAID

## 2022-11-18 VITALS
DIASTOLIC BLOOD PRESSURE: 64 MMHG | HEART RATE: 82 BPM | WEIGHT: 153.4 LBS | BODY MASS INDEX: 27.18 KG/M2 | OXYGEN SATURATION: 96 % | SYSTOLIC BLOOD PRESSURE: 92 MMHG | RESPIRATION RATE: 18 BRPM | HEIGHT: 63 IN | TEMPERATURE: 97.8 F

## 2022-11-18 DIAGNOSIS — R10.84 GENERALIZED ABDOMINAL PAIN: ICD-10-CM

## 2022-11-18 PROCEDURE — 99214 OFFICE O/P EST MOD 30 MIN: CPT | Performed by: NURSE PRACTITIONER

## 2022-11-18 PROCEDURE — 99213 OFFICE O/P EST LOW 20 MIN: CPT | Performed by: NURSE PRACTITIONER

## 2022-11-18 RX ORDER — SUCRALFATE 1 G/1
1 TABLET ORAL
Qty: 56 TABLET | Refills: 0 | Status: SHIPPED | OUTPATIENT
Start: 2022-11-18 | End: 2022-12-02

## 2022-11-18 ASSESSMENT — FIBROSIS 4 INDEX: FIB4 SCORE: 0.68

## 2022-11-18 NOTE — LETTER
November 18, 2022    To Whom It May Concern:         This is confirmation that Marian Kent attended her scheduled appointment with TARUN KumarRANDREZ on 11/18/22. Please excuse earlier this week due to illness. May return on 11/19/22 if she is feeling well.          If you have any questions please do not hesitate to call me at the phone number listed below.    Sincerely,          BRADEN Kumar.P.R.N.  [phone]

## 2022-11-19 NOTE — PROGRESS NOTES
No chief complaint on file.      Subjective:     HPI:   Marian Kent is a 37 y.o. female here to discuss the evaluation and management of:      Problem   Generalized Abdominal Pain    Patient here after visit last week with continued abdominal pain. Was seen at [facility] about 2 weeks ago for same complaint. At that time ultrasound was completed and revealed no abnormal findings and patient was told she likely had gastritis. Patient misunderstood and thought her gallbladder had issues and wanted to have a referral to a surgeon to discuss having cholecystectomy. Patient states that she has been out of work for the last 3 weeks and would like a note excusing her for the entire time. Patient also requesting increase in pain medication as her 10mg Norco do not help enough and she would like something similar to the morphine given to her in the ER.          ROS  See HPI     Allergies   Allergen Reactions    Percocet [Apap-Fd&C Red #40 [place]-Oxycodone] Hives    Oxycodone-Acetaminophen     Tramadol Hives       Current medicines (including changes today)  Current Outpatient Medications   Medication Sig Dispense Refill    sucralfate (CARAFATE) 1 GM Tab Take 1 Tablet by mouth 4 Times a Day,Before Meals and at Bedtime for 14 days. 56 Tablet 0    omeprazole (PRILOSEC OTC) 20 MG tablet TAKE 1 TABLET BY MOUTH EVERY DAY 28 Tablet 1    gabapentin (NEURONTIN) 300 MG Cap Take 1 Capsule by mouth 3 times a day. 90 Capsule 5    HYDROcodone/acetaminophen (NORCO)  MG Tab Take 1 Tablet by mouth every 6 hours as needed for Moderate Pain for up to 30 days. 90 Tablet 0    [START ON 11/30/2022] HYDROcodone/acetaminophen (NORCO)  MG Tab Take 1 Tablet by mouth every 6 hours as needed for Moderate Pain for up to 30 days. 90 Tablet 0    mesalamine (CANASA) 1000 MG Suppos Insert 1 Suppository into the rectum at bedtime. 30 Suppository 5    promethazine (PHENERGAN) 25 MG Tab Take 1 Tablet by mouth every 6  hours as needed for Nausea/Vomiting. 30 Tablet 0    fluconazole (DIFLUCAN) 150 MG tablet       traZODone (DESYREL) 50 MG Tab 1 TABLET AT BEDTIME FOR SLEEP - TAKE AS NEEDED      dicyclomine (BENTYL) 20 MG Tab Take 1 Tablet by mouth every 6 hours. 30 Tablet 1    predniSONE (DELTASONE) 20 MG Tab Take 1 Tablet by mouth 2 times a day. 60 Tablet 1    mesalamine extended-release (PENTASA) 500 MG capsule Take 2 Capsules by mouth 3 times a day. 180 Capsule 0    ondansetron (ZOFRAN ODT) 8 MG TABLET DISPERSIBLE Take 1 Tablet by mouth every 8 hours as needed for Nausea. 15 Tablet 0    Naloxone (NARCAN) 4 MG/0.1ML Liquid Use one as needed for overdose of opiates. Call 911 after use 2 Each 1     No current facility-administered medications for this visit.       Social History     Tobacco Use    Smoking status: Every Day     Packs/day: 1.00     Years: 10.00     Pack years: 10.00     Types: Cigarettes    Smokeless tobacco: Never   Vaping Use    Vaping Use: Never used   Substance Use Topics    Alcohol use: No     Alcohol/week: 0.0 oz     Comment: none since10/21    Drug use: No       Patient Active Problem List    Diagnosis Date Noted    Nausea with vomiting 09/16/2022    Heartburn 09/16/2022    Epigastric pain 09/16/2022    Acute midline low back pain without sciatica 12/13/2021    Traumatic subarachnoid hemorrhage with loss of consciousness (HCC) 12/13/2021    Acute pain of left knee 12/13/2021    History of bipolar disorder 12/13/2021    Suicidal ideation 07/19/2021    Generalized abdominal pain 03/18/2021    Kidney infection 02/17/2021    Postprocedural pelvic peritoneal adhesions 10/22/2020    Tobacco abuse disorder 02/19/2019    Endometriosis 02/19/2019    Anxiety 02/19/2019    Left inguinal hernia 02/19/2019    Pelvic pain 08/08/2016    Female pelvic pain 05/20/2016       Family History   Problem Relation Age of Onset    Cancer Father 45        colon    Other Maternal Grandfather     Cancer Paternal Grandmother         breast  "   Diabetes Paternal Grandmother     Heart Disease Neg Hx     Stroke Neg Hx           Objective:     BP (!) 92/64 (BP Location: Left arm, Patient Position: Sitting, BP Cuff Size: Adult)   Pulse 82   Temp 36.6 °C (97.8 °F) (Skin)   Resp 18   Ht 1.6 m (5' 3\")   Wt 69.6 kg (153 lb 6.4 oz)   SpO2 96%  Body mass index is 27.17 kg/m².    Physical Exam:  Physical Exam  Vitals reviewed.   Constitutional:       General: She is awake.      Appearance: Normal appearance. She is well-developed.   HENT:      Head: Normocephalic.   Eyes:      Conjunctiva/sclera: Conjunctivae normal.   Cardiovascular:      Rate and Rhythm: Normal rate.   Pulmonary:      Effort: Pulmonary effort is normal. No respiratory distress.   Musculoskeletal:      Cervical back: Neck supple.   Skin:     General: Skin is warm and dry.   Neurological:      Mental Status: She is alert and oriented to person, place, and time.   Psychiatric:         Mood and Affect: Mood normal.         Behavior: Behavior normal. Behavior is cooperative.            Assessment and Plan:     The following treatment plan was discussed:    Problem List Items Addressed This Visit       Generalized abdominal pain     Ongoing-   Will defer increase in pain medication or pain management referral to her PCP- Discussed this with patient.   Prescribed 2 weeks of carafate to help with abdominal discomfort.   Explained to patient that her US did not show any signs of acute findings therefore will not move forward with referral to surgery.   Provided work note for this week allowing patient to return to work tomorrow.          Relevant Medications    sucralfate (CARAFATE) 1 GM Tab       Any change or worsening of signs or symptoms, patient encouraged to follow-up or report to emergency room for further evaluation. Patient verbalizes understanding and agrees.    Follow-Up: Follow up with your PCP        PLEASE NOTE: This dictation was created using voice recognition software. I have made " every reasonable attempt to correct obvious errors, but I expect that there are errors of grammar and possibly content that I did not discover before finalizing the note.

## 2022-11-19 NOTE — ASSESSMENT & PLAN NOTE
Ongoing-   Will defer increase in pain medication or pain management referral to her PCP- Discussed this with patient.   Prescribed 2 weeks of carafate to help with abdominal discomfort.   Explained to patient that her US did not show any signs of acute findings therefore will not move forward with referral to surgery.   Provided work note for this week allowing patient to return to work tomorrow.

## 2022-11-29 ENCOUNTER — TELEPHONE (OUTPATIENT)
Dept: MEDICAL GROUP | Facility: MEDICAL CENTER | Age: 37
End: 2022-11-29
Payer: MEDICAID

## 2022-11-29 DIAGNOSIS — K50.10 CROHN'S DISEASE OF COLON WITHOUT COMPLICATION (HCC): ICD-10-CM

## 2022-11-29 DIAGNOSIS — R10.84 GENERALIZED ABDOMINAL PAIN: ICD-10-CM

## 2022-11-30 RX ORDER — PREDNISONE 20 MG/1
20 TABLET ORAL 2 TIMES DAILY
Qty: 60 TABLET | Refills: 1 | OUTPATIENT
Start: 2022-11-30

## 2022-11-30 NOTE — TELEPHONE ENCOUNTER
Received request via: Patient    Was the patient seen in the last year in this department? Yes    Does the patient have an active prescription (recently filled or refills available) for medication(s) requested? No    Does the patient have assisted Plus and need 100 day supply (blood pressure, diabetes and cholesterol meds only)? Patient does not have SCP

## 2022-12-26 DIAGNOSIS — R10.84 GENERALIZED ABDOMINAL PAIN: ICD-10-CM

## 2022-12-26 DIAGNOSIS — K50.10 CROHN'S DISEASE OF COLON WITHOUT COMPLICATION (HCC): ICD-10-CM

## 2022-12-30 RX ORDER — PREDNISONE 20 MG/1
TABLET ORAL
Qty: 60 TABLET | Refills: 1 | Status: SHIPPED | OUTPATIENT
Start: 2022-12-30 | End: 2023-01-03

## 2023-01-03 ENCOUNTER — OFFICE VISIT (OUTPATIENT)
Dept: MEDICAL GROUP | Facility: MEDICAL CENTER | Age: 38
End: 2023-01-03
Attending: NURSE PRACTITIONER
Payer: MEDICAID

## 2023-01-03 VITALS
DIASTOLIC BLOOD PRESSURE: 66 MMHG | SYSTOLIC BLOOD PRESSURE: 104 MMHG | OXYGEN SATURATION: 97 % | WEIGHT: 148 LBS | HEIGHT: 63 IN | BODY MASS INDEX: 26.22 KG/M2 | TEMPERATURE: 97.2 F | RESPIRATION RATE: 16 BRPM | HEART RATE: 100 BPM

## 2023-01-03 DIAGNOSIS — R10.84 GENERALIZED ABDOMINAL PAIN: ICD-10-CM

## 2023-01-03 DIAGNOSIS — R19.7 DIARRHEA, UNSPECIFIED TYPE: ICD-10-CM

## 2023-01-03 DIAGNOSIS — K64.8 INTERNAL HEMORRHOID: ICD-10-CM

## 2023-01-03 PROCEDURE — 99212 OFFICE O/P EST SF 10 MIN: CPT | Performed by: NURSE PRACTITIONER

## 2023-01-03 PROCEDURE — 99213 OFFICE O/P EST LOW 20 MIN: CPT | Performed by: NURSE PRACTITIONER

## 2023-01-03 RX ORDER — FLUOXETINE HYDROCHLORIDE 40 MG/1
40 CAPSULE ORAL DAILY
COMMUNITY
Start: 2022-12-24 | End: 2023-03-16

## 2023-01-03 RX ORDER — PROPRANOLOL HYDROCHLORIDE 20 MG/1
TABLET ORAL
COMMUNITY
Start: 2022-11-03 | End: 2023-01-19

## 2023-01-03 RX ORDER — HYDROCORTISONE ACETATE 25 MG/1
25 SUPPOSITORY RECTAL EVERY 12 HOURS
Qty: 30 SUPPOSITORY | Refills: 0 | Status: SHIPPED | OUTPATIENT
Start: 2023-01-03 | End: 2023-01-19

## 2023-01-03 RX ORDER — HYDROCODONE BITARTRATE AND ACETAMINOPHEN 10; 325 MG/1; MG/1
1 TABLET ORAL EVERY 8 HOURS PRN
Qty: 90 TABLET | Refills: 0 | Status: SHIPPED | OUTPATIENT
Start: 2023-01-03 | End: 2023-02-02

## 2023-01-03 RX ORDER — RISPERIDONE 4 MG/1
TABLET ORAL
COMMUNITY
Start: 2022-11-17 | End: 2023-01-19

## 2023-01-03 RX ORDER — HYDROCODONE BITARTRATE AND ACETAMINOPHEN 10; 325 MG/1; MG/1
1 TABLET ORAL EVERY 8 HOURS PRN
Qty: 90 TABLET | Refills: 0 | Status: SHIPPED | OUTPATIENT
Start: 2023-02-02 | End: 2023-03-04

## 2023-01-03 RX ORDER — HYDROCODONE BITARTRATE AND ACETAMINOPHEN 10; 325 MG/1; MG/1
1 TABLET ORAL EVERY 8 HOURS PRN
Qty: 90 TABLET | Refills: 0 | Status: SHIPPED | OUTPATIENT
Start: 2023-03-04 | End: 2023-04-03

## 2023-01-03 RX ORDER — CARIPRAZINE 3 MG/1
3 CAPSULE, GELATIN COATED ORAL EVERY EVENING
COMMUNITY
Start: 2022-12-12 | End: 2023-03-16

## 2023-01-03 RX ORDER — DIPHENOXYLATE HYDROCHLORIDE AND ATROPINE SULFATE 2.5; .025 MG/1; MG/1
1 TABLET ORAL 4 TIMES DAILY PRN
Qty: 120 TABLET | Refills: 0 | Status: SHIPPED | OUTPATIENT
Start: 2023-01-03 | End: 2023-01-19

## 2023-01-03 RX ORDER — PREDNISONE 5 MG/1
TABLET ORAL
Qty: 15 TABLET | Refills: 0 | Status: SHIPPED | OUTPATIENT
Start: 2023-01-03 | End: 2023-01-19

## 2023-01-03 ASSESSMENT — ENCOUNTER SYMPTOMS
DIARRHEA: 1
ABDOMINAL PAIN: 1
CHILLS: 0
NAUSEA: 1
COUGH: 0
FEVER: 0
WHEEZING: 0
PALPITATIONS: 0
CONSTIPATION: 0
WEIGHT LOSS: 0
SHORTNESS OF BREATH: 0
BLOOD IN STOOL: 0

## 2023-01-03 ASSESSMENT — FIBROSIS 4 INDEX: FIB4 SCORE: 0.28

## 2023-01-03 NOTE — PROGRESS NOTES
No chief complaint on file.      Subjective:     HPI:   Marian Kent is a 37 y.o. female here to discuss the evaluation and management of:        Generalized abdominal pain  She saw GI and they think that it could be her gallbladder.  Her pain continues.  She continues to have diarrhea near daily.  She has been vomiting bilious liquid in the morning.  She has been without pain medication and has noticed an increase in her pain without it. She has internal hemorrhoid pain.  She denies external hemorrhoids.     ROS  Review of Systems   Constitutional:  Negative for chills, fever, malaise/fatigue and weight loss.   Respiratory:  Negative for cough, shortness of breath and wheezing.    Cardiovascular:  Negative for chest pain, palpitations and leg swelling.   Gastrointestinal:  Positive for abdominal pain, diarrhea and nausea. Negative for blood in stool and constipation.       Allergies   Allergen Reactions    Percocet [Apap-Fd&C Red #40 [place]-Oxycodone] Hives    Oxycodone-Acetaminophen     Tramadol Hives       Current medicines (including changes today)  Current Outpatient Medications   Medication Sig Dispense Refill    HYDROcodone/acetaminophen (NORCO)  MG Tab Take 1 Tablet by mouth every 8 hours as needed for Moderate Pain for up to 30 days. 90 Tablet 0    [START ON 2/2/2023] HYDROcodone/acetaminophen (NORCO)  MG Tab Take 1 Tablet by mouth every 8 hours as needed for Moderate Pain for up to 30 days. 90 Tablet 0    [START ON 3/4/2023] HYDROcodone/acetaminophen (NORCO)  MG Tab Take 1 Tablet by mouth every 8 hours as needed for Moderate Pain for up to 30 days. 90 Tablet 0    diphenoxylate-atropine (LOMOTIL) 2.5-0.025 MG Tab Take 1 Tablet by mouth 4 times a day as needed for Diarrhea for up to 30 days. 120 Tablet 0    predniSONE (DELTASONE) 5 MG Tab Take 2 tabs (10 mg) by mouth once daily for five days.  Take one tab by mouth once daily for five days then stop 15 Tablet 0    hydrocortisone  (ANUSOL-HC) 25 MG Suppos Insert 1 Suppository into the rectum every 12 hours. 30 Suppository 0    omeprazole (PRILOSEC OTC) 20 MG tablet TAKE 1 TABLET BY MOUTH EVERY DAY 28 Tablet 1    gabapentin (NEURONTIN) 300 MG Cap Take 1 Capsule by mouth 3 times a day. 90 Capsule 5    mesalamine (CANASA) 1000 MG Suppos Insert 1 Suppository into the rectum at bedtime. 30 Suppository 5    promethazine (PHENERGAN) 25 MG Tab Take 1 Tablet by mouth every 6 hours as needed for Nausea/Vomiting. 30 Tablet 0    fluconazole (DIFLUCAN) 150 MG tablet       traZODone (DESYREL) 50 MG Tab 1 TABLET AT BEDTIME FOR SLEEP - TAKE AS NEEDED      dicyclomine (BENTYL) 20 MG Tab Take 1 Tablet by mouth every 6 hours. 30 Tablet 1    mesalamine extended-release (PENTASA) 500 MG capsule Take 2 Capsules by mouth 3 times a day. 180 Capsule 0    ondansetron (ZOFRAN ODT) 8 MG TABLET DISPERSIBLE Take 1 Tablet by mouth every 8 hours as needed for Nausea. 15 Tablet 0    Naloxone (NARCAN) 4 MG/0.1ML Liquid Use one as needed for overdose of opiates. Call 911 after use 2 Each 1     No current facility-administered medications for this visit.       Social History     Tobacco Use    Smoking status: Every Day     Packs/day: 1.00     Years: 10.00     Pack years: 10.00     Types: Cigarettes    Smokeless tobacco: Never   Vaping Use    Vaping Use: Never used   Substance Use Topics    Alcohol use: No     Alcohol/week: 0.0 oz     Comment: none since10/21    Drug use: No       Patient Active Problem List    Diagnosis Date Noted    Nausea with vomiting 09/16/2022    Heartburn 09/16/2022    Epigastric pain 09/16/2022    Acute midline low back pain without sciatica 12/13/2021    Traumatic subarachnoid hemorrhage with loss of consciousness (HCC) 12/13/2021    Acute pain of left knee 12/13/2021    History of bipolar disorder 12/13/2021    Suicidal ideation 07/19/2021    Generalized abdominal pain 03/18/2021    Kidney infection 02/17/2021    Postprocedural pelvic peritoneal  adhesions 10/22/2020    Tobacco abuse disorder 02/19/2019    Endometriosis 02/19/2019    Anxiety 02/19/2019    Left inguinal hernia 02/19/2019    Pelvic pain 08/08/2016    Female pelvic pain 05/20/2016       Family History   Problem Relation Age of Onset    Cancer Father 45        colon    Other Maternal Grandfather     Cancer Paternal Grandmother         breast    Diabetes Paternal Grandmother     Heart Disease Neg Hx     Stroke Neg Hx           Objective:     There were no vitals taken for this visit. There is no height or weight on file to calculate BMI.    Physical Exam:  Physical Exam  Constitutional:       General: She is not in acute distress.  HENT:      Head: Normocephalic.      Right Ear: Tympanic membrane and external ear normal.      Left Ear: Tympanic membrane and external ear normal.   Eyes:      Conjunctiva/sclera: Conjunctivae normal.      Pupils: Pupils are equal, round, and reactive to light.   Neck:      Trachea: No tracheal deviation.   Cardiovascular:      Rate and Rhythm: Normal rate and regular rhythm.      Heart sounds: Normal heart sounds.   Pulmonary:      Effort: Pulmonary effort is normal.      Breath sounds: Normal breath sounds.   Abdominal:      General: Bowel sounds are increased.      Palpations: Abdomen is soft.      Tenderness: There is no abdominal tenderness.   Musculoskeletal:         General: Normal range of motion.      Cervical back: Normal range of motion and neck supple.   Lymphadenopathy:      Head:      Right side of head: No preauricular adenopathy.      Left side of head: No preauricular adenopathy.      Cervical: No cervical adenopathy.   Skin:     General: Skin is warm and dry.   Neurological:      Mental Status: She is alert and oriented to person, place, and time.      Sensory: Sensation is intact.      Gait: Gait is intact.   Psychiatric:         Mood and Affect: Mood and affect normal.         Judgment: Judgment normal.     Overall impression that this patient is  benefiting from opioid therapy and that the benefits outweigh the risks of continued use: yes     - Controlled Substance Use Agreement current or updated today   - Urine drug screen current or ordered today   - Additional lab: CMP to assess liver and renal function- reviewed from 9/22   - Rx refill prescriptions are done for 3 months, No early refills. See orders   - Referral to pain management: no      Assessment and Plan:     The following treatment plan was discussed:    1. Generalized abdominal pain  HYDROcodone/acetaminophen (NORCO)  MG Tab    HYDROcodone/acetaminophen (NORCO)  MG Tab    HYDROcodone/acetaminophen (NORCO)  MG Tab    Referral to General Surgery    predniSONE (DELTASONE) 5 MG Tab  -Chronic problem, unstable.  Referral to general surgery for evaluation.  Prednisone taper 10 mg once daily for 5 days, 5 mg once daily for 5 days then stop.      2. Diarrhea, unspecified type  diphenoxylate-atropine (LOMOTIL) 2.5-0.025 MG Tab  -Chronic problem, unstable.  We will try Lomotil.      3. Internal hemorrhoid  hydrocortisone (ANUSOL-HC) 25 MG Suppos  -Chronic problem, unstable.  Anusol suppositories as needed.          Any change or worsening of signs or symptoms, patient encouraged to follow-up or report to emergency room for further evaluation. Patient verbalizes understanding and agrees.    Follow-Up: Return in about 6 weeks (around 2/14/2023) for Routine follow up.      PLEASE NOTE: This dictation was created using voice recognition software. I have made every reasonable attempt to correct obvious errors, but I expect that there are errors of grammar and possibly content that I did not discover before finalizing the note.

## 2023-01-03 NOTE — ASSESSMENT & PLAN NOTE
She saw GI and they think that it could be her gallbladder.  Her pain continues.  She continues to have diarrhea near daily.  She has been vomiting bilious liquid in the morning.  She has been without pain medication and has noticed an increase in her pain without it. She has internal hemorrhoid pain.  She denies external hemorrhoids.

## 2023-01-12 DIAGNOSIS — R12 HEARTBURN: ICD-10-CM

## 2023-01-18 RX ORDER — OMEPRAZOLE 20 MG/1
20 TABLET, DELAYED RELEASE ORAL DAILY
Qty: 28 TABLET | Refills: 1 | Status: SHIPPED | OUTPATIENT
Start: 2023-01-18 | End: 2023-01-19

## 2023-01-19 ENCOUNTER — HOSPITAL ENCOUNTER (EMERGENCY)
Facility: MEDICAL CENTER | Age: 38
End: 2023-01-19
Attending: EMERGENCY MEDICINE
Payer: MEDICAID

## 2023-01-19 ENCOUNTER — APPOINTMENT (OUTPATIENT)
Dept: RADIOLOGY | Facility: MEDICAL CENTER | Age: 38
End: 2023-01-19
Attending: EMERGENCY MEDICINE
Payer: MEDICAID

## 2023-01-19 VITALS
WEIGHT: 161.38 LBS | BODY MASS INDEX: 28.59 KG/M2 | DIASTOLIC BLOOD PRESSURE: 76 MMHG | SYSTOLIC BLOOD PRESSURE: 107 MMHG | OXYGEN SATURATION: 93 % | RESPIRATION RATE: 18 BRPM | TEMPERATURE: 96.7 F | HEART RATE: 71 BPM | HEIGHT: 63 IN

## 2023-01-19 DIAGNOSIS — R10.9 FLANK PAIN: ICD-10-CM

## 2023-01-19 DIAGNOSIS — S16.1XXA STRAIN OF NECK MUSCLE, INITIAL ENCOUNTER: ICD-10-CM

## 2023-01-19 DIAGNOSIS — M54.10 RADICULAR LOW BACK PAIN: ICD-10-CM

## 2023-01-19 DIAGNOSIS — R11.2 NAUSEA AND VOMITING, UNSPECIFIED VOMITING TYPE: ICD-10-CM

## 2023-01-19 LAB
ALBUMIN SERPL BCP-MCNC: 4.4 G/DL (ref 3.2–4.9)
ALBUMIN/GLOB SERPL: 2.2 G/DL
ALP SERPL-CCNC: 61 U/L (ref 30–99)
ALT SERPL-CCNC: 68 U/L (ref 2–50)
ANION GAP SERPL CALC-SCNC: 11 MMOL/L (ref 7–16)
APPEARANCE UR: CLEAR
AST SERPL-CCNC: 43 U/L (ref 12–45)
BASOPHILS # BLD AUTO: 0.5 % (ref 0–1.8)
BASOPHILS # BLD: 0.03 K/UL (ref 0–0.12)
BILIRUB SERPL-MCNC: 0.3 MG/DL (ref 0.1–1.5)
BILIRUB UR QL STRIP.AUTO: NEGATIVE
BUN SERPL-MCNC: 6 MG/DL (ref 8–22)
CALCIUM ALBUM COR SERPL-MCNC: 8.8 MG/DL (ref 8.5–10.5)
CALCIUM SERPL-MCNC: 9.1 MG/DL (ref 8.4–10.2)
CHLORIDE SERPL-SCNC: 109 MMOL/L (ref 96–112)
CO2 SERPL-SCNC: 20 MMOL/L (ref 20–33)
COLOR UR: YELLOW
CREAT SERPL-MCNC: 0.56 MG/DL (ref 0.5–1.4)
EOSINOPHIL # BLD AUTO: 0.13 K/UL (ref 0–0.51)
EOSINOPHIL NFR BLD: 2.3 % (ref 0–6.9)
ERYTHROCYTE [DISTWIDTH] IN BLOOD BY AUTOMATED COUNT: 44.5 FL (ref 35.9–50)
GFR SERPLBLD CREATININE-BSD FMLA CKD-EPI: 121 ML/MIN/1.73 M 2
GLOBULIN SER CALC-MCNC: 2 G/DL (ref 1.9–3.5)
GLUCOSE SERPL-MCNC: 106 MG/DL (ref 65–99)
GLUCOSE UR STRIP.AUTO-MCNC: NEGATIVE MG/DL
HCG SERPL QL: NEGATIVE
HCT VFR BLD AUTO: 43.7 % (ref 37–47)
HGB BLD-MCNC: 15 G/DL (ref 12–16)
IMM GRANULOCYTES # BLD AUTO: 0.02 K/UL (ref 0–0.11)
IMM GRANULOCYTES NFR BLD AUTO: 0.3 % (ref 0–0.9)
KETONES UR STRIP.AUTO-MCNC: NEGATIVE MG/DL
LEUKOCYTE ESTERASE UR QL STRIP.AUTO: NEGATIVE
LIPASE SERPL-CCNC: 40 U/L (ref 7–58)
LYMPHOCYTES # BLD AUTO: 1.78 K/UL (ref 1–4.8)
LYMPHOCYTES NFR BLD: 31.1 % (ref 22–41)
MCH RBC QN AUTO: 33.9 PG (ref 27–33)
MCHC RBC AUTO-ENTMCNC: 34.3 G/DL (ref 33.6–35)
MCV RBC AUTO: 98.9 FL (ref 81.4–97.8)
MICRO URNS: NORMAL
MONOCYTES # BLD AUTO: 0.42 K/UL (ref 0–0.85)
MONOCYTES NFR BLD AUTO: 7.3 % (ref 0–13.4)
NEUTROPHILS # BLD AUTO: 3.35 K/UL (ref 2–7.15)
NEUTROPHILS NFR BLD: 58.5 % (ref 44–72)
NITRITE UR QL STRIP.AUTO: NEGATIVE
NRBC # BLD AUTO: 0 K/UL
NRBC BLD-RTO: 0 /100 WBC
PH UR STRIP.AUTO: 7 [PH] (ref 5–8)
PLATELET # BLD AUTO: 233 K/UL (ref 164–446)
PMV BLD AUTO: 8.2 FL (ref 9–12.9)
POTASSIUM SERPL-SCNC: 4.1 MMOL/L (ref 3.6–5.5)
PROT SERPL-MCNC: 6.4 G/DL (ref 6–8.2)
PROT UR QL STRIP: NEGATIVE MG/DL
RBC # BLD AUTO: 4.42 M/UL (ref 4.2–5.4)
RBC UR QL AUTO: NEGATIVE
SODIUM SERPL-SCNC: 140 MMOL/L (ref 135–145)
SP GR UR STRIP.AUTO: <=1.005
WBC # BLD AUTO: 5.7 K/UL (ref 4.8–10.8)

## 2023-01-19 PROCEDURE — 36415 COLL VENOUS BLD VENIPUNCTURE: CPT

## 2023-01-19 PROCEDURE — 96376 TX/PRO/DX INJ SAME DRUG ADON: CPT

## 2023-01-19 PROCEDURE — 83690 ASSAY OF LIPASE: CPT

## 2023-01-19 PROCEDURE — 84703 CHORIONIC GONADOTROPIN ASSAY: CPT

## 2023-01-19 PROCEDURE — 96374 THER/PROPH/DIAG INJ IV PUSH: CPT

## 2023-01-19 PROCEDURE — 700105 HCHG RX REV CODE 258: Performed by: EMERGENCY MEDICINE

## 2023-01-19 PROCEDURE — 80053 COMPREHEN METABOLIC PANEL: CPT

## 2023-01-19 PROCEDURE — 72100 X-RAY EXAM L-S SPINE 2/3 VWS: CPT

## 2023-01-19 PROCEDURE — 99284 EMERGENCY DEPT VISIT MOD MDM: CPT

## 2023-01-19 PROCEDURE — 81003 URINALYSIS AUTO W/O SCOPE: CPT

## 2023-01-19 PROCEDURE — 700111 HCHG RX REV CODE 636 W/ 250 OVERRIDE (IP): Performed by: EMERGENCY MEDICINE

## 2023-01-19 PROCEDURE — 96375 TX/PRO/DX INJ NEW DRUG ADDON: CPT

## 2023-01-19 PROCEDURE — 85025 COMPLETE CBC W/AUTO DIFF WBC: CPT

## 2023-01-19 RX ORDER — MORPHINE SULFATE 4 MG/ML
2 INJECTION INTRAVENOUS ONCE
Status: COMPLETED | OUTPATIENT
Start: 2023-01-19 | End: 2023-01-19

## 2023-01-19 RX ORDER — ONDANSETRON 2 MG/ML
4 INJECTION INTRAMUSCULAR; INTRAVENOUS ONCE
Status: COMPLETED | OUTPATIENT
Start: 2023-01-19 | End: 2023-01-19

## 2023-01-19 RX ORDER — METHYLPREDNISOLONE 4 MG/1
TABLET ORAL
Qty: 1 EACH | Refills: 0 | Status: SHIPPED | OUTPATIENT
Start: 2023-01-19 | End: 2023-02-22

## 2023-01-19 RX ORDER — CYCLOBENZAPRINE HCL 5 MG
5 TABLET ORAL 3 TIMES DAILY PRN
Qty: 30 TABLET | Refills: 0 | Status: SHIPPED | OUTPATIENT
Start: 2023-01-19 | End: 2023-01-29

## 2023-01-19 RX ORDER — MORPHINE SULFATE 4 MG/ML
4 INJECTION INTRAVENOUS ONCE
Status: COMPLETED | OUTPATIENT
Start: 2023-01-19 | End: 2023-01-19

## 2023-01-19 RX ORDER — ONDANSETRON 4 MG/1
4 TABLET, ORALLY DISINTEGRATING ORAL EVERY 6 HOURS PRN
Qty: 10 TABLET | Refills: 0 | Status: SHIPPED | OUTPATIENT
Start: 2023-01-19 | End: 2023-03-16

## 2023-01-19 RX ORDER — GABAPENTIN 300 MG/1
300 CAPSULE ORAL 3 TIMES DAILY
Qty: 15 CAPSULE | Refills: 0 | Status: SHIPPED | OUTPATIENT
Start: 2023-01-19 | End: 2023-01-24

## 2023-01-19 RX ORDER — SODIUM CHLORIDE 9 MG/ML
1000 INJECTION, SOLUTION INTRAVENOUS ONCE
Status: COMPLETED | OUTPATIENT
Start: 2023-01-19 | End: 2023-01-19

## 2023-01-19 RX ORDER — KETOROLAC TROMETHAMINE 30 MG/ML
15 INJECTION, SOLUTION INTRAMUSCULAR; INTRAVENOUS ONCE
Status: COMPLETED | OUTPATIENT
Start: 2023-01-19 | End: 2023-01-19

## 2023-01-19 RX ADMIN — KETOROLAC TROMETHAMINE 15 MG: 30 INJECTION, SOLUTION INTRAMUSCULAR at 12:21

## 2023-01-19 RX ADMIN — MORPHINE SULFATE 2 MG: 4 INJECTION INTRAVENOUS at 13:53

## 2023-01-19 RX ADMIN — SODIUM CHLORIDE 1000 ML: 9 INJECTION, SOLUTION INTRAVENOUS at 12:21

## 2023-01-19 RX ADMIN — ONDANSETRON 4 MG: 2 INJECTION INTRAMUSCULAR; INTRAVENOUS at 12:21

## 2023-01-19 RX ADMIN — MORPHINE SULFATE 4 MG: 4 INJECTION INTRAVENOUS at 12:21

## 2023-01-19 ASSESSMENT — FIBROSIS 4 INDEX: FIB4 SCORE: 0.81

## 2023-01-19 NOTE — ED NOTES
Pt given d/c paperwork and RX p/u information; pt verbalized understanding all information given. Pt ambulated out of the ER w/o difficulty w/ friend

## 2023-01-19 NOTE — ED PROVIDER NOTES
ED Provider Note    CHIEF COMPLAINT  Chief Complaint   Patient presents with    Nausea    Neck Pain    Vomiting    Flank Pain    Painful Urination     Symptoms above started yesterday.  Took ibuprofen/tylenol w/o relief.         EXTERNAL RECORDS REVIEWED  External ED Note -patient was seen at [facility] 1/10/2023 for right upper quadrant pain.  She reported a history of Crohn's disease, IBS, and ulcerative colitis.  At that time she reported that she was unable to keep anything down.  She was supposedly scheduled to see her GI surgeon last Tuesday patient underwent ultrasound of the abdomen which did not reveal acute abnormality.  Patient was discharged to follow-up as an outpatient.    HPI/ROS  LIMITATION TO HISTORY   Select: : None  OUTSIDE HISTORIAN(S):  None    Marian Kent is a 37 y.o. female who presents with a couple concerns including left-sided neck pain, nausea, vomiting, right-sided flank pain, and dysuria.  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  She developed pain in the right side of her back radiating down the right buttock at the time which has worsened overnight.  Additionally, although she did not initially have neck pain following the incident, when she awoke this morning she did have pain and tenderness in the left side of her neck which is worsened with turning her head to the left.  She has no weakness or numbness in her extremities, denies any incontinence, has no saddle anesthesia, denies lower extremity weakness or numbness, and does not use IV drugs.  She has had no recent low back injections.  When she awoke this morning she felt nauseated and has had multiple episodes of nonbloody, nonbilious emesis with some associated dysuria.  She has no associated abdominal pain.  She trialed Tylenol and ibuprofen without improvement.  She is in a same-sex relationship and denies chance of pregnancy.   She denies any vaginal discharge and is not concerned for sexually transmitted infection.    PAST MEDICAL HISTORY   has a past medical history of Anesthesia (2022), Anxiety (2019), Bipolar 1 disorder (HCC) (2022), Breath shortness, EP (ectopic pregnancy), Gynecological disorder, Hydronephrosis right (1/3/2014), Nausea with vomiting (2022), Pain (2022), Pain (2019), Pain management, Placenta previa diagnosed with US at [facility] (2013), PONV (postoperative nausea and vomiting), and Psychiatric problem (2022).    SURGICAL HISTORY   has a past surgical history that includes  DELIVERY SER ( & ); cystoscopy (2016); laparoscopy (2017); laparoscopic lysis of adhesions (2017); gyn surgery; pelviscopy (2011); salpingectomy (2011); primary c section (2006); repeat c section (2007); repeat c section (2014); pelviscopy (N/A, 2016); hysterectomy laparoscopy (2016); pelviscopy (N/A, 2018); inguinal hernia repair robotic (Left, 3/28/2019); lap,diagnostic abdomen (10/22/2020); lap,diagnostic abdomen (2022); and lap,fulgurate/excise lesions (Right, 2022).    FAMILY HISTORY  Family History   Problem Relation Age of Onset    Cancer Father 45        colon    Other Maternal Grandfather     Cancer Paternal Grandmother         breast    Diabetes Paternal Grandmother     Heart Disease Neg Hx     Stroke Neg Hx        SOCIAL HISTORY  Social History     Tobacco Use    Smoking status: Every Day     Packs/day: 1.00     Years: 10.00     Pack years: 10.00     Types: Cigarettes    Smokeless tobacco: Never   Vaping Use    Vaping Use: Never used   Substance and Sexual Activity    Alcohol use: No     Alcohol/week: 0.0 oz     Comment: none since10/21    Drug use: No    Sexual activity: Not Currently     Partners: Male     Birth control/protection: Pill       CURRENT MEDICATIONS  Home Medications       Reviewed by Jenna Gomez,  "EAN (Registered Nurse) on 01/19/23 at 1128  Med List Status: Not Addressed     Medication Last Dose Status   dicyclomine (BENTYL) 20 MG Tab  Active   diphenoxylate-atropine (LOMOTIL) 2.5-0.025 MG Tab  Active   fluconazole (DIFLUCAN) 150 MG tablet  Active   fluoxetine (PROZAC) 40 MG capsule  Active   gabapentin (NEURONTIN) 300 MG Cap  Active   HYDROcodone/acetaminophen (NORCO)  MG Tab  Active   HYDROcodone/acetaminophen (NORCO)  MG Tab  Active   HYDROcodone/acetaminophen (NORCO)  MG Tab  Active   hydrocortisone (ANUSOL-HC) 25 MG Suppos  Active   mesalamine (CANASA) 1000 MG Suppos  Active   mesalamine extended-release (PENTASA) 500 MG capsule  Active   Naloxone (NARCAN) 4 MG/0.1ML Liquid  Active   omeprazole (PRILOSEC OTC) 20 MG tablet  Active   ondansetron (ZOFRAN ODT) 8 MG TABLET DISPERSIBLE  Active   predniSONE (DELTASONE) 5 MG Tab  Active   promethazine (PHENERGAN) 25 MG Tab  Active   propranolol (INDERAL) 20 MG Tab  Active   risperidone (RISPERDAL) 4 MG tablet  Active   traZODone (DESYREL) 50 MG Tab  Active   VRAYLAR 3 MG Cap  Active                    ALLERGIES  Allergies   Allergen Reactions    Percocet [Apap-Fd&C Red #40 [place]-Oxycodone] Hives    Oxycodone-Acetaminophen     Tramadol Hives       PHYSICAL EXAM  VITAL SIGNS: /78   Pulse 88   Temp 35.9 °C (96.7 °F) (Temporal)   Resp 18   Ht 1.6 m (5' 3\")   Wt 73.2 kg (161 lb 6 oz)   LMP 07/22/2016 (Exact Date)   SpO2 99%   BMI 28.59 kg/m²    Physical Exam  Vitals and nursing note reviewed.   Constitutional:       Appearance: Normal appearance.      Comments: Appears uncomfortable   HENT:      Head: Normocephalic and atraumatic.      Right Ear: External ear normal.      Left Ear: External ear normal.      Nose: Nose normal.      Mouth/Throat:      Mouth: Mucous membranes are dry.   Eyes:      Extraocular Movements: Extraocular movements intact.      Pupils: Pupils are equal, round, and reactive to light.   Neck:      Comments: " Decreased range of motion to the left due to pain.  Tenderness over left trapezius musculature.  No cervical spine tenderness.  Cardiovascular:      Rate and Rhythm: Normal rate and regular rhythm.      Pulses: Normal pulses.      Heart sounds: Normal heart sounds.   Pulmonary:      Effort: Pulmonary effort is normal.   Abdominal:      Palpations: Abdomen is soft.      Tenderness: There is no abdominal tenderness.   Musculoskeletal:         General: No tenderness or signs of injury.      Comments: Right CVA tenderness.  Right lumbar paraspinal musculature tenderness.  No thoracic or lumbar spine tenderness.   Skin:     General: Skin is warm and dry.   Neurological:      General: No focal deficit present.      Mental Status: She is alert and oriented to person, place, and time.   Psychiatric:         Mood and Affect: Mood normal.         Behavior: Behavior normal.     DIAGNOSTIC STUDIES / PROCEDURES    LABS  Results for orders placed or performed during the hospital encounter of 01/19/23   CBC WITH DIFFERENTIAL   Result Value Ref Range    WBC 5.7 4.8 - 10.8 K/uL    RBC 4.42 4.20 - 5.40 M/uL    Hemoglobin 15.0 12.0 - 16.0 g/dL    Hematocrit 43.7 37.0 - 47.0 %    MCV 98.9 (H) 81.4 - 97.8 fL    MCH 33.9 (H) 27.0 - 33.0 pg    MCHC 34.3 33.6 - 35.0 g/dL    RDW 44.5 35.9 - 50.0 fL    Platelet Count 233 164 - 446 K/uL    MPV 8.2 (L) 9.0 - 12.9 fL    Neutrophils-Polys 58.50 44.00 - 72.00 %    Lymphocytes 31.10 22.00 - 41.00 %    Monocytes 7.30 0.00 - 13.40 %    Eosinophils 2.30 0.00 - 6.90 %    Basophils 0.50 0.00 - 1.80 %    Immature Granulocytes 0.30 0.00 - 0.90 %    Nucleated RBC 0.00 /100 WBC    Neutrophils (Absolute) 3.35 2.00 - 7.15 K/uL    Lymphs (Absolute) 1.78 1.00 - 4.80 K/uL    Monos (Absolute) 0.42 0.00 - 0.85 K/uL    Eos (Absolute) 0.13 0.00 - 0.51 K/uL    Baso (Absolute) 0.03 0.00 - 0.12 K/uL    Immature Granulocytes (abs) 0.02 0.00 - 0.11 K/uL    NRBC (Absolute) 0.00 K/uL   Comp Metabolic Panel   Result Value  Ref Range    Sodium 140 135 - 145 mmol/L    Potassium 4.1 3.6 - 5.5 mmol/L    Chloride 109 96 - 112 mmol/L    Co2 20 20 - 33 mmol/L    Anion Gap 11.0 7.0 - 16.0    Glucose 106 (H) 65 - 99 mg/dL    Bun 6 (L) 8 - 22 mg/dL    Creatinine 0.56 0.50 - 1.40 mg/dL    Calcium 9.1 8.4 - 10.2 mg/dL    AST(SGOT) 43 12 - 45 U/L    ALT(SGPT) 68 (H) 2 - 50 U/L    Alkaline Phosphatase 61 30 - 99 U/L    Total Bilirubin 0.3 0.1 - 1.5 mg/dL    Albumin 4.4 3.2 - 4.9 g/dL    Total Protein 6.4 6.0 - 8.2 g/dL    Globulin 2.0 1.9 - 3.5 g/dL    A-G Ratio 2.2 g/dL   LIPASE   Result Value Ref Range    Lipase 40 7 - 58 U/L   BETA-HCG QUALITATIVE SERUM   Result Value Ref Range    Beta-Hcg Qualitative Serum Negative Negative   URINALYSIS,CULTURE IF INDICATED    Specimen: Urine, Clean Catch   Result Value Ref Range    Color Yellow     Character Clear     Specific Gravity <=1.005 <1.035    Ph 7.0 5.0 - 8.0    Glucose Negative Negative mg/dL    Ketones Negative Negative mg/dL    Protein Negative Negative mg/dL    Bilirubin Negative Negative    Nitrite Negative Negative    Leukocyte Esterase Negative Negative    Occult Blood Negative Negative    Micro Urine Req see below    CORRECTED CALCIUM   Result Value Ref Range    Correct Calcium 8.8 8.5 - 10.5 mg/dL   ESTIMATED GFR   Result Value Ref Range    GFR (CKD-EPI) 121 >60 mL/min/1.73 m 2     RADIOLOGY  DX-LUMBAR SPINE-2 OR 3 VIEWS   Final Result         No acute osseous abnormality.   No malalignment.        COURSE & MEDICAL DECISION MAKING    ED Observation Status? No; Patient does not meet criteria for ED Observation.     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 She has no fevers, weakness, or numbness in her extremities. No headache. Low suspicion for CNS infection. Does have decreased ROM while attempting to turn her head to the left with  associated left trapezius tenderness most consistent with muscle strain. Regarding the radicular low back pain - she has no red flag symptoms to suggest cauda equina. No lower extremity weakness/numbness, saddle anesthesia, fevers. Does not use IV drugs and denies anticoagulation. Unlikely epidural abscess or hematoma. Full strength and sensation in bilateral lower extremities. Does not have any bony spine tenderness or overlying erythema. Low suspicion for osteomyelitis or discitis. Symptoms are most consistent with disc disease and I will obtain plain films of the L spine but she does not require advanced imaging presently. Does report some dysuria but denies concern for pregnancy or STI. She does not desire STI testing or treatment.     Given pain medication and started on IV fluids for clinical evidence of dehydration.    Labs obtained which were overall reassuring. No leukocytosis, left shift, or anemia. Normal electrolytes and renal function. Glucose is slightly elevated to 106 with an AST of 68. Patient denies current abdominal pain and has no abdominal tenderness. Total bilirubin is normal. Unlikely obstructive process. Lipase is normal. UA does not suggest infection. HCG is negative.    XR of the L-spine is without acute abnormality.    Patient reevaluated at bedside. She is resting comfortably. Symptoms are improved. Will trial Medrol dosepak - no h/o GERD or diabetes as well as gabapentin. Also given small prescription for flexeril as non-sedating muscle relaxation. I have recommended continued NSAID use as well as outpatient follow up. Safe for d/c with close outpatient f/u.    ADDITIONAL PROBLEM LIST AND DISPOSITION  Discharged in good and stable condition.    Problem #1: Neck pain  Problem #2: Back pain  Problem #3: Dysuria    I have discussed management of the patient with the following physicians and ASHANTI's:  None.    Discussion of management with other QHP or appropriate source(s): None      Escalation of care considered, and ultimately not performed:acute inpatient care management, however at this time, the patient is most appropriate for outpatient management    Barriers to care at this time, including but not limited to:  none .     Decision tools and prescription drugs considered including, but not limited to: None.    HYDRATION: Based on the patient's presentation of Dehydration the patient was given IV fluids. IV Hydration was used because oral hydration was not adequate alone. Upon recheck following hydration, the patient was improved.    FINAL DIAGNOSIS  1. Flank pain    2. Nausea and vomiting, unspecified vomiting type    3. Strain of neck muscle, initial encounter    4. Radicular low back pain      Electronically signed by: Salvatore Li M.D., 1/19/2023 11:57 AM

## 2023-01-19 NOTE — DISCHARGE INSTRUCTIONS
You were seen in the ER for low back pain as well as neck pain.  I suspect the neck pain is due to a muscle strain and have given you a small prescription for Flexeril which is a muscle relaxer.  Please take this as directed.  I suspect that the pain in your low back radiating into her right buttock is due to disc disease in the low back.  I have sent in a prescription for gabapentin as well as a steroid, take these medications as directed.  You should also trial anti-inflammatories for the pain.  If you develop new or worsening symptoms please return immediately to the ER otherwise I would like you to follow-up with both a spine physician (contact information above) and your primary care physician this week.  I hope you feel better soon!

## 2023-01-19 NOTE — ED TRIAGE NOTES
"Chief Complaint   Patient presents with    Nausea    Neck Pain    Vomiting    Flank Pain    Painful Urination     Symptoms above started yesterday.  Took ibuprofen/tylenol w/o relief.       /78   Pulse 88   Temp 35.9 °C (96.7 °F) (Temporal)   Resp 18   Ht 1.6 m (5' 3\")   Wt 73.2 kg (161 lb 6 oz)   LMP 07/22/2016 (Exact Date)   SpO2 99%   BMI 28.59 kg/m²     "

## 2023-02-17 ENCOUNTER — APPOINTMENT (OUTPATIENT)
Dept: RADIOLOGY | Facility: MEDICAL CENTER | Age: 38
End: 2023-02-17
Attending: EMERGENCY MEDICINE
Payer: MEDICAID

## 2023-02-17 ENCOUNTER — HOSPITAL ENCOUNTER (EMERGENCY)
Facility: MEDICAL CENTER | Age: 38
End: 2023-02-17
Attending: EMERGENCY MEDICINE
Payer: MEDICAID

## 2023-02-17 VITALS
RESPIRATION RATE: 16 BRPM | BODY MASS INDEX: 28.63 KG/M2 | SYSTOLIC BLOOD PRESSURE: 100 MMHG | HEIGHT: 63 IN | HEART RATE: 69 BPM | DIASTOLIC BLOOD PRESSURE: 70 MMHG | TEMPERATURE: 97 F | OXYGEN SATURATION: 97 % | WEIGHT: 161.6 LBS

## 2023-02-17 DIAGNOSIS — S16.1XXA STRAIN OF NECK MUSCLE, INITIAL ENCOUNTER: ICD-10-CM
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
## 2023-02-17 DIAGNOSIS — S09.90XA CLOSED HEAD INJURY, INITIAL ENCOUNTER: ICD-10-CM

## 2023-02-17 PROCEDURE — 72125 CT NECK SPINE W/O DYE: CPT

## 2023-02-17 PROCEDURE — 700102 HCHG RX REV CODE 250 W/ 637 OVERRIDE(OP): Performed by: EMERGENCY MEDICINE

## 2023-02-17 PROCEDURE — 96374 THER/PROPH/DIAG INJ IV PUSH: CPT

## 2023-02-17 PROCEDURE — 70450 CT HEAD/BRAIN W/O DYE: CPT

## 2023-02-17 PROCEDURE — 96375 TX/PRO/DX INJ NEW DRUG ADDON: CPT

## 2023-02-17 PROCEDURE — A9270 NON-COVERED ITEM OR SERVICE: HCPCS | Performed by: EMERGENCY MEDICINE

## 2023-02-17 PROCEDURE — 700111 HCHG RX REV CODE 636 W/ 250 OVERRIDE (IP): Performed by: EMERGENCY MEDICINE

## 2023-02-17 PROCEDURE — 700105 HCHG RX REV CODE 258: Performed by: EMERGENCY MEDICINE

## 2023-02-17 PROCEDURE — 99285 EMERGENCY DEPT VISIT HI MDM: CPT

## 2023-02-17 RX ORDER — ONDANSETRON 2 MG/ML
4 INJECTION INTRAMUSCULAR; INTRAVENOUS ONCE
Status: COMPLETED | OUTPATIENT
Start: 2023-02-17 | End: 2023-02-17

## 2023-02-17 RX ORDER — CYCLOBENZAPRINE HCL 10 MG
10 TABLET ORAL 3 TIMES DAILY PRN
Qty: 20 TABLET | Refills: 0 | Status: SHIPPED | OUTPATIENT
Start: 2023-02-17 | End: 2023-03-16

## 2023-02-17 RX ORDER — SODIUM CHLORIDE 9 MG/ML
1000 INJECTION, SOLUTION INTRAVENOUS ONCE
Status: COMPLETED | OUTPATIENT
Start: 2023-02-17 | End: 2023-02-17

## 2023-02-17 RX ORDER — NAPROXEN 500 MG/1
500 TABLET ORAL 2 TIMES DAILY WITH MEALS
Qty: 20 TABLET | Refills: 0 | Status: SHIPPED | OUTPATIENT
Start: 2023-02-17 | End: 2023-02-22

## 2023-02-17 RX ORDER — HYDROCODONE BITARTRATE AND ACETAMINOPHEN 10; 325 MG/1; MG/1
1 TABLET ORAL ONCE
Status: COMPLETED | OUTPATIENT
Start: 2023-02-17 | End: 2023-02-17

## 2023-02-17 RX ORDER — KETOROLAC TROMETHAMINE 30 MG/ML
15 INJECTION, SOLUTION INTRAMUSCULAR; INTRAVENOUS ONCE
Status: COMPLETED | OUTPATIENT
Start: 2023-02-17 | End: 2023-02-17

## 2023-02-17 RX ADMIN — HYDROCODONE BITARTRATE AND ACETAMINOPHEN 1 TABLET: 10; 325 TABLET ORAL at 19:54

## 2023-02-17 RX ADMIN — SODIUM CHLORIDE 1000 ML: 9 INJECTION, SOLUTION INTRAVENOUS at 19:11

## 2023-02-17 RX ADMIN — ONDANSETRON 4 MG: 2 INJECTION INTRAMUSCULAR; INTRAVENOUS at 19:10

## 2023-02-17 RX ADMIN — KETOROLAC TROMETHAMINE 15 MG: 30 INJECTION, SOLUTION INTRAMUSCULAR; INTRAVENOUS at 19:12

## 2023-02-17 ASSESSMENT — PAIN DESCRIPTION - PAIN TYPE: TYPE: ACUTE PAIN

## 2023-02-17 ASSESSMENT — FIBROSIS 4 INDEX: FIB4 SCORE: 0.81

## 2023-02-18 NOTE — ED NOTES
Erp to bedside, orders recvd and reviewed with pt and family. Aware of pending ct.x 2. C/o head pain 8/10

## 2023-02-18 NOTE — ED NOTES
Pt on bed, alert and awake, still complaining of headache with pain score of 8/10 and verbalized CT procedure made her feel discomfort.  Pt. medicated prior CT.
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
## 2023-02-18 NOTE — ED NOTES
Vital signs taken and recorded. IV removed. Discharge in stable condition ambulatory. Health teachings given to patient and family with full understanding of the information given. Instructed not to drive to home tonight. No personal belongings left.

## 2023-02-22 ENCOUNTER — HOSPITAL ENCOUNTER (EMERGENCY)
Facility: MEDICAL CENTER | Age: 38
End: 2023-02-22
Attending: EMERGENCY MEDICINE
Payer: MEDICAID

## 2023-02-22 VITALS
HEART RATE: 75 BPM | DIASTOLIC BLOOD PRESSURE: 75 MMHG | SYSTOLIC BLOOD PRESSURE: 108 MMHG | HEIGHT: 63 IN | WEIGHT: 160.27 LBS | RESPIRATION RATE: 16 BRPM | TEMPERATURE: 97 F | BODY MASS INDEX: 28.4 KG/M2 | OXYGEN SATURATION: 97 %

## 2023-02-22 DIAGNOSIS — R10.9 ABDOMINAL PAIN, UNSPECIFIED ABDOMINAL LOCATION: ICD-10-CM

## 2023-02-22 LAB
ALBUMIN SERPL BCP-MCNC: 4.4 G/DL (ref 3.2–4.9)
ALBUMIN/GLOB SERPL: 1.8 G/DL
ALP SERPL-CCNC: 55 U/L (ref 30–99)
ALT SERPL-CCNC: 17 U/L (ref 2–50)
ANION GAP SERPL CALC-SCNC: 11 MMOL/L (ref 7–16)
APPEARANCE UR: CLEAR
AST SERPL-CCNC: 18 U/L (ref 12–45)
BASOPHILS # BLD AUTO: 0.4 % (ref 0–1.8)
BASOPHILS # BLD: 0.02 K/UL (ref 0–0.12)
BILIRUB SERPL-MCNC: 0.4 MG/DL (ref 0.1–1.5)
BILIRUB UR QL STRIP.AUTO: NEGATIVE
BUN SERPL-MCNC: 10 MG/DL (ref 8–22)
CALCIUM ALBUM COR SERPL-MCNC: 8.4 MG/DL (ref 8.5–10.5)
CALCIUM SERPL-MCNC: 8.7 MG/DL (ref 8.4–10.2)
CHLORIDE SERPL-SCNC: 107 MMOL/L (ref 96–112)
CO2 SERPL-SCNC: 21 MMOL/L (ref 20–33)
COLOR UR: YELLOW
CREAT SERPL-MCNC: 0.68 MG/DL (ref 0.5–1.4)
EOSINOPHIL # BLD AUTO: 0.08 K/UL (ref 0–0.51)
EOSINOPHIL NFR BLD: 1.4 % (ref 0–6.9)
ERYTHROCYTE [DISTWIDTH] IN BLOOD BY AUTOMATED COUNT: 40.6 FL (ref 35.9–50)
GFR SERPLBLD CREATININE-BSD FMLA CKD-EPI: 115 ML/MIN/1.73 M 2
GLOBULIN SER CALC-MCNC: 2.4 G/DL (ref 1.9–3.5)
GLUCOSE SERPL-MCNC: 111 MG/DL (ref 65–99)
GLUCOSE UR STRIP.AUTO-MCNC: NEGATIVE MG/DL
HCG SERPL QL: NEGATIVE
HCT VFR BLD AUTO: 45.2 % (ref 37–47)
HGB BLD-MCNC: 15.8 G/DL (ref 12–16)
IMM GRANULOCYTES # BLD AUTO: 0.02 K/UL (ref 0–0.11)
IMM GRANULOCYTES NFR BLD AUTO: 0.4 % (ref 0–0.9)
KETONES UR STRIP.AUTO-MCNC: NEGATIVE MG/DL
LEUKOCYTE ESTERASE UR QL STRIP.AUTO: NEGATIVE
LIPASE SERPL-CCNC: 114 U/L (ref 7–58)
LYMPHOCYTES # BLD AUTO: 1.95 K/UL (ref 1–4.8)
LYMPHOCYTES NFR BLD: 34.6 % (ref 22–41)
MCH RBC QN AUTO: 33.5 PG (ref 27–33)
MCHC RBC AUTO-ENTMCNC: 35 G/DL (ref 33.6–35)
MCV RBC AUTO: 95.8 FL (ref 81.4–97.8)
MICRO URNS: NORMAL
MONOCYTES # BLD AUTO: 0.31 K/UL (ref 0–0.85)
MONOCYTES NFR BLD AUTO: 5.5 % (ref 0–13.4)
NEUTROPHILS # BLD AUTO: 3.26 K/UL (ref 2–7.15)
NEUTROPHILS NFR BLD: 57.7 % (ref 44–72)
NITRITE UR QL STRIP.AUTO: NEGATIVE
NRBC # BLD AUTO: 0 K/UL
NRBC BLD-RTO: 0 /100 WBC
PH UR STRIP.AUTO: 6.5 [PH] (ref 5–8)
PLATELET # BLD AUTO: 254 K/UL (ref 164–446)
PMV BLD AUTO: 8.3 FL (ref 9–12.9)
POTASSIUM SERPL-SCNC: 3.9 MMOL/L (ref 3.6–5.5)
PROT SERPL-MCNC: 6.8 G/DL (ref 6–8.2)
PROT UR QL STRIP: NEGATIVE MG/DL
RBC # BLD AUTO: 4.72 M/UL (ref 4.2–5.4)
RBC UR QL AUTO: NEGATIVE
SODIUM SERPL-SCNC: 139 MMOL/L (ref 135–145)
SP GR UR STRIP.AUTO: 1.01
WBC # BLD AUTO: 5.6 K/UL (ref 4.8–10.8)

## 2023-02-22 PROCEDURE — 99284 EMERGENCY DEPT VISIT MOD MDM: CPT

## 2023-02-22 PROCEDURE — 700102 HCHG RX REV CODE 250 W/ 637 OVERRIDE(OP): Performed by: EMERGENCY MEDICINE

## 2023-02-22 PROCEDURE — 84703 CHORIONIC GONADOTROPIN ASSAY: CPT

## 2023-02-22 PROCEDURE — 80053 COMPREHEN METABOLIC PANEL: CPT

## 2023-02-22 PROCEDURE — 36415 COLL VENOUS BLD VENIPUNCTURE: CPT

## 2023-02-22 PROCEDURE — 85025 COMPLETE CBC W/AUTO DIFF WBC: CPT

## 2023-02-22 PROCEDURE — 81003 URINALYSIS AUTO W/O SCOPE: CPT

## 2023-02-22 PROCEDURE — A9270 NON-COVERED ITEM OR SERVICE: HCPCS | Performed by: EMERGENCY MEDICINE

## 2023-02-22 PROCEDURE — 83690 ASSAY OF LIPASE: CPT

## 2023-02-22 RX ORDER — PROMETHAZINE HYDROCHLORIDE 25 MG/1
25 TABLET ORAL ONCE
Status: COMPLETED | OUTPATIENT
Start: 2023-02-22 | End: 2023-02-22

## 2023-02-22 RX ORDER — FLUOXETINE HYDROCHLORIDE 20 MG/1
20 CAPSULE ORAL DAILY
Status: SHIPPED | COMMUNITY
End: 2023-03-16

## 2023-02-22 RX ORDER — NAPROXEN SODIUM 220 MG
660 TABLET ORAL 2 TIMES DAILY PRN
Status: SHIPPED | COMMUNITY
End: 2023-03-16

## 2023-02-22 RX ORDER — IBUPROFEN 200 MG
400 TABLET ORAL EVERY 6 HOURS PRN
Status: SHIPPED | COMMUNITY
End: 2023-03-16

## 2023-02-22 RX ORDER — DICYCLOMINE HCL 20 MG
20 TABLET ORAL EVERY 6 HOURS
Qty: 20 TABLET | Refills: 0 | Status: SHIPPED | OUTPATIENT
Start: 2023-02-22 | End: 2023-03-16

## 2023-02-22 RX ADMIN — PROMETHAZINE HYDROCHLORIDE 25 MG: 25 TABLET ORAL at 12:42

## 2023-02-22 RX ADMIN — LIDOCAINE HYDROCHLORIDE 30 ML: 20 SOLUTION OROPHARYNGEAL at 12:42

## 2023-02-22 ASSESSMENT — FIBROSIS 4 INDEX: FIB4 SCORE: 0.81

## 2023-02-22 NOTE — ED PROVIDER NOTES
ED Provider Note    CHIEF COMPLAINT  Chief Complaint   Patient presents with    Abdominal Pain       EXTERNAL RECORDS REVIEWED  Outpatient Notes I reviewed care everywhere, the patient had a normal HIDA scan at [facility] on 2023    HPI/ROS  LIMITATION TO HISTORY   Select: : None  OUTSIDE HISTORIAN(S):  Friend friend is in the room helping with history    Marian Kent is a 37 y.o. female who presents with past medical history see med for bipolar disorder, inflammatory bowel disease followed by GI, ectopic pregnancy, she is on chronic narcotic pain medication from a pain specialist, she presents with epigastric pain and vomiting.  She reports that she had a normal HIDA scan  at [facility].  She also describes having dark stool that is not black.  She reports that her pain medications are not helping her pain currently.    PAST MEDICAL HISTORY   has a past medical history of Anesthesia (2022), Anxiety (2019), Bipolar 1 disorder (HCC) (2022), Breath shortness, EP (ectopic pregnancy), Gynecological disorder, Hydronephrosis right (1/3/2014), Nausea with vomiting (2022), Pain (2022), Pain (2019), Pain management, Placenta previa diagnosed with US at [facility] (2013), PONV (postoperative nausea and vomiting), and Psychiatric problem (2022).    SURGICAL HISTORY   has a past surgical history that includes  DELIVERY SER ( & ); cystoscopy (2016); laparoscopy (2017); laparoscopic lysis of adhesions (2017); gyn surgery; pelviscopy (2011); salpingectomy (2011); primary c section (2006); repeat c section (2007); repeat c section (2014); pelviscopy (N/A, 2016); hysterectomy laparoscopy (2016); pelviscopy (N/A, 2018); inguinal hernia repair robotic (Left, 3/28/2019); lap,diagnostic abdomen (10/22/2020); lap,diagnostic abdomen (2022); and lap,fulgurate/excise lesions (Right,  "2/17/2022).    FAMILY HISTORY  Family History   Problem Relation Age of Onset    Cancer Father 45        colon    Other Maternal Grandfather     Cancer Paternal Grandmother         breast    Diabetes Paternal Grandmother     Heart Disease Neg Hx     Stroke Neg Hx        SOCIAL HISTORY  Social History     Tobacco Use    Smoking status: Every Day     Packs/day: 1.00     Years: 10.00     Pack years: 10.00     Types: Cigarettes    Smokeless tobacco: Never   Vaping Use    Vaping Use: Never used   Substance and Sexual Activity    Alcohol use: No     Alcohol/week: 0.0 oz     Comment: none since10/21    Drug use: No    Sexual activity: Not Currently     Partners: Male     Birth control/protection: Pill       CURRENT MEDICATIONS  Home Medications       Reviewed by Bay Hale (Pharmacy Tech) on 02/22/23 at 1230  Med List Status: Complete     Medication Last Dose Status   cyclobenzaprine (FLEXERIL) 10 mg Tab > 2 days Active   FLUoxetine (PROZAC) 20 MG Cap 2/22/2023 Active   fluoxetine (PROZAC) 40 MG capsule 2/22/2023 Active   HYDROcodone/acetaminophen (NORCO)  MG Tab > 2 days Active   HYDROcodone/acetaminophen (NORCO)  MG Tab DUPLICATE Active   ibuprofen (MOTRIN) 200 MG Tab 2/22/2023 Active   naproxen (ALEVE) 220 MG tablet 2/21/2023 Active   ondansetron (ZOFRAN ODT) 4 MG TABLET DISPERSIBLE 2/21/2023 Active   VRAYLAR 3 MG Cap > 2 nights Active                      ALLERGIES  Allergies   Allergen Reactions    Oxycodone Hives and Itching    Tramadol Hives and Itching       PHYSICAL EXAM  VITAL SIGNS: BP 99/72   Pulse 76   Temp 36.1 °C (97 °F) (Temporal)   Resp 16   Ht 1.6 m (5' 3\")   Wt 72.7 kg (160 lb 4.4 oz)   LMP 01/09/2016   SpO2 97%   BMI 28.39 kg/m²    Constitutional: Alert.  HENT: No signs of trauma, Bilateral external ears normal, Nose normal.   Eyes: Pupils are equal and reactive, Conjunctiva normal, Non-icteric.   Neck: Normal range of motion, No tenderness, Supple, No stridor. "   Lymphatic: No lymphadenopathy noted.   Cardiovascular: Regular rate and rhythm, no murmurs.   Thorax & Lungs: Normal breath sounds, No respiratory distress, No wheezing, No chest tenderness.   Abdomen: Bowel sounds normal, Soft, No tenderness, No peritoneal signs, No masses, No pulsatile masses.   Skin: Warm, Dry, No erythema, No rash.   Back: No bony tenderness, No CVA tenderness.   Extremities: Intact distal pulses, No edema, No tenderness, No cyanosis,  Negative Carlito's sign.   Musculoskeletal: Good range of motion in all major joints. No tenderness to palpation or major deformities noted.   Neurologic: Alert , Normal motor function, Normal sensory function, No focal deficits noted.   Psychiatric: Affect normal, Judgment normal, Mood normal.       DIAGNOSTIC STUDIES / PROCEDURES      LABS  Labs Reviewed   CBC WITH DIFFERENTIAL - Abnormal; Notable for the following components:       Result Value    MCH 33.5 (*)     MPV 8.3 (*)     All other components within normal limits   COMP METABOLIC PANEL - Abnormal; Notable for the following components:    Glucose 111 (*)     All other components within normal limits   LIPASE - Abnormal; Notable for the following components:    Lipase 114 (*)     All other components within normal limits   CORRECTED CALCIUM - Abnormal; Notable for the following components:    Correct Calcium 8.4 (*)     All other components within normal limits   URINALYSIS,CULTURE IF INDICATED    Narrative:     Indication for culture:->Patient WITHOUT an indwelling Good  catheter in place with new onset of Dysuria, Frequency,  Urgency, and/or Suprapubic pain   HCG QUAL SERUM   ESTIMATED GFR               COURSE & MEDICAL DECISION MAKING    ED Observation Status? Yes; I am placing the patient in to an observation status due to a diagnostic uncertainty as well as therapeutic intensity. Patient placed in observation status at 12:27 PM, 2/22/2023.     Observation plan is as follows: The patient is receiving  pain medications, I have ordered her a GI cocktail and Phenergan to see if it improves her symptoms.  Labs are pending.    Upon Reevaluation, the patient's condition has: Improved; and will be discharged.    Patient discharged from ED Observation status at 1:54 PM   (Time) 2/22/223 (Date).     INITIAL ASSESSMENT, COURSE AND PLAN  Care Narrative: The patient presents with epigastric pain.  Labs ordered, I reviewed the patient's results from [facility] and she had a normal HIDA scan 1 month ago.  She was given a GI cocktail.      On reassessment I had to wake the patient up.  It appears her pain has improved.        ADDITIONAL PROBLEM LIST  Chronic pain, epigastric pain  DISPOSITION AND DISCUSSIONS      Discussion of management with other [facility] or appropriate source(s): None     Escalation of care considered, and ultimately not performed:acute inpatient care management, however at this time, the patient is most appropriate for outpatient management the patient's labs are unremarkable, she does not require admission.  I would like her to follow-up with GI for endoscopy.    Barriers to care at this time, including but not limited to:  None .     Decision tools and prescription drugs considered including, but not limited to:   Pain Medications prescribed the patient Bentyl.    The patient will return for new or worsening symptoms and is stable at the time of discharge.    The patient is referred to a primary physician for blood pressure management, diabetic screening, and for all other preventative health concerns.        DISPOSITION:  Patient will be discharged home in stable condition.    FOLLOW UP:  [facility], Emergency Dept  [address]  [facility] [id number] [phone]  Follow up  If symptoms worsen    [facility]  [address]  [facility] [address]  [phone]  Follow up  Follow-up as scheduled      OUTPATIENT MEDICATIONS:  New Prescriptions     DICYCLOMINE (BENTYL) 20 MG TAB    Take 1 Tablet by mouth every 6 hours.       FINAL DIAGNOSIS  1. Abdominal pain, unspecified abdominal location           Electronically signed by: Reynaldo Schulz M.D., 2/22/2023 12:22 PM

## 2023-02-22 NOTE — ED NOTES
Med rec updated and complete, per pt   Allergies reviewed, per pt  Interviewed pt with partner at bedside with permission from pt.  Pt reports that she did not  her RX for NAPROXEN 500MG from the pharmacy

## 2023-02-22 NOTE — ED TRIAGE NOTES
Pt amb to triage c/o mid-epigastric abd pain, n/v, cough and cp x several days, progressively worsening.

## 2023-03-16 ENCOUNTER — OFFICE VISIT (OUTPATIENT)
Dept: MEDICAL GROUP | Facility: MEDICAL CENTER | Age: 38
End: 2023-03-16
Attending: FAMILY MEDICINE
Payer: MEDICAID

## 2023-03-16 VITALS
WEIGHT: 160 LBS | BODY MASS INDEX: 28.35 KG/M2 | HEIGHT: 63 IN | DIASTOLIC BLOOD PRESSURE: 74 MMHG | SYSTOLIC BLOOD PRESSURE: 98 MMHG | OXYGEN SATURATION: 96 % | TEMPERATURE: 97.3 F | HEART RATE: 82 BPM

## 2023-03-16 DIAGNOSIS — R10.84 GENERALIZED ABDOMINAL PAIN: ICD-10-CM

## 2023-03-16 DIAGNOSIS — G89.4 CHRONIC PAIN SYNDROME: ICD-10-CM

## 2023-03-16 PROBLEM — K50.90 CROHN'S DISEASE (HCC): Status: ACTIVE | Noted: 2023-01-17

## 2023-03-16 PROBLEM — K51.90 ULCERATIVE COLITIS (HCC): Status: ACTIVE | Noted: 2023-01-17

## 2023-03-16 PROCEDURE — 99213 OFFICE O/P EST LOW 20 MIN: CPT | Performed by: FAMILY MEDICINE

## 2023-03-16 RX ORDER — TRAZODONE HYDROCHLORIDE 50 MG/1
TABLET ORAL
COMMUNITY
Start: 2023-02-04 | End: 2023-03-16

## 2023-03-16 RX ORDER — MIRTAZAPINE 15 MG/1
TABLET, FILM COATED ORAL
COMMUNITY
Start: 2023-02-15 | End: 2023-03-16

## 2023-03-16 RX ORDER — AMOXICILLIN 500 MG/1
500 CAPSULE ORAL 3 TIMES DAILY
COMMUNITY
Start: 2023-01-17 | End: 2023-03-16

## 2023-03-16 RX ORDER — METOCLOPRAMIDE 10 MG/1
TABLET ORAL
COMMUNITY
Start: 2023-01-10 | End: 2023-03-16

## 2023-03-16 RX ORDER — CARIPRAZINE 4.5 MG/1
CAPSULE, GELATIN COATED ORAL
COMMUNITY
Start: 2023-02-21 | End: 2023-06-14

## 2023-03-16 RX ORDER — IBUPROFEN 800 MG/1
TABLET ORAL
COMMUNITY
Start: 2023-01-17 | End: 2023-03-16

## 2023-03-16 RX ORDER — ZOLPIDEM TARTRATE 5 MG/1
5 TABLET ORAL
COMMUNITY
Start: 2023-02-21 | End: 2023-03-16

## 2023-03-16 RX ORDER — LORAZEPAM 0.5 MG/1
TABLET ORAL
COMMUNITY
Start: 2023-03-05 | End: 2023-03-16

## 2023-03-16 RX ORDER — GABAPENTIN 300 MG/1
CAPSULE ORAL
COMMUNITY
Start: 2023-02-21 | End: 2023-03-16

## 2023-03-16 RX ORDER — QUETIAPINE FUMARATE 100 MG/1
TABLET, FILM COATED ORAL
COMMUNITY
Start: 2023-01-24 | End: 2023-03-16

## 2023-03-16 ASSESSMENT — PATIENT HEALTH QUESTIONNAIRE - PHQ9
CLINICAL INTERPRETATION OF PHQ2 SCORE: 3
SUM OF ALL RESPONSES TO PHQ QUESTIONS 1-9: 13
5. POOR APPETITE OR OVEREATING: 2 - MORE THAN HALF THE DAYS

## 2023-03-16 ASSESSMENT — FIBROSIS 4 INDEX: FIB4 SCORE: 0.61

## 2023-03-16 NOTE — PROGRESS NOTES
"Subjective   Chief Complaint   Patient presents with    Medication Refill        HPI:   Marian Ulloa presents today for refill request for pain medication.    Generalized abdominal pain  States that she has been diagnosed with IBD and is waiting to see GI consultants to address this. She was started on Norco by previous providers since 12/2021. No previous consultation with pain specialist. Patient has also been on chronic lorazepam and was recently prescribed zolpidem by her psychiatrist.    Objective   Social History     Tobacco Use    Smoking status: Every Day     Packs/day: 1.00     Years: 10.00     Pack years: 10.00     Types: Cigarettes    Smokeless tobacco: Never   Vaping Use    Vaping Use: Never used   Substance Use Topics    Alcohol use: No     Alcohol/week: 0.0 oz     Comment: none since10/21    Drug use: No       Exam:  BP 98/74 (BP Location: Left arm, Patient Position: Sitting, BP Cuff Size: Adult)   Pulse 82   Temp 36.3 °C (97.3 °F) (Temporal)   Ht 1.6 m (5' 2.99\")   Wt 72.6 kg (160 lb)   LMP 01/09/2016   SpO2 96%   BMI 28.35 kg/m²     Physical Exam  Constitutional: Alert, no distress  Skin: No rashes in visible areas  Eye: Conjunctiva clear, lids normal  Respiratory: Unlabored respiratory effort, no cough  MSK: Normal gait, moves all extremities  Neuro: Grossly non-focal  Psych: Alert and oriented x3, normal affect and mood    Allergies   Allergen Reactions    Apap-Fd&C Red #40 [place]-Oxycodone Hives and Itching    Sertraline      Other reaction(s): diarrhea    Oxycodone Hives and Itching    Oxycodone-Acetaminophen Itching    Tramadol Hives and Itching       [id number] - [place], [place] - [address] AT [facility]  [address] [address]  Phone: [phone] Fax: [phone]    Current Outpatient Medications   Medication Sig Dispense Refill    VRAYLAR 4.5 MG Cap TAKE 1 CAPSULE BY MOUTH AT BEDTIME FOR MOOD STABILTY      HYDROcodone/acetaminophen (NORCO) "  MG Tab Take 1 Tablet by mouth every 8 hours as needed for Moderate Pain for up to 30 days. 90 Tablet 0     No current facility-administered medications for this visit.       Assessment & Plan    37 y.o. female with the following -     1. Chronic pain syndrome  2. Generalized abdominal pain  - Referral to Pain Management placed. Explained to patient that I do not manage chronic pain with opioid/narcotic therapy.  She will need to see pain specialist to resume her Norco 10mg. She has a 30 day supply filled 3/4/23 that was prescribed by her previous provider.   - Discussed importance of controlling underlying condition as means to better control her pain, and that ideally she should not be on lifelong opioid treatment. She is established with GI consultants and I recommend that she reach out to them to see what other ways she could control her symptoms  - She is requesting refill of her steroid medication for Crohns; it is unclear when she was last prescribed this. She will update me with last script, but also advised that she should contact her specialist abut this.  - Referral to Pain Management    Other orders  - VRAYLAR 4.5 MG Cap; TAKE 1 CAPSULE BY MOUTH AT BEDTIME FOR MOOD STABILTY    Please note that this dictation was created using voice recognition software. I have made every reasonable attempt to correct obvious errors, but I expect that there are errors of grammar and possibly content that I did not discover before finalizing the note.

## 2023-03-16 NOTE — ASSESSMENT & PLAN NOTE
States that she has been diagnosed with IBD and is waiting to see GI consultants to address this. She was started on Norco by previous providers since 12/2021. No previous consultation with pain specialist. Patient has also been on chronic lorazepam and was recently prescribed zolpidem by her psychiatrist.

## 2023-04-03 ENCOUNTER — APPOINTMENT (OUTPATIENT)
Dept: ADMISSIONS | Facility: MEDICAL CENTER | Age: 38
End: 2023-04-03
Attending: SPECIALIST
Payer: MEDICAID

## 2023-04-18 ENCOUNTER — PRE-ADMISSION TESTING (OUTPATIENT)
Dept: ADMISSIONS | Facility: MEDICAL CENTER | Age: 38
End: 2023-04-18
Attending: SPECIALIST
Payer: MEDICAID

## 2023-04-18 RX ORDER — ONDANSETRON 4 MG/1
4 TABLET, ORALLY DISINTEGRATING ORAL
COMMUNITY
Start: 2023-04-11 | End: 2023-04-18

## 2023-04-18 RX ORDER — CLONAZEPAM 0.5 MG/1
0.5 TABLET ORAL NIGHTLY
COMMUNITY
Start: 2023-04-12 | End: 2023-05-30

## 2023-04-18 RX ORDER — HYDROCODONE BITARTRATE AND ACETAMINOPHEN 10; 325 MG/1; MG/1
1 TABLET ORAL 3 TIMES DAILY PRN
COMMUNITY
Start: 2023-04-14 | End: 2023-05-30

## 2023-04-18 RX ORDER — FLUOXETINE HYDROCHLORIDE 40 MG/1
40 CAPSULE ORAL EVERY MORNING
COMMUNITY
Start: 2023-04-12 | End: 2023-05-30

## 2023-04-20 ENCOUNTER — HOSPITAL ENCOUNTER (OUTPATIENT)
Facility: MEDICAL CENTER | Age: 38
End: 2023-04-20
Attending: SPECIALIST | Admitting: SPECIALIST
Payer: MEDICAID

## 2023-04-20 ENCOUNTER — ANESTHESIA (OUTPATIENT)
Dept: SURGERY | Facility: MEDICAL CENTER | Age: 38
End: 2023-04-20
Payer: MEDICAID

## 2023-04-20 ENCOUNTER — ANESTHESIA EVENT (OUTPATIENT)
Dept: SURGERY | Facility: MEDICAL CENTER | Age: 38
End: 2023-04-20
Payer: MEDICAID

## 2023-04-20 VITALS
OXYGEN SATURATION: 91 % | BODY MASS INDEX: 28.55 KG/M2 | SYSTOLIC BLOOD PRESSURE: 113 MMHG | RESPIRATION RATE: 20 BRPM | HEIGHT: 63 IN | TEMPERATURE: 98.6 F | HEART RATE: 73 BPM | WEIGHT: 161.16 LBS | DIASTOLIC BLOOD PRESSURE: 71 MMHG

## 2023-04-20 DIAGNOSIS — G89.18 POSTOPERATIVE PAIN: ICD-10-CM

## 2023-04-20 PROBLEM — N80.101 ENDOMETRIOSIS OF RIGHT OVARY: Status: ACTIVE | Noted: 2023-04-20

## 2023-04-20 PROBLEM — N94.10 DYSPAREUNIA IN FEMALE: Status: ACTIVE | Noted: 2023-04-20

## 2023-04-20 LAB — PATHOLOGY CONSULT NOTE: NORMAL

## 2023-04-20 PROCEDURE — 160046 HCHG PACU - 1ST 60 MINS PHASE II: Performed by: SPECIALIST

## 2023-04-20 PROCEDURE — 160036 HCHG PACU - EA ADDL 30 MINS PHASE I: Performed by: SPECIALIST

## 2023-04-20 PROCEDURE — 160009 HCHG ANES TIME/MIN: Performed by: SPECIALIST

## 2023-04-20 PROCEDURE — 160039 HCHG SURGERY MINUTES - EA ADDL 1 MIN LEVEL 3: Performed by: SPECIALIST

## 2023-04-20 PROCEDURE — 700101 HCHG RX REV CODE 250: Performed by: STUDENT IN AN ORGANIZED HEALTH CARE EDUCATION/TRAINING PROGRAM

## 2023-04-20 PROCEDURE — A9270 NON-COVERED ITEM OR SERVICE: HCPCS | Performed by: STUDENT IN AN ORGANIZED HEALTH CARE EDUCATION/TRAINING PROGRAM

## 2023-04-20 PROCEDURE — 700111 HCHG RX REV CODE 636 W/ 250 OVERRIDE (IP): Performed by: STUDENT IN AN ORGANIZED HEALTH CARE EDUCATION/TRAINING PROGRAM

## 2023-04-20 PROCEDURE — 160002 HCHG RECOVERY MINUTES (STAT): Performed by: SPECIALIST

## 2023-04-20 PROCEDURE — 160035 HCHG PACU - 1ST 60 MINS PHASE I: Performed by: SPECIALIST

## 2023-04-20 PROCEDURE — 160048 HCHG OR STATISTICAL LEVEL 1-5: Performed by: SPECIALIST

## 2023-04-20 PROCEDURE — 700105 HCHG RX REV CODE 258: Performed by: SPECIALIST

## 2023-04-20 PROCEDURE — 700102 HCHG RX REV CODE 250 W/ 637 OVERRIDE(OP): Performed by: STUDENT IN AN ORGANIZED HEALTH CARE EDUCATION/TRAINING PROGRAM

## 2023-04-20 PROCEDURE — 160025 RECOVERY II MINUTES (STATS): Performed by: SPECIALIST

## 2023-04-20 PROCEDURE — 88307 TISSUE EXAM BY PATHOLOGIST: CPT

## 2023-04-20 PROCEDURE — 00840 ANES IPER PX LOWER ABD NOS: CPT | Performed by: STUDENT IN AN ORGANIZED HEALTH CARE EDUCATION/TRAINING PROGRAM

## 2023-04-20 PROCEDURE — 160028 HCHG SURGERY MINUTES - 1ST 30 MINS LEVEL 3: Performed by: SPECIALIST

## 2023-04-20 RX ORDER — SODIUM CHLORIDE, SODIUM LACTATE, POTASSIUM CHLORIDE, CALCIUM CHLORIDE 600; 310; 30; 20 MG/100ML; MG/100ML; MG/100ML; MG/100ML
INJECTION, SOLUTION INTRAVENOUS CONTINUOUS
Status: DISCONTINUED | OUTPATIENT
Start: 2023-04-20 | End: 2023-04-20 | Stop reason: HOSPADM

## 2023-04-20 RX ORDER — MIDAZOLAM HYDROCHLORIDE 1 MG/ML
2 INJECTION INTRAMUSCULAR; INTRAVENOUS ONCE
Status: COMPLETED | OUTPATIENT
Start: 2023-04-20 | End: 2023-04-20

## 2023-04-20 RX ORDER — MEPERIDINE HYDROCHLORIDE 25 MG/ML
12.5 INJECTION INTRAMUSCULAR; INTRAVENOUS; SUBCUTANEOUS
Status: DISCONTINUED | OUTPATIENT
Start: 2023-04-20 | End: 2023-04-20 | Stop reason: HOSPADM

## 2023-04-20 RX ORDER — IBUPROFEN 800 MG/1
800 TABLET ORAL EVERY 8 HOURS PRN
Qty: 30 TABLET | Refills: 0 | Status: SHIPPED | OUTPATIENT
Start: 2023-04-20 | End: 2023-05-30

## 2023-04-20 RX ORDER — EPHEDRINE SULFATE 50 MG/ML
5 INJECTION, SOLUTION INTRAVENOUS
Status: DISCONTINUED | OUTPATIENT
Start: 2023-04-20 | End: 2023-04-20 | Stop reason: HOSPADM

## 2023-04-20 RX ORDER — HYDROMORPHONE HYDROCHLORIDE 1 MG/ML
0.4 INJECTION, SOLUTION INTRAMUSCULAR; INTRAVENOUS; SUBCUTANEOUS
Status: DISCONTINUED | OUTPATIENT
Start: 2023-04-20 | End: 2023-04-20 | Stop reason: HOSPADM

## 2023-04-20 RX ORDER — DEXAMETHASONE SODIUM PHOSPHATE 4 MG/ML
INJECTION, SOLUTION INTRA-ARTICULAR; INTRALESIONAL; INTRAMUSCULAR; INTRAVENOUS; SOFT TISSUE PRN
Status: DISCONTINUED | OUTPATIENT
Start: 2023-04-20 | End: 2023-04-20 | Stop reason: SURG

## 2023-04-20 RX ORDER — ONDANSETRON 2 MG/ML
INJECTION INTRAMUSCULAR; INTRAVENOUS PRN
Status: DISCONTINUED | OUTPATIENT
Start: 2023-04-20 | End: 2023-04-20 | Stop reason: SURG

## 2023-04-20 RX ORDER — ACETAMINOPHEN 500 MG
1000 TABLET ORAL ONCE
Status: COMPLETED | OUTPATIENT
Start: 2023-04-20 | End: 2023-04-20

## 2023-04-20 RX ORDER — HYDROMORPHONE HYDROCHLORIDE 1 MG/ML
0.2 INJECTION, SOLUTION INTRAMUSCULAR; INTRAVENOUS; SUBCUTANEOUS
Status: DISCONTINUED | OUTPATIENT
Start: 2023-04-20 | End: 2023-04-20 | Stop reason: HOSPADM

## 2023-04-20 RX ORDER — CEFAZOLIN SODIUM 1 G/3ML
INJECTION, POWDER, FOR SOLUTION INTRAMUSCULAR; INTRAVENOUS PRN
Status: DISCONTINUED | OUTPATIENT
Start: 2023-04-20 | End: 2023-04-20 | Stop reason: SURG

## 2023-04-20 RX ORDER — DIPHENHYDRAMINE HYDROCHLORIDE 50 MG/ML
12.5 INJECTION INTRAMUSCULAR; INTRAVENOUS
Status: DISCONTINUED | OUTPATIENT
Start: 2023-04-20 | End: 2023-04-20 | Stop reason: HOSPADM

## 2023-04-20 RX ORDER — SODIUM CHLORIDE, SODIUM LACTATE, POTASSIUM CHLORIDE, CALCIUM CHLORIDE 600; 310; 30; 20 MG/100ML; MG/100ML; MG/100ML; MG/100ML
INJECTION, SOLUTION INTRAVENOUS CONTINUOUS
Status: ACTIVE | OUTPATIENT
Start: 2023-04-20 | End: 2023-04-20

## 2023-04-20 RX ORDER — HYDRALAZINE HYDROCHLORIDE 20 MG/ML
5 INJECTION INTRAMUSCULAR; INTRAVENOUS
Status: DISCONTINUED | OUTPATIENT
Start: 2023-04-20 | End: 2023-04-20 | Stop reason: HOSPADM

## 2023-04-20 RX ORDER — SCOLOPAMINE TRANSDERMAL SYSTEM 1 MG/1
1 PATCH, EXTENDED RELEASE TRANSDERMAL
Status: DISCONTINUED | OUTPATIENT
Start: 2023-04-20 | End: 2023-04-20 | Stop reason: HOSPADM

## 2023-04-20 RX ORDER — ALBUTEROL SULFATE 2.5 MG/3ML
2.5 SOLUTION RESPIRATORY (INHALATION)
Status: DISCONTINUED | OUTPATIENT
Start: 2023-04-20 | End: 2023-04-20 | Stop reason: HOSPADM

## 2023-04-20 RX ORDER — HALOPERIDOL 5 MG/ML
1 INJECTION INTRAMUSCULAR
Status: DISCONTINUED | OUTPATIENT
Start: 2023-04-20 | End: 2023-04-20 | Stop reason: HOSPADM

## 2023-04-20 RX ORDER — LIDOCAINE HYDROCHLORIDE 20 MG/ML
INJECTION, SOLUTION EPIDURAL; INFILTRATION; INTRACAUDAL; PERINEURAL PRN
Status: DISCONTINUED | OUTPATIENT
Start: 2023-04-20 | End: 2023-04-20 | Stop reason: SURG

## 2023-04-20 RX ORDER — HYDROCODONE BITARTRATE AND ACETAMINOPHEN 5; 325 MG/1; MG/1
2 TABLET ORAL ONCE
Status: DISCONTINUED | OUTPATIENT
Start: 2023-04-20 | End: 2023-04-20

## 2023-04-20 RX ORDER — HYDROCODONE BITARTRATE AND ACETAMINOPHEN 5; 325 MG/1; MG/1
1 TABLET ORAL ONCE
Status: COMPLETED | OUTPATIENT
Start: 2023-04-20 | End: 2023-04-20

## 2023-04-20 RX ORDER — ONDANSETRON 2 MG/ML
4 INJECTION INTRAMUSCULAR; INTRAVENOUS
Status: DISCONTINUED | OUTPATIENT
Start: 2023-04-20 | End: 2023-04-20 | Stop reason: HOSPADM

## 2023-04-20 RX ORDER — KETOROLAC TROMETHAMINE 30 MG/ML
INJECTION, SOLUTION INTRAMUSCULAR; INTRAVENOUS PRN
Status: DISCONTINUED | OUTPATIENT
Start: 2023-04-20 | End: 2023-04-20 | Stop reason: SURG

## 2023-04-20 RX ORDER — HYDROCODONE BITARTRATE AND ACETAMINOPHEN 7.5; 325 MG/1; MG/1
1 TABLET ORAL EVERY 6 HOURS PRN
Qty: 28 TABLET | Refills: 0 | Status: SHIPPED | OUTPATIENT
Start: 2023-04-20 | End: 2023-04-27

## 2023-04-20 RX ORDER — HYDROMORPHONE HYDROCHLORIDE 1 MG/ML
0.1 INJECTION, SOLUTION INTRAMUSCULAR; INTRAVENOUS; SUBCUTANEOUS
Status: DISCONTINUED | OUTPATIENT
Start: 2023-04-20 | End: 2023-04-20 | Stop reason: HOSPADM

## 2023-04-20 RX ORDER — ROCURONIUM BROMIDE 10 MG/ML
INJECTION, SOLUTION INTRAVENOUS PRN
Status: DISCONTINUED | OUTPATIENT
Start: 2023-04-20 | End: 2023-04-20 | Stop reason: SURG

## 2023-04-20 RX ADMIN — FENTANYL CITRATE 50 MCG: 50 INJECTION, SOLUTION INTRAMUSCULAR; INTRAVENOUS at 11:49

## 2023-04-20 RX ADMIN — FENTANYL CITRATE 50 MCG: 50 INJECTION, SOLUTION INTRAMUSCULAR; INTRAVENOUS at 13:14

## 2023-04-20 RX ADMIN — KETOROLAC TROMETHAMINE 30 MG: 30 INJECTION, SOLUTION INTRAMUSCULAR at 11:52

## 2023-04-20 RX ADMIN — FENTANYL CITRATE 50 MCG: 50 INJECTION, SOLUTION INTRAMUSCULAR; INTRAVENOUS at 12:25

## 2023-04-20 RX ADMIN — ROCURONIUM BROMIDE 10 MG: 10 INJECTION, SOLUTION INTRAVENOUS at 11:38

## 2023-04-20 RX ADMIN — MIDAZOLAM 2 MG: 1 INJECTION, SOLUTION INTRAMUSCULAR; INTRAVENOUS at 10:00

## 2023-04-20 RX ADMIN — FENTANYL CITRATE 50 MCG: 50 INJECTION, SOLUTION INTRAMUSCULAR; INTRAVENOUS at 11:27

## 2023-04-20 RX ADMIN — DEXAMETHASONE SODIUM PHOSPHATE 4 MG: 4 INJECTION, SOLUTION INTRA-ARTICULAR; INTRALESIONAL; INTRAMUSCULAR; INTRAVENOUS; SOFT TISSUE at 10:57

## 2023-04-20 RX ADMIN — CEFAZOLIN 2 G: 330 INJECTION, POWDER, FOR SOLUTION INTRAMUSCULAR; INTRAVENOUS at 10:57

## 2023-04-20 RX ADMIN — LIDOCAINE HYDROCHLORIDE 30 MG: 20 INJECTION, SOLUTION EPIDURAL; INFILTRATION; INTRACAUDAL at 10:54

## 2023-04-20 RX ADMIN — ACETAMINOPHEN 1000 MG: 500 TABLET, FILM COATED ORAL at 10:01

## 2023-04-20 RX ADMIN — FENTANYL CITRATE 100 MCG: 50 INJECTION, SOLUTION INTRAMUSCULAR; INTRAVENOUS at 10:54

## 2023-04-20 RX ADMIN — PROPOFOL 150 MG: 10 INJECTION, EMULSION INTRAVENOUS at 10:54

## 2023-04-20 RX ADMIN — SODIUM CHLORIDE, POTASSIUM CHLORIDE, SODIUM LACTATE AND CALCIUM CHLORIDE: 600; 310; 30; 20 INJECTION, SOLUTION INTRAVENOUS at 10:50

## 2023-04-20 RX ADMIN — ONDANSETRON 4 MG: 2 INJECTION INTRAMUSCULAR; INTRAVENOUS at 11:52

## 2023-04-20 RX ADMIN — SCOPALAMINE 1 PATCH: 1 PATCH, EXTENDED RELEASE TRANSDERMAL at 10:01

## 2023-04-20 RX ADMIN — SUGAMMADEX 200 MG: 100 INJECTION, SOLUTION INTRAVENOUS at 11:52

## 2023-04-20 RX ADMIN — HYDROCODONE BITARTRATE AND ACETAMINOPHEN 1 TABLET: 5; 325 TABLET ORAL at 13:00

## 2023-04-20 RX ADMIN — ROCURONIUM BROMIDE 40 MG: 10 INJECTION, SOLUTION INTRAVENOUS at 10:54

## 2023-04-20 ASSESSMENT — PAIN DESCRIPTION - PAIN TYPE
TYPE: ACUTE PAIN
TYPE: SURGICAL PAIN

## 2023-04-20 ASSESSMENT — FIBROSIS 4 INDEX: FIB4 SCORE: 0.59

## 2023-04-20 ASSESSMENT — PAIN SCALES - GENERAL: PAIN_LEVEL: 4

## 2023-04-20 NOTE — OR NURSING
1358 - Recieved report from Jay MOLINA. Patient arrived to phase II bay 20, now sitting in a recliner. X3 lap sites CDI, soft. Drinking water and eating crackers with no problems.    1421 - Discharge instructions discussed with friend at bedside. All questions answered. Verbalized understanding.     1432 - Patient was able to void successfully in bathroom. PIV removed with tip intact. Pt escorted out by ambulation with all belongings. Discharged home to responsible adult.

## 2023-04-20 NOTE — OR NURSING
1000 Pt reports high anxiety, requesting medication. Spoke with Dr Barnett- order received for 2mg versed, see MAR. Pt connected to continuous pulse ox to monitor.

## 2023-04-20 NOTE — ANESTHESIA TIME REPORT
Anesthesia Start and Stop Event Times     Date Time Event    4/20/2023 1004 Ready for Procedure     1050 Anesthesia Start     1216 Anesthesia Stop        Responsible Staff  04/20/23    Name Role Begin End    Angelito Barnett D.O. Anesth 1050 1216        Overtime Reason:  no overtime (within assigned shift)    Comments:

## 2023-04-20 NOTE — ANESTHESIA PROCEDURE NOTES
Airway    Date/Time: 4/20/2023 10:55 AM  Performed by: Angelito Barnett D.O.  Authorized by: Angelito Barnett D.O.     Location:  OR  Urgency:  Elective  Difficult Airway: No    Indications for Airway Management:  Anesthesia      Spontaneous Ventilation: absent    Sedation Level:  Deep  Preoxygenated: Yes    Patient Position:  Sniffing  Mask Difficulty Assessment:  2 - vent by mask + OA or adjuvant +/- NMBA  Final Airway Type:  Endotracheal airway  Final Endotracheal Airway:  ETT  Cuffed: Yes    Technique Used for Successful ETT Placement:  Direct laryngoscopy    Insertion Site:  Oral  Blade Type:  Elena  Laryngoscope Blade/Videolaryngoscope Blade Size:  3  ETT Size (mm):  7.0  Measured from:  Teeth  ETT to Teeth (cm):  21  Placement Verified by: auscultation and capnometry    Cormack-Lehane Classification:  Grade I - full view of glottis  Number of Attempts at Approach:  1

## 2023-04-20 NOTE — H&P
DATE OF SERVICE:  2023     IDENTIFICATION:  The patient is a very pleasant 37-year-old multipara.  She   has had 3 previous  sections.     CHIEF COMPLAINT:  Pelvic pain.     HISTORY OF PRESENT ILLNESS:  The patient complains of pelvic pain and has a   history of endometriosis.  She has in the past undergone multiple   laparoscopies.  Her last laparoscopy was performed on 2022.  Previously,   she had undergone hysterectomy.  It was on 2022 that I performed   laparoscopy with laparoscopic cauterization with endometriosis lesion on the   right ovary.  Intraoperative findings included that during laparoscopy,   absence of uterus was noted and absence of the both fallopian tubes was noted   and both ovaries were seen and the endometriosis lesions were seen on the   right ovary and there were no intraperitoneal pelvic adhesions. The patient is   having more pelvic pain and requests laparoscopy.     PAST MEDICAL HISTORY:  The patient has a history of depression and panic   attacks.     PAST SURGICAL HISTORY:  The patient has had 3 previous  sections.  She   has had multiple laparoscopies. She has had a previous laparoscopic   hysterectomy.  She has undergone left inguinal hernia repair with mesh using   the da Espinoza robot.     MEDICATIONS:  The patient takes analgesics and has taken Cymbalta and   Klonopin.     ALLERGIES:  The patient says she thinks she might be ALLERGIC TO PERCOCET,   although this was questionable.     SOCIAL HISTORY:  The patient smokes.  She denies consuming alcoholic   beverages.  She denies using recreational drugs.     REVIEW OF SYSTEMS:  GENERAL:  No fevers or chills or sweats.  PULMONARY:  No coughing or wheezing or chest pain or shortness of breath.  CARDIOVASCULAR:  No palpitations or dyspnea or chest pain.  GASTROINTESTINAL:  No nausea, vomiting, diarrhea, or constipation.  GENITOURINARY:  The patient is amenorrheic.  She complains of pelvic pain and    dyspareunia.  MUSCULOSKELETAL:  The patient does not report any arthralgias or myalgias.  NEUROLOGICAL:  The patient does not report any headaches or syncope or   seizures.     PHYSICAL EXAMINATION:  VITAL SIGNS:  The patient's vital signs are stable and she was afebrile, her   blood pressure is 126/72.  The patient's height is 5 feet 3 inches and her   weight is 165 pounds and this corresponds to a body mass index of 29.2.  GENERAL:  The patient appears well developed and well nourished and relaxed   and alert and comfortable and in no apparent distress.  HEENT:  Normocephalic, atraumatic.  Pupils equal, round, reactive to light and   accommodation.  Extraocular muscles intact.  Pharynx clear.  There is no   thyromegaly.  There are no cervical adenopathy.  CHEST:  Heart, regular rate and rhythm, no murmur.  LUNGS:  Clear to auscultation bilaterally.  ABDOMEN:  Examination of the patient's abdomen with the patient in the dorsal   supine position reveals that the abdomen is soft and nontender and   nondistended.  There is no evidence of hepatomegaly and no evidence of   splenomegaly and no evidence of any abdominal masses.  EXTREMITIES:  No clubbing, cyanosis or edema.  NEUROLOGIC:  Nonfocal.     ASSESSMENT:  1.  Pelvic pain.  2.  Dyspareunia.  3.  History of endometriosis.     PLAN:  We will proceed with diagnostic and operative laparoscopy.  I have   discussed with the patient and explained to the patient in detail and at   length what diagnostic and operative laparoscopy is and what diagnostic and   operative laparoscopy involves and I have discussed with the patient and   explained to the patient in detail and at length the risks and benefits and   alternatives of diagnostic and operative laparoscopy.  After our discussions   and after answering her questions, she told me that she very much wishes for   us to proceed with laparoscopy, both diagnostic and if necessary depending on   findings at the time of  laparoscopy, operative.        ______________________________  MD SANTY Chapman/DESHAUN/ABI    DD:  04/19/2023 19:17  DT:  04/19/2023 19:44    Job#:  [id number]

## 2023-04-20 NOTE — OP REPORT
DATE OF SERVICE:  04/20/2023     PREOPERATIVE DIAGNOSES:  1.  Pelvic pain.  2.  Dyspareunia.  3.  History of endometriosis.     POSTOPERATIVE DIAGNOSES:  1.  Pelvic pain.  2.  Dyspareunia.  3.  History of endometriosis.  Two small endometriosis lesions are seen on the   right ovary, during today's laparoscopy.     PROCEDURES:  Diagnostic and operative laparoscopy with laparoscopic   cauterization of endometriosis lesions on the right ovary and laparoscopic   resection of left ovarian cystic excrescence.     SURGEON:  Mark Price MD     ANESTHESIA:  General endotracheal tube anesthesia.     ANESTHESIOLOGIST:  nAgelito Barnett MD     FINDINGS:  At laparoscopy views of the pelvis were obtained.  At laparoscopy,   absence of the uterus was noted.  Also, at laparoscopy absence of both   fallopian tubes as noted as well.  The cul-de-sac appears normal.  The vaginal   cuff appears normal.  There are 2 small endometriosis lesions seen on the   right ovary.  There is also a small cystic excrescences emanating from the   left ovary.  Otherwise, both ovaries appeared normal.  There are no   intraperitoneal pelvic adhesions, the liver edge appears normal.     SPECIMENS:  Cystic excrescences emanating from the left ovary.     COMPLICATIONS:  None.     ESTIMATED BLOOD LOSS:  Less than 5 mL     DESCRIPTION OF PROCEDURE:  After appropriate consents have been obtained, the   patient was taken to the operating room and given general anesthesia.  She was   prepped and draped in dorsal lithotomy position.  I placed a Good catheter   and this was noted to be draining urine.  A sponge stick was placed in the   vaginal vault.  The 's gloves were changed and attention was then   directed to the abdomen where a small (approximately 1 cm) horizontal   infraumbilical incision was made with a scalpel.  A Veress needle was advanced   through this incision into the peritoneal cavity and proper placement in the   peritoneal cavity  was verified with a Matta hanging drop technique.  The   peritoneal cavity was then insufflated with approximately 2-3 liters of carbon   dioxide gas.  The Veress needle was removed and a 5 mm port was introduced   through the infraumbilical incision into the peritoneal cavity and the central   portion of this port was removed and a 5 mm 0-degree laparoscope was inserted   through the remaining sleeve and proper entry in the peritoneal cavity was   verified visually with laparoscope.  The patient was placed in Trendelenburg   position.  A 5 mm port was placed suprapubically under direct laparoscopic   visualization utilizing the VersaStep trocar system.  The laparoscope was then   placed through the suprapubic port and used to inspect the infraumbilical   port.  No adhesions are seen in or around the infraumbilical port.  The   laparoscope was replaced through the infraumbilical port.  It should be noted   that later on during the procedure, a 5 mm port was placed in left lower   quadrant under direct laparoscopic visualization utilizing the VersaStep   trocar system.  The patient was placed in further Trendelenburg position.  The   vaginal cuff was manipulated with the vaginal sponge stick.  A Prestige   instrument was placed through accessory ports to elevate both ovaries and   examined both ovarian fossae.  Of note, both ovarian fossae are found to be   normal.  Two endometriosis lesions are seen on the right ovary.  These were   both cauterized with monopolar cautery instrument.  A cystic excrescences   emanating from the left ovary is resected with use of monopolar cautery.    Excellent hemostasis was observed.  More pictures were taken.  The patient was   taken out of Trendelenburg position and accessory instruments removed and the   laparoscope was removed and air was allowed to evacuate the peritoneal   cavity.  All ports were removed.  Skin incisions were reapproximated with   placement of interrupted  buried sutures of 4-0 Monocryl placed in the dermis.    The vaginal sponge stick was removed.  The procedure was terminated.  Final   lap and needle counts reported to be correct at the end of the procedure.  The   patient tolerated the procedure well and sent to postanesthesia recovery in   stable condition.        ______________________________  Mark Price MD    MED/[place]    DD:  04/20/2023 12:25  DT:  04/20/2023 12:59    Job#:  [id number]    CC:Angelito Barnett DO

## 2023-04-20 NOTE — OR SURGEON
Immediate Post OP Note    PreOp Diagnosis:   1.  Pelvic pain.  2.  Dyspareunia.  3.  History of endometriosis.    PostOp Diagnosis:   1.  Pelvic pain.  2.  Dyspareunia.  3.  History of endometriosis.  Two small endometriosis lesions are seen on the right ovary.    Procedure(s):  DIAGNOSTIC AND OPERATIVE LAPAROSCOPY - Wound Class: Clean    Surgeon(s):  Mark Price M.D.    Anesthesiologist/Type of Anesthesia:  Anesthesiologist: Angelito Barnett D.O./General    Surgical Staff:  Circulator: EAN Rice Circulator: Deann Enrique R.N.  Relief Scrub: Jalen Salmeron  Scrub Person: Erin Hassan    Specimens removed if any:  ID Type Source Tests Collected by Time Destination   A : Excrescence left ovary Other Other PATHOLOGY SPECIMEN Mark Price M.D. 4/20/2023 11:57 AM        Estimated Blood Loss:   Less than 5 cc    Findings:   At laparoscopy views of the pelvis are obtained.  At laparoscopy absence of the uterus is noted.  Also absence of both fallopian tubes is noted as well.  The cul-de-sac appears normal.  The vaginal cuff appears normal.  There are 2 small endometriosis lesions on the right ovary.  There is a small cystic excrescence emanating from the left ovary.    Complications:   None.        4/20/2023 12:03 PM Mark Price M.D.

## 2023-04-20 NOTE — ANESTHESIA PREPROCEDURE EVALUATION
Case: [id number] Date/Time: 04/20/23 1015    Procedure: DIAGNOSTIC AND OPERATIVE LAPAROSCOPY    Pre-op diagnosis: PELVIC PAIN, DYSPAREUNIA, ENDOMETRIOSIS    Location: [facility] ROOM 25 / SURGERY SAME DAY [facility]    Surgeons: Mark Price M.D.          Relevant Problems   NEURO   (positive) Traumatic subarachnoid hemorrhage with loss of consciousness (HCC)      CARDIAC   (positive) Traumatic subarachnoid hemorrhage with loss of consciousness (HCC)         (positive) Kidney infection       Physical Exam    Airway   Mallampati: II  TM distance: >3 FB  Neck ROM: full       Cardiovascular - normal exam  Rhythm: regular  Rate: normal  (-) murmur     Dental - normal exam           Pulmonary - normal exam  Breath sounds clear to auscultation     Abdominal    Neurological - normal exam                 Anesthesia Plan    ASA 2       Plan - general       Airway plan will be ETT          Induction: intravenous    Postoperative Plan: Postoperative administration of opioids is intended.    Pertinent diagnostic labs and testing reviewed    Informed Consent:    Anesthetic plan and risks discussed with patient.    Use of blood products discussed with: patient whom consented to blood products.

## 2023-04-20 NOTE — ANESTHESIA POSTPROCEDURE EVALUATION
Patient: Marian Kent    Procedure Summary     Date: 04/20/23 Room / Location: [facility] ROOM 25 / SURGERY SAME DAY [facility]    Anesthesia Start: 1050 Anesthesia Stop: 1216    Procedure: DIAGNOSTIC AND OPERATIVE LAPAROSCOPY (Abdomen) Diagnosis: (PELVIC PAIN, DYSPAREUNIA, ENDOMETRIOSIS)    Surgeons: Mark Price M.D. Responsible Provider: Angelito Barnett D.O.    Anesthesia Type: general ASA Status: 2          Final Anesthesia Type: general  Last vitals  BP   Blood Pressure: 113/74    Temp   36.9 °C (98.5 °F)    Pulse   81   Resp   18    SpO2   95 %      Anesthesia Post Evaluation    Patient location during evaluation: PACU  Patient participation: complete - patient participated  Level of consciousness: awake and alert  Pain score: 4    Airway patency: patent  Anesthetic complications: no  Cardiovascular status: hemodynamically stable  Respiratory status: acceptable  Hydration status: euvolemic    PONV: none          No notable events documented.     Nurse Pain Score: 8 (NPRS)

## 2023-04-20 NOTE — DISCHARGE INSTRUCTIONS
HOME CARE INSTRUCTIONS    ACTIVITY: Rest and take it easy for the first 24 hours.  A responsible adult is recommended to remain with you during that time.  It is normal to feel sleepy.  We encourage you to not do anything that requires balance, judgment or coordination.    FOR 24 HOURS DO NOT:  Drive, operate machinery or run household appliances.  Drink beer or alcoholic beverages.  Make important decisions or sign legal documents.    SPECIAL INSTRUCTIONS: See Handout    DIET: To avoid nausea, slowly advance diet as tolerated, avoiding spicy or greasy foods for the first day.  Add more substantial food to your diet according to your physician's instructions.  Babies can be fed formula or breast milk as soon as they are hungry.  INCREASE FLUIDS AND FIBER TO AVOID CONSTIPATION.    SURGICAL DRESSING/BATHING: Ok to shower tomorrow, avoid submerging in water (hot tub, pool, bath) until ok with doctor    MEDICATIONS: Resume taking daily medication.  Take prescribed pain medication with food.  If no medication is prescribed, you may take non-aspirin pain medication if needed.  PAIN MEDICATION CAN BE VERY CONSTIPATING.  Take a stool softener or laxative such as senokot, pericolace, or milk of magnesia if needed.     Last pain medication given: 1,000mg Tylenol given at 10:00am.  Toradol (like ibuprofen) given at 12pm.  Norco given at 1pm.   May take next dose of ibuprofen after 6pm, as needed  May take next dose of norco after 7pm, as needed    A follow-up appointment should be arranged with your doctor; call to schedule.    You should CALL YOUR PHYSICIAN if you develop:  Fever greater than 101 degrees F.  Pain not relieved by medication, or persistent nausea or vomiting.  Excessive bleeding (blood soaking through dressing) or unexpected drainage from the wound.  Extreme redness or swelling around the incision site, drainage of pus or foul smelling drainage.  Inability to urinate or empty your bladder within 8  hours.  Problems with breathing or chest pain.    You should call 911 if you develop problems with breathing or chest pain.  If you are unable to contact your doctor or surgical center, you should go to the nearest emergency room or urgent care center.    Physician's telephone #: Dr. Price [phone]    MILD FLU-LIKE SYMPTOMS ARE NORMAL.  YOU MAY EXPERIENCE GENERALIZED MUSCLE ACHES, THROAT IRRITATION, HEADACHE AND/OR SOME NAUSEA.    If any questions arise, call your doctor.  If your doctor is not available, please feel free to call the Surgical Center at [phone].  The Center is open Monday through Friday from 7AM to 7PM.      A registered nurse may call you a few days after your surgery to see how you are doing after your procedure.    You may also receive a survey in the mail within the next two weeks and we ask that you take a few moments to complete the survey and return it to us.  Our goal is to provide you with very good care and we value your comments.     Depression / Suicide Risk    As you are discharged from this [facility] facility, it is important to learn how to keep safe from harming yourself.    Recognize the warning signs:  Abrupt changes in personality, positive or negative- including increase in energy   Giving away possessions  Change in eating patterns- significant weight changes-  positive or negative  Change in sleeping patterns- unable to sleep or sleeping all the time   Unwillingness or inability to communicate  Depression  Unusual sadness, discouragement and loneliness  Talk of wanting to die  Neglect of personal appearance   Rebelliousness- reckless behavior  Withdrawal from people/activities they love  Confusion- inability to concentrate     If you or a loved one observes any of these behaviors or has concerns about self-harm, here's what you can do:  Talk about it- your feelings and reasons for harming yourself  Remove any means that you might use to hurt yourself (examples:  pills, rope, extension cords, firearm)  Get professional help from the community (Mental Health, Substance Abuse, psychological counseling)  Do not be alone:Call your Safe Contact- someone whom you trust who will be there for you.  Call your local CRISIS HOTLINE 344-8916 or 251-541-2987  Call your local Children's Mobile Crisis Response Team [place] (393) 987-3063 or www.[employer]  Call the toll free National Suicide Prevention Hotlines   National Suicide Prevention Lifeline 350-688-GEVF (8111)  [facility] Line Network 800-SUICIDE (911-9963)    I acknowledge receipt and understanding of these Home Care instructions.

## 2023-04-20 NOTE — OR NURSING
1214 Arrived from OR. Identified appropriately. Patient restless. 10L 02 Mask . X3 lap sites clean dry intact. Vázquez cath in place.     1216 Per OR nurse order received from DR Dennies to discontinue vázquez catheter.    1225 Fentanyl given as ordered.     1239 Vázquez removed tip intact.     1253 - Dr. Barnett gave ok to give x1 dose Norco 5/325 for reported abdominal pain.  Pt states she is ok to take this medication and that is what she will also be prescribed to take at home.  Pt on 2L NC, has had some water.     1320 Patient tolerating PO fluids.     1356 Report Given to Yanet MOLINA phase 2

## 2023-05-30 ENCOUNTER — HOSPITAL ENCOUNTER (OUTPATIENT)
Facility: MEDICAL CENTER | Age: 38
End: 2023-05-30
Attending: FAMILY MEDICINE
Payer: MEDICAID

## 2023-05-30 ENCOUNTER — OFFICE VISIT (OUTPATIENT)
Dept: MEDICAL GROUP | Facility: MEDICAL CENTER | Age: 38
End: 2023-05-30
Attending: FAMILY MEDICINE
Payer: MEDICAID

## 2023-05-30 VITALS
HEIGHT: 63 IN | OXYGEN SATURATION: 98 % | RESPIRATION RATE: 16 BRPM | DIASTOLIC BLOOD PRESSURE: 82 MMHG | BODY MASS INDEX: 29.29 KG/M2 | WEIGHT: 165.3 LBS | HEART RATE: 72 BPM | SYSTOLIC BLOOD PRESSURE: 101 MMHG | TEMPERATURE: 98.2 F

## 2023-05-30 DIAGNOSIS — G89.29 CHRONIC ABDOMINAL PAIN: ICD-10-CM

## 2023-05-30 DIAGNOSIS — R10.9 CHRONIC ABDOMINAL PAIN: ICD-10-CM

## 2023-05-30 LAB — AMBIGUOUS DTTM AMBI4: NORMAL

## 2023-05-30 PROCEDURE — 99213 OFFICE O/P EST LOW 20 MIN: CPT | Performed by: FAMILY MEDICINE

## 2023-05-30 PROCEDURE — 80307 DRUG TEST PRSMV CHEM ANLYZR: CPT

## 2023-05-30 PROCEDURE — G0480 DRUG TEST DEF 1-7 CLASSES: HCPCS

## 2023-05-30 PROCEDURE — 3079F DIAST BP 80-89 MM HG: CPT | Performed by: FAMILY MEDICINE

## 2023-05-30 PROCEDURE — 99214 OFFICE O/P EST MOD 30 MIN: CPT | Performed by: FAMILY MEDICINE

## 2023-05-30 PROCEDURE — 3074F SYST BP LT 130 MM HG: CPT | Performed by: FAMILY MEDICINE

## 2023-05-30 RX ORDER — HYDROCODONE BITARTRATE AND ACETAMINOPHEN 10; 325 MG/1; MG/1
TABLET ORAL
Qty: 10 TABLET | Refills: 0 | Status: SHIPPED | OUTPATIENT
Start: 2023-05-30 | End: 2023-06-06

## 2023-05-30 RX ORDER — CLONAZEPAM 1 MG/1
TABLET ORAL
COMMUNITY
Start: 2023-05-26 | End: 2023-07-01

## 2023-05-30 RX ORDER — FLUOXETINE HYDROCHLORIDE 20 MG/1
CAPSULE ORAL
COMMUNITY
Start: 2023-05-15 | End: 2023-07-01

## 2023-05-30 ASSESSMENT — FIBROSIS 4 INDEX: FIB4 SCORE: 0.59

## 2023-05-30 NOTE — PROGRESS NOTES
"Subjective:     CC:   Chief Complaint   Patient presents with    Follow-Up     Referral to pain mgt         HPI:     Chronic abdominal pain  Pt presents today for chronic abdominal pain  Previously on hydrocodone 10mg TID, has been getting short term rx from gyn, prev given by her PCP.   Has history of endometriosis, s/p ablation by Dr. Albert Folres. Reports she is about to get gall bladder surgery. Reports history of crohn's and chronic epigastric pain. No path results for IBD. She reports a colonoscopy a few months ago with dx of crohn's but no records of this. No immunomodulatory medications. She reports she has a f/u with GI consultants in [facility] in August.   Multiple ER visits for chronic abdominal pain.   On clonazepam via psychiatry.   Pt reports pain is lower abdomen.         Past Medical History:   Diagnosis Date    Anesthesia 02/14/2022    PONV/history of motion sickness    Anxiety 2/19/2019    Bipolar 1 disorder (HCC) 02/14/2022    Breath shortness     occas. no O2    Crohn's disease (HCC) 1/17/2023    EP (ectopic pregnancy)     Gynecological disorder     adhesions/cysts    Hydronephrosis right 1/3/2014    stone    Nausea with vomiting 9/16/2022    Pain 02/14/2022    pelvic, back    Pain 03/2019    \"Stomach\"    Pain management     With Dr. Barger in [place]    Placenta previa diagnosed with US at [facility] 12/17/2013    PONV (postoperative nausea and vomiting)     Psychiatric problem 02/14/2022    depressions, anxiety    Ulcerative colitis (HCC) 1/17/2023       Social History     Tobacco Use    Smoking status: Every Day     Packs/day: 1.00     Years: 10.00     Pack years: 10.00     Types: Cigarettes    Smokeless tobacco: Never   Vaping Use    Vaping Use: Never used   Substance Use Topics    Alcohol use: No     Alcohol/week: 0.0 oz     Comment: none since10/21    Drug use: No       Current Outpatient Medications Ordered in Epic   Medication Sig Dispense Refill    HYDROcodone/acetaminophen (NORCO)  MG Tab " "Take 1 Tablet by mouth every 8 hours as needed for Severe Pain (abd pain) for 1 day, THEN 0.5 Tablets every 8 hours as needed for 2 days, THEN 0.5 Tablets every 12 hours as needed for 2 days, THEN 0.5 Tablets 1 time a day as needed for up to 2 days. 10 Tablet 0    FLUoxetine (PROZAC) 20 MG Cap 1 CAPSULE EVERY NIGHT FOR DEPRESSION AND ANXIETY      clonazePAM (KLONOPIN) 1 MG Tab TAKE 1/2 TABLET BY MOUTH 2 TIMES DAILY AS NEEDED FOR PANIC ATTACK FOR 14 DAYS      VRAYLAR 4.5 MG Cap TAKE 1 CAPSULE BY MOUTH AT BEDTIME FOR MOOD STABILTY       No current Epic-ordered facility-administered medications on file.       Allergies:  Sertraline, Oxycodone, and Tramadol        Objective:       Exam:  /82 (BP Location: Left arm, Patient Position: Sitting, BP Cuff Size: Adult)   Pulse 72   Temp 36.8 °C (98.2 °F) (Temporal)   Resp 16   Ht 1.6 m (5' 3\")   Wt 75 kg (165 lb 4.8 oz)   LMP 01/09/2016   SpO2 98%   BMI 29.28 kg/m²  Body mass index is 29.28 kg/m².    Constitutional: Alert, no distress, well-groomed.  Respiratory: Unlabored respiratory effort, no cough.  MSK: moves all extremities.  Psych: normal affect and mood.        Labs:   Reviewed op notes from gynecology  Prior HIDA scan done at [facility], this is normal    Assessment & Plan:     37 y.o. female with the following -     1. Chronic abdominal pain  - Referral to Pain Management  - PAIN MANAGEMENT SCRN, W/ RFLX TO QNT; Future  - Controlled Substance Treatment Agreement  - HYDROcodone/acetaminophen (NORCO)  MG Tab; Take 1 Tablet by mouth every 8 hours as needed for Severe Pain (abd pain) for 1 day, THEN 0.5 Tablets every 8 hours as needed for 2 days, THEN 0.5 Tablets every 12 hours as needed for 2 days, THEN 0.5 Tablets 1 time a day as needed for up to 2 days.  Dispense: 10 Tablet; Refill: 0  Patient was previously given hydrocodone 10 mg 3 times a day by her prior PCP.  However, based on her reported history, I do not have any of the proper " documentation.  She reports a history of Crohn's disease, however upon review of prior records from digestive health and also GI consultants earlier in March of this year, I do not see any proof of Crohn's disease.  If she truly does have Crohn's disease, I do think that treatment of this is appropriate.  She states that she had a colonoscopy earlier this year.  I am requesting records from GI consultants.  She did not follow-up with [facility] after 2020, and I only see one note from GI consultants from March of this year.  No records received regarding EGD or colonoscopy however this was planned.  She also states that she has gallbladder issues, and that [facility] wants to remove it, however she does have a normal HIDA scan done recently in February at [facility].  Patient has multiple visits to the ER for abdominal pain.  Abdominal pain is in the lower area, which may be more fitting with endometriosis.  I discussed with patient that we should be finding the etiology of her pain rather than continuing to give her Norco.  I am giving her a wean off of the narcotics until further evaluation or documentation can be obtained.  I also advised her that taking in combination with benzos is high risk.  She is to return in 2 weeks with her PCP to review documentation, or, at the very least seeing her specialists regularly.  She admits to using a small amount of marijuana last night.   Need to find proper etiology for her and seek appropriate treatment for etiology. She is agreeable.  I have tried calling GI consultants, INOC[facility]E left.   Discussed with [facility], who stated that pt was not appropriate for their care as they do not manage long term pain medicines. New referral placed.      Return in about 2 weeks (around 6/13/2023) for f/u with PCP to review documentation, specialist notes.    Please note that this dictation was created using voice recognition software. I have made every reasonable attempt  to correct obvious errors, but I expect that there are errors of grammar and possibly content that I did not discover before finalizing the note.

## 2023-05-30 NOTE — LETTER
[facility]  Ofe Gregory M.D.  [address]  [place] [address]  Fax: [phone]   Authorization for Release/Disclosure of   Protected Health Information   Name: MARIAN KENT : 1985 SSN: [id number]   Address: [address] [place] [address] Phone:    [phone] (home)    I authorize the entity listed below to release/disclose the PHI below to:   [facility]/Ofe Gregory M.D. and Rosey Ceja M.D.   Provider or Entity Name:  [facility]   Address   City, State, Zip   Phone:      Fax:     Reason for request: continuity of care   Information to be released:    [  ] LAST COLONOSCOPY,  including any PATH REPORT and follow-up  [  ] LAST FIT/COLOGUARD RESULT [  ] LAST DEXA  [  ] LAST MAMMOGRAM  [  ] LAST PAP  [  ] LAST LABS [  ] RETINA EXAM REPORT  [  ] IMMUNIZATION RECORDS  [ X ] Release all info      [  ] Check here and initial the line next to each item to release ALL health information INCLUDING  _____ Care and treatment for drug and / or alcohol abuse  _____ HIV testing, infection status, or AIDS  _____ Genetic Testing    DATES OF SERVICE OR TIME PERIOD TO BE DISCLOSED: _____________  I understand and acknowledge that:  * This Authorization may be revoked at any time by you in writing, except if your health information has already been used or disclosed.  * Your health information that will be used or disclosed as a result of you signing this authorization could be re-disclosed by the recipient. If this occurs, your re-disclosed health information may no longer be protected by State or Federal laws.  * You may refuse to sign this Authorization. Your refusal will not affect your ability to obtain treatment.  * This Authorization becomes effective upon signing and will  on (date) __________.      If no date is indicated, this Authorization will  one (1) year from the signature date.    Name: Marian Kent  Signature: Date:   2023     PLEASE FAX  REQUESTED RECORDS BACK TO: [phone]

## 2023-05-30 NOTE — LETTER
[facility]  Ofe Gregory M.D.  [address]  [place] [address]  Fax: [phone]   Authorization for Release/Disclosure of   Protected Health Information   Name: MARIAN KENT : 1985 SSN: [id number]   Address: [address]  [place] [address] Phone:    [phone] (home)    I authorize the entity listed below to release/disclose the PHI below to:   [facility]/Ofe Gregory M.D. and Rosey Ceja M.D.   Provider or Entity Name:  [facility]   Address   City, State, Zip   Phone:      Fax:     Reason for request: continuity of care   Information to be released:    [  ] LAST COLONOSCOPY,  including any PATH REPORT and follow-up  [  ] LAST FIT/COLOGUARD RESULT [  ] LAST DEXA  [  ] LAST MAMMOGRAM  [  ] LAST PAP  [  ] LAST LABS [  ] RETINA EXAM REPORT  [  ] IMMUNIZATION RECORDS  [ X ] Release all info      [  ] Check here and initial the line next to each item to release ALL health information INCLUDING  _____ Care and treatment for drug and / or alcohol abuse  _____ HIV testing, infection status, or AIDS  _____ Genetic Testing    DATES OF SERVICE OR TIME PERIOD TO BE DISCLOSED: _____________  I understand and acknowledge that:  * This Authorization may be revoked at any time by you in writing, except if your health information has already been used or disclosed.  * Your health information that will be used or disclosed as a result of you signing this authorization could be re-disclosed by the recipient. If this occurs, your re-disclosed health information may no longer be protected by State or Federal laws.  * You may refuse to sign this Authorization. Your refusal will not affect your ability to obtain treatment.  * This Authorization becomes effective upon signing and will  on (date) __________.      If no date is indicated, this Authorization will  one (1) year from the signature date.    Name: Marian Kent  Signature: Date:   2023     PLEASE FAX  REQUESTED RECORDS BACK TO: [phone]

## 2023-05-30 NOTE — ASSESSMENT & PLAN NOTE
Pt presents today for chronic abdominal pain  Previously on hydrocodone 10mg TID, has been getting short term rx from gyn, prev given by her PCP.   Has history of endometriosis, s/p ablation by Dr. Albert Flores. Reports she is about to get gall bladder surgery. Reports history of crohn's and chronic epigastric pain. No path results for IBD. She reports a colonoscopy a few months ago with dx of crohn's but no records of this. No immunomodulatory medications. She reports she has a f/u with GI consultants in [facility] in August.   Multiple ER visits for chronic abdominal pain.   On clonazepam via psychiatry.   Pt reports pain is lower abdomen.

## 2023-06-01 LAB
AMPHET CTO UR CFM-MCNC: NEGATIVE NG/ML
BARBITURATES CTO UR CFM-MCNC: NEGATIVE NG/ML
BENZODIAZ CTO UR CFM-MCNC: NEGATIVE NG/ML
BUPRENORPHINE UR-MCNC: NEGATIVE NG/ML
CANNABINOIDS CTO UR CFM-MCNC: POSITIVE NG/ML
CARISOPRODOL UR-MCNC: NEGATIVE NG/ML
COCAINE CTO UR CFM-MCNC: NEGATIVE NG/ML
DRUG SCREEN COMMENT UR-IMP: NORMAL
ETHYL GLUCURONIDE UR QL SCN: NEGATIVE NG/ML
FENTANYL UR-MCNC: NEGATIVE NG/ML
MEPERIDINE CTO UR SCN-MCNC: NEGATIVE NG/ML
METHADONE CTO UR CFM-MCNC: NEGATIVE NG/ML
OPIATES UR QL SCN: NEGATIVE NG/ML
OXYCDOXYM URSCRN 2005102: POSITIVE NG/ML
PCP CTO UR CFM-MCNC: NEGATIVE NG/ML
PROPOXYPH CTO UR CFM-MCNC: NEGATIVE NG/ML
TAPENTADOL UR-MCNC: NEGATIVE NG/ML
TRAMADOL CTO UR SCN-MCNC: NEGATIVE NG/ML
ZOLPIDEM UR-MCNC: NEGATIVE NG/ML

## 2023-06-03 LAB
6MAM UR CFM-MCNC: <10 NG/ML
CODEINE UR CFM-MCNC: <20 NG/ML
HYDROCODONE UR CFM-MCNC: <20 NG/ML
HYDROMORPHONE UR CFM-MCNC: <20 NG/ML
MORPHINE UR CFM-MCNC: <20 NG/ML
NORHYDROCODONE UR CFM-MCNC: <20 NG/ML
NOROXYCODONE UR CFM-MCNC: 2576 NG/ML
OPIATES UR NOROXYM Q0836: 32 NG/ML
OXYCODONE UR CFM-MCNC: 931 NG/ML
OXYMORPHONE UR CFM-MCNC: <20 NG/ML
THC UR CFM-MCNC: 108 NG/ML

## 2023-06-05 ENCOUNTER — HOSPITAL ENCOUNTER (EMERGENCY)
Facility: MEDICAL CENTER | Age: 38
End: 2023-06-05
Attending: EMERGENCY MEDICINE
Payer: MEDICAID

## 2023-06-05 ENCOUNTER — APPOINTMENT (OUTPATIENT)
Dept: RADIOLOGY | Facility: MEDICAL CENTER | Age: 38
End: 2023-06-05
Attending: EMERGENCY MEDICINE
Payer: MEDICAID

## 2023-06-05 VITALS
HEART RATE: 70 BPM | HEIGHT: 63 IN | SYSTOLIC BLOOD PRESSURE: 111 MMHG | OXYGEN SATURATION: 99 % | WEIGHT: 165.57 LBS | BODY MASS INDEX: 29.34 KG/M2 | RESPIRATION RATE: 16 BRPM | DIASTOLIC BLOOD PRESSURE: 64 MMHG | TEMPERATURE: 97.9 F

## 2023-06-05 DIAGNOSIS — R19.7 NAUSEA, VOMITING AND DIARRHEA: ICD-10-CM

## 2023-06-05 DIAGNOSIS — R11.2 NAUSEA, VOMITING AND DIARRHEA: ICD-10-CM

## 2023-06-05 DIAGNOSIS — R10.84 GENERALIZED ABDOMINAL PAIN: ICD-10-CM

## 2023-06-05 LAB
ALBUMIN SERPL BCP-MCNC: 4.3 G/DL (ref 3.2–4.9)
ALBUMIN/GLOB SERPL: 2 G/DL
ALP SERPL-CCNC: 68 U/L (ref 30–99)
ALT SERPL-CCNC: 13 U/L (ref 2–50)
ANION GAP SERPL CALC-SCNC: 11 MMOL/L (ref 7–16)
APPEARANCE UR: CLEAR
AST SERPL-CCNC: 18 U/L (ref 12–45)
BASOPHILS # BLD AUTO: 0.4 % (ref 0–1.8)
BASOPHILS # BLD: 0.03 K/UL (ref 0–0.12)
BILIRUB SERPL-MCNC: 0.2 MG/DL (ref 0.1–1.5)
BILIRUB UR QL STRIP.AUTO: NEGATIVE
BUN SERPL-MCNC: 10 MG/DL (ref 8–22)
CALCIUM ALBUM COR SERPL-MCNC: 9 MG/DL (ref 8.5–10.5)
CALCIUM SERPL-MCNC: 9.2 MG/DL (ref 8.4–10.2)
CHLORIDE SERPL-SCNC: 108 MMOL/L (ref 96–112)
CO2 SERPL-SCNC: 23 MMOL/L (ref 20–33)
COLOR UR: YELLOW
CREAT SERPL-MCNC: 0.78 MG/DL (ref 0.5–1.4)
EOSINOPHIL # BLD AUTO: 0.12 K/UL (ref 0–0.51)
EOSINOPHIL NFR BLD: 1.7 % (ref 0–6.9)
ERYTHROCYTE [DISTWIDTH] IN BLOOD BY AUTOMATED COUNT: 40.7 FL (ref 35.9–50)
GFR SERPLBLD CREATININE-BSD FMLA CKD-EPI: 101 ML/MIN/1.73 M 2
GLOBULIN SER CALC-MCNC: 2.1 G/DL (ref 1.9–3.5)
GLUCOSE SERPL-MCNC: 121 MG/DL (ref 65–99)
GLUCOSE UR STRIP.AUTO-MCNC: NEGATIVE MG/DL
HCT VFR BLD AUTO: 41.2 % (ref 37–47)
HGB BLD-MCNC: 14.6 G/DL (ref 12–16)
IMM GRANULOCYTES # BLD AUTO: 0.02 K/UL (ref 0–0.11)
IMM GRANULOCYTES NFR BLD AUTO: 0.3 % (ref 0–0.9)
KETONES UR STRIP.AUTO-MCNC: NEGATIVE MG/DL
LEUKOCYTE ESTERASE UR QL STRIP.AUTO: NEGATIVE
LIPASE SERPL-CCNC: 70 U/L (ref 7–58)
LYMPHOCYTES # BLD AUTO: 2.4 K/UL (ref 1–4.8)
LYMPHOCYTES NFR BLD: 34.9 % (ref 22–41)
MCH RBC QN AUTO: 33.9 PG (ref 27–33)
MCHC RBC AUTO-ENTMCNC: 35.4 G/DL (ref 32.2–35.5)
MCV RBC AUTO: 95.6 FL (ref 81.4–97.8)
MICRO URNS: NORMAL
MONOCYTES # BLD AUTO: 0.55 K/UL (ref 0–0.85)
MONOCYTES NFR BLD AUTO: 8 % (ref 0–13.4)
NEUTROPHILS # BLD AUTO: 3.75 K/UL (ref 1.82–7.42)
NEUTROPHILS NFR BLD: 54.7 % (ref 44–72)
NITRITE UR QL STRIP.AUTO: NEGATIVE
NRBC # BLD AUTO: 0 K/UL
NRBC BLD-RTO: 0 /100 WBC (ref 0–0.2)
PH UR STRIP.AUTO: 7 [PH] (ref 5–8)
PLATELET # BLD AUTO: 216 K/UL (ref 164–446)
PMV BLD AUTO: 8.5 FL (ref 9–12.9)
POTASSIUM SERPL-SCNC: 3.4 MMOL/L (ref 3.6–5.5)
PROT SERPL-MCNC: 6.4 G/DL (ref 6–8.2)
PROT UR QL STRIP: NEGATIVE MG/DL
RBC # BLD AUTO: 4.31 M/UL (ref 4.2–5.4)
RBC UR QL AUTO: NEGATIVE
SODIUM SERPL-SCNC: 142 MMOL/L (ref 135–145)
SP GR UR STRIP.AUTO: 1.01
WBC # BLD AUTO: 6.9 K/UL (ref 4.8–10.8)

## 2023-06-05 PROCEDURE — 96374 THER/PROPH/DIAG INJ IV PUSH: CPT

## 2023-06-05 PROCEDURE — 700111 HCHG RX REV CODE 636 W/ 250 OVERRIDE (IP): Performed by: EMERGENCY MEDICINE

## 2023-06-05 PROCEDURE — 700117 HCHG RX CONTRAST REV CODE 255: Performed by: EMERGENCY MEDICINE

## 2023-06-05 PROCEDURE — 36415 COLL VENOUS BLD VENIPUNCTURE: CPT

## 2023-06-05 PROCEDURE — 96375 TX/PRO/DX INJ NEW DRUG ADDON: CPT

## 2023-06-05 PROCEDURE — 99285 EMERGENCY DEPT VISIT HI MDM: CPT

## 2023-06-05 PROCEDURE — 700105 HCHG RX REV CODE 258: Performed by: EMERGENCY MEDICINE

## 2023-06-05 PROCEDURE — 81003 URINALYSIS AUTO W/O SCOPE: CPT

## 2023-06-05 PROCEDURE — 85025 COMPLETE CBC W/AUTO DIFF WBC: CPT

## 2023-06-05 PROCEDURE — 80053 COMPREHEN METABOLIC PANEL: CPT

## 2023-06-05 PROCEDURE — 83690 ASSAY OF LIPASE: CPT

## 2023-06-05 PROCEDURE — 74177 CT ABD & PELVIS W/CONTRAST: CPT

## 2023-06-05 RX ORDER — HALOPERIDOL 5 MG/ML
2.5 INJECTION INTRAMUSCULAR ONCE
Status: COMPLETED | OUTPATIENT
Start: 2023-06-05 | End: 2023-06-05

## 2023-06-05 RX ORDER — SODIUM CHLORIDE, SODIUM LACTATE, POTASSIUM CHLORIDE, CALCIUM CHLORIDE 600; 310; 30; 20 MG/100ML; MG/100ML; MG/100ML; MG/100ML
1000 INJECTION, SOLUTION INTRAVENOUS ONCE
Status: COMPLETED | OUTPATIENT
Start: 2023-06-05 | End: 2023-06-05

## 2023-06-05 RX ORDER — PROCHLORPERAZINE EDISYLATE 5 MG/ML
5 INJECTION INTRAMUSCULAR; INTRAVENOUS ONCE
Status: COMPLETED | OUTPATIENT
Start: 2023-06-05 | End: 2023-06-05

## 2023-06-05 RX ADMIN — PROCHLORPERAZINE EDISYLATE 5 MG: 5 INJECTION INTRAMUSCULAR; INTRAVENOUS at 03:35

## 2023-06-05 RX ADMIN — IOHEXOL 100 ML: 350 INJECTION, SOLUTION INTRAVENOUS at 03:25

## 2023-06-05 RX ADMIN — SODIUM CHLORIDE, POTASSIUM CHLORIDE, SODIUM LACTATE AND CALCIUM CHLORIDE 1000 ML: 600; 310; 30; 20 INJECTION, SOLUTION INTRAVENOUS at 02:56

## 2023-06-05 RX ADMIN — HALOPERIDOL LACTATE 2.5 MG: 5 INJECTION, SOLUTION INTRAMUSCULAR at 02:55

## 2023-06-05 ASSESSMENT — FIBROSIS 4 INDEX: FIB4 SCORE: 0.69

## 2023-06-05 ASSESSMENT — PAIN DESCRIPTION - PAIN TYPE
TYPE: ACUTE PAIN
TYPE: ACUTE PAIN

## 2023-06-05 NOTE — ED NOTES
Pt. C/O right lower quad. From Wednesday on was scene at [facility] on Wednesday for the same issue and it has worsen in pain.

## 2023-06-05 NOTE — ED TRIAGE NOTES
"Chief Complaint   Patient presents with    Abdominal Pain    N/V     36 YO female ambulates to room 1 in the ER, reports abdominal pain, N/V that started wed.   Pulse 94   Temp 36.4 °C (97.5 °F) (Temporal)   Resp 18   Ht 1.6 m (5' 3\")   Wt 75.1 kg (165 lb 9.1 oz)   LMP 01/09/2016   SpO2 97%   BMI 29.33 kg/m²   "

## 2023-06-05 NOTE — ED PROVIDER NOTES
"                                                        ED Provider Note    CHIEF COMPLAINT  Chief Complaint   Patient presents with    Abdominal Pain    N/V     36 YO female ambulates to room 1 in the ER, reports abdominal pain, N/V that started wed.        HPI    Primary care provider: Ofe Gregory M.D.   History obtained from: Patient  History limited by: None     Marian Laila Kent is a 37 y.o. female who presents to the ED complaining of abdominal pain starting 5 days ago.  She reports feeling a \"bulge\" in the upper abdomen with radiation of pain to her right side.  She reports approximately 5 episodes of nausea and vomiting tonight approximately once every hour and also 3 episodes of diarrhea without gross blood noted.  She denies fever.  No dysuria.  Denies possibility of pregnancy with history of hysterectomy.  Patient is requesting CT scan.    REVIEW OF SYSTEMS  Please see HPI for pertinent positives/negatives.  All other systems reviewed and are negative.     PAST MEDICAL HISTORY  Past Medical History:   Diagnosis Date    Crohn's disease (HCC) 1/17/2023    Ulcerative colitis (HCC) 1/17/2023    Nausea with vomiting 9/16/2022    Anesthesia 02/14/2022    PONV/history of motion sickness    Pain 02/14/2022    pelvic, back    Psychiatric problem 02/14/2022    depressions, anxiety    Bipolar 1 disorder (HCC) 02/14/2022    Pain 03/2019    \"Stomach\"    Anxiety 2/19/2019    Hydronephrosis right 1/3/2014    stone    Placenta previa diagnosed with US at [facility] 12/17/2013    Breath shortness     occas. no O2    EP (ectopic pregnancy)     Gynecological disorder     adhesions/cysts    Pain management     With Dr. Barger in [place]    PONV (postoperative nausea and vomiting)         SURGICAL HISTORY  Past Surgical History:   Procedure Laterality Date    DC LAP,DIAGNOSTIC ABDOMEN N/A 4/20/2023    Procedure: DIAGNOSTIC AND OPERATIVE LAPAROSCOPY, WITH CAUTERIZATION OF ENDOMETRIOSIS OF RIGHT OVARY AND RESECTION OF LEFT " OVARIAN EXCRESCENCE;  Surgeon: Mark Price M.D.;  Location: SURGERY SAME DAY [facility];  Service: Gynecology    ND LAP,DIAGNOSTIC ABDOMEN  02/17/2022    Procedure: LAPAROSCOPY - DIAGNOSTIC and operative;  Surgeon: Mark Price M.D.;  Location: SURGERY SAME DAY [facility];  Service: Gynecology    ND LAP,FULGURATE/EXCISE LESIONS Right 02/17/2022    Procedure: cauterization ,ENDOMETRIOSIS lesion right ovary;  Surgeon: Mark Price M.D.;  Location: SURGERY SAME DAY [facility];  Service: Gynecology    ND LAP,DIAGNOSTIC ABDOMEN  10/22/2020    Procedure: PELVISCOPY - DIAGNOSTIC AND OPERATIVE. LYSIS OF ADHESIONS, CAUTERIZATION OF ENDOMETRIOSIS RIGHT OVARY;  Surgeon: Mark Price M.D.;  Location: SURGERY SAME DAY [facility];  Service: Gynecology    INGUINAL HERNIA REPAIR ROBOTIC Left 03/28/2019    Procedure: INGUINAL HERNIA REPAIR ROBOTIC WITH MESH PLACEMENT  ;  Surgeon: Chris Cooley M.D.;  Location: SURGERY SAME DAY [facility];  Service: General    PELVISCOPY N/A 07/09/2018    Procedure: PELVISCOPY-DIAGNOSTIC;  Surgeon: Mark Price M.D.;  Location: SURGERY SAME DAY [facility];  Service: Gynecology    LAPAROSCOPY  12/09/2017    Procedure: Exploratory laparoscopy;  Surgeon: Mark Price M.D.;  Location: [facility];  Service: Gynecology    LAPAROSCOPIC LYSIS OF ADHESIONS  12/09/2017    Procedure: LAPAROSCOPIC LYSIS OF ADHESIONS;  Surgeon: Mark Price M.D.;  Location: [facility];  Service: Gynecology    CYSTOSCOPY  08/08/2016    Procedure: CYSTOSCOPY;  Surgeon: Mark Price M.D.;  Location: SURGERY SAME DAY [facility];  Service:     HYSTERECTOMY LAPAROSCOPY  08/08/2016    Procedure: HYSTERECTOMY LAPAROSCOPY RIGHT SALPINGECTOMY;  Surgeon: Mark Price M.D.;  Location: SURGERY SAME DAY [facility];  Service:     PELVISCOPY N/A 05/20/2016    Procedure: PELVISCOPY Diagnostic;  Surgeon: Mark Price M.D.;  Location: [facility];  Service:     REPEAT  "C SECTION  2014    Performed by Mark Mariano M.D. at LABOR AND DELIVERY    PELVISCOPY  2011    Performed by MARK MARIANO at SURGERY [facility] ORS    SALPINGECTOMY  2011    Performed by MARK MARIANO at SURGERY [facility] ORS    REPEAT C SECTION  2007    PRIMARY C SECTION  2006    HC  DELIVERY SER   &         SOCIAL HISTORY  Social History     Tobacco Use    Smoking status: Every Day     Packs/day: 1.00     Years: 10.00     Pack years: 10.00     Types: Cigarettes    Smokeless tobacco: Never   Vaping Use    Vaping Use: Never used   Substance and Sexual Activity    Alcohol use: No     Alcohol/week: 0.0 oz     Comment: none since10/21    Drug use: No    Sexual activity: Not Currently     Partners: Male     Birth control/protection: Pill        FAMILY HISTORY  Family History   Problem Relation Age of Onset    Cancer Father 45        colon    Other Maternal Grandfather     Cancer Paternal Grandmother         breast    Diabetes Paternal Grandmother     Heart Disease Neg Hx     Stroke Neg Hx         CURRENT MEDICATIONS  Home Medications       Reviewed by Ruth Feliz R.N. (Registered Nurse) on 23 at 0244  Med List Status: Not Addressed     Medication Last Dose Status   clonazePAM (KLONOPIN) 1 MG Tab  Active   FLUoxetine (PROZAC) 20 MG Cap  Active   HYDROcodone/acetaminophen (NORCO)  MG Tab  Active   VRAYLAR 4.5 MG Cap  Active                     ALLERGIES  Allergies   Allergen Reactions    Sertraline      Other reaction(s): diarrhea    Oxycodone Hives and Itching    Tramadol Hives and Itching        PHYSICAL EXAM  VITAL SIGNS: /64   Pulse 70   Temp 36.6 °C (97.9 °F) (Temporal)   Resp 16   Ht 1.6 m (5' 3\")   Wt 75.1 kg (165 lb 9.1 oz)   LMP 2016   SpO2 99%   BMI 29.33 kg/m²  @SHAYAN[[id number]::@     Pulse ox interpretation: 97% I interpret this pulse ox as normal     Constitutional: Well developed, well nourished, alert in no " apparent distress, nontoxic appearance    HENT: No external signs of trauma, normocephalic  Eyes: PERRL, conjunctiva without erythema, no discharge, no icterus    Neck: Soft and supple, trachea midline, no stridor, no tenderness, no LAD, no JVD, good ROM    Cardiovascular: Regular rate and rhythm, no murmurs/rubs/gallops, strong distal pulses and good perfusion    Thorax & Lungs: No respiratory distress, CTAB    Abdomen: Soft, mild diffuse tenderness to palpation, nondistended, no guarding, no rebound, normal BS    Back: No CVAT     Extremities: No cyanosis, no edema, no gross deformity, good ROM, intact distal pulses with brisk cap refill    Skin: Warm, dry, no pallor/cyanosis, no rash noted      Neuro: A/O times 3, no focal deficits noted, ambulating without difficulty  Psychiatric: Cooperative, slightly anxious      DIAGNOSTIC STUDIES / PROCEDURES        LABS  All labs reviewed by me.     Results for orders placed or performed during the hospital encounter of 06/05/23   CBC WITH DIFFERENTIAL   Result Value Ref Range    WBC 6.9 4.8 - 10.8 K/uL    RBC 4.31 4.20 - 5.40 M/uL    Hemoglobin 14.6 12.0 - 16.0 g/dL    Hematocrit 41.2 37.0 - 47.0 %    MCV 95.6 81.4 - 97.8 fL    MCH 33.9 (H) 27.0 - 33.0 pg    MCHC 35.4 32.2 - 35.5 g/dL    RDW 40.7 35.9 - 50.0 fL    Platelet Count 216 164 - 446 K/uL    MPV 8.5 (L) 9.0 - 12.9 fL    Neutrophils-Polys 54.70 44.00 - 72.00 %    Lymphocytes 34.90 22.00 - 41.00 %    Monocytes 8.00 0.00 - 13.40 %    Eosinophils 1.70 0.00 - 6.90 %    Basophils 0.40 0.00 - 1.80 %    Immature Granulocytes 0.30 0.00 - 0.90 %    Nucleated RBC 0.00 0.00 - 0.20 /100 WBC    Neutrophils (Absolute) 3.75 1.82 - 7.42 K/uL    Lymphs (Absolute) 2.40 1.00 - 4.80 K/uL    Monos (Absolute) 0.55 0.00 - 0.85 K/uL    Eos (Absolute) 0.12 0.00 - 0.51 K/uL    Baso (Absolute) 0.03 0.00 - 0.12 K/uL    Immature Granulocytes (abs) 0.02 0.00 - 0.11 K/uL    NRBC (Absolute) 0.00 K/uL   COMP METABOLIC PANEL   Result Value Ref  Range    Sodium 142 135 - 145 mmol/L    Potassium 3.4 (L) 3.6 - 5.5 mmol/L    Chloride 108 96 - 112 mmol/L    Co2 23 20 - 33 mmol/L    Anion Gap 11.0 7.0 - 16.0    Glucose 121 (H) 65 - 99 mg/dL    Bun 10 8 - 22 mg/dL    Creatinine 0.78 0.50 - 1.40 mg/dL    Calcium 9.2 8.4 - 10.2 mg/dL    AST(SGOT) 18 12 - 45 U/L    ALT(SGPT) 13 2 - 50 U/L    Alkaline Phosphatase 68 30 - 99 U/L    Total Bilirubin 0.2 0.1 - 1.5 mg/dL    Albumin 4.3 3.2 - 4.9 g/dL    Total Protein 6.4 6.0 - 8.2 g/dL    Globulin 2.1 1.9 - 3.5 g/dL    A-G Ratio 2.0 g/dL   LIPASE   Result Value Ref Range    Lipase 70 (H) 7 - 58 U/L   URINALYSIS (UA)    Specimen: Urine, Clean Catch   Result Value Ref Range    Color Yellow     Character Clear     Specific Gravity 1.010 <1.035    Ph 7.0 5.0 - 8.0    Glucose Negative Negative mg/dL    Ketones Negative Negative mg/dL    Protein Negative Negative mg/dL    Bilirubin Negative Negative    Nitrite Negative Negative    Leukocyte Esterase Negative Negative    Occult Blood Negative Negative    Micro Urine Req see below    CORRECTED CALCIUM   Result Value Ref Range    Correct Calcium 9.0 8.5 - 10.5 mg/dL   ESTIMATED GFR   Result Value Ref Range    GFR (CKD-EPI) 101 >60 mL/min/1.73 m 2        RADIOLOGY  I have independently interpreted the diagnostic imaging associated with this visit and am waiting the final reading from the radiologist.     CT-ABDOMEN-PELVIS WITH   Final Result         1.  No acute intra-abdominal abnormality identified.   2.  Small low-density lesion left hepatic lobe, appearance suggests cyst or hemangioma.             COURSE & MEDICAL DECISION MAKING  Nursing notes, VS, PMSFHx reviewed in chart.     Review of past medical records shows the patient was last seen at urgent care on June 2, 2023 for nausea and vomiting.  She was seen at [place] ED on May 31, 2023 for acute pelvic pain with unremarkable ultrasound.  She was seen in the office on May 30, 2023 for chronic abdominal pain.  Patient had  diagnostic and operative laparoscopy with cauterization of endometriosis of right ovary and resection of left ovarian excrescence with Dr. Priec on April 20, 2023.  Patient last seen in this ED February 22, 2023 for abdominal pain.      Differential diagnoses considered include but are not limited to: Appendicitis, bowel perforation/obstruction, ileus, cholecystitis/ascending cholangitis, gallstone/biliary colic, diverticulitis, pancreatitis, PUD, gastritis, GERD, KS/renal colic, gastroenteritis, colitis, muscle strain, hernia, endometriosis, ovarian cyst/torsion      ED Observation Status? Yes; I am placing the patient in to an observation status due to a diagnostic uncertainty as well as therapeutic intensity. Patient placed in observation status at 2:49 AM, 6/5/2023.     Observation plan is as follows: We will obtain laboratory and imaging studies and monitor patient in the ED.    Upon Reevaluation, the patient's condition has: Remained stable and will be discharged.    Patient discharged from ED Observation status at 0438 on June 5, 2023.      INITIAL ASSESSMENT AND PLAN  Care Narrative: This is a 37-year-old female patient with past medical history including Crohn's disease, ulcerative colitis, kidney stone, bipolar and chronic abdominal pain pain management who presents to the ED complain abdominal pain with nausea/vomiting/diarrhea.  Will obtain laboratory and imaging studies.  Patient will be given the fluid and pain medicine and closely monitored in the ED.      Discussion of management with other QHP or appropriate source(s): None     Escalation of care considered, and ultimately not performed: acute inpatient care management, however at this time, the patient is most appropriate for outpatient management.     Decision tools and prescription drugs considered including, but not limited to: Pain Medications   .        History and physical exam as above.  Laboratory testing fairly unremarkable.  Patient with  mild hypokalemia likely from vomiting and mild hyperglycemia from stress reaction.  Mildly elevated lipase improved compared to 3 months ago.  UA unremarkable and without evidence for infection.  CT abdomen/pelvis without evidence for acute findings.  Patient was initially treated with IV fluid and low-dose Haldol for pain.  She had an episode of vomiting in the ED and was given Compazine.  I discussed the findings with the patient.  She reports that her nausea has improved but her pain is still present.  She is noted to be in no acute distress and nontoxic in appearance.  Record review shows patient has had multiple ED visits for abdominal pain and has history of chronic abdominal pain.  Patient is also on pain management contract.  At this time, no convincing evidence for emergent pathology or indications for admission.  Patient is requesting discharge.  She was advised on outpatient follow-up and given return to ED precautions.  Patient verbalized understanding and agreed with plan of care with no further questions or concerns.      HYDRATION: Based on the patient's presentation of Acute Diarrhea and Acute Vomiting the patient was given IV fluids. IV Hydration was used because oral hydration was not as rapid as required. Upon recheck following hydration, the patient was improved.      The patient is referred to a primary physician for blood pressure management, diabetic screening, and for all other preventative health concerns.       FINAL IMPRESSION  1. Generalized abdominal pain Acute   2. Nausea, vomiting and diarrhea Acute          DISPOSITION  Patient will be discharged home in stable condition.       FOLLOW UP  Ofe Gregory M.D.  [address]  [place] [address]  [phone]    Call today      [facility], Emergency Dept  [address]  [facility] [id number] [phone]    If symptoms worsen         OUTPATIENT MEDICATIONS  Discharge Medication List as of 6/5/2023   4:39 AM             Electronically signed by: Orion Han D.O., 6/5/2023 2:36 AM      Portions of this record were made with voice recognition software.  Despite my review, spelling/grammar/context errors may still remain.  Interpretation of this chart should be taken in this context.

## 2023-06-05 NOTE — ED NOTES
"Pt requested pain medications, pt was educated on plan of care at this time. The pt requested a warm blanket and when this nurse got back to the room the patient was fully dressed and attempting to leave the ER with their IV in place. Pt was stopped and brought back to room 1. The IV was removed and the ERP was made aware. Pt stated they wanted to \"go to another ER where they will treat my pain\".  "

## 2023-06-05 NOTE — ED NOTES
Pt discharged home with instructions to follow up with primary care.  The pt denies questions at this time.  Pt instructed to come back to the ER if symptoms worsen or they feel they are having a medical emergency.  Pt is alert and oriented, speaking in full sentences. Pt ambulates to the waiting area without incident.

## 2023-06-05 NOTE — ED NOTES
Pt ambulated to the restroom to give a urine sample. Pt back on the gurney and reports they were unable to urinate at this time. Fluids started and pt educated to use call light.

## 2023-06-05 NOTE — ED NOTES
Pt started vomiting, ERP notified, medications administered. Pt resting on gurney with lights off.

## 2023-06-14 ENCOUNTER — OFFICE VISIT (OUTPATIENT)
Dept: MEDICAL GROUP | Facility: MEDICAL CENTER | Age: 38
End: 2023-06-14
Attending: FAMILY MEDICINE
Payer: MEDICAID

## 2023-06-14 VITALS
WEIGHT: 160.6 LBS | TEMPERATURE: 98.3 F | HEART RATE: 92 BPM | BODY MASS INDEX: 28.46 KG/M2 | DIASTOLIC BLOOD PRESSURE: 65 MMHG | RESPIRATION RATE: 16 BRPM | HEIGHT: 63 IN | SYSTOLIC BLOOD PRESSURE: 95 MMHG | OXYGEN SATURATION: 99 %

## 2023-06-14 DIAGNOSIS — G89.29 CHRONIC ABDOMINAL PAIN: ICD-10-CM

## 2023-06-14 DIAGNOSIS — R10.9 CHRONIC ABDOMINAL PAIN: ICD-10-CM

## 2023-06-14 PROCEDURE — 99215 OFFICE O/P EST HI 40 MIN: CPT | Performed by: FAMILY MEDICINE

## 2023-06-14 PROCEDURE — 3074F SYST BP LT 130 MM HG: CPT | Performed by: FAMILY MEDICINE

## 2023-06-14 PROCEDURE — 99213 OFFICE O/P EST LOW 20 MIN: CPT | Performed by: FAMILY MEDICINE

## 2023-06-14 PROCEDURE — 3078F DIAST BP <80 MM HG: CPT | Performed by: FAMILY MEDICINE

## 2023-06-14 RX ORDER — ACETAMINOPHEN 325 MG/1
650 TABLET ORAL
COMMUNITY
End: 2024-02-21

## 2023-06-14 RX ORDER — GABAPENTIN 300 MG/1
CAPSULE ORAL
COMMUNITY
Start: 2023-06-09 | End: 2023-07-01

## 2023-06-14 RX ORDER — IBUPROFEN 400 MG/1
600 TABLET ORAL
COMMUNITY
End: 2023-07-15

## 2023-06-14 RX ORDER — CARIPRAZINE 6 MG/1
6 CAPSULE, GELATIN COATED ORAL
COMMUNITY
Start: 2023-06-12

## 2023-06-14 RX ORDER — BUPROPION HYDROCHLORIDE 300 MG/1
TABLET ORAL
COMMUNITY
Start: 2023-06-07 | End: 2023-07-01

## 2023-06-14 RX ORDER — HYOSCYAMINE SULFATE 0.125 MG
125 TABLET ORAL EVERY 4 HOURS PRN
Qty: 120 TABLET | Refills: 2 | Status: SHIPPED | OUTPATIENT
Start: 2023-06-14 | End: 2023-07-01

## 2023-06-14 RX ORDER — ZOLPIDEM TARTRATE 10 MG/1
TABLET ORAL
COMMUNITY
Start: 2023-06-07 | End: 2024-01-03

## 2023-06-14 ASSESSMENT — FIBROSIS 4 INDEX: FIB4 SCORE: .832050294337843683

## 2023-06-14 NOTE — LETTER
[facility]  Ofe Gregory M.D.  [address]  [place] [address]  Fax: [phone]   Authorization for Release/Disclosure of   Protected Health Information   Name: MARIAN KENT : 1985 SSN: [id number]   Address: [address]  [place] [address] Phone:    [phone] (home)    I authorize the entity listed below to release/disclose the PHI below to:   [facility]/Ofe Gregory M.D. and Rosey Ceja M.D.   Provider or Entity Name:  [facility]   Address   City, State, Zip   Phone:      Fax:     Reason for request: continuity of care   Information to be released:    [  ] LAST COLONOSCOPY,  including any PATH REPORT and follow-up  [  ] LAST FIT/COLOGUARD RESULT [  ] LAST DEXA  [  ] LAST MAMMOGRAM  [  ] LAST PAP  [  ] LAST LABS [  ] RETINA EXAM REPORT  [  ] IMMUNIZATION RECORDS  [ X ] Release all info      [  ] Check here and initial the line next to each item to release ALL health information INCLUDING  _____ Care and treatment for drug and / or alcohol abuse  _____ HIV testing, infection status, or AIDS  _____ Genetic Testing    DATES OF SERVICE OR TIME PERIOD TO BE DISCLOSED: _____________  I understand and acknowledge that:  * This Authorization may be revoked at any time by you in writing, except if your health information has already been used or disclosed.  * Your health information that will be used or disclosed as a result of you signing this authorization could be re-disclosed by the recipient. If this occurs, your re-disclosed health information may no longer be protected by State or Federal laws.  * You may refuse to sign this Authorization. Your refusal will not affect your ability to obtain treatment.  * This Authorization becomes effective upon signing and will  on (date) __________.      If no date is indicated, this Authorization will  one (1) year from the signature date.    Name: Marian Kent  Signature: Date:   2023     PLEASE FAX  REQUESTED RECORDS BACK TO: [phone]

## 2023-06-14 NOTE — ASSESSMENT & PLAN NOTE
I received records from GI, scope and EGD were normal with neg biopsies done 11/2022. Does not seem she had f/u with them afterwards as she was receiving pain medications from prior PCP, appt with GI is in August.    Had endometriosis lesions cauterized by Dr. Price, but still has abdominal pain. No records.     Pt reports that [facility] wants to take her gall bladder, but today reports that had a nuclear exam, records show they were normal. She also notes that since that was done she hasn't heard back from them and does not have follow up with them.     She reports bentyl, omeprazole has not helped before     Ibuprofen - takes 1600mg every 3 days   No aleve  Tylenol - 500mg every 3 days     Pt has diarrhea and vomiting daily. Uses marijuana daily. No constipation. occ blood in the stool. No melena. No fiber supplementation    Multiple ER visits at [facility] and [facility] regarding chronic abdominal pain

## 2023-06-14 NOTE — LETTER
[facility]  Ofe Gregory M.D.  [address]  [place] [address]  Fax: [phone]   Authorization for Release/Disclosure of   Protected Health Information   Name: MARIAN MORAN : 1985 SSN: [id number]   [address] [place] [address] Phone:    [phone] (home)    I authorize the entity listed below to release/disclose the PHI below to:   [facility]/Ofe Gregory M.D. and Rosey Ceja M.D.   Provider or Entity Name:  Dr. Mark Price   Address   City, [facility]   Phone:      Fax:     Reason for request: continuity of care   Information to be released:    [  ] LAST COLONOSCOPY,  including any PATH REPORT and follow-up  [  ] LAST FIT/COLOGUARD RESULT [  ] LAST DEXA  [  ] LAST MAMMOGRAM  [  ] LAST PAP  [  ] LAST LABS [  ] RETINA EXAM REPORT  [  ] IMMUNIZATION RECORDS  [ X ] Release all info      [  ] Check here and initial the line next to each item to release ALL health information INCLUDING  _____ Care and treatment for drug and / or alcohol abuse  _____ HIV testing, infection status, or AIDS  _____ Genetic Testing    DATES OF SERVICE OR TIME PERIOD TO BE DISCLOSED: _____________  I understand and acknowledge that:  * This Authorization may be revoked at any time by you in writing, except if your health information has already been used or disclosed.  * Your health information that will be used or disclosed as a result of you signing this authorization could be re-disclosed by the recipient. If this occurs, your re-disclosed health information may no longer be protected by State or Federal laws.  * You may refuse to sign this Authorization. Your refusal will not affect your ability to obtain treatment.  * This Authorization becomes effective upon signing and will  on (date) __________.      If no date is indicated, this Authorization will  one (1) year from the signature date.    Name: Marian Moran  Signature: Date:   2023     PLEASE FAX  REQUESTED RECORDS BACK TO: [phone]

## 2023-06-15 NOTE — PROGRESS NOTES
"Subjective:     CC:   Chief Complaint   Patient presents with    Follow-Up     Not able to get into pain mgt          HPI:     Chronic abdominal pain  I received records from GI, scope and EGD were normal with neg biopsies done 11/2022. Does not seem she had f/u with them afterwards as she was receiving pain medications from prior PCP, appt with GI is in August.    Had endometriosis lesions cauterized by Dr. Price, but still has abdominal pain. No records.     Pt reports that [facility] wants to take her gall bladder, but today reports that had a nuclear exam, records show they were normal. She also notes that since that was done she hasn't heard back from them and does not have follow up with them.     She reports bentyl, omeprazole has not helped before     Ibuprofen - takes 1600mg every 3 days   No aleve  Tylenol - 500mg every 3 days     Pt has diarrhea and vomiting daily. Uses marijuana daily. No constipation. occ blood in the stool. No melena. No fiber supplementation    Multiple ER visits at [facility] and [facility] regarding chronic abdominal pain    Past Medical History:   Diagnosis Date    Anesthesia 02/14/2022    PONV/history of motion sickness    Anxiety 2/19/2019    Bipolar 1 disorder (HCC) 02/14/2022    Breath shortness     occas. no O2    Crohn's disease (HCC) 1/17/2023    EP (ectopic pregnancy)     Gynecological disorder     adhesions/cysts    Hydronephrosis right 1/3/2014    stone    Nausea with vomiting 9/16/2022    Pain 02/14/2022    pelvic, back    Pain 03/2019    \"Stomach\"    Pain management     With Dr. Barger in [place]    Placenta previa diagnosed with US at [facility] 12/17/2013    PONV (postoperative nausea and vomiting)     Psychiatric problem 02/14/2022    depressions, anxiety    Ulcerative colitis (HCC) 1/17/2023       Social History     Tobacco Use    Smoking status: Every Day     Packs/day: 1.00     Years: 10.00     Pack years: 10.00     Types: Cigarettes    Smokeless tobacco: Never " "  Vaping Use    Vaping Use: Never used   Substance Use Topics    Alcohol use: No     Alcohol/week: 0.0 oz     Comment: none since10/21    Drug use: No       Current Outpatient Medications Ordered in Epic   Medication Sig Dispense Refill    hyoscyamine (LEVSIN) 0.125 MG tablet Take 1 Tablet by mouth every four hours as needed for Cramping. 120 Tablet 2    VRAYLAR 6 MG Cap       zolpidem (AMBIEN) 10 MG Tab TAKE 1 TABLET AT BEDTIME TAKE AS NEEDED FOR SLEEP, INCREASED FROM 5MG      ibuprofen (MOTRIN) 400 MG Tab Take 600 mg by mouth.      gabapentin (NEURONTIN) 300 MG Cap       buPROPion (WELLBUTRIN XL) 300 MG XL tablet TAKE 1 TABLET DAILY. INCREASE FROM 150       acetaminophen (TYLENOL) 325 MG Tab Take 650 mg by mouth.      ondansetron (ZOFRAN) 4 MG Tab tablet Take 4 mg by mouth.      FLUoxetine (PROZAC) 20 MG Cap 1 CAPSULE EVERY NIGHT FOR DEPRESSION AND ANXIETY      clonazePAM (KLONOPIN) 1 MG Tab TAKE 1/2 TABLET BY MOUTH 2 TIMES DAILY AS NEEDED FOR PANIC ATTACK FOR 14 DAYS       No current [facility]-ordered facility-administered medications on file.       Allergies:  Sertraline, Oxycodone, and Tramadol        Objective:       Exam:  BP 95/65 (BP Location: Left arm, Patient Position: Sitting, BP Cuff Size: Adult)   Pulse 92   Temp 36.8 °C (98.3 °F) (Temporal)   Resp 16   Ht 1.6 m (5' 3\")   Wt 72.8 kg (160 lb 9.6 oz)   LMP 01/09/2016   SpO2 99%   BMI 28.45 kg/m²  Body mass index is 28.45 kg/m².    General: Normal appearing. No distress.  Abdomen: diffuse abdominal tenderness.  Psych: Normal mood and affect.       Labs:   Reviewed multiple records from GI consultants, multiple ER records from [facility]    Assessment & Plan:     37 y.o. female with the following -     1. Chronic abdominal pain  - Referral to Pain Management  - hyoscyamine (LEVSIN) 0.125 MG tablet; Take 1 Tablet by mouth every four hours as needed for Cramping.  Dispense: 120 Tablet; Refill: 2  - Referral to Behavioral Health  - LIPASE; " Future  - CALPROTECTIN,FECAL; Future  - Sed Rate; Future  - CRP QUANTITIVE (NON-CARDIAC); Future  - CELIAC DISEASE AB SCREEN W/RFX  - H.PYLORI STOOL ANTIGEN; Future  - CULTURE STOOL; Future  I advised patient to cut back on ibuprofen as this can contribute to exacerbating abdominal pain.  Reviewed a few notes from GI consultants with multiple studies, pt reportedly went to [facility] to complete them but admits that she never did stool studies.  Since she reports that bentyl and PPI didn't work before, will try hyoscyamine for abdominal cramping  Overall, negative biopsies from EGD and Cscope done last year, so unlikely crohn's diagnosis, but will see what comes of her f/u at GI.   Given her symptoms, she may have IBS - I recommended fiber supplementation, peppermint oil, low FODMAP diet. It does not seem like she has tried these before. If these do not work, could consider a 2 week trial of rifaxamin for IBS-D. I also think she would benefit from a SIBO study from GI.  Her UDS came back with marijuana, which she admittedly uses daily. I advised her that this can contribute to cyclical vomiting syndrome and she needs to try to decrease. I am also declining further narcotic chronic prescriptions due to marijuana use, failure of patient to try other means (dietary, other medications) and failure to f/u with her specialists. I also do not think that continuing narcotics will improve her GI symptoms at all and may confuse the clinical picture.   Pt reported that new referral to pain management wouldn't take her, but staff called their office who noted that they offered nonnarcotic therapies and that she declined. I communicated this to the patient and she seemed surprised, and I advised her that she should see what they have to offer her as she should try not to be dependent on narcotics.   I have ordered stool studies, lab studies as above. Trial of hyoscyamine for abd cramping. Reroute pain management referral again to  see if anyone will provide chronic opiate medications for her. Pt also inquiring about suboxone, so will send to Dr. Light. May be an option for chronic pain control and to prevent ER visits. I have also advised the patient that she must stop marijuana use if she wants Dr. Light to consider suboxone for her. Will f/u in about 3-4 weeks to f/u on these studies.     *still need estab care visit*    My total time spent caring for the patient on the day of the encounter was 46 minutes.   This does not include time spent on separately billable procedures/tests.      Return in about 1 month (around 7/14/2023).    Please note that this dictation was created using voice recognition software. I have made every reasonable attempt to correct obvious errors, but I expect that there are errors of grammar and possibly content that I did not discover before finalizing the note.

## 2023-06-22 ENCOUNTER — HOSPITAL ENCOUNTER (EMERGENCY)
Facility: MEDICAL CENTER | Age: 38
End: 2023-06-22
Attending: EMERGENCY MEDICINE
Payer: MEDICAID

## 2023-06-22 ENCOUNTER — APPOINTMENT (OUTPATIENT)
Dept: RADIOLOGY | Facility: MEDICAL CENTER | Age: 38
End: 2023-06-22
Attending: EMERGENCY MEDICINE
Payer: MEDICAID

## 2023-06-22 VITALS
SYSTOLIC BLOOD PRESSURE: 120 MMHG | HEIGHT: 63 IN | WEIGHT: 162.1 LBS | HEART RATE: 98 BPM | TEMPERATURE: 97.8 F | DIASTOLIC BLOOD PRESSURE: 70 MMHG | OXYGEN SATURATION: 99 % | BODY MASS INDEX: 28.72 KG/M2 | RESPIRATION RATE: 18 BRPM

## 2023-06-22 DIAGNOSIS — S09.90XA CLOSED HEAD INJURY, INITIAL ENCOUNTER: ICD-10-CM

## 2023-06-22 DIAGNOSIS — S16.1XXA STRAIN OF NECK MUSCLE, INITIAL ENCOUNTER: ICD-10-CM

## 2023-06-22 PROCEDURE — A9270 NON-COVERED ITEM OR SERVICE: HCPCS | Performed by: EMERGENCY MEDICINE

## 2023-06-22 PROCEDURE — 72125 CT NECK SPINE W/O DYE: CPT

## 2023-06-22 PROCEDURE — 700102 HCHG RX REV CODE 250 W/ 637 OVERRIDE(OP): Performed by: EMERGENCY MEDICINE

## 2023-06-22 PROCEDURE — 70450 CT HEAD/BRAIN W/O DYE: CPT

## 2023-06-22 PROCEDURE — 99284 EMERGENCY DEPT VISIT MOD MDM: CPT

## 2023-06-22 PROCEDURE — 700111 HCHG RX REV CODE 636 W/ 250 OVERRIDE (IP): Performed by: EMERGENCY MEDICINE

## 2023-06-22 RX ORDER — ONDANSETRON 4 MG/1
4 TABLET, ORALLY DISINTEGRATING ORAL ONCE
Status: COMPLETED | OUTPATIENT
Start: 2023-06-22 | End: 2023-06-22

## 2023-06-22 RX ORDER — IBUPROFEN 800 MG/1
800 TABLET ORAL EVERY 8 HOURS PRN
Qty: 30 TABLET | Refills: 0 | Status: SHIPPED | OUTPATIENT
Start: 2023-06-22 | End: 2023-09-10

## 2023-06-22 RX ORDER — HYDROCODONE BITARTRATE AND ACETAMINOPHEN 5; 325 MG/1; MG/1
2 TABLET ORAL ONCE
Status: COMPLETED | OUTPATIENT
Start: 2023-06-22 | End: 2023-06-22

## 2023-06-22 RX ORDER — CYCLOBENZAPRINE HCL 10 MG
10 TABLET ORAL 3 TIMES DAILY PRN
Qty: 30 TABLET | Refills: 0 | Status: SHIPPED | OUTPATIENT
Start: 2023-06-22 | End: 2023-07-01

## 2023-06-22 RX ADMIN — HYDROCODONE BITARTRATE AND ACETAMINOPHEN 2 TABLET: 5; 325 TABLET ORAL at 14:25

## 2023-06-22 RX ADMIN — ONDANSETRON 4 MG: 4 TABLET, ORALLY DISINTEGRATING ORAL at 14:25

## 2023-06-22 ASSESSMENT — FIBROSIS 4 INDEX: FIB4 SCORE: .832050294337843683

## 2023-06-22 NOTE — ED NOTES
Discharge instructions provided. Pt verbalized understanding of discharge instructions to follow up with PCP and to return to ER if condition worsens. Pt ambulated out of ER without difficulty.

## 2023-06-22 NOTE — ED PROVIDER NOTES
ED Provider Note    CHIEF COMPLAINT  Chief Complaint   Patient presents with    Fall    Head Injury       EXTERNAL RECORDS REVIEWED  Reviewed outpatient clinic visits recent ER visits at [facility]    HPI/ROS  LIMITATION TO HISTORY   None  OUTSIDE HISTORIAN(S):  None    Marian Kent is a 37 y.o. female who presents for evaluation of head and neck injury.  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  She hit the back of her head and neck.  This was last night.  She did not seek medical attention but over the last 12 hours has had increasing headache and neck pain and stiffness.  She denies any use of anticoagulants.  No loss of consciousness but she is been nauseous.  She denies any pain or injury to the chest abdomen pelvis upper or lower extremities.  No mid or low back pain.  No neurological symptoms such as numbness weakness or tingling or difficulty word finding.  No report of any nasal bleeding or bleeding from the ears.  Denies any other complaints.    PAST MEDICAL HISTORY   has a past medical history of Anesthesia (2022), Anxiety (2019), Bipolar 1 disorder (HCC) (2022), Breath shortness, Crohn's disease (HCC) (2023), EP (ectopic pregnancy), Gynecological disorder, Hydronephrosis right (1/3/2014), Nausea with vomiting (2022), Pain (2022), Pain (2019), Pain management, Placenta previa diagnosed with US at [facility] (2013), PONV (postoperative nausea and vomiting), Psychiatric problem (2022), and Ulcerative colitis ([facility]) (2023).    SURGICAL HISTORY   has a past surgical history that includes  DELIVERY SER ( & ); cystoscopy (2016); laparoscopy (2017); laparoscopic lysis of adhesions (2017); pelviscopy (2011); salpingectomy (2011); primary c section (2006); repeat c section (2007); repeat c  section (05/23/2014); pelviscopy (N/A, 05/20/2016); hysterectomy laparoscopy (08/08/2016); pelviscopy (N/A, 07/09/2018); inguinal hernia repair robotic (Left, 03/28/2019); lap,diagnostic abdomen (10/22/2020); lap,diagnostic abdomen (02/17/2022); lap,fulgurate/excise lesions (Right, 02/17/2022); and lap,diagnostic abdomen (N/A, 4/20/2023).    FAMILY HISTORY  Family History   Problem Relation Age of Onset    Cancer Father 45        colon    Other Maternal Grandfather     Cancer Paternal Grandmother         breast    Diabetes Paternal Grandmother     Heart Disease Neg Hx     Stroke Neg Hx        SOCIAL HISTORY  Social History     Tobacco Use    Smoking status: Every Day     Packs/day: 1.00     Years: 10.00     Pack years: 10.00     Types: Cigarettes    Smokeless tobacco: Never   Vaping Use    Vaping Use: Never used   Substance and Sexual Activity    Alcohol use: No     Alcohol/week: 0.0 oz     Comment: none since10/21    Drug use: No    Sexual activity: Not Currently     Partners: Male     Birth control/protection: Pill       CURRENT MEDICATIONS    Current Facility-Administered Medications:     ondansetron (ZOFRAN ODT) dispertab 4 mg, 4 mg, Oral, Once, Raghav Foreman M.D.    HYDROcodone-acetaminophen (NORCO) 5-325 MG per tablet 2 Tablet, 2 Tablet, Oral, Once, Raghav Foreman M.D.    Current Outpatient Medications:     VRAYLAR 6 MG Cap, , Disp: , Rfl:     zolpidem (AMBIEN) 10 MG Tab, TAKE 1 TABLET AT BEDTIME TAKE AS NEEDED FOR SLEEP, INCREASED FROM 5MG, Disp: , Rfl:     ibuprofen (MOTRIN) 400 MG Tab, Take 600 mg by mouth., Disp: , Rfl:     gabapentin (NEURONTIN) 300 MG Cap, , Disp: , Rfl:     buPROPion (WELLBUTRIN XL) 300 MG XL tablet, TAKE 1 TABLET DAILY. INCREASE FROM 150 , Disp: , Rfl:     acetaminophen (TYLENOL) 325 MG Tab, Take 650 mg by mouth., Disp: , Rfl:     hyoscyamine (LEVSIN) 0.125 MG tablet, Take 1 Tablet by mouth every four hours as needed for Cramping., Disp: 120 Tablet, Rfl: 2    ondansetron  "(ZOFRAN) 4 MG Tab tablet, Take 4 mg by mouth., Disp: , Rfl:     FLUoxetine (PROZAC) 20 MG Cap, 1 CAPSULE EVERY NIGHT FOR DEPRESSION AND ANXIETY, Disp: , Rfl:     clonazePAM (KLONOPIN) 1 MG Tab, TAKE 1/2 TABLET BY MOUTH 2 TIMES DAILY AS NEEDED FOR PANIC ATTACK FOR 14 DAYS, Disp: , Rfl:         ALLERGIES  Allergies   Allergen Reactions    Sertraline      Other reaction(s): diarrhea    Oxycodone Hives and Itching    Tramadol Hives and Itching       PHYSICAL EXAM  VITAL SIGNS: /71   Pulse (!) 108   Temp 36.4 °C (97.5 °F) (Temporal)   Resp 18   Ht 1.6 m (5' 3\")   Wt 73.5 kg (162 lb 1.6 oz)   LMP 01/09/2016   SpO2 98%   BMI 28.71 kg/m²    Pulse ox interpretation: I interpret this pulse ox as normal.  Constitutional: Alert and oriented x 3, daughter at distress  HEENT: Atraumatic normocephalic, pupils are equal round reactive to light extraocular movements are intact. The nares is clear, external ears are normal, mouth shows moist mucous membranes normal dentition for age  Neck: Cervical collar is in place midline bony tenderness at C2-C3 without step-off  Cardiovascular: Mild tachycardia no murmur rub or gallop 2+ pulses peripherally x4  Thorax & Lungs: No respiratory distress, no wheezes rales or rhonchi, No chest tenderness.   GI: Soft nontender nondistended positive bowel sounds, no peritoneal signs  Skin: Warm dry no acute rash or lesion  Musculoskeletal: Moving all extremities with full range and 5 of 5 strength no acute  deformity pedal edema  Neurologic: Cranial nerves III through XII are grossly intact no sensory deficit no cerebellar dysfunction CS 15 no motor and sensory deficits.  NIH stroke scale score of 0  Psychiatric: Anxious        DIAGNOSTIC STUDIES / PROCEDURES    RADIOLOGY  I have independently interpreted the diagnostic imaging associated with this visit and am waiting the final reading from the radiologist.   My preliminary interpretation is as follows: No acute intracranial process or " spinal fracture  Radiologist interpretation:   CT-CSPINE WITHOUT PLUS RECONS   Final Result      No CT evidence of acute cervical spine abnormality.      Mild spondylosis      CT-HEAD W/O   Final Result         1. No acute intracranial abnormality. No evidence of acute intracranial hemorrhage or mass lesion.                         COURSE & MEDICAL DECISION MAKING    ED Observation Status? No; Patient does not meet criteria for ED Observation.     INITIAL ASSESSMENT, COURSE AND PLAN  Care Narrative:    This is a very pleasant 37-year-old female presents here after ground-level fall with injury to the head and neck.  I was concerned about possible intracranial hemorrhage skull fracture subdural hemorrhage cervical spine fracture.  I performed serial neurological exams and there is no deficit to suggest spinal cord injury or mass effect from hemorrhage.  Subsequent CT scan of the head and cervical spine are both negative.  I counseled the patient to expect postconcussive syndrome as well as neck tightness.  She will be prescribed high-dose NSAIDs and Flexeril.  Return precautions have been reviewed      ADDITIONAL PROBLEM LIST    DISPOSITION AND DISCUSSIONS  I have discussed management of the patient with the following physicians and ASHANTI's: None    Discussion of management with other QHP or appropriate source(s): None    Escalation of care considered, and ultimately not performed:blood analysis    Barriers to care at this time, including but not limited to: None    Decision tools and prescription drugs considered including, but not limited to: Not applicable    FINAL DIAGNOSIS  1. Closed head injury, initial encounter        2. Strain of neck muscle, initial encounter  ibuprofen (MOTRIN) 800 MG Tab    cyclobenzaprine (FLEXERIL) 10 mg Tab               Electronically signed by: Raghav Foreman M.D., 6/22/2023 2:20 PM

## 2023-06-22 NOTE — ED TRIAGE NOTES
Pt presents following a fall last night. She states she fell down 5 stairs and hit her head. This morning she woke up with severe neck pain that radiates to her left should and decreased ROM. Denies LOC. Denies anticoagulants.

## 2023-07-01 ENCOUNTER — OFFICE VISIT (OUTPATIENT)
Dept: URGENT CARE | Facility: CLINIC | Age: 38
End: 2023-07-01
Payer: MEDICAID

## 2023-07-01 VITALS
DIASTOLIC BLOOD PRESSURE: 78 MMHG | RESPIRATION RATE: 16 BRPM | WEIGHT: 160.5 LBS | SYSTOLIC BLOOD PRESSURE: 116 MMHG | BODY MASS INDEX: 28.44 KG/M2 | TEMPERATURE: 97.5 F | HEIGHT: 63 IN | HEART RATE: 105 BPM | OXYGEN SATURATION: 98 %

## 2023-07-01 DIAGNOSIS — R10.31 RLQ ABDOMINAL PAIN: ICD-10-CM

## 2023-07-01 LAB
APPEARANCE UR: NORMAL
BILIRUB UR STRIP-MCNC: NEGATIVE MG/DL
COLOR UR AUTO: NORMAL
GLUCOSE UR STRIP.AUTO-MCNC: NEGATIVE MG/DL
KETONES UR STRIP.AUTO-MCNC: NORMAL MG/DL
LEUKOCYTE ESTERASE UR QL STRIP.AUTO: NEGATIVE
NITRITE UR QL STRIP.AUTO: NEGATIVE
PH UR STRIP.AUTO: 7 [PH] (ref 5–8)
POCT INT CON NEG: NEGATIVE
POCT INT CON POS: POSITIVE
POCT URINE PREGNANCY TEST: NEGATIVE
PROT UR QL STRIP: NORMAL MG/DL
RBC UR QL AUTO: NORMAL
SP GR UR STRIP.AUTO: 1.02
UROBILINOGEN UR STRIP-MCNC: NORMAL MG/DL

## 2023-07-01 PROCEDURE — 99203 OFFICE O/P NEW LOW 30 MIN: CPT | Performed by: PHYSICIAN ASSISTANT

## 2023-07-01 PROCEDURE — 81002 URINALYSIS NONAUTO W/O SCOPE: CPT | Performed by: PHYSICIAN ASSISTANT

## 2023-07-01 PROCEDURE — 81025 URINE PREGNANCY TEST: CPT | Performed by: PHYSICIAN ASSISTANT

## 2023-07-01 PROCEDURE — 3078F DIAST BP <80 MM HG: CPT | Performed by: PHYSICIAN ASSISTANT

## 2023-07-01 PROCEDURE — 3074F SYST BP LT 130 MM HG: CPT | Performed by: PHYSICIAN ASSISTANT

## 2023-07-01 ASSESSMENT — ENCOUNTER SYMPTOMS
HEADACHES: 0
NAUSEA: 1
NEUROLOGICAL NEGATIVE: 1
FEVER: 0
CONSTIPATION: 0
HEMATOCHEZIA: 0
ABDOMINAL PAIN: 1
ANOREXIA: 1
MYALGIAS: 0
CHILLS: 0
DIARRHEA: 0
VOMITING: 1
BLOOD IN STOOL: 0
CARDIOVASCULAR NEGATIVE: 1
ARTHRALGIAS: 0
HEARTBURN: 0

## 2023-07-01 ASSESSMENT — FIBROSIS 4 INDEX: FIB4 SCORE: .832050294337843683

## 2023-07-01 NOTE — LETTER
July 1, 2023         Patient: Marian Kent   YOB: 1985   Date of Visit: 7/1/2023           To Whom it May Concern:    Marian Kent was seen in my clinic on 7/1/2023. Please excuse any absences from work this week due to acute illness.        If you have any questions or concerns, please don't hesitate to call.        Sincerely,           Steven Stokes P.A.-C.  Electronically Signed

## 2023-07-02 NOTE — PROGRESS NOTES
Subjective     Marian Kent is a very pleasant 37 y.o. female who presents with Emesis (Nausea, vomiting, stomach pain on (R) side X yesterday)            RLQ Pain  This is a new problem. The current episode started yesterday. The onset quality is sudden. The problem occurs constantly. The problem has been gradually worsening. The pain is located in the RLQ. The pain is at a severity of 7/10. The pain is severe. The quality of the pain is sharp. The abdominal pain does not radiate. Associated symptoms include anorexia, nausea and vomiting. Pertinent negatives include no arthralgias, constipation, diarrhea, dysuria, fever, frequency, headaches, hematochezia, hematuria, melena or myalgias. She has tried acetaminophen for the symptoms. The treatment provided no relief. Her past medical history is significant for abdominal surgery (3 .  Exploratory laparotomy 3 months ago did confirm endometriosis).         PMH:  has a past medical history of Anesthesia (2022), Anxiety (2019), Bipolar 1 disorder (HCC) (2022), Breath shortness, Crohn's disease ([facility]) (2023), EP (ectopic pregnancy), Gynecological disorder, Hydronephrosis right (1/3/2014), Nausea with vomiting (2022), Pain (2022), Pain (2019), Pain management, Placenta previa diagnosed with US at [facility] (2013), PONV (postoperative nausea and vomiting), Psychiatric problem (2022), and Ulcerative colitis ([facility]) (2023).    She has no past medical history of Addisons disease ([facility]), Adrenal disorder ([facility]), Allergy, Anemia, Blood transfusion, Clotting disorder ([facility]), Cushings syndrome ([facility]), Diabetic neuropathy ([facility]), Hyperlipidemia, Meningitis, Migraine, Muscle disorder, OSTEOPOROSIS, Parathyroid disorder ([facility]), Pituitary disease ([facility]), Sickle cell disease ([facility]), or Substance abuse ([facility]).  MEDS:   Current Outpatient Medications:     ibuprofen (MOTRIN) 800 MG Tab, Take 1 Tablet by mouth every 8 hours as needed  for Moderate Pain., Disp: 30 Tablet, Rfl: 0    VRAYLAR 6 MG Cap, , Disp: , Rfl:     zolpidem (AMBIEN) 10 MG Tab, TAKE 1 TABLET AT BEDTIME TAKE AS NEEDED FOR SLEEP, INCREASED FROM 5MG, Disp: , Rfl:     ibuprofen (MOTRIN) 400 MG Tab, Take 600 mg by mouth., Disp: , Rfl:     acetaminophen (TYLENOL) 325 MG Tab, Take 650 mg by mouth., Disp: , Rfl:     ondansetron (ZOFRAN) 4 MG Tab tablet, Take 4 mg by mouth., Disp: , Rfl:   ALLERGIES:   Allergies   Allergen Reactions    Sertraline      Other reaction(s): diarrhea    Oxycodone Hives and Itching    Tramadol Hives and Itching     SURGHX:   Past Surgical History:   Procedure Laterality Date    [place] LAP,DIAGNOSTIC ABDOMEN N/A 4/20/2023    Procedure: DIAGNOSTIC AND OPERATIVE LAPAROSCOPY, WITH CAUTERIZATION OF ENDOMETRIOSIS OF RIGHT OVARY AND RESECTION OF LEFT OVARIAN EXCRESCENCE;  Surgeon: Mark Price M.D.;  Location: SURGERY SAME DAY [facility];  Service: Gynecology    [place] LAP,DIAGNOSTIC ABDOMEN  02/17/2022    Procedure: LAPAROSCOPY - DIAGNOSTIC and operative;  Surgeon: Mark Price M.D.;  Location: SURGERY SAME DAY [facility];  Service: Gynecology    [place] LAP,FULGURATE/EXCISE LESIONS Right 02/17/2022    Procedure: cauterization ,ENDOMETRIOSIS lesion right ovary;  Surgeon: Mark Price M.D.;  Location: SURGERY SAME DAY [facility];  Service: Gynecology    [place] LAP,DIAGNOSTIC ABDOMEN  10/22/2020    Procedure: PELVISCOPY - DIAGNOSTIC AND OPERATIVE. LYSIS OF ADHESIONS, CAUTERIZATION OF ENDOMETRIOSIS RIGHT OVARY;  Surgeon: Mark Price M.D.;  Location: SURGERY SAME DAY [facility];  Service: Gynecology    INGUINAL HERNIA REPAIR ROBOTIC Left 03/28/2019    Procedure: INGUINAL HERNIA REPAIR ROBOTIC WITH MESH PLACEMENT  ;  Surgeon: Chris Cooley M.D.;  Location: SURGERY SAME DAY [facility];  Service: General    PELVISCOPY N/A 07/09/2018    Procedure: PELVISCOPY-DIAGNOSTIC;  Surgeon: Mark Price M.D.;  Location: SURGERY SAME DAY [facility];  Service: Gynecology     LAPAROSCOPY  2017    Procedure: Exploratory laparoscopy;  Surgeon: Mark Mariano M.D.;  Location: SURGERY [facility];  Service: Gynecology    LAPAROSCOPIC LYSIS OF ADHESIONS  2017    Procedure: LAPAROSCOPIC LYSIS OF ADHESIONS;  Surgeon: Mark Mariano M.D.;  Location: SURGERY [facility];  Service: Gynecology    CYSTOSCOPY  2016    Procedure: CYSTOSCOPY;  Surgeon: Mark Mariano M.D.;  Location: SURGERY SAME DAY [facility];  Service:     HYSTERECTOMY LAPAROSCOPY  2016    Procedure: HYSTERECTOMY LAPAROSCOPY RIGHT SALPINGECTOMY;  Surgeon: Mark Mariano M.D.;  Location: SURGERY SAME DAY [facility];  Service:     PELVISCOPY N/A 2016    Procedure: PELVISCOPY Diagnostic;  Surgeon: Mark Mariano M.D.;  Location: SURGERY [facility];  Service:     REPEAT C SECTION  2014    Performed by Mark Mariano M.D. at LABOR AND DELIVERY    PELVISCOPY  2011    Performed by MARK MARIANO at SURGERY [facility]    SALPINGECTOMY  2011    Performed by MARK MARIANO at [facility]    REPEAT C SECTION  2007    PRIMARY C SECTION  2006    [facility]  DELIVERY SER   &      SOCHX:  reports that she has been smoking cigarettes. She has a 10.00 pack-year smoking history. She has never used smokeless tobacco. She reports that she does not drink alcohol and does not use drugs.  FH: family history includes Cancer in her paternal grandmother; Cancer (age of onset: 45) in her father; Diabetes in her paternal grandmother; Other in her maternal grandfather.    Review of Systems   Constitutional:  Negative for chills and fever.   Cardiovascular: Negative.    Gastrointestinal:  Positive for abdominal pain, anorexia, nausea and vomiting. Negative for blood in stool, constipation, diarrhea, heartburn, hematochezia and melena.   Genitourinary: Negative.  Negative for dysuria, frequency and hematuria.   Musculoskeletal:  Negative for  "arthralgias and myalgias.   Neurological: Negative.  Negative for headaches.   All other systems reviewed and are negative.      Medications, Allergies, and current problem list reviewed today in Epic           Objective     /78   Pulse (!) 105   Temp 36.4 °C (97.5 °F) (Temporal)   Resp 16   Ht 1.6 m (5' 3\")   Wt 72.8 kg (160 lb 8 oz)   LMP 01/09/2016   SpO2 98%   BMI 28.43 kg/m²      Physical Exam  Vitals and nursing note reviewed.   Constitutional:       General: She is not in acute distress.     Appearance: Normal appearance. She is not ill-appearing, toxic-appearing or diaphoretic.   HENT:      Head: Normocephalic and atraumatic.      Right Ear: External ear normal.      Left Ear: External ear normal.      Nose: Nose normal.   Eyes:      Conjunctiva/sclera: Conjunctivae normal.   Cardiovascular:      Rate and Rhythm: Regular rhythm. Tachycardia present.      Heart sounds: Normal heart sounds.   Pulmonary:      Effort: Pulmonary effort is normal. No respiratory distress.      Breath sounds: Normal breath sounds. No wheezing, rhonchi or rales.   Abdominal:      General: Abdomen is flat. Bowel sounds are normal. There is no distension.      Palpations: Abdomen is soft.      Tenderness: There is abdominal tenderness in the right lower quadrant. There is guarding. There is no right CVA tenderness, left CVA tenderness or rebound. Positive signs include McBurney's sign. Negative signs include Grace's sign.      Hernia: No hernia is present.       Musculoskeletal:      Cervical back: Normal range of motion and neck supple.   Skin:     General: Skin is warm and dry.   Neurological:      General: No focal deficit present.      Mental Status: She is alert and oriented to person, place, and time. Mental status is at baseline.   Psychiatric:         Mood and Affect: Mood normal.         Behavior: Behavior normal.         Thought Content: Thought content normal.         Judgment: Judgment normal.                "              Assessment & Plan     This is a very pleasant 37-year-old female presenting with pinpoint right lower quadrant pain since yesterday.  She has associated nausea, vomiting and anorexia.  She denies fever chills or bodies.  She denies diarrhea, constipation, bloody stools.  No urinary symptoms.  Previous abdominal history of 3  sections and exploratory laparotomy confirming endometriosis.  No sick contacts, suspect food intake or new risk factors.  Patient is tachycardic otherwise vitals are normal.  Exam shows pinpoint right lower quadrant pain near McBurney's point with guarding.  Remainder of exam unremarkable.  Urinalysis and pregnancy negative.  Concern for appendicitis.  No access to labs or imaging at the urgent care at this hour on the weekend.  She will go to [facility] ER now by private vehicle for further work-up and management.    1. RLQ abdominal pain  POCT Urinalysis    POCT Pregnancy          I personally reviewed prior external notes and test results pertinent to today's visit. Return to clinic or go to ED if symptoms worsen or persist. Red flag symptoms and indications for ED discussed at length. Patient/Parent/Guardian voices understanding. Follow-up with your primary care provider in 3-5 days. All side effects and potential interactions of prescribed medication discussed including allergic response, GI upset, tendon injury, rash, sedation, OCP effectiveness, etc.    Please note that this dictation was created using voice recognition software. I have made every reasonable attempt to correct obvious errors, but I expect that there are errors of grammar and possibly content that I did not discover before finalizing the note.

## 2023-07-15 ENCOUNTER — OFFICE VISIT (OUTPATIENT)
Dept: URGENT CARE | Facility: CLINIC | Age: 38
End: 2023-07-15
Payer: MEDICAID

## 2023-07-15 VITALS
DIASTOLIC BLOOD PRESSURE: 76 MMHG | SYSTOLIC BLOOD PRESSURE: 102 MMHG | RESPIRATION RATE: 16 BRPM | TEMPERATURE: 98.1 F | BODY MASS INDEX: 28.84 KG/M2 | OXYGEN SATURATION: 96 % | HEIGHT: 63 IN | WEIGHT: 162.8 LBS | HEART RATE: 82 BPM

## 2023-07-15 DIAGNOSIS — K04.7 DENTAL INFECTION: ICD-10-CM

## 2023-07-15 PROCEDURE — 99214 OFFICE O/P EST MOD 30 MIN: CPT | Performed by: PHYSICIAN ASSISTANT

## 2023-07-15 PROCEDURE — 3078F DIAST BP <80 MM HG: CPT | Performed by: PHYSICIAN ASSISTANT

## 2023-07-15 PROCEDURE — 3074F SYST BP LT 130 MM HG: CPT | Performed by: PHYSICIAN ASSISTANT

## 2023-07-15 RX ORDER — AMOXICILLIN AND CLAVULANATE POTASSIUM 875; 125 MG/1; MG/1
1 TABLET, FILM COATED ORAL 2 TIMES DAILY
Qty: 14 TABLET | Refills: 0 | Status: SHIPPED | OUTPATIENT
Start: 2023-07-15 | End: 2023-07-22

## 2023-07-15 ASSESSMENT — ENCOUNTER SYMPTOMS
RESPIRATORY NEGATIVE: 1
CHILLS: 0
ABDOMINAL PAIN: 0
NAUSEA: 0
SINUS PRESSURE: 0
NEUROLOGICAL NEGATIVE: 1
CARDIOVASCULAR NEGATIVE: 1
VOMITING: 1
SORE THROAT: 0
FEVER: 0
DIARRHEA: 0

## 2023-07-15 ASSESSMENT — FIBROSIS 4 INDEX: FIB4 SCORE: .832050294337843683

## 2023-07-15 NOTE — LETTER
July 15, 2023         Patient: Marian Kent   YOB: 1985   Date of Visit: 7/15/2023           To Whom it May Concern:    Marian Kent was seen in my clinic on 7/15/2023. Please excuse any absences from work this week due to acute illness. She may return to work on Tuesday July 18th.      If you have any questions or concerns, please don't hesitate to call.        Sincerely,           Steven Stokes P.A.-C.  Electronically Signed

## 2023-07-21 ENCOUNTER — HOSPITAL ENCOUNTER (EMERGENCY)
Facility: MEDICAL CENTER | Age: 38
End: 2023-07-21
Attending: STUDENT IN AN ORGANIZED HEALTH CARE EDUCATION/TRAINING PROGRAM
Payer: MEDICAID

## 2023-07-21 VITALS
SYSTOLIC BLOOD PRESSURE: 120 MMHG | DIASTOLIC BLOOD PRESSURE: 78 MMHG | HEART RATE: 87 BPM | WEIGHT: 163.58 LBS | OXYGEN SATURATION: 97 % | TEMPERATURE: 97.8 F | HEIGHT: 63 IN | BODY MASS INDEX: 28.98 KG/M2 | RESPIRATION RATE: 18 BRPM

## 2023-07-21 DIAGNOSIS — R52 PAIN ASSOCIATED WITH WOUND: ICD-10-CM

## 2023-07-21 DIAGNOSIS — Z51.89 ENCOUNTER FOR WOUND RE-CHECK: ICD-10-CM

## 2023-07-21 DIAGNOSIS — T14.8XXA PAIN ASSOCIATED WITH WOUND: ICD-10-CM

## 2023-07-21 PROCEDURE — 99283 EMERGENCY DEPT VISIT LOW MDM: CPT

## 2023-07-21 PROCEDURE — A9270 NON-COVERED ITEM OR SERVICE: HCPCS | Performed by: STUDENT IN AN ORGANIZED HEALTH CARE EDUCATION/TRAINING PROGRAM

## 2023-07-21 PROCEDURE — 700102 HCHG RX REV CODE 250 W/ 637 OVERRIDE(OP): Performed by: STUDENT IN AN ORGANIZED HEALTH CARE EDUCATION/TRAINING PROGRAM

## 2023-07-21 RX ORDER — HYDROCODONE BITARTRATE AND ACETAMINOPHEN 5; 325 MG/1; MG/1
1 TABLET ORAL ONCE
Status: COMPLETED | OUTPATIENT
Start: 2023-07-21 | End: 2023-07-21

## 2023-07-21 RX ADMIN — HYDROCODONE BITARTRATE AND ACETAMINOPHEN 1 TABLET: 5; 325 TABLET ORAL at 00:43

## 2023-07-21 ASSESSMENT — FIBROSIS 4 INDEX: FIB4 SCORE: 0.7

## 2023-07-21 ASSESSMENT — PAIN DESCRIPTION - PAIN TYPE: TYPE: ACUTE PAIN
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
## 2023-07-21 NOTE — ED NOTES
Pt discharged home with instructions to follow up with [facility] for stitch removal as requested in their instructions.  Education given on wound cleansing and dressing changes.  The pt denies questions at this time.  Pt instructed to come back to the ER if symptoms worsen or they feel they are having a medical emergency.  Pt is alert and oriented, speaking in full sentences. Pt ambulates to the waiting area without incident.

## 2023-07-21 NOTE — DISCHARGE INSTRUCTIONS
Continue take all of your antibiotics as prescribed.  Use the crutches for weightbearing as tolerated you may use the Ace bandage for support.  The pain should start to improve over the next few days but your leg will be uncomfortable for several days due to the wound.  Return for suture removal as instructed by [place].    Take the following medications for pain/fever at home:  Acetaminophen (Tylenol): Take 650 mg (2 regular strength) every 6 hours. Do not take more than 3,000mg in a 24 hour period.   Ibuprofen: Take 400-600 mg (2-3 regular strength) every 6 hours. Take with food.   Alternate the two medications and you can take one of them every 3 hours.
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
## 2023-07-21 NOTE — ED NOTES
Pts wound cleaned and dressed with antibiotic ointment and gauze with ace bandage for compression. Pt reports it feels much better.   Education given on crutches.  Pt requests Rx for pain medication, ERP consulted and pt is educated to use ice and rest for pain control.

## 2023-07-27 ENCOUNTER — HOSPITAL ENCOUNTER (EMERGENCY)
Facility: MEDICAL CENTER | Age: 38
End: 2023-07-27
Attending: EMERGENCY MEDICINE
Payer: MEDICAID

## 2023-07-27 VITALS
OXYGEN SATURATION: 98 % | TEMPERATURE: 97.6 F | WEIGHT: 162.4 LBS | HEIGHT: 63 IN | SYSTOLIC BLOOD PRESSURE: 115 MMHG | DIASTOLIC BLOOD PRESSURE: 65 MMHG | RESPIRATION RATE: 18 BRPM | BODY MASS INDEX: 28.77 KG/M2 | HEART RATE: 80 BPM

## 2023-07-27 DIAGNOSIS — R52 PAIN ASSOCIATED WITH WOUND: ICD-10-CM

## 2023-07-27 DIAGNOSIS — Z51.89 ENCOUNTER FOR WOUND RE-CHECK: ICD-10-CM

## 2023-07-27 DIAGNOSIS — T14.8XXA PAIN ASSOCIATED WITH WOUND: ICD-10-CM

## 2023-07-27 DIAGNOSIS — L24.A9 WOUND DRAINAGE: ICD-10-CM

## 2023-07-27 PROCEDURE — 700102 HCHG RX REV CODE 250 W/ 637 OVERRIDE(OP): Performed by: EMERGENCY MEDICINE

## 2023-07-27 PROCEDURE — 99283 EMERGENCY DEPT VISIT LOW MDM: CPT

## 2023-07-27 PROCEDURE — A9270 NON-COVERED ITEM OR SERVICE: HCPCS | Performed by: EMERGENCY MEDICINE

## 2023-07-27 RX ORDER — HYDROCODONE BITARTRATE AND ACETAMINOPHEN 5; 325 MG/1; MG/1
1 TABLET ORAL ONCE
Status: COMPLETED | OUTPATIENT
Start: 2023-07-27 | End: 2023-07-27

## 2023-07-27 RX ADMIN — HYDROCODONE BITARTRATE AND ACETAMINOPHEN 1 TABLET: 5; 325 TABLET ORAL at 19:45

## 2023-07-27 ASSESSMENT — FIBROSIS 4 INDEX: FIB4 SCORE: 0.7
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
## 2023-07-28 NOTE — ED TRIAGE NOTES
"Chief Complaint   Patient presents with    Wound Check     38 yo female ambulates to triage with reports of concerned that stitches placed on the 20th of July at [facility] are coming out as they are caught on her dressing and she is worried to pull it off and replace it due to it sticking to her sutures on her right hip     /66   Pulse 83   Temp 36.6 °C (97.8 °F) (Temporal)   Resp 16   Ht 1.6 m (5' 3\")   Wt 73.7 kg (162 lb 6.4 oz)   LMP 01/09/2016   SpO2 97%   BMI 28.77 kg/m²    "

## 2023-08-03 ENCOUNTER — OFFICE VISIT (OUTPATIENT)
Dept: URGENT CARE | Facility: CLINIC | Age: 38
End: 2023-08-03
Payer: MEDICAID

## 2023-08-03 VITALS
TEMPERATURE: 98 F | OXYGEN SATURATION: 99 % | BODY MASS INDEX: 28.53 KG/M2 | HEART RATE: 99 BPM | HEIGHT: 63 IN | RESPIRATION RATE: 18 BRPM | DIASTOLIC BLOOD PRESSURE: 62 MMHG | SYSTOLIC BLOOD PRESSURE: 104 MMHG | WEIGHT: 161 LBS

## 2023-08-03 DIAGNOSIS — Z48.02 VISIT FOR SUTURE REMOVAL: ICD-10-CM

## 2023-08-03 PROCEDURE — 15853 REMOVAL SUTR/STAPL XREQ ANES: CPT | Performed by: PHYSICIAN ASSISTANT

## 2023-08-03 PROCEDURE — 3078F DIAST BP <80 MM HG: CPT | Performed by: PHYSICIAN ASSISTANT

## 2023-08-03 PROCEDURE — 99212 OFFICE O/P EST SF 10 MIN: CPT | Performed by: PHYSICIAN ASSISTANT

## 2023-08-03 PROCEDURE — 3074F SYST BP LT 130 MM HG: CPT | Performed by: PHYSICIAN ASSISTANT

## 2023-08-03 ASSESSMENT — ENCOUNTER SYMPTOMS
MYALGIAS: 0
VOMITING: 0
CHILLS: 0
NAUSEA: 0
FEVER: 0

## 2023-08-03 ASSESSMENT — FIBROSIS 4 INDEX: FIB4 SCORE: 0.7

## 2023-08-03 NOTE — LETTER
August 3, 2023    To Whom It May Concern:         This is confirmation that Marian Kent attended her scheduled appointment with Hans Coelho P.A.-C. on 8/03/23.  Please excuse the patient's absence from work on 8/3/2023.         If you have any questions please do not hesitate to call me at the phone number listed below.    Sincerely,          Hans Coelho P.A.-C.  [phone]

## 2023-08-04 NOTE — PROGRESS NOTES
Subjective:     CHIEF COMPLAINT  Chief Complaint   Patient presents with    Suture / Staple Removal     Suture removal        HPI  Tappan Laila Kent is a very pleasant 37 y.o. female who presents to the clinic for suture removal.  Patient had a large complicated laceration repaired on 2023 at [facility]. she has been following all wound care instructions and has had follow-up wound check visits.  Wound has fully healed at this time.  Denies any wound dehiscence, surrounding redness, discharge or drainage.  She denies any pain, fevers, chills, nausea or vomiting.    REVIEW OF SYSTEMS  Review of Systems   Constitutional:  Negative for chills, fever and malaise/fatigue.   Gastrointestinal:  Negative for nausea and vomiting.   Musculoskeletal:  Negative for joint pain and myalgias.   Skin:         Healing laceration to right thigh       PAST MEDICAL HISTORY  Patient Active Problem List    Diagnosis Date Noted    Chronic abdominal pain 2023    Endometriosis of right ovary 2023    Dyspareunia in female 2023    Crohn's disease (HCC) 2023    Ulcerative colitis (HCC) 2023    Nausea with vomiting 2022    Heartburn 2022    Epigastric pain 2022    Acute midline low back pain without sciatica 2021    Traumatic subarachnoid hemorrhage with loss of consciousness (HCC) 2021    Acute pain of left knee 2021    History of bipolar disorder 2021    Suicidal ideation 2021    Generalized abdominal pain 2021    Kidney infection 2021    Postprocedural pelvic peritoneal adhesions 10/22/2020    Tobacco abuse disorder 2019    Endometriosis determined by laparoscopy 2019    Anxiety 2019    Left inguinal hernia 2019    Pelvic pain 2016    Female pelvic pain 2016       SURGICAL HISTORY   has a past surgical history that includes  DELIVERY SER ( & ); cystoscopy (2016); laparoscopy  "(12/09/2017); laparoscopic lysis of adhesions (12/09/2017); pelviscopy (01/23/2011); salpingectomy (01/23/2011); primary c section (06/19/2006); repeat c section (11/09/2007); repeat c section (05/23/2014); pelviscopy (N/A, 05/20/2016); hysterectomy laparoscopy (08/08/2016); pelviscopy (N/A, 07/09/2018); inguinal hernia repair robotic (Left, 03/28/2019); lap,diagnostic abdomen (10/22/2020); lap,diagnostic abdomen (02/17/2022); lap,fulgurate/excise lesions (Right, 02/17/2022); and lap,diagnostic abdomen (N/A, 4/20/2023).    ALLERGIES  Allergies   Allergen Reactions    Sertraline      Other reaction(s): diarrhea    Oxycodone Hives and Itching    Tramadol Hives and Itching       CURRENT MEDICATIONS  Home Medications       Reviewed by Hans Coelho P.A.-C. (Physician Assistant) on 08/03/23 at 1852  Med List Status: <None>     Medication Last Dose Status   acetaminophen (TYLENOL) 325 MG Tab Taking Active   ibuprofen (MOTRIN) 800 MG Tab Taking Active   VRAYLAR 6 MG Cap Taking Active   zolpidem (AMBIEN) 10 MG Tab Taking Active                    SOCIAL HISTORY  Social History     Tobacco Use    Smoking status: Every Day     Packs/day: 1.00     Years: 10.00     Pack years: 10.00     Types: Cigarettes    Smokeless tobacco: Never   Vaping Use    Vaping Use: Never used   Substance and Sexual Activity    Alcohol use: No     Alcohol/week: 0.0 oz     Comment: none since10/21    Drug use: No    Sexual activity: Not Currently     Partners: Male     Birth control/protection: Pill       FAMILY HISTORY  Family History   Problem Relation Age of Onset    Cancer Father 45        colon    Other Maternal Grandfather     Cancer Paternal Grandmother         breast    Diabetes Paternal Grandmother     Heart Disease Neg Hx     Stroke Neg Hx           Objective:     VITAL SIGNS: /62   Pulse 99   Temp 36.7 °C (98 °F) (Temporal)   Resp 18   Ht 1.6 m (5' 3\")   Wt 73 kg (161 lb)   LMP 01/09/2016   SpO2 99%   BMI 28.52 kg/m² "     PHYSICAL EXAM  Physical Exam  Constitutional:       Appearance: Normal appearance.   HENT:      Head: Normocephalic and atraumatic.   Eyes:      Conjunctiva/sclera: Conjunctivae normal.   Pulmonary:      Effort: Pulmonary effort is normal.   Musculoskeletal:      Cervical back: Normal range of motion.   Skin:     Comments: Patient has a large 16 cm linear laceration to the lateral right thigh with 6 horizontal mattress sutures in place and 5 simple interrupted sutures in place.  There is good wound approximation.  No surrounding erythema, dehiscence or drainage.   Neurological:      General: No focal deficit present.      Mental Status: She is alert and oriented to person, place, and time. Mental status is at baseline.         Assessment/Plan:     1. Visit for suture removal      MDM/Comments:    6 horizontal mattress sutures and 5 simple interrupted sutures were removed in clinic without complication.  Patient tolerated this well.  No wound dehiscence.  No signs of secondary infection present.  Continued wound care instructions discussed.    Differential diagnosis, natural history, supportive care, and indications for immediate follow-up discussed. All questions answered. Patient agrees with the plan of care.    Follow-up as needed if symptoms worsen or fail to improve to PCP, Urgent care or Emergency Room.    I have personally reviewed prior external notes and test results pertinent to today's visit.  I have independently reviewed and interpreted all diagnostics ordered during this urgent care acute visit.   Discussed management options (risks,benefits, and alternatives to treatment). Pt expresses understanding and the treatment plan was agreed upon. Questions were encouraged and answered to pt's satisfaction.    Please note that this dictation was created using voice recognition software. I have made a reasonable attempt to correct obvious errors, but I expect that there are errors of grammar and possibly  content that I did not discover before finalizing the note.

## 2023-09-06 ENCOUNTER — OFFICE VISIT (OUTPATIENT)
Dept: URGENT CARE | Facility: CLINIC | Age: 38
End: 2023-09-06
Payer: MEDICAID

## 2023-09-06 VITALS
DIASTOLIC BLOOD PRESSURE: 70 MMHG | BODY MASS INDEX: 26.58 KG/M2 | TEMPERATURE: 97.5 F | OXYGEN SATURATION: 98 % | HEART RATE: 83 BPM | SYSTOLIC BLOOD PRESSURE: 112 MMHG | WEIGHT: 150 LBS | RESPIRATION RATE: 16 BRPM | HEIGHT: 63 IN

## 2023-09-06 DIAGNOSIS — K52.9 GASTROENTERITIS: ICD-10-CM

## 2023-09-06 PROCEDURE — 3074F SYST BP LT 130 MM HG: CPT | Performed by: FAMILY MEDICINE

## 2023-09-06 PROCEDURE — 3078F DIAST BP <80 MM HG: CPT | Performed by: FAMILY MEDICINE

## 2023-09-06 PROCEDURE — 99213 OFFICE O/P EST LOW 20 MIN: CPT | Performed by: FAMILY MEDICINE

## 2023-09-06 ASSESSMENT — FIBROSIS 4 INDEX: FIB4 SCORE: 0.58

## 2023-09-06 NOTE — LETTER
September 6, 2023    To Whom It May Concern:         This is confirmation that Marian Kent attended her scheduled appointment with Patricia Rosas M.D. on 9/06/23. She may return to work on Friday without any restrictions.          If you have any questions please do not hesitate to call me at the phone number listed below.    Sincerely,          Patricia Rosas M.D.  [phone]

## 2023-09-06 NOTE — PROGRESS NOTES
"  Subjective:      37 y.o. female presents to urgent care for vomiting and diarrhea that started today.  There is no inciting event or trauma at that time.  No blood in either vomit or stool.  No associated abdominal pain.  No changes to urinary urgency, frequency, dysuria, or hematuria.  No recent travel, antibiotic use, change in diet, or exposure to exotic animals.  She has had prior abdominal hernia repair and partial hysterectomy.  She remains afebrile.    She denies any other questions or concerns at this time.    Current problem list, medication, and past medical/surgical history were reviewed in Epic.    ROS  See HPI     Objective:      /70   Pulse 83   Temp 36.4 °C (97.5 °F)   Resp 16   Ht 1.6 m (5' 3\")   Wt 68 kg (150 lb)   LMP 01/09/2016   SpO2 98%   BMI 26.57 kg/m²     Physical Exam  Constitutional:       General: She is not in acute distress.     Appearance: She is not diaphoretic.   Cardiovascular:      Rate and Rhythm: Normal rate and regular rhythm.      Heart sounds: Normal heart sounds.   Pulmonary:      Effort: Pulmonary effort is normal. No respiratory distress.      Breath sounds: Normal breath sounds.   Abdominal:      General: Bowel sounds are normal. There is no distension.      Palpations: Abdomen is soft.      Tenderness: There is no abdominal tenderness. There is no guarding.   Neurological:      Mental Status: She is alert.   Psychiatric:         Mood and Affect: Affect normal.         Judgment: Judgment normal.       Assessment/Plan:     1. Gastroenteritis  No red flag warning signs.  Most consistent with virus.  She was encouraged to stay well-hydrated.      Instructed to return to Urgent Care or nearest Emergency Department if symptoms fail to improve, for any change in condition, further concerns, or new concerning symptoms. Patient states understanding of the plan of care and discharge instructions.    Patricia Rosas M.D.   "

## 2023-09-10 ENCOUNTER — OFFICE VISIT (OUTPATIENT)
Dept: URGENT CARE | Facility: CLINIC | Age: 38
End: 2023-09-10
Payer: MEDICAID

## 2023-09-10 ENCOUNTER — APPOINTMENT (OUTPATIENT)
Dept: URGENT CARE | Facility: CLINIC | Age: 38
End: 2023-09-10
Payer: MEDICAID

## 2023-09-10 VITALS
SYSTOLIC BLOOD PRESSURE: 96 MMHG | BODY MASS INDEX: 26.58 KG/M2 | WEIGHT: 150 LBS | HEIGHT: 63 IN | OXYGEN SATURATION: 98 % | RESPIRATION RATE: 16 BRPM | DIASTOLIC BLOOD PRESSURE: 60 MMHG | TEMPERATURE: 97.4 F | HEART RATE: 89 BPM

## 2023-09-10 DIAGNOSIS — K04.7 DENTAL ABSCESS: Primary | ICD-10-CM

## 2023-09-10 DIAGNOSIS — R11.2 NAUSEA AND VOMITING, UNSPECIFIED VOMITING TYPE: ICD-10-CM

## 2023-09-10 DIAGNOSIS — R22.0 FACIAL SWELLING: ICD-10-CM

## 2023-09-10 PROCEDURE — 99213 OFFICE O/P EST LOW 20 MIN: CPT | Performed by: PHYSICIAN ASSISTANT

## 2023-09-10 PROCEDURE — 3074F SYST BP LT 130 MM HG: CPT | Performed by: PHYSICIAN ASSISTANT

## 2023-09-10 PROCEDURE — 3078F DIAST BP <80 MM HG: CPT | Performed by: PHYSICIAN ASSISTANT

## 2023-09-10 RX ORDER — IBUPROFEN 600 MG/1
600 TABLET ORAL EVERY 6 HOURS PRN
Qty: 90 TABLET | Refills: 0 | Status: ON HOLD | OUTPATIENT
Start: 2023-09-10 | End: 2024-03-18

## 2023-09-10 RX ORDER — ONDANSETRON 4 MG/1
4 TABLET, ORALLY DISINTEGRATING ORAL EVERY 6 HOURS PRN
Qty: 10 TABLET | Refills: 0 | Status: SHIPPED | OUTPATIENT
Start: 2023-09-10 | End: 2024-02-21

## 2023-09-10 RX ORDER — AMOXICILLIN AND CLAVULANATE POTASSIUM 875; 125 MG/1; MG/1
1 TABLET, FILM COATED ORAL 2 TIMES DAILY
Qty: 14 TABLET | Refills: 0 | Status: SHIPPED | OUTPATIENT
Start: 2023-09-10 | End: 2023-09-17

## 2023-09-10 ASSESSMENT — ENCOUNTER SYMPTOMS
NAUSEA: 1
VOMITING: 1
FEVER: 0
SINUS PRESSURE: 0

## 2023-09-10 ASSESSMENT — FIBROSIS 4 INDEX: FIB4 SCORE: 0.58

## 2023-09-10 NOTE — LETTER
September 10, 2023         Patient: Marian Kent   YOB: 1985   Date of Visit: 9/10/2023           To Whom it May Concern:    Marian Kent was seen in my clinic on 9/10/2023. She may return to work on 9/13/2023.    If you have any questions or concerns, please don't hesitate to call.        Sincerely,           Nallely Robins P.A.-C.  Electronically Signed

## 2023-09-11 NOTE — PROGRESS NOTES
Subjective     [place] Laila Kent is a 37 y.o. female who presents with Oral Swelling    PMH:  has a past medical history of Anesthesia (02/14/2022), Anxiety (2/19/2019), Bipolar 1 disorder (HCC) (02/14/2022), Breath shortness, Crohn's disease (HCC) (1/17/2023), EP (ectopic pregnancy), Gynecological disorder, Hydronephrosis right (1/3/2014), Nausea with vomiting (9/16/2022), Pain (02/14/2022), Pain (03/2019), Pain management, Placenta previa diagnosed with US at [facility] (12/17/2013), PONV (postoperative nausea and vomiting), Psychiatric problem (02/14/2022), and Ulcerative colitis (HCC) (1/17/2023).    She has no past medical history of Addisons disease ([facility]), Adrenal disorder (HCC), Allergy, Anemia, Blood transfusion, Clotting disorder ([facility]), Cushings syndrome ([facility]), Diabetic neuropathy ([facility]), Hyperlipidemia, Meningitis, Migraine, Muscle disorder, OSTEOPOROSIS, Parathyroid disorder (HCC), Pituitary disease (HCC), Sickle cell disease ([facility]), or Substance abuse ([facility]).  MEDS:   Current Outpatient Medications:     amoxicillin-clavulanate (AUGMENTIN) 875-125 MG Tab, Take 1 Tablet by mouth 2 times a day for 7 days., Disp: 14 Tablet, Rfl: 0    ondansetron (ZOFRAN ODT) 4 MG TABLET DISPERSIBLE, Take 1 Tablet by mouth every 6 hours as needed for Nausea/Vomiting., Disp: 10 Tablet, Rfl: 0    ibuprofen (MOTRIN) 600 MG Tab, Take 1 Tablet by mouth every 6 hours as needed for Moderate Pain., Disp: 90 Tablet, Rfl: 0    VRAYLAR 6 MG Cap, , Disp: , Rfl:     zolpidem (AMBIEN) 10 MG Tab, TAKE 1 TABLET AT BEDTIME TAKE AS NEEDED FOR SLEEP, INCREASED FROM 5MG, Disp: , Rfl:     acetaminophen (TYLENOL) 325 MG Tab, Take 650 mg by mouth., Disp: , Rfl:   ALLERGIES:   Allergies   Allergen Reactions    Sertraline      Other reaction(s): diarrhea    Oxycodone Hives and Itching    Tramadol Hives and Itching     SURGHX:   Past Surgical History:   Procedure Laterality Date    FL LAP,DIAGNOSTIC ABDOMEN N/A 4/20/2023    Procedure: DIAGNOSTIC AND  OPERATIVE LAPAROSCOPY, WITH CAUTERIZATION OF ENDOMETRIOSIS OF RIGHT OVARY AND RESECTION OF LEFT OVARIAN EXCRESCENCE;  Surgeon: Mark Price M.D.;  Location: SURGERY SAME DAY [facility];  Service: Gynecology    CO LAP,DIAGNOSTIC ABDOMEN  02/17/2022    Procedure: LAPAROSCOPY - DIAGNOSTIC and operative;  Surgeon: Mark Price M.D.;  Location: SURGERY SAME DAY [facility];  Service: Gynecology    CO LAP,FULGURATE/EXCISE LESIONS Right 02/17/2022    Procedure: cauterization ,ENDOMETRIOSIS lesion right ovary;  Surgeon: Mark Price M.D.;  Location: SURGERY SAME DAY [facility];  Service: Gynecology    CO LAP,DIAGNOSTIC ABDOMEN  10/22/2020    Procedure: PELVISCOPY - DIAGNOSTIC AND OPERATIVE. LYSIS OF ADHESIONS, CAUTERIZATION OF ENDOMETRIOSIS RIGHT OVARY;  Surgeon: Mark Price M.D.;  Location: SURGERY SAME DAY [facility];  Service: Gynecology    INGUINAL HERNIA REPAIR ROBOTIC Left 03/28/2019    Procedure: INGUINAL HERNIA REPAIR ROBOTIC WITH MESH PLACEMENT  ;  Surgeon: Chris Cooley M.D.;  Location: SURGERY SAME DAY [facility];  Service: General    PELVISCOPY N/A 07/09/2018    Procedure: PELVISCOPY-DIAGNOSTIC;  Surgeon: Mark Price M.D.;  Location: SURGERY SAME DAY [facility];  Service: Gynecology    LAPAROSCOPY  12/09/2017    Procedure: Exploratory laparoscopy;  Surgeon: Mark Price M.D.;  Location: [facility];  Service: Gynecology    LAPAROSCOPIC LYSIS OF ADHESIONS  12/09/2017    Procedure: LAPAROSCOPIC LYSIS OF ADHESIONS;  Surgeon: Mark Price M.D.;  Location: [facility];  Service: Gynecology    CYSTOSCOPY  08/08/2016    Procedure: CYSTOSCOPY;  Surgeon: Mark Price M.D.;  Location: SURGERY SAME DAY [facility];  Service:     HYSTERECTOMY LAPAROSCOPY  08/08/2016    Procedure: HYSTERECTOMY LAPAROSCOPY RIGHT SALPINGECTOMY;  Surgeon: Mark Price M.D.;  Location: SURGERY SAME DAY [facility];  Service:     PELVISCOPY N/A 05/20/2016    Procedure: PELVISCOPY  Diagnostic;  Surgeon: Mark Mariano M.D.;  Location: SURGERY [facility];  Service:     REPEAT C SECTION  2014    Performed by Mark Mariano M.D. at [facility]    PELVISCOPY  2011    Performed by MARK MARIANO at SURGERY [facility]    SALPINGECTOMY  2011    Performed by MARK MARIANO at SURGERY [facility]    REPEAT C SECTION  2007    PRIMARY C SECTION  2006    [facility]  DELIVERY SER   &      SOCHX:  reports that she has been smoking cigarettes. She has a 10.0 pack-year smoking history. She has never used smokeless tobacco. She reports that she does not drink alcohol and does not use drugs.  FH: Reviewed with patient, not pertinent to this visit.           Patient presents clinic today with complaint of left lower dental pain and mild facial swelling for the last 3 days.  Patient has a broken tooth that is causing her symptoms.  Patient has taken some over-the-counter Tylenol and Motrin with no change in her symptoms.  Patient states that is also causing some nausea and vomiting secondary to pain.  Patient does not currently have a dentist.  No other complaints.    Dental Pain   This is a new problem. The current episode started in the past 7 days. The problem occurs constantly. The problem has been gradually worsening. The pain is at a severity of 7/10. Associated symptoms include facial pain and thermal sensitivity. Pertinent negatives include no difficulty swallowing, fever or sinus pressure. She has tried acetaminophen and NSAIDs for the symptoms. The treatment provided no relief.       Review of Systems   Constitutional:  Negative for fever.   HENT:  Negative for sinus pressure.         Dental pain and swelling   Gastrointestinal:  Positive for nausea and vomiting.   All other systems reviewed and are negative.             Objective     BP 96/60 (BP Location: Left arm, Patient Position: Sitting, BP Cuff Size: Adult)   Pulse 89    "Temp 36.3 °C (97.4 °F) (Temporal)   Resp 16   Ht 1.6 m (5' 3\")   Wt 68 kg (150 lb)   LMP 01/09/2016   SpO2 98%   BMI 26.57 kg/m²      Physical Exam  Vitals and nursing note reviewed.   Constitutional:       General: She is not in acute distress.     Appearance: Normal appearance. She is well-developed. She is not toxic-appearing.   HENT:      Head: Normocephalic and atraumatic.      Jaw: No trismus.        Right Ear: Tympanic membrane normal.      Left Ear: Tympanic membrane normal.      Nose: Nose normal.      Mouth/Throat:      Lips: Pink.      Mouth: Mucous membranes are moist.      Dentition: Dental tenderness, dental caries and dental abscesses present.      Pharynx: Uvula midline. No uvula swelling.     Eyes:      Extraocular Movements: Extraocular movements intact.      Conjunctiva/sclera: Conjunctivae normal.      Pupils: Pupils are equal, round, and reactive to light.   Cardiovascular:      Rate and Rhythm: Normal rate.      Heart sounds: Normal heart sounds.   Pulmonary:      Effort: Pulmonary effort is normal.      Breath sounds: Normal breath sounds.   Abdominal:      Palpations: Abdomen is soft.   Musculoskeletal:         General: Normal range of motion.      Cervical back: Normal range of motion and neck supple.   Skin:     General: Skin is warm and dry.      Capillary Refill: Capillary refill takes less than 2 seconds.   Neurological:      General: No focal deficit present.      Mental Status: She is alert and oriented to person, place, and time.      Gait: Gait normal.   Psychiatric:         Mood and Affect: Mood normal.         Behavior: Behavior is cooperative.                             Assessment & Plan        1. Dental abscess  amoxicillin-clavulanate (AUGMENTIN) 875-125 MG Tab    ondansetron (ZOFRAN ODT) 4 MG TABLET DISPERSIBLE    ibuprofen (MOTRIN) 600 MG Tab      2. Nausea and vomiting, unspecified vomiting type  amoxicillin-clavulanate (AUGMENTIN) 875-125 MG Tab    ondansetron (ZOFRAN " ODT) 4 MG TABLET DISPERSIBLE    ibuprofen (MOTRIN) 600 MG Tab      3. Facial swelling  amoxicillin-clavulanate (AUGMENTIN) 875-125 MG Tab    ondansetron (ZOFRAN ODT) 4 MG TABLET DISPERSIBLE    ibuprofen (MOTRIN) 600 MG Tab             Patient HPI and physical exam are consistent with dental abscess of left lower molar .  I will treat with Augmentin twice daily x7 days.    I have also sent a prescription for ibuprofen 3 times daily as needed for pain.    Patient requested Zofran for her nausea, prescription was sent to the pharmacy for that as well.    PT advised saltwater gargles/swishes  3-4 times daily until symptoms improve.     Patient was encouraged to call [facility] on [facility] as they also have a dental clinic for patients with Medicaid.  Patient was unaware of this service so she states she will call tomorrow.    Differential diagnosis, supportive care, and indications for immediate follow-up discussed with patient.  Instructed to return to clinic or nearest emergency department for any change in condition, further concerns, or worsening of symptoms.    I personally reviewed prior external notes and test results pertinent to today's visit.  I have independently reviewed and interpreted all diagnostics ordered during this urgent care visit.    PT should follow up with PCP in 1-2 days for re-evaluation if symptoms have not improved.      Discussed red flags and reasons to return to UC or ED.      Pt and/or family verbalized understanding of diagnosis and follow up instructions and was offered informational handout on diagnosis.  PT discharged.     Please note that this dictation was created using voice recognition software. I have made every reasonable attempt to correct obvious errors, but I expect that there may be errors of grammar and possibly content that I did not discover before finalizing the note.

## 2023-09-21 ENCOUNTER — OFFICE VISIT (OUTPATIENT)
Dept: URGENT CARE | Facility: CLINIC | Age: 38
End: 2023-09-21
Payer: MEDICAID

## 2023-09-21 VITALS
DIASTOLIC BLOOD PRESSURE: 68 MMHG | TEMPERATURE: 97.5 F | RESPIRATION RATE: 12 BRPM | SYSTOLIC BLOOD PRESSURE: 100 MMHG | WEIGHT: 157.1 LBS | BODY MASS INDEX: 27.84 KG/M2 | OXYGEN SATURATION: 99 % | HEART RATE: 74 BPM | HEIGHT: 63 IN

## 2023-09-21 DIAGNOSIS — R11.2 NAUSEA AND VOMITING, UNSPECIFIED VOMITING TYPE: ICD-10-CM

## 2023-09-21 DIAGNOSIS — R10.9 ABDOMINAL PAIN, UNSPECIFIED ABDOMINAL LOCATION: ICD-10-CM

## 2023-09-21 PROCEDURE — 3078F DIAST BP <80 MM HG: CPT | Performed by: PHYSICIAN ASSISTANT

## 2023-09-21 PROCEDURE — 3074F SYST BP LT 130 MM HG: CPT | Performed by: PHYSICIAN ASSISTANT

## 2023-09-21 PROCEDURE — 99213 OFFICE O/P EST LOW 20 MIN: CPT | Performed by: PHYSICIAN ASSISTANT

## 2023-09-21 RX ORDER — ONDANSETRON 4 MG/1
4 TABLET, ORALLY DISINTEGRATING ORAL EVERY 6 HOURS PRN
Qty: 20 TABLET | Refills: 0 | Status: ON HOLD | OUTPATIENT
Start: 2023-09-21 | End: 2024-03-18

## 2023-09-21 ASSESSMENT — ENCOUNTER SYMPTOMS
CHILLS: 0
DIAPHORESIS: 0
PALPITATIONS: 0
FLANK PAIN: 0
BLOOD IN STOOL: 0
FEVER: 1
NAUSEA: 1
ABDOMINAL PAIN: 1
SHORTNESS OF BREATH: 0
CONSTIPATION: 0
WEAKNESS: 0
DIARRHEA: 0
HEADACHES: 0
COUGH: 0
MYALGIAS: 0
VOMITING: 1
DIZZINESS: 0
SORE THROAT: 0
HEARTBURN: 0

## 2023-09-21 ASSESSMENT — FIBROSIS 4 INDEX: FIB4 SCORE: 0.58

## 2023-09-21 NOTE — PROGRESS NOTES
Subjective:     CHIEF COMPLAINT  Chief Complaint   Patient presents with    Vomiting     Pt has stomach pain, nausea, vomiting, fever x yesterday        HPI  Marian Kent is a very pleasant 37 y.o. female who presents to the clinic with intermittent abdominal cramping, nausea, vomiting and fever x1 day.  Unknown of Tmax.  States she had multiple episodes of emesis yesterday.  She has had 3 episodes of emesis this morning.  Describes intermittent crampy sensations over the lower abdomen primarily over the right lower quadrant.  The pain does not radiate.  States she has been having normal bowel movements.  Symptoms started after eating [employer].  No known ill contacts.  Prior abdominal surgeries include partial hysterectomy and hernia repair.  Both occurred multiple years ago.  PMH significant for Crohn's disease and ulcerative colitis.    REVIEW OF SYSTEMS  Review of Systems   Constitutional:  Positive for fever. Negative for chills, diaphoresis and malaise/fatigue.   HENT:  Negative for sore throat.    Respiratory:  Negative for cough and shortness of breath.    Cardiovascular:  Negative for chest pain and palpitations.   Gastrointestinal:  Positive for abdominal pain, nausea and vomiting. Negative for blood in stool, constipation, diarrhea, heartburn and melena.   Genitourinary:  Negative for dysuria, flank pain, frequency, hematuria and urgency.   Musculoskeletal:  Negative for myalgias.   Skin:  Negative for rash.   Neurological:  Negative for dizziness, weakness and headaches.       PAST MEDICAL HISTORY  Patient Active Problem List    Diagnosis Date Noted    Chronic abdominal pain 05/30/2023    Endometriosis of right ovary 04/20/2023    Dyspareunia in female 04/20/2023    Crohn's disease (HCC) 01/17/2023    Ulcerative colitis (HCC) 01/17/2023    Nausea with vomiting 09/16/2022    Heartburn 09/16/2022    Epigastric pain 09/16/2022    Acute midline low back pain without sciatica 12/13/2021    Traumatic  subarachnoid hemorrhage with loss of consciousness (HCC) 2021    Acute pain of left knee 2021    History of bipolar disorder 2021    Suicidal ideation 2021    Generalized abdominal pain 2021    Kidney infection 2021    Postprocedural pelvic peritoneal adhesions 10/22/2020    Tobacco abuse disorder 2019    Endometriosis determined by laparoscopy 2019    Anxiety 2019    Left inguinal hernia 2019    Pelvic pain 2016    Female pelvic pain 2016       SURGICAL HISTORY   has a past surgical history that includes  DELIVERY SER ( & ); cystoscopy (2016); laparoscopy (2017); laparoscopic lysis of adhesions (2017); pelviscopy (2011); salpingectomy (2011); primary c section (2006); repeat c section (2007); repeat c section (2014); pelviscopy (N/A, 2016); hysterectomy laparoscopy (2016); pelviscopy (N/A, 2018); inguinal hernia repair robotic (Left, 2019); lap,diagnostic abdomen (10/22/2020); lap,diagnostic abdomen (2022); lap,fulgurate/excise lesions (Right, 2022); and lap,diagnostic abdomen (N/A, 2023).    ALLERGIES  Allergies   Allergen Reactions    Sertraline      Other reaction(s): diarrhea    Oxycodone Hives and Itching    Tramadol Hives and Itching       CURRENT MEDICATIONS  Home Medications       Reviewed by Hans Coelho P.A.-C. (Physician Assistant) on 23 at 1414  Med List Status: <None>     Medication Last Dose Status   acetaminophen (TYLENOL) 325 MG Tab Taking Active   ibuprofen (MOTRIN) 600 MG Tab Taking Active   ondansetron (ZOFRAN ODT) 4 MG TABLET DISPERSIBLE Taking Active   VRAYLAR 6 MG Cap Taking Active   zolpidem (AMBIEN) 10 MG Tab Taking Active                    SOCIAL HISTORY  Social History     Tobacco Use    Smoking status: Every Day     Current packs/day: 1.00     Average packs/day: 1 pack/day for 10.0 years (10.0 ttl  "pk-yrs)     Types: Cigarettes    Smokeless tobacco: Never   Vaping Use    Vaping Use: Never used   Substance and Sexual Activity    Alcohol use: No     Alcohol/week: 0.0 oz     Comment: none since10/21    Drug use: No    Sexual activity: Not Currently     Partners: Male     Birth control/protection: Pill       FAMILY HISTORY  Family History   Problem Relation Age of Onset    Cancer Father 45        colon    Other Maternal Grandfather     Cancer Paternal Grandmother         breast    Diabetes Paternal Grandmother     Heart Disease Neg Hx     Stroke Neg Hx           Objective:     VITAL SIGNS: /68 (BP Location: Right arm, Patient Position: Sitting, BP Cuff Size: Adult)   Pulse 74   Temp 36.4 °C (97.5 °F) (Temporal)   Resp 12   Ht 1.6 m (5' 3\")   Wt 71.3 kg (157 lb 1.6 oz)   LMP 01/09/2016   SpO2 99%   BMI 27.83 kg/m²     PHYSICAL EXAM  Physical Exam  Constitutional:       General: She is not in acute distress.     Appearance: Normal appearance. She is not ill-appearing, toxic-appearing or diaphoretic.   HENT:      Head: Normocephalic and atraumatic.      Mouth/Throat:      Mouth: Mucous membranes are moist.      Pharynx: No oropharyngeal exudate or posterior oropharyngeal erythema.   Eyes:      Conjunctiva/sclera: Conjunctivae normal.   Cardiovascular:      Rate and Rhythm: Normal rate and regular rhythm.      Pulses: Normal pulses.      Heart sounds: Normal heart sounds.   Pulmonary:      Effort: Pulmonary effort is normal.      Breath sounds: Normal breath sounds. No wheezing.   Abdominal:      General: Bowel sounds are normal. There is no distension.      Palpations: Abdomen is soft. There is no mass.      Tenderness: There is no abdominal tenderness. There is no right CVA tenderness, left CVA tenderness, guarding or rebound.      Hernia: No hernia is present.      Comments: Mild tenderness to palpation over the right lower quadrant on exam.  No rebound, rigidity or guarding.  Negative McBurney sign.  " Negative psoas, Rovsing's and obturator's.   Musculoskeletal:         General: Normal range of motion.      Cervical back: Normal range of motion. No muscular tenderness.   Lymphadenopathy:      Cervical: No cervical adenopathy.   Skin:     General: Skin is warm and dry.      Capillary Refill: Capillary refill takes less than 2 seconds.   Neurological:      General: No focal deficit present.      Mental Status: She is alert and oriented to person, place, and time. Mental status is at baseline.   Psychiatric:         Mood and Affect: Mood normal.         Thought Content: Thought content normal.         Assessment/Plan:     1. Abdominal pain, unspecified abdominal location    2. Nausea and vomiting, unspecified vomiting type  - ondansetron (ZOFRAN ODT) 4 MG TABLET DISPERSIBLE; Take 1 Tablet by mouth every 6 hours as needed for Nausea/Vomiting.  Dispense: 20 Tablet; Refill: 0      MDM/Comments:    Patient experiencing abdominal cramping, nausea, vomiting and fever x1 day.  On exam the patient appears well.  Minimal tenderness to palpation over the right lower quadrant.  No rebound, rigidity or guarding.  Low suspicion for acute abdomen.  Symptoms seem more consistent with a viral illness.  Discussed bland diet, increase fluid intake as well as Zofran as needed.  Give the patient's strict ED precautions for any red flag symptoms as I discussed I cannot definitively rule out appendicitis at this time.  Patient verbalized understanding.  Work note provided.    Differential diagnosis, natural history, supportive care, and indications for immediate follow-up discussed. All questions answered. Patient agrees with the plan of care.    Follow-up as needed if symptoms worsen or fail to improve to PCP, Urgent care or Emergency Room.    I have personally reviewed prior external notes and test results pertinent to today's visit.  I have independently reviewed and interpreted all diagnostics ordered during this urgent care acute  visit.   Discussed management options (risks,benefits, and alternatives to treatment). Pt expresses understanding and the treatment plan was agreed upon. Questions were encouraged and answered to pt's satisfaction.    Please note that this dictation was created using voice recognition software. I have made a reasonable attempt to correct obvious errors, but I expect that there are errors of grammar and possibly content that I did not discover before finalizing the note.

## 2023-09-21 NOTE — LETTER
September 21, 2023    To Whom It May Concern:         This is confirmation that Marian Kent attended her scheduled appointment with Hans Coelho P.A.-C. on 9/21/23.  Please excuse the patient's absence from work on 9/20/2023 and 9/21/2023.         If you have any questions please do not hesitate to call me at the phone number listed below.    Sincerely,          Hans Coelho P.A.-C.  [phone]

## 2024-01-03 ENCOUNTER — OFFICE VISIT (OUTPATIENT)
Dept: URGENT CARE | Facility: CLINIC | Age: 39
End: 2024-01-03
Payer: MEDICAID

## 2024-01-03 VITALS
TEMPERATURE: 99.2 F | DIASTOLIC BLOOD PRESSURE: 82 MMHG | RESPIRATION RATE: 16 BRPM | HEART RATE: 132 BPM | BODY MASS INDEX: 28.17 KG/M2 | OXYGEN SATURATION: 96 % | WEIGHT: 159 LBS | SYSTOLIC BLOOD PRESSURE: 122 MMHG | HEIGHT: 63 IN

## 2024-01-03 DIAGNOSIS — R05.1 ACUTE COUGH: ICD-10-CM

## 2024-01-03 DIAGNOSIS — B34.9 VIRAL ILLNESS: ICD-10-CM

## 2024-01-03 LAB
FLUAV RNA SPEC QL NAA+PROBE: NEGATIVE
FLUBV RNA SPEC QL NAA+PROBE: NEGATIVE
RSV RNA SPEC QL NAA+PROBE: NEGATIVE
SARS-COV-2 RNA RESP QL NAA+PROBE: NEGATIVE

## 2024-01-03 PROCEDURE — 87637 SARSCOV2&INF A&B&RSV AMP PRB: CPT | Mod: QW | Performed by: PHYSICIAN ASSISTANT

## 2024-01-03 PROCEDURE — 3074F SYST BP LT 130 MM HG: CPT | Performed by: PHYSICIAN ASSISTANT

## 2024-01-03 PROCEDURE — 99214 OFFICE O/P EST MOD 30 MIN: CPT | Performed by: PHYSICIAN ASSISTANT

## 2024-01-03 PROCEDURE — 3079F DIAST BP 80-89 MM HG: CPT | Performed by: PHYSICIAN ASSISTANT

## 2024-01-03 RX ORDER — ALBUTEROL SULFATE 90 UG/1
2 AEROSOL, METERED RESPIRATORY (INHALATION) EVERY 6 HOURS PRN
Qty: 8.5 G | Refills: 0 | Status: ON HOLD | OUTPATIENT
Start: 2024-01-03 | End: 2024-03-18

## 2024-01-03 RX ORDER — DEXTROMETHORPHAN HYDROBROMIDE AND PROMETHAZINE HYDROCHLORIDE 15; 6.25 MG/5ML; MG/5ML
5 SYRUP ORAL EVERY 4 HOURS PRN
Qty: 120 ML | Refills: 0 | Status: ON HOLD | OUTPATIENT
Start: 2024-01-03 | End: 2024-03-18

## 2024-01-03 RX ORDER — BENZONATATE 100 MG/1
100 CAPSULE ORAL 3 TIMES DAILY PRN
Qty: 30 CAPSULE | Refills: 0 | Status: ON HOLD | OUTPATIENT
Start: 2024-01-03 | End: 2024-03-18

## 2024-01-03 ASSESSMENT — FIBROSIS 4 INDEX: FIB4 SCORE: 0.69

## 2024-01-03 ASSESSMENT — ENCOUNTER SYMPTOMS
CHILLS: 0
HEADACHES: 1
FEVER: 0
SINUS PAIN: 1
VOMITING: 0
SORE THROAT: 1
ABDOMINAL PAIN: 0
COUGH: 1
RHINORRHEA: 1
NAUSEA: 0
DIARRHEA: 1
MYALGIAS: 0
WHEEZING: 0
SHORTNESS OF BREATH: 0

## 2024-01-04 NOTE — PROGRESS NOTES
Subjective     Marian Kent is a very pleasant 38 y.o. female who presents with Pain (Pain in chest and throat, cough, vomiting, and diarrhea x2 days. Cough is exacerbated at night. Has tried robitussin, nyquil, and dayquil w/ no improvement. )            Cough  This is a new problem. The current episode started in the past 7 days (2 days). The problem has been gradually worsening. The problem occurs every few minutes. Associated symptoms include headaches, nasal congestion, rhinorrhea and a sore throat. Pertinent negatives include no chest pain, chills, ear pain, fever, myalgias, postnasal drip, rash, shortness of breath or wheezing. The symptoms are aggravated by lying down. She has tried OTC cough suppressant for the symptoms. The treatment provided mild relief. There is no history of asthma or pneumonia.       PMH:  has a past medical history of Anesthesia (02/14/2022), Anxiety (2/19/2019), Bipolar 1 disorder (HCC) (02/14/2022), Breath shortness, Crohn's disease ([facility]) (1/17/2023), EP (ectopic pregnancy), Gynecological disorder, Hydronephrosis right (1/3/2014), Nausea with vomiting (9/16/2022), Pain (02/14/2022), Pain (03/2019), Pain management, Placenta previa diagnosed with US at [facility] (12/17/2013), PONV (postoperative nausea and vomiting), Psychiatric problem (02/14/2022), and Ulcerative colitis ([facility]) (1/17/2023).    She has no past medical history of Addisons disease ([facility]), Adrenal disorder ([facility]), Allergy, Anemia, Blood transfusion, Clotting disorder ([facility]), Cushings syndrome ([facility]), Diabetic neuropathy ([facility]), Hyperlipidemia, Meningitis, Migraine, Muscle disorder, OSTEOPOROSIS, Parathyroid disorder ([facility]), Pituitary disease (HCC), Sickle cell disease ([facility]), or Substance abuse ([facility]).  MEDS:   Current Outpatient Medications:     ondansetron (ZOFRAN ODT) 4 MG TABLET DISPERSIBLE, Take 1 Tablet by mouth every 6 hours as needed for Nausea/Vomiting., Disp: 20 Tablet, Rfl: 0    ondansetron (ZOFRAN ODT) 4 MG  TABLET DISPERSIBLE, Take 1 Tablet by mouth every 6 hours as needed for Nausea/Vomiting., Disp: 10 Tablet, Rfl: 0    ibuprofen (MOTRIN) 600 MG Tab, Take 1 Tablet by mouth every 6 hours as needed for Moderate Pain., Disp: 90 Tablet, Rfl: 0    VRAYLAR 6 MG Cap, , Disp: , Rfl:     acetaminophen (TYLENOL) 325 MG Tab, Take 650 mg by mouth., Disp: , Rfl:   ALLERGIES:   Allergies   Allergen Reactions    Sertraline      Other reaction(s): diarrhea    Oxycodone Hives and Itching    Tramadol Hives and Itching     SURGHX:   Past Surgical History:   Procedure Laterality Date    OR LAP,DIAGNOSTIC ABDOMEN N/A 4/20/2023    Procedure: DIAGNOSTIC AND OPERATIVE LAPAROSCOPY, WITH CAUTERIZATION OF ENDOMETRIOSIS OF RIGHT OVARY AND RESECTION OF LEFT OVARIAN EXCRESCENCE;  Surgeon: Mark Price M.D.;  Location: SURGERY SAME DAY [facility];  Service: Gynecology    OR LAP,DIAGNOSTIC ABDOMEN  02/17/2022    Procedure: LAPAROSCOPY - DIAGNOSTIC and operative;  Surgeon: Mark Price M.D.;  Location: SURGERY SAME DAY [facility];  Service: Gynecology    OR LAP,FULGURATE/EXCISE LESIONS Right 02/17/2022    Procedure: cauterization ,ENDOMETRIOSIS lesion right ovary;  Surgeon: Mark Price M.D.;  Location: SURGERY SAME DAY [facility];  Service: Gynecology    OR LAP,DIAGNOSTIC ABDOMEN  10/22/2020    Procedure: PELVISCOPY - DIAGNOSTIC AND OPERATIVE. LYSIS OF ADHESIONS, CAUTERIZATION OF ENDOMETRIOSIS RIGHT OVARY;  Surgeon: Mark Price M.D.;  Location: SURGERY SAME DAY [facility];  Service: Gynecology    INGUINAL HERNIA REPAIR ROBOTIC Left 03/28/2019    Procedure: INGUINAL HERNIA REPAIR ROBOTIC WITH MESH PLACEMENT  ;  Surgeon: Chris Cooley M.D.;  Location: SURGERY SAME DAY [facility];  Service: General    PELVISCOPY N/A 07/09/2018    Procedure: PELVISCOPY-DIAGNOSTIC;  Surgeon: Mark Price M.D.;  Location: SURGERY SAME DAY [facility] ORS;  Service: Gynecology    LAPAROSCOPY  12/09/2017    Procedure: Exploratory laparoscopy;  Surgeon: Mark  MEEK Mariano M.D.;  Location: SURGERY [facility];  Service: Gynecology    LAPAROSCOPIC LYSIS OF ADHESIONS  2017    Procedure: LAPAROSCOPIC LYSIS OF ADHESIONS;  Surgeon: Mark Mariano M.D.;  Location: SURGERY [facility];  Service: Gynecology    CYSTOSCOPY  2016    Procedure: CYSTOSCOPY;  Surgeon: Mark Mariano M.D.;  Location: SURGERY SAME DAY [facility];  Service:     HYSTERECTOMY LAPAROSCOPY  2016    Procedure: HYSTERECTOMY LAPAROSCOPY RIGHT SALPINGECTOMY;  Surgeon: Mark Mariano M.D.;  Location: SURGERY SAME DAY [facility];  Service:     PELVISCOPY N/A 2016    Procedure: PELVISCOPY Diagnostic;  Surgeon: Mark Mariano M.D.;  Location: SURGERY [facility];  Service:     REPEAT C SECTION  2014    Performed by Mark Mariano M.D. at [facility]    PELVISCOPY  2011    Performed by MARK MARIANO at SURGERY [facility]    SALPINGECTOMY  2011    Performed by MARK MARIANO at [facility]    REPEAT C SECTION  2007    PRIMARY C SECTION  2006    [facility]  DELIVERY SER   &      SOCHX:  reports that she has been smoking cigarettes. She has a 10.0 pack-year smoking history. She has never used smokeless tobacco. She reports that she does not drink alcohol and does not use drugs.  FH: family history includes Cancer in her paternal grandmother; Cancer (age of onset: 45) in her father; Diabetes in her paternal grandmother; Other in her maternal grandfather.      Review of Systems   Constitutional:  Negative for chills, fever and malaise/fatigue.   HENT:  Positive for rhinorrhea, sinus pain and sore throat. Negative for ear pain and postnasal drip.    Respiratory:  Positive for cough. Negative for shortness of breath and wheezing.    Cardiovascular:  Negative for chest pain.   Gastrointestinal:  Positive for diarrhea. Negative for abdominal pain, nausea and vomiting.   Musculoskeletal:  Negative for myalgias.  "  Skin:  Negative for rash.   Neurological:  Positive for headaches.       Medications, Allergies, and current problem list reviewed today in Epic           Objective     /82 (BP Location: Right arm, Patient Position: Sitting, BP Cuff Size: Adult)   Pulse (!) 132   Temp 37.3 °C (99.2 °F) (Temporal)   Resp 16   Ht 1.6 m (5' 3\")   Wt 72.1 kg (159 lb)   LMP 01/09/2016   SpO2 96%   BMI 28.17 kg/m²      Physical Exam  Vitals and nursing note reviewed.   Constitutional:       General: She is not in acute distress.     Appearance: Normal appearance. She is well-developed. She is not ill-appearing, toxic-appearing or diaphoretic.   HENT:      Head: Normocephalic and atraumatic.      Right Ear: Tympanic membrane, ear canal and external ear normal.      Left Ear: Tympanic membrane, ear canal and external ear normal.      Nose: Congestion and rhinorrhea present.      Mouth/Throat:      Mouth: Mucous membranes are moist.      Pharynx: No oropharyngeal exudate or posterior oropharyngeal erythema.   Eyes:      General:         Right eye: No discharge.         Left eye: No discharge.      Conjunctiva/sclera: Conjunctivae normal.   Cardiovascular:      Rate and Rhythm: Normal rate and regular rhythm.      Pulses: Normal pulses.      Heart sounds: Normal heart sounds.   Pulmonary:      Effort: Pulmonary effort is normal. No respiratory distress.      Breath sounds: Normal breath sounds. No stridor. No wheezing, rhonchi or rales.   Musculoskeletal:      Cervical back: Normal range of motion and neck supple.      Right lower leg: No edema.      Left lower leg: No edema.   Lymphadenopathy:      Cervical: Cervical adenopathy present.   Skin:     General: Skin is warm and dry.   Neurological:      General: No focal deficit present.      Mental Status: She is alert and oriented to person, place, and time. Mental status is at baseline.   Psychiatric:         Mood and Affect: Mood normal.         Behavior: Behavior normal.    "      Thought Content: Thought content normal.         Judgment: Judgment normal.                             Assessment & Plan     This is a very pleasant 38-year-old female presenting with viral URI symptoms for the past 2 days.  Some vomiting and diarrhea.  Intermittent body aches and chills but no fever.  No shortness of breath wheezing chest pain leg swelling or calf tenderness.  Sick contacts at home.  No pertinent past respiratory history.  Tachycardic otherwise vital signs appropriate.  Nasal congestion and rhinorrhea.  Lungs are clear bilateral without wheezing, rhonchi or rales.  Remainder of exam unremarkable.  In clinic COVID, flu, RSV testing negative. No clinical symptoms/signs of focal bacterial infection.  Patient will be treated for self-limiting viral URI with OTC meds, conservative measures, and symptomatic relief.  ER and red flag symptoms discussed at length.         1. Acute cough  POCT CEPHEID COV-2, FLU A/B, RSV - PCR      2. Viral illness  benzonatate (TESSALON) 100 MG Cap    promethazine-dextromethorphan (PROMETHAZINE-DM) 6.25-15 MG/5ML syrup    albuterol 108 (90 Base) MCG/ACT Aero Soln inhalation aerosol          I personally reviewed prior external notes and test results pertinent to today's visit. Return to clinic or go to ED if symptoms worsen or persist. Red flag symptoms and indications for ED discussed at length. Patient/Parent/Guardian voices understanding.  AVS with post-visit instructions provided or given verbally.  Follow-up with your primary care provider in 3-5 days. All side effects and potential interactions of prescribed medication discussed including allergic response, GI upset, tendon injury, rash, sedation, OCP effectiveness, etc.    Please note that this dictation was created using voice recognition software. I have made every reasonable attempt to correct obvious errors, but I expect that there are errors of grammar and possibly content that I did not discover before  finalizing the note.

## 2024-02-01 ENCOUNTER — APPOINTMENT (OUTPATIENT)
Dept: ADMISSIONS | Facility: MEDICAL CENTER | Age: 39
End: 2024-02-01
Attending: SPECIALIST
Payer: MEDICAID

## 2024-02-21 ENCOUNTER — PRE-ADMISSION TESTING (OUTPATIENT)
Dept: ADMISSIONS | Facility: MEDICAL CENTER | Age: 39
End: 2024-02-21
Attending: SPECIALIST
Payer: MEDICAID

## 2024-02-22 ENCOUNTER — PRE-ADMISSION TESTING (OUTPATIENT)
Dept: ADMISSIONS | Facility: MEDICAL CENTER | Age: 39
End: 2024-02-22
Attending: SPECIALIST
Payer: MEDICAID

## 2024-02-22 DIAGNOSIS — Z01.812 PRE-OPERATIVE LABORATORY EXAMINATION: ICD-10-CM

## 2024-02-22 LAB
ANION GAP SERPL CALC-SCNC: 10 MMOL/L (ref 7–16)
BUN SERPL-MCNC: 7 MG/DL (ref 8–22)
CALCIUM SERPL-MCNC: 8.7 MG/DL (ref 8.5–10.5)
CHLORIDE SERPL-SCNC: 107 MMOL/L (ref 96–112)
CO2 SERPL-SCNC: 22 MMOL/L (ref 20–33)
CREAT SERPL-MCNC: 0.78 MG/DL (ref 0.5–1.4)
ERYTHROCYTE [DISTWIDTH] IN BLOOD BY AUTOMATED COUNT: 41.8 FL (ref 35.9–50)
GFR SERPLBLD CREATININE-BSD FMLA CKD-EPI: 100 ML/MIN/1.73 M 2
GLUCOSE SERPL-MCNC: 95 MG/DL (ref 65–99)
HCT VFR BLD AUTO: 40.7 % (ref 37–47)
HGB BLD-MCNC: 14.2 G/DL (ref 12–16)
MCH RBC QN AUTO: 33.8 PG (ref 27–33)
MCHC RBC AUTO-ENTMCNC: 34.9 G/DL (ref 32.2–35.5)
MCV RBC AUTO: 96.9 FL (ref 81.4–97.8)
PLATELET # BLD AUTO: 260 K/UL (ref 164–446)
PMV BLD AUTO: 8.6 FL (ref 9–12.9)
POTASSIUM SERPL-SCNC: 4 MMOL/L (ref 3.6–5.5)
RBC # BLD AUTO: 4.2 M/UL (ref 4.2–5.4)
SODIUM SERPL-SCNC: 139 MMOL/L (ref 135–145)
WBC # BLD AUTO: 4.5 K/UL (ref 4.8–10.8)

## 2024-02-22 PROCEDURE — 80048 BASIC METABOLIC PNL TOTAL CA: CPT

## 2024-02-22 PROCEDURE — 36415 COLL VENOUS BLD VENIPUNCTURE: CPT

## 2024-02-22 PROCEDURE — 85027 COMPLETE CBC AUTOMATED: CPT

## 2024-03-18 ENCOUNTER — HOSPITAL ENCOUNTER (OUTPATIENT)
Facility: MEDICAL CENTER | Age: 39
End: 2024-03-18
Attending: SPECIALIST | Admitting: SPECIALIST
Payer: MEDICAID

## 2024-03-18 ENCOUNTER — ANESTHESIA (OUTPATIENT)
Dept: SURGERY | Facility: MEDICAL CENTER | Age: 39
End: 2024-03-18
Payer: MEDICAID

## 2024-03-18 ENCOUNTER — PHARMACY VISIT (OUTPATIENT)
Dept: PHARMACY | Facility: MEDICAL CENTER | Age: 39
End: 2024-03-18
Payer: COMMERCIAL

## 2024-03-18 ENCOUNTER — ANESTHESIA EVENT (OUTPATIENT)
Dept: SURGERY | Facility: MEDICAL CENTER | Age: 39
End: 2024-03-18
Payer: MEDICAID

## 2024-03-18 VITALS
DIASTOLIC BLOOD PRESSURE: 69 MMHG | BODY MASS INDEX: 28.71 KG/M2 | HEIGHT: 63 IN | WEIGHT: 162.04 LBS | HEART RATE: 71 BPM | RESPIRATION RATE: 16 BRPM | SYSTOLIC BLOOD PRESSURE: 104 MMHG | OXYGEN SATURATION: 96 % | TEMPERATURE: 97.9 F

## 2024-03-18 DIAGNOSIS — G89.18 POSTOPERATIVE PAIN: ICD-10-CM

## 2024-03-18 PROCEDURE — 160028 HCHG SURGERY MINUTES - 1ST 30 MINS LEVEL 3: Performed by: SPECIALIST

## 2024-03-18 PROCEDURE — 160025 RECOVERY II MINUTES (STATS): Performed by: SPECIALIST

## 2024-03-18 PROCEDURE — 700111 HCHG RX REV CODE 636 W/ 250 OVERRIDE (IP): Mod: UD | Performed by: ANESTHESIOLOGY

## 2024-03-18 PROCEDURE — RXMED WILLOW AMBULATORY MEDICATION CHARGE: Performed by: SPECIALIST

## 2024-03-18 PROCEDURE — 700102 HCHG RX REV CODE 250 W/ 637 OVERRIDE(OP): Mod: UD | Performed by: ANESTHESIOLOGY

## 2024-03-18 PROCEDURE — 160035 HCHG PACU - 1ST 60 MINS PHASE I: Performed by: SPECIALIST

## 2024-03-18 PROCEDURE — 700105 HCHG RX REV CODE 258: Mod: UD | Performed by: SPECIALIST

## 2024-03-18 PROCEDURE — 160046 HCHG PACU - 1ST 60 MINS PHASE II: Performed by: SPECIALIST

## 2024-03-18 PROCEDURE — 160047 HCHG PACU  - EA ADDL 30 MINS PHASE II: Performed by: SPECIALIST

## 2024-03-18 PROCEDURE — 160036 HCHG PACU - EA ADDL 30 MINS PHASE I: Performed by: SPECIALIST

## 2024-03-18 PROCEDURE — A9270 NON-COVERED ITEM OR SERVICE: HCPCS | Mod: UD | Performed by: ANESTHESIOLOGY

## 2024-03-18 PROCEDURE — 160048 HCHG OR STATISTICAL LEVEL 1-5: Performed by: SPECIALIST

## 2024-03-18 PROCEDURE — 160002 HCHG RECOVERY MINUTES (STAT): Performed by: SPECIALIST

## 2024-03-18 PROCEDURE — 700101 HCHG RX REV CODE 250: Mod: UD | Performed by: ANESTHESIOLOGY

## 2024-03-18 PROCEDURE — 160039 HCHG SURGERY MINUTES - EA ADDL 1 MIN LEVEL 3: Performed by: SPECIALIST

## 2024-03-18 PROCEDURE — 160009 HCHG ANES TIME/MIN: Performed by: SPECIALIST

## 2024-03-18 RX ORDER — DEXTROMETHORPHAN HYDROBROMIDE, BUPROPION HYDROCHLORIDE 105; 45 MG/1; MG/1
1 TABLET, MULTILAYER, EXTENDED RELEASE ORAL 2 TIMES DAILY
COMMUNITY
Start: 2024-01-22

## 2024-03-18 RX ORDER — OXYCODONE HYDROCHLORIDE AND ACETAMINOPHEN 5; 325 MG/1; MG/1
1 TABLET ORAL EVERY 6 HOURS PRN
COMMUNITY
Start: 2024-01-11

## 2024-03-18 RX ORDER — HYDROMORPHONE HYDROCHLORIDE 1 MG/ML
0.4 INJECTION, SOLUTION INTRAMUSCULAR; INTRAVENOUS; SUBCUTANEOUS
Status: DISCONTINUED | OUTPATIENT
Start: 2024-03-18 | End: 2024-03-18 | Stop reason: HOSPADM

## 2024-03-18 RX ORDER — HALOPERIDOL 5 MG/ML
1 INJECTION INTRAMUSCULAR
Status: DISCONTINUED | OUTPATIENT
Start: 2024-03-18 | End: 2024-03-18 | Stop reason: HOSPADM

## 2024-03-18 RX ORDER — DEXAMETHASONE SODIUM PHOSPHATE 4 MG/ML
INJECTION, SOLUTION INTRA-ARTICULAR; INTRALESIONAL; INTRAMUSCULAR; INTRAVENOUS; SOFT TISSUE PRN
Status: DISCONTINUED | OUTPATIENT
Start: 2024-03-18 | End: 2024-03-18 | Stop reason: SURG

## 2024-03-18 RX ORDER — SODIUM CHLORIDE, SODIUM LACTATE, POTASSIUM CHLORIDE, CALCIUM CHLORIDE 600; 310; 30; 20 MG/100ML; MG/100ML; MG/100ML; MG/100ML
INJECTION, SOLUTION INTRAVENOUS CONTINUOUS
Status: ACTIVE | OUTPATIENT
Start: 2024-03-18 | End: 2024-03-18

## 2024-03-18 RX ORDER — SCOLOPAMINE TRANSDERMAL SYSTEM 1 MG/1
1 PATCH, EXTENDED RELEASE TRANSDERMAL
Status: DISCONTINUED | OUTPATIENT
Start: 2024-03-18 | End: 2024-03-18 | Stop reason: HOSPADM

## 2024-03-18 RX ORDER — CELECOXIB 200 MG/1
400 CAPSULE ORAL ONCE
Status: COMPLETED | OUTPATIENT
Start: 2024-03-18 | End: 2024-03-18

## 2024-03-18 RX ORDER — HYDROCODONE BITARTRATE AND ACETAMINOPHEN 5; 325 MG/1; MG/1
1 TABLET ORAL EVERY 6 HOURS PRN
Qty: 28 TABLET | Refills: 0 | Status: SHIPPED | OUTPATIENT
Start: 2024-03-18 | End: 2024-03-25

## 2024-03-18 RX ORDER — HYDROMORPHONE HYDROCHLORIDE 1 MG/ML
0.2 INJECTION, SOLUTION INTRAMUSCULAR; INTRAVENOUS; SUBCUTANEOUS
Status: DISCONTINUED | OUTPATIENT
Start: 2024-03-18 | End: 2024-03-18 | Stop reason: HOSPADM

## 2024-03-18 RX ORDER — ACETAMINOPHEN 500 MG
1000 TABLET ORAL ONCE
Status: COMPLETED | OUTPATIENT
Start: 2024-03-18 | End: 2024-03-18

## 2024-03-18 RX ORDER — CEFAZOLIN SODIUM 1 G/3ML
INJECTION, POWDER, FOR SOLUTION INTRAMUSCULAR; INTRAVENOUS PRN
Status: DISCONTINUED | OUTPATIENT
Start: 2024-03-18 | End: 2024-03-18 | Stop reason: SURG

## 2024-03-18 RX ORDER — MEPERIDINE HYDROCHLORIDE 25 MG/ML
12.5 INJECTION INTRAMUSCULAR; INTRAVENOUS; SUBCUTANEOUS
Status: DISCONTINUED | OUTPATIENT
Start: 2024-03-18 | End: 2024-03-18 | Stop reason: HOSPADM

## 2024-03-18 RX ORDER — SODIUM CHLORIDE, SODIUM LACTATE, POTASSIUM CHLORIDE, CALCIUM CHLORIDE 600; 310; 30; 20 MG/100ML; MG/100ML; MG/100ML; MG/100ML
INJECTION, SOLUTION INTRAVENOUS CONTINUOUS
Status: DISCONTINUED | OUTPATIENT
Start: 2024-03-18 | End: 2024-03-18 | Stop reason: HOSPADM

## 2024-03-18 RX ORDER — ONDANSETRON 2 MG/ML
INJECTION INTRAMUSCULAR; INTRAVENOUS PRN
Status: DISCONTINUED | OUTPATIENT
Start: 2024-03-18 | End: 2024-03-18 | Stop reason: SURG

## 2024-03-18 RX ORDER — CLONAZEPAM 1 MG/1
1 TABLET ORAL 2 TIMES DAILY
COMMUNITY

## 2024-03-18 RX ORDER — LIDOCAINE HYDROCHLORIDE 20 MG/ML
INJECTION, SOLUTION EPIDURAL; INFILTRATION; INTRACAUDAL; PERINEURAL PRN
Status: DISCONTINUED | OUTPATIENT
Start: 2024-03-18 | End: 2024-03-18 | Stop reason: SURG

## 2024-03-18 RX ORDER — MIDAZOLAM HYDROCHLORIDE 1 MG/ML
INJECTION INTRAMUSCULAR; INTRAVENOUS PRN
Status: DISCONTINUED | OUTPATIENT
Start: 2024-03-18 | End: 2024-03-18 | Stop reason: SURG

## 2024-03-18 RX ORDER — ROCURONIUM BROMIDE 10 MG/ML
INJECTION, SOLUTION INTRAVENOUS PRN
Status: DISCONTINUED | OUTPATIENT
Start: 2024-03-18 | End: 2024-03-18 | Stop reason: SURG

## 2024-03-18 RX ORDER — IBUPROFEN 800 MG/1
800 TABLET ORAL EVERY 8 HOURS PRN
Qty: 30 TABLET | Refills: 0 | Status: SHIPPED | OUTPATIENT
Start: 2024-03-18

## 2024-03-18 RX ORDER — HYDROMORPHONE HYDROCHLORIDE 1 MG/ML
0.1 INJECTION, SOLUTION INTRAMUSCULAR; INTRAVENOUS; SUBCUTANEOUS
Status: DISCONTINUED | OUTPATIENT
Start: 2024-03-18 | End: 2024-03-18 | Stop reason: HOSPADM

## 2024-03-18 RX ORDER — ONDANSETRON 2 MG/ML
4 INJECTION INTRAMUSCULAR; INTRAVENOUS
Status: COMPLETED | OUTPATIENT
Start: 2024-03-18 | End: 2024-03-18

## 2024-03-18 RX ADMIN — HYDROMORPHONE HYDROCHLORIDE 0.2 MG: 1 INJECTION, SOLUTION INTRAMUSCULAR; INTRAVENOUS; SUBCUTANEOUS at 14:54

## 2024-03-18 RX ADMIN — FENTANYL CITRATE 50 MCG: 50 INJECTION, SOLUTION INTRAMUSCULAR; INTRAVENOUS at 14:24

## 2024-03-18 RX ADMIN — CELECOXIB 400 MG: 200 CAPSULE ORAL at 12:32

## 2024-03-18 RX ADMIN — SODIUM CHLORIDE, POTASSIUM CHLORIDE, SODIUM LACTATE AND CALCIUM CHLORIDE: 600; 310; 30; 20 INJECTION, SOLUTION INTRAVENOUS at 12:33

## 2024-03-18 RX ADMIN — HYDROMORPHONE HYDROCHLORIDE 0.4 MG: 1 INJECTION, SOLUTION INTRAMUSCULAR; INTRAVENOUS; SUBCUTANEOUS at 14:42

## 2024-03-18 RX ADMIN — DEXAMETHASONE SODIUM PHOSPHATE 8 MG: 4 INJECTION INTRA-ARTICULAR; INTRALESIONAL; INTRAMUSCULAR; INTRAVENOUS; SOFT TISSUE at 13:12

## 2024-03-18 RX ADMIN — HYDROMORPHONE HYDROCHLORIDE 0.4 MG: 1 INJECTION, SOLUTION INTRAMUSCULAR; INTRAVENOUS; SUBCUTANEOUS at 16:17

## 2024-03-18 RX ADMIN — FENTANYL CITRATE 150 MCG: 50 INJECTION, SOLUTION INTRAMUSCULAR; INTRAVENOUS at 13:06

## 2024-03-18 RX ADMIN — LIDOCAINE HYDROCHLORIDE 80 MG: 20 INJECTION, SOLUTION EPIDURAL; INFILTRATION; INTRACAUDAL at 13:06

## 2024-03-18 RX ADMIN — HYDROMORPHONE HYDROCHLORIDE 0.4 MG: 1 INJECTION, SOLUTION INTRAMUSCULAR; INTRAVENOUS; SUBCUTANEOUS at 14:32

## 2024-03-18 RX ADMIN — ROCURONIUM BROMIDE 50 MG: 50 INJECTION, SOLUTION INTRAVENOUS at 13:06

## 2024-03-18 RX ADMIN — ONDANSETRON 4 MG: 2 INJECTION INTRAMUSCULAR; INTRAVENOUS at 13:57

## 2024-03-18 RX ADMIN — MIDAZOLAM HYDROCHLORIDE 2 MG: 1 INJECTION, SOLUTION INTRAMUSCULAR; INTRAVENOUS at 12:45

## 2024-03-18 RX ADMIN — ACETAMINOPHEN 1000 MG: 500 TABLET ORAL at 12:32

## 2024-03-18 RX ADMIN — CEFAZOLIN 2 G: 1 INJECTION, POWDER, FOR SOLUTION INTRAMUSCULAR; INTRAVENOUS at 13:06

## 2024-03-18 RX ADMIN — SCOPOLAMINE 1 PATCH: 1.5 PATCH, EXTENDED RELEASE TRANSDERMAL at 14:49

## 2024-03-18 RX ADMIN — MIDAZOLAM HYDROCHLORIDE 2 MG: 1 INJECTION, SOLUTION INTRAMUSCULAR; INTRAVENOUS at 12:52

## 2024-03-18 RX ADMIN — ONDANSETRON 4 MG: 2 INJECTION INTRAMUSCULAR; INTRAVENOUS at 14:20

## 2024-03-18 RX ADMIN — PROPOFOL 200 MG: 10 INJECTION, EMULSION INTRAVENOUS at 13:06

## 2024-03-18 ASSESSMENT — PAIN DESCRIPTION - PAIN TYPE
TYPE: SURGICAL PAIN

## 2024-03-18 ASSESSMENT — FIBROSIS 4 INDEX: FIB4 SCORE: 0.55

## 2024-03-18 ASSESSMENT — PAIN SCALES - GENERAL: PAIN_LEVEL: 6

## 2024-03-18 NOTE — OR NURSING
Pt mother/Lori stiles, updated. Lori will head [facility] now to  pt's prescriptions: Norco and Ibuprofen.

## 2024-03-18 NOTE — ANESTHESIA TIME REPORT
Anesthesia Start and Stop Event Times       Date Time Event    3/18/2024 1245 Ready for Procedure     1300 Anesthesia Start     1418 Anesthesia Stop          Responsible Staff  03/18/24      Name Role Begin End    Jay Hernandez M.D. Anesth 1300 1418          Overtime Reason:  no overtime (within assigned shift)    Comments:

## 2024-03-18 NOTE — ANESTHESIA PROCEDURE NOTES
Airway    Date/Time: 3/18/2024 1:07 PM    Performed by: Jay Hernandez M.D.  Authorized by: Jay Hernandez M.D.    Location:  OR  Urgency:  Elective  Indications for Airway Management:  Anesthesia      Spontaneous Ventilation: absent    Sedation Level:  Deep  Preoxygenated: Yes    Patient Position:  Sniffing  Mask Difficulty Assessment:  1 - vent by mask  Final Airway Type:  Endotracheal airway  Final Endotracheal Airway:  ETT  Cuffed: Yes    Technique Used for Successful ETT Placement:  Direct laryngoscopy    Insertion Site:  Oral  Blade Type:  Celaya  Laryngoscope Blade/Videolaryngoscope Blade Size:  2  ETT Size (mm):  6.5  Measured from:  Gums  ETT to Gums (cm):  21  Placement Verified by: auscultation and capnometry    Cormack-Lehane Classification:  Grade I - full view of glottis  Number of Attempts at Approach:  1

## 2024-03-18 NOTE — OR SURGEON
Immediate Post OP Note    PreOp Diagnosis:   Pelvic pain.  History of endometriosis.    PostOp Diagnosis:   Pelvic pain.  History of endometriosis.  Small endometriosis lesions are seen on the right ovary.    Procedure(s):  PELVIC EXAM UNDER ANESTHESIA, DIAGNOSTIC AND OPERATIVE LAPAROSCOPY, CAUTERIZATION OF ENDOMETRIOSIS - Wound Class: Clean    Surgeon(s):  Mark Price M.D.    Anesthesiologist/Type of Anesthesia:  Anesthesiologist: Jay Hernandez M.D./General    Surgical Staff:  Circulator: Kaila Cee R.N.  Scrub Person: Cee RamirezJulio    Specimens removed if any:  None.    Estimated Blood Loss:   Less than 5 cc.    Findings:   Speculum exam under anesthesia reveals absence of the cervix and reveals no vulvar or vaginal or cervical lesions.  The vaginal cuff is seen and appears normal.  During laparoscopy excellent views of the pelvis are obtained.  During laparoscopy the uterus is found to be absent and both ovaries are found to be absent.  As the vaginal cuff is manipulated with the vaginal sponge stick the vaginal cuff is well-visualized and is normal and there are no adhesions involving the vaginal cuff.  In fact throughout the pelvis there are no intraperitoneal pelvic adhesions.  The right ovary is well-visualized.  There are 2 small endometriosis lesions on the right ovary and these are cauterized.  The right ovarian fossa is normal.  Multiple small cystic excrescences are seen emanating from the left ovary.  However no other lesions of the left ovary are seen and that the left ovarian fossa is normal.  The liver edge appears normal.  The cul-de-sac appears normal.    Complications:   None.        3/18/2024 2:06 PM Mark Price M.D.

## 2024-03-18 NOTE — H&P
Donte, [place]  YOB: 1985  Date of today's admission/procedure: 2024  Facility: [facility]    ID: The patient is a very pleasant 38-year-old multipara (para-3 with 3 previous  sections).    Chief complaint: The patient complains of pelvic pain.    History of present illness:  The patient has been complaining of pelvic pain and has a history of endometriosis and she has in the past undergone laparoscopy on multiple different occasions. The last time I performed laparoscopy was on  which was almost one year ago. She has also undergone hysterectomy. It was on  (almost 8 years ago) that I performed a total laparoscopic hysterectomy at right salpingectomy and cystoscopy at [facility]. She continues to complain of pelvic pain and has been to the emergency room because of this pelvic pain and is today scheduled for laparoscopy, both diagnostic and if necessary and depending on findings at the time of laparoscopy, operative.    Past medical history:  The patient does have a history of depression and of panic attacks.    Past surgical history: The patient has had 3 previous  sections. She has undergone multiple laparoscopies in the past. On 2016 she underwent laparoscopic hysterectomy. Also, she has undergone left inguinal hernia repair with mesh using the da Espinoza robot.    Medications:  The patient takes analgesics and Cymbalta and Klonopin.    Allergies: The patient has no known drug allergies.    Social history: The patient patient has smoke cigarettes. She denies consuming alcoholic beverages and she denies the use of recreational drugs.    Review of Systems:    General: The patient denies any fevers or chills or sweats.   Pulmonary: The patient denies any coughing or wheezing or chest pain or shortness of breath.  Cardiovascular: The patient denies any palpitations, chest pain,  dyspnea.  Gastrointestinal: The patient denies any nausea, vomiting, diarrhea, constipation, hematochezia, melena.  Genitourinary: The patient complains of pelvic pain.  Musculoskeletal: The patient denies any arthralgias or myalgias.  Neurological: The patient denies any headaches or syncope or seizures.      Physical Exam:    Vital signs: The patient's vital signs are stable and she is afebrile.  General: The patient appears well developed and well-nourished and relaxed and alert and comfortable and in no apparent distress.  HEENT: Normocephalic, atraumatic, pupils equal, round, reactive to light and accommodation, extra ocular motions intact, pharynx clear.  There is no thyromegaly.  There is no cervical lymphadenopathy.  Chest: Heart regular rate and rhythm, with no murmurs or rubs or gallops.  The lungs are clear to auscultation bilaterally.  Abdomen: Examination of the patient's abdomen with the patient in the dorsal supine position reveals that the abdomen is soft and nontender and nondistended and that there is no evidence of hepatomegaly and that there is no evidence of splenomegaly.  There is no evidence detected on abdominal exam of any abdominal masses.  Extremities: No clubbing or cyanosis or edema.  Neurological: Nonfocal.        Assessment:   Pelvic pain.  History of endometriosis.    Plan:   The patient is scheduled today for laparoscopy, both diagnostic and if necessary and depending on findings at the time of laparoscopy, operative. I have discussed with the patient is explained to the patient in detail and at length what diagnostic and operative laparoscopy is and what diagnostic and operative laparoscopy involves and I have discussed with her and explained her in detail and at length the risks and benefits and alternatives of diagnostic and operative laparoscopy. After our discussions and after answering her question she told me that she very much wishes for us to proceed with laparoscopy, both  diagnostic and if necessary and depending on findings at the time of laparoscopy, operative.        __________________  Mark Price M.D.

## 2024-03-18 NOTE — DISCHARGE INSTRUCTIONS
HOME CARE INSTRUCTIONS    ACTIVITY: Rest and take it easy for the first 24 hours.  A responsible adult is recommended to remain with you during that time.  It is normal to feel sleepy.  We encourage you to not do anything that requires balance, judgment or coordination.    FOR 24 HOURS DO NOT:  Drive, operate machinery or run household appliances.  Drink beer or alcoholic beverages.  Make important decisions or sign legal documents.    SPECIAL INSTRUCTIONS:     Diagnostic Laparoscopy, Care After  The following information offers guidance on how to care for yourself after your procedure. Your health care provider may also give you more specific instructions. If you have problems or questions, contact your health care provider.  What can I expect after the procedure?  After the procedure, it is common to have:  Mild discomfort in the abdomen.  Sore throat.  Women who have laparoscopy with a pelvic examination may have mild cramping and fluid coming from the vagina for a few days after the procedure.  Follow these instructions at home:  Medicines  Take over-the-counter and prescription medicines only as told by your health care provider.  If you were prescribed an antibiotic medicine, take it as told by your health care provider. Do not stop taking the antibiotic even if you start to feel better.  Ask your health care provider if the medicine prescribed to you:  Requires you to avoid driving or using machinery.  Can cause constipation. You may need to take these actions to prevent or treat constipation:  Drink enough fluid to keep your urine pale yellow.  Take over-the-counter or prescription medicines.  Eat foods that are high in fiber, such as beans, whole grains, and fresh fruits and vegetables.  Limit foods that are high in fat and processed sugars, such as fried or sweet foods.  Incision care  Follow instructions from your health care provider about how to take care of your incisions. Make sure you:  Wash your  hands with soap and water for at least 20 seconds before and after you change your bandage (dressing). If soap and water are not available, use hand .  Change your dressing as told by your health care provider.  Leave stitches (sutures), skin glue, or surgical tape in place. These skin closures may need to stay in place for 2 weeks or longer. If surgical tape edges start to loosen and curl up, you may trim the loose edges. Do not remove the surgical tape completely unless your health care provider tells you to do that.  Check your incision areas every day for signs of infection. Check for:  Redness, swelling, or pain.  Fluid or blood.  Warmth.  Pus or a bad smell.  Activity  Return to your normal activities as told by your health care provider. Ask your health care provider what activities are safe for you.  Do not lift anything that is heavier than 10 lb (4.5 kg), or the limit that you are told, until your health care provider says that it is safe.  Avoid sitting for a long time without moving. Get up to take short walks every 1-2 hours. This is important to improve blood flow and breathing. Ask for help if you feel weak or unsteady.  General instructions  Do not use any products that contain nicotine or tobacco. These products include cigarettes, chewing tobacco, and vaping devices, such as e-cigarettes. If you need help quitting, ask your health care provider.  If you were given a sedative during the procedure, it can affect you for several hours. Do not drive or operate machinery until your health care provider says that it is safe.  Do not take baths, swim, or use a hot tub until your health care provider approves. Ask your health care provider if you may take showers. You may only be allowed to take sponge baths.  Keep all follow-up visits. This is important.  Contact a health care provider if:  You develop shoulder pain.  You feel light-headed or faint.  You are unable to pass gas or have a bowel  movement.  You feel nauseous or you vomit.  You develop a rash.  You have any of these signs of infection:  Redness, swelling, or pain around an incision.  Fluid or blood coming from an incision.  Warmth coming from an incision.  Pus or a bad smell coming from an incision.  A fever or chills.  Get help right away if:  You have severe pain.  You have vomiting that does not go away.  You have heavy bleeding from the vagina.  Any incision opens up.  You have trouble breathing.  You have chest pain.  These symptoms may represent a serious problem that is an emergency. Do not wait to see if the symptoms will go away. Get medical help right away. Call your local emergency services (911 in the [place] Do not drive yourself to the hospital.  Summary  After the procedure, it is common to have mild discomfort in the abdomen and a sore throat.  Check your incision areas every day for signs of infection.  Return to your normal activities as told by your health care provider. Ask your health care provider what activities are safe for you.  This information is not intended to replace advice given to you by your health care provider. Make sure you discuss any questions you have with your health care provider.    DIET: To avoid nausea, slowly advance diet as tolerated, avoiding spicy or greasy foods for the first day.  Add more substantial food to your diet according to your physician's instructions.  Babies can be fed formula or breast milk as soon as they are hungry.  INCREASE FLUIDS AND FIBER TO AVOID CONSTIPATION.    SURGICAL DRESSING/BATHING: Keep incisions clean and dry. Okay to shower and pat dry incisions afterwards. Do not submerge in water until cleared by MD.    MEDICATIONS: Resume taking daily medication.  Take prescribed pain medication with food.  If no medication is prescribed, you may take non-aspirin pain medication if needed.  PAIN MEDICATION CAN BE VERY CONSTIPATING.  Take a stool softener or laxative such as  senokot, pericolace, or milk of magnesia if needed.    Prescription given for NORCO and IBUPROFEN.  Last pain medication given at 12:32pm (tylenol 1000mg and celebrex 400mg) .    A follow-up appointment should be arranged with your doctor in 1-2 weeks; call to schedule.    You should CALL YOUR PHYSICIAN if you develop:  Fever greater than 101 degrees F.  Pain not relieved by medication, or persistent nausea or vomiting.  Excessive bleeding (blood soaking through dressing) or unexpected drainage from the wound.  Extreme redness or swelling around the incision site, drainage of pus or foul smelling drainage.  Inability to urinate or empty your bladder within 8 hours.  Problems with breathing or chest pain.    You should call 911 if you develop problems with breathing or chest pain.  If you are unable to contact your doctor or surgical center, you should go to the nearest emergency room or urgent care center.  Physician's telephone #: Dr. Price [phone]    MILD FLU-LIKE SYMPTOMS ARE NORMAL.  YOU MAY EXPERIENCE GENERALIZED MUSCLE ACHES, THROAT IRRITATION, HEADACHE AND/OR SOME NAUSEA.    If any questions arise, call your doctor.  If your doctor is not available, please feel free to call the Surgical Center at [phone].  The Center is open Monday through Friday from 7AM to 7PM.      A registered nurse may call you a few days after your surgery to see how you are doing after your procedure.    You may also receive a survey in the mail within the next two weeks and we ask that you take a few moments to complete the survey and return it to us.  Our goal is to provide you with very good care and we value your comments.     Depression / Suicide Risk    As you are discharged from this [facility] facility, it is important to learn how to keep safe from harming yourself.    Recognize the warning signs:  Abrupt changes in personality, positive or negative- including increase in energy   Giving away  possessions  Change in eating patterns- significant weight changes-  positive or negative  Change in sleeping patterns- unable to sleep or sleeping all the time   Unwillingness or inability to communicate  Depression  Unusual sadness, discouragement and loneliness  Talk of wanting to die  Neglect of personal appearance   Rebelliousness- reckless behavior  Withdrawal from people/activities they love  Confusion- inability to concentrate     If you or a loved one observes any of these behaviors or has concerns about self-harm, here's what you can do:  Talk about it- your feelings and reasons for harming yourself  Remove any means that you might use to hurt yourself (examples: pills, rope, extension cords, firearm)  Get professional help from the community (Mental Health, Substance Abuse, psychological counseling)  Do not be alone:Call your Safe Contact- someone whom you trust who will be there for you.  Call your local CRISIS HOTLINE 755-3141 or 101-511-0409  Call your local Children's Mobile Crisis Response Team [place] (744) 073-8924 or www.VideoJax  Call the toll free National Suicide Prevention Hotlines   National Suicide Prevention Lifeline 738-623-DWFT (2658)  National Hope Line Network 800-SUICIDE (552-7985)    I acknowledge receipt and understanding of these Home Care instructions.

## 2024-03-18 NOTE — OP REPORT
DATE OF SERVICE:  03/18/2024     PREOPERATIVE DIAGNOSES:  1.  Pelvic pain.  2.  History of endometriosis.     POSTOPERATIVE DIAGNOSES:  1.  Pelvic pain.  2.  History of endometriosis.  3.  Small endometriosis lesions seen on the right ovary.     PROCEDURES:  Pelvic exam under anesthesia and diagnostic and operative   laparoscopy with laparoscopic cauterization of endometriosis on the right   ovary (and also laparoscopic cauterization of cystic excrescences emanating   from the left ovary).     SURGEON:  Mark Price MD     ANESTHESIA:  General endotracheal tube anesthesia.     ANESTHESIOLOGIST:  Jay Hernandez MD     FINDINGS:  Speculum exam under anesthesia reveals absence of the cervix and   reveals no vulvar or vaginal lesions.  The vaginal cuff is seen and appears   normal.  During laparoscopy, excellent views of the pelvis were obtained.    During laparoscopy, the uterus was found to be absent.  During laparoscopy,   both ovaries are noted.  Both fallopian tubes are found to be absent.  Both   ovarian fossae are normal.  There are no intraperitoneal pelvic adhesions.    The vaginal cuff was seen during laparoscopy when manipulated with a vaginal   sponge stick and the vaginal cuff appears normal and there are no adhesions   involving the vaginal cuff.  In fact, there are no intraperitoneal pelvic   adhesions.  Both ovaries are well visualized.  There are 2 small endometriosis   lesions on the right ovary.  There are small cystic excrescences emanating   from the left ovary, which when cauterized revealed clear to straw-colored   fluid.  The liver edge appears normal.  The cul-de-sac appears normal.     SPECIMENS:  None.     COMPLICATIONS:  None.     ESTIMATED BLOOD LOSS:  Less than 5 mL.     DESCRIPTION OF PROCEDURE:  After appropriate consents have been obtained, the   patient was taken to the operating room and given general anesthesia.  She was   prepped and draped in the dorsal lithotomy position.  I  placed a Good   catheter and this Good catheter was found to be draining urine.  Speculum   exam was performed and reveals absence of the cervix and reveals no vulvar or   vaginal lesions.  The vulva and vaginal mucosa appeared well estrogenized.    There is no vaginal discharge.  A sponge stick was placed in the vaginal vault   and left in place as the speculum was removed.  The 's gloves were   then changed.  Attention was then directed to the abdomen where a small   (approximately 1 cm) horizontal infraumbilical incision was made with a   scalpel.  A Veress needle was advanced through this incision into the   peritoneal cavity and proper placement in the peritoneal cavity was verified   with a Matta hanging drop technique.  The peritoneal cavity was then   insufflated with approximately 3 liters of carbon dioxide gas.  The Veress   needle was removed and a 5 mm port was introduced through the infraumbilical   incision into the peritoneal cavity utilizing the VersaStep trocar system.    The central portion of this port was removed and a 5 mm 0-degree laparoscope   was inserted through the remaining sleeve and proper entry in the peritoneal   cavity was verified visually with laparoscope.  The patient was placed in   Trendelenburg position.  A 5 mm port was placed suprapubically under direct   laparoscopic visualization utilizing the VersaStep trocar system.  The   laparoscope was placed through the suprapubic port and used to examine the   infraumbilical port and the anterior abdominal wall and the anterior abdominal   wall in the vicinity of the intrauterine infraumbilical port.  The   laparoscope was replaced through the infraumbilical port.  The patient was   placed in further Trendelenburg position.  The vaginal cuff was manipulated   with a vaginal sponge stick.  Findings are as noted above and pictures were   taken.  Also, both ovaries were manipulated with a Prestige instrument placed   through  the suprapubic port and findings are as noted above.  Two   endometriosis lesions on the right ovary are cauterized with the monopolar   cautery instrument, namely the J hook.  Cystic excrescences emanating from the   left ovary are incised and drained with the monopolar cautery.  Much care was   taken to avoid the bowel.  Pictures were taken.  The patient was taken out of   Trendelenburg position and air was allowed to evacuate the peritoneal cavity   and then both ports were removed along with laparoscope.  Skin incision was   reapproximated with placement of interrupted buried sutures of 4-0 Monocryl   placed in the dermis.  The vaginal sponge stick was removed.  The procedure   was terminated with final lap and needle counts reported to be correct at the   end of the procedure.  The patient tolerated the procedure well and sent to   postanesthesia recovery in stable condition.        ______________________________  MD SANTY Chapman/JODY    DD:  03/18/2024 14:30  DT:  03/18/2024 15:32    Job#:  [id number]

## 2024-03-18 NOTE — ANESTHESIA PREPROCEDURE EVALUATION
Case: [id number] Date/Time: 03/18/24 1245    Procedure: PELVIC EXAM UNDER ANESTHESIA, DIAGNOSTIC AND OPERATIVE LAPAROSCOPY, AND ALSO ANY AND ALL OTHER INDICATED PROCEDURES    Pre-op diagnosis: PELVIC PAIN, DYSPAREUNIA, ENDOMETRIOSIS    Location: [facility] OR 11 / SURGERY [facility]    Surgeons: Mark Price M.D.            Relevant Problems   NEURO   (positive) Traumatic subarachnoid hemorrhage with loss of consciousness (HCC)      CARDIAC   (positive) Traumatic subarachnoid hemorrhage with loss of consciousness (HCC)         (positive) Kidney infection      Other   (positive) Anxiety   (positive) Crohn's disease (HCC)   (positive) Endometriosis determined by laparoscopy   (positive) Pelvic pain       Physical Exam    Airway   Mallampati: II  TM distance: >3 FB  Neck ROM: full       Cardiovascular - normal exam  Rhythm: regular  Rate: normal  (-) murmur     Dental   (+) upper dentures           Pulmonary - normal exam  Breath sounds clear to auscultation     Abdominal    Neurological - normal exam                   Anesthesia Plan    ASA 2       Plan - general       Airway plan will be ETT          Induction: intravenous    Postoperative Plan: Postoperative administration of opioids is intended.    Pertinent diagnostic labs and testing reviewed    Informed Consent:    Anesthetic plan and risks discussed with patient.

## 2024-03-19 NOTE — ANESTHESIA POSTPROCEDURE EVALUATION
Patient: Marian Kent    Procedure Summary       Date: 03/18/24 Room / Location: [place] [address] / SURGERY [facility]    Anesthesia Start: 1300 Anesthesia Stop: 1418    Procedure: PELVIC EXAM UNDER ANESTHESIA, DIAGNOSTIC AND OPERATIVE LAPAROSCOPY, CAUTERIZATION OF ENDOMETRIOSIS (Abdomen) Diagnosis: (PELVIC PAIN, DYSPAREUNIA, ENDOMETRIOSIS)    Surgeons: Mark Price M.D. Responsible Provider: Jay Hernandez M.D.    Anesthesia Type: general ASA Status: 2            Final Anesthesia Type: general  Last vitals  BP   Blood Pressure: 108/75    Temp   36.6 °C (97.9 °F)    Pulse   65   Resp   14    SpO2   90 %      Anesthesia Post Evaluation    Patient location during evaluation: PACU  Patient participation: complete - patient participated  Level of consciousness: awake and alert  Pain score: 6    Airway patency: patent  Anesthetic complications: no  Cardiovascular status: hemodynamically stable  Respiratory status: acceptable  Hydration status: euvolemic    PONV: none          There were no known notable events for this encounter.

## 2024-03-19 NOTE — OR NURSING
~1535  Arrived from PACU   Pt seems to be drowsy upon arrival; but arousal to voice, Pt's VSS; denies N/V; states pain is at tolerable level. Surgical dressing CDI to lower abdomen - dermabond.   Pt at bedside with pt     ~1617:  Pt c/o 8/10 surgical pain; prn Dilaudid 0.4mg given per MAR. Ice pack still in use.   Instructed pt and family to stay for another 30 minutes for monitor after IV med given - they both verbalized understanding     ~1700  D/c orders received. IV dc'd. Pt changed into clothing with assistance. Discharge reviewed, Pt and family verbalized understanding and questions answered. Patient states ready to d/c home. Pt dc'd in w/c with mother - Lori .

## 2024-03-22 ENCOUNTER — HOSPITAL ENCOUNTER (EMERGENCY)
Facility: MEDICAL CENTER | Age: 39
End: 2024-03-22
Attending: STUDENT IN AN ORGANIZED HEALTH CARE EDUCATION/TRAINING PROGRAM
Payer: MEDICAID

## 2024-03-22 ENCOUNTER — APPOINTMENT (OUTPATIENT)
Dept: RADIOLOGY | Facility: MEDICAL CENTER | Age: 39
End: 2024-03-22
Attending: STUDENT IN AN ORGANIZED HEALTH CARE EDUCATION/TRAINING PROGRAM
Payer: MEDICAID

## 2024-03-22 VITALS
RESPIRATION RATE: 16 BRPM | BODY MASS INDEX: 28.55 KG/M2 | TEMPERATURE: 97.5 F | DIASTOLIC BLOOD PRESSURE: 74 MMHG | WEIGHT: 161.16 LBS | OXYGEN SATURATION: 96 % | HEIGHT: 63 IN | HEART RATE: 70 BPM | SYSTOLIC BLOOD PRESSURE: 110 MMHG

## 2024-03-22 DIAGNOSIS — R10.9 ABDOMINAL PAIN, UNSPECIFIED ABDOMINAL LOCATION: ICD-10-CM

## 2024-03-22 LAB
ALBUMIN SERPL BCP-MCNC: 4.7 G/DL (ref 3.2–4.9)
ALBUMIN/GLOB SERPL: 2 G/DL
ALP SERPL-CCNC: 56 U/L (ref 30–99)
ALT SERPL-CCNC: 27 U/L (ref 2–50)
ANION GAP SERPL CALC-SCNC: 15 MMOL/L (ref 7–16)
AST SERPL-CCNC: 37 U/L (ref 12–45)
BASOPHILS # BLD AUTO: 0.4 % (ref 0–1.8)
BASOPHILS # BLD: 0.03 K/UL (ref 0–0.12)
BILIRUB SERPL-MCNC: 0.6 MG/DL (ref 0.1–1.5)
BUN SERPL-MCNC: 10 MG/DL (ref 8–22)
CALCIUM ALBUM COR SERPL-MCNC: 8.8 MG/DL (ref 8.5–10.5)
CALCIUM SERPL-MCNC: 9.4 MG/DL (ref 8.5–10.5)
CHLORIDE SERPL-SCNC: 104 MMOL/L (ref 96–112)
CO2 SERPL-SCNC: 20 MMOL/L (ref 20–33)
CREAT SERPL-MCNC: 0.79 MG/DL (ref 0.5–1.4)
EOSINOPHIL # BLD AUTO: 0.29 K/UL (ref 0–0.51)
EOSINOPHIL NFR BLD: 3.7 % (ref 0–6.9)
ERYTHROCYTE [DISTWIDTH] IN BLOOD BY AUTOMATED COUNT: 39.9 FL (ref 35.9–50)
GFR SERPLBLD CREATININE-BSD FMLA CKD-EPI: 98 ML/MIN/1.73 M 2
GLOBULIN SER CALC-MCNC: 2.4 G/DL (ref 1.9–3.5)
GLUCOSE SERPL-MCNC: 91 MG/DL (ref 65–99)
HCG SERPL QL: NEGATIVE
HCT VFR BLD AUTO: 42.7 % (ref 37–47)
HGB BLD-MCNC: 15.5 G/DL (ref 12–16)
IMM GRANULOCYTES # BLD AUTO: 0.02 K/UL (ref 0–0.11)
IMM GRANULOCYTES NFR BLD AUTO: 0.3 % (ref 0–0.9)
LIPASE SERPL-CCNC: 20 U/L (ref 11–82)
LYMPHOCYTES # BLD AUTO: 2.35 K/UL (ref 1–4.8)
LYMPHOCYTES NFR BLD: 30.2 % (ref 22–41)
MCH RBC QN AUTO: 33.5 PG (ref 27–33)
MCHC RBC AUTO-ENTMCNC: 36.3 G/DL (ref 32.2–35.5)
MCV RBC AUTO: 92.2 FL (ref 81.4–97.8)
MONOCYTES # BLD AUTO: 0.47 K/UL (ref 0–0.85)
MONOCYTES NFR BLD AUTO: 6 % (ref 0–13.4)
NEUTROPHILS # BLD AUTO: 4.61 K/UL (ref 1.82–7.42)
NEUTROPHILS NFR BLD: 59.4 % (ref 44–72)
NRBC # BLD AUTO: 0 K/UL
NRBC BLD-RTO: 0 /100 WBC (ref 0–0.2)
PLATELET # BLD AUTO: 281 K/UL (ref 164–446)
PMV BLD AUTO: 8.1 FL (ref 9–12.9)
POTASSIUM SERPL-SCNC: 3.9 MMOL/L (ref 3.6–5.5)
PROT SERPL-MCNC: 7.1 G/DL (ref 6–8.2)
RBC # BLD AUTO: 4.63 M/UL (ref 4.2–5.4)
SODIUM SERPL-SCNC: 139 MMOL/L (ref 135–145)
WBC # BLD AUTO: 7.8 K/UL (ref 4.8–10.8)

## 2024-03-22 PROCEDURE — 80053 COMPREHEN METABOLIC PANEL: CPT

## 2024-03-22 PROCEDURE — 96375 TX/PRO/DX INJ NEW DRUG ADDON: CPT

## 2024-03-22 PROCEDURE — 36415 COLL VENOUS BLD VENIPUNCTURE: CPT

## 2024-03-22 PROCEDURE — 700117 HCHG RX CONTRAST REV CODE 255: Mod: UD | Performed by: STUDENT IN AN ORGANIZED HEALTH CARE EDUCATION/TRAINING PROGRAM

## 2024-03-22 PROCEDURE — 83690 ASSAY OF LIPASE: CPT

## 2024-03-22 PROCEDURE — 700105 HCHG RX REV CODE 258: Mod: UD | Performed by: STUDENT IN AN ORGANIZED HEALTH CARE EDUCATION/TRAINING PROGRAM

## 2024-03-22 PROCEDURE — 700111 HCHG RX REV CODE 636 W/ 250 OVERRIDE (IP): Mod: JZ,UD | Performed by: STUDENT IN AN ORGANIZED HEALTH CARE EDUCATION/TRAINING PROGRAM

## 2024-03-22 PROCEDURE — 99285 EMERGENCY DEPT VISIT HI MDM: CPT

## 2024-03-22 PROCEDURE — 74177 CT ABD & PELVIS W/CONTRAST: CPT

## 2024-03-22 PROCEDURE — 96374 THER/PROPH/DIAG INJ IV PUSH: CPT

## 2024-03-22 PROCEDURE — 85025 COMPLETE CBC W/AUTO DIFF WBC: CPT

## 2024-03-22 PROCEDURE — 84703 CHORIONIC GONADOTROPIN ASSAY: CPT

## 2024-03-22 RX ORDER — MORPHINE SULFATE 4 MG/ML
4 INJECTION INTRAVENOUS ONCE
Status: COMPLETED | OUTPATIENT
Start: 2024-03-22 | End: 2024-03-22

## 2024-03-22 RX ORDER — SODIUM CHLORIDE, SODIUM LACTATE, POTASSIUM CHLORIDE, CALCIUM CHLORIDE 600; 310; 30; 20 MG/100ML; MG/100ML; MG/100ML; MG/100ML
1000 INJECTION, SOLUTION INTRAVENOUS ONCE
Status: COMPLETED | OUTPATIENT
Start: 2024-03-22 | End: 2024-03-22

## 2024-03-22 RX ORDER — MORPHINE SULFATE 15 MG/1
15 TABLET ORAL EVERY 8 HOURS PRN
Qty: 10 TABLET | Refills: 0 | Status: SHIPPED | OUTPATIENT
Start: 2024-03-22 | End: 2024-03-29

## 2024-03-22 RX ORDER — ONDANSETRON 2 MG/ML
4 INJECTION INTRAMUSCULAR; INTRAVENOUS ONCE
Status: COMPLETED | OUTPATIENT
Start: 2024-03-22 | End: 2024-03-22

## 2024-03-22 RX ADMIN — SODIUM CHLORIDE, POTASSIUM CHLORIDE, SODIUM LACTATE AND CALCIUM CHLORIDE 1000 ML: 600; 310; 30; 20 INJECTION, SOLUTION INTRAVENOUS at 19:33

## 2024-03-22 RX ADMIN — ONDANSETRON 4 MG: 2 INJECTION INTRAMUSCULAR; INTRAVENOUS at 19:28

## 2024-03-22 RX ADMIN — MORPHINE SULFATE 4 MG: 4 INJECTION, SOLUTION INTRAMUSCULAR; INTRAVENOUS at 19:28

## 2024-03-22 RX ADMIN — IOHEXOL 90 ML: 350 INJECTION, SOLUTION INTRAVENOUS at 20:00

## 2024-03-22 ASSESSMENT — FIBROSIS 4 INDEX: FIB4 SCORE: 0.55

## 2024-03-23 NOTE — ED PROVIDER NOTES
ED Provider Note    CHIEF COMPLAINT  Chief Complaint   Patient presents with    Abdominal Pain    N/V       EXTERNAL RECORDS REVIEWED  Other op report from 3/18/2024 patient underwent diagnostic and operative laparoscopic with laparoscopic cauterization of endometriosis on the right ovary with Dr. Albert GUTHRIE/MONTSERRAT  LIMITATION TO HISTORY     OUTSIDE HISTORIAN(S):      Marian Kent is a 38 y.o. female who presents with abdominal pain.  Patient says she had recent laparoscopic for endometriosis and has been having ongoing pain since the surgery.  Patient says initially she was doing well but 2 days ago lost her recently prescribed pain medication.  Patient says that since being out of pain medications she has had worsening generalized abdominal pain nausea.  Patient denies recent fever, chills, chest pain, shortness of breath.    PAST MEDICAL HISTORY   has a past medical history of Anesthesia (2022), Anxiety (2019), Bipolar 1 disorder (HCC) (2022), Bowel habit changes (2024), Crohn's disease (HCC) (2023), Dental disorder (2024), EP (ectopic pregnancy), Gynecological disorder, Hydronephrosis right (2014), Nausea with vomiting (2022), Pain (2022), Pain (2019), Pain management, Placenta previa diagnosed with US at [facility] (2013), PONV (postoperative nausea and vomiting), Psychiatric problem (2022), and Ulcerative colitis (HCC) (2023).    SURGICAL HISTORY   has a past surgical history that includes  DELIVERY SER ( & ); cystoscopy (2016); laparoscopy (2017); laparoscopic lysis of adhesions (2017); pelviscopy (2011); salpingectomy (2011); primary c section (2006); repeat c section (2007); repeat c section (2014); pelviscopy (N/A, 2016); hysterectomy laparoscopy (2016); pelviscopy (N/A, 2018); inguinal hernia repair robotic (Left, 2019); lap,diagnostic  "abdomen (10/22/2020); lap,diagnostic abdomen (02/17/2022); lap,fulgurate/excise lesions (Right, 02/17/2022); lap,diagnostic abdomen (N/A, 4/20/2023); and lap,diagnostic abdomen (N/A, 3/18/2024).    FAMILY HISTORY  Family History   Problem Relation Age of Onset    Cancer Father 45        colon    Other Maternal Grandfather     Cancer Paternal Grandmother         breast    Diabetes Paternal Grandmother     Heart Disease Neg Hx     Stroke Neg Hx        SOCIAL HISTORY  Social History     Tobacco Use    Smoking status: Every Day     Current packs/day: 1.00     Average packs/day: 1 pack/day for 7.2 years (7.2 ttl pk-yrs)     Types: Cigarettes     Start date: 2017    Smokeless tobacco: Never   Vaping Use    Vaping Use: Never used   Substance and Sexual Activity    Alcohol use: No     Alcohol/week: 0.0 oz     Comment: none since10/21    Drug use: No    Sexual activity: Not Currently     Partners: Male     Birth control/protection: Pill       CURRENT MEDICATIONS  Home Medications       Reviewed by Tiffanie Hsu R.N. (Registered Nurse) on 03/22/24 at 1740  Med List Status: Not Addressed     Medication Last Dose Status   AUVELITY  MG Tab CR  Active   clonazePAM (KLONOPIN) 1 MG Tab  Active   HYDROcodone-acetaminophen (NORCO) 5-325 MG Tab per tablet  Active   ibuprofen (MOTRIN) 800 MG Tab  Active   oxyCODONE-acetaminophen (PERCOCET) 5-325 MG Tab  Active   VRAYLAR 6 MG Cap  Active                    ALLERGIES  Allergies   Allergen Reactions    Sertraline Diarrhea     diarrhea    Oxycodone Hives and Itching    Tramadol Hives and Itching       PHYSICAL EXAM  VITAL SIGNS: /74   Pulse 70   Temp 36.4 °C (97.5 °F) (Temporal)   Resp 16   Ht 1.6 m (5' 3\")   Wt 73.1 kg (161 lb 2.5 oz)   LMP 01/09/2016   SpO2 96%   BMI 28.55 kg/m²    Constitutional: Alert in no apparent distress.  HENT: No signs of trauma, Bilateral external ears normal, Nose normal.   Eyes: Pupils are equal and reactive, Conjunctiva normal, " Non-icteric.   Neck: Normal range of motion, No tenderness, Supple, No stridor.   Lymphatic: No lymphadenopathy noted.   Cardiovascular: Regular rate and rhythm, no murmurs.   Thorax & Lungs: Normal breath sounds, No respiratory distress, No wheezing, No chest tenderness.   Abdomen: Bowel sounds normal, Soft, No tenderness, No masses, No pulsatile masses.   Skin: Warm, Dry, No erythema, No rash.   Back: No bony tenderness, No CVA tenderness.   Extremities: Intact distal pulses, No edema, No tenderness, No cyanosis  Musculoskeletal: Good range of motion in all major joints. No tenderness to palpation or major deformities noted.   Neurologic: Alert , Normal motor function, Normal sensory function, No focal deficits noted.       DIAGNOSTIC STUDIES / PROCEDURES  EKG      LABS  Labs Reviewed   CBC WITH DIFFERENTIAL - Abnormal; Notable for the following components:       Result Value    MCH 33.5 (*)     MCHC 36.3 (*)     MPV 8.1 (*)     All other components within normal limits   COMP METABOLIC PANEL   LIPASE   ESTIMATED GFR   HCG QUAL SERUM   URINALYSIS         RADIOLOGY  I have independently interpreted the diagnostic imaging associated with this visit and am waiting the final reading from the radiologist.   My preliminary interpretation is as follows: No free air  Radiologist interpretation:   CT-ABDOMEN-PELVIS WITH   Final Result      1.  Nonspecific small amount of pelvic free fluid possibly physiologic.   2.  Atrophic left kidney with multifocal scarring. No hydronephrosis.   3.  Normal appendix. No bowel obstruction.   4.  Mild splenomegaly.            COURSE & MEDICAL DECISION MAKING    ED Observation Status?     INITIAL ASSESSMENT, COURSE AND PLAN  Care Narrative: On arrival vital signs within normal limits.  Patient with mild generalized tenderness no peritoneal signs.  Given patient's recent surgery consider obstruction, abscess, bleeding.  Will obtain CT ab/pelvis.  Will obtain blood work to assess for  hematologic or metabolic derangements.  Will initiate IV fluids and analgesics.  Patient without urinary symptoms.    Fortunately CT imaging without acute findings blood work is unremarkable negative pregnancy test.  On reassessment patient reports significant proved symptoms been able to eat and drink normally.  Given patient's recent surgery reported misplacement of recently prescribed analgesics believe a new prescription for short course of oral morphine is reasonable before she is able to follow-up with her OB/GYN.  Discussed with patient return precautions, OB/GYN follow-up.    In prescribing controlled substances to this patient, I certify that I have obtained and reviewed the medical history of Marian Kent. I have also made a good shruthi effort to obtain applicable records from other providers who have treated the patient and records demonstrating the following: Number of recent narcotic prescriptions .     I have conducted a physical exam and documented it. I have reviewed Ms. Kent’s prescription history as maintained by the [place] Prescription Monitoring Program.     I have assessed the patient’s risk for abuse, dependency, and addiction using the validated Opioid Risk Tool available at https://www.mdcalc.com/evvhyr-mnxp-qtdw-ort-narcotic-abuse.     Given the above, I believe the benefits of controlled substance therapy outweigh the risks. The reasons for prescribing controlled substances include non-narcotic, oral analgesic alternatives have been inadequate for pain control. Accordingly, I have discussed the risk and benefits, treatment plan, and alternative therapies with the patient.     HYDRATION: Based on the patient's presentation of Dehydration the patient was given IV fluids. IV Hydration was used because oral hydration was not adequate alone. Upon recheck following hydration, the patient was improved.      ADDITIONAL PROBLEM LIST    DISPOSITION AND DISCUSSIONS    FINAL DIAGNOSIS  1.  Abdominal pain, unspecified abdominal location           Electronically signed by: Ted Rubio D.O., 3/22/2024 6:47 PM

## 2024-03-23 NOTE — DISCHARGE INSTRUCTIONS
As we discussed you should follow-up with Dr. Price as planned.  We have given you a short prescription for morphine which you can use as directed in place of the Norco that was misplaced.  If you have uncontrolled pain, vomiting you should return to the emergency department.

## 2024-03-23 NOTE — ED NOTES
"Pt discharged home with family. IV discontinued and gauze placed, pt in possession of belongings. Pt provided discharge education and information pertaining to medications and follow up appointments. Pt received copy of discharge instructions and verbalized understanding. Discussed signs and symptoms to return to the ER, patient verbalized understanding. Pt is A/O x 4, ambulatory with a steady gait. Pt signed controlled substance consent.     /74   Pulse 70   Temp 36.4 °C (97.5 °F) (Temporal)   Resp 16   Ht 1.6 m (5' 3\")   Wt 73.1 kg (161 lb 2.5 oz)   LMP 01/09/2016   SpO2 96%   BMI 28.55 kg/m²    "

## 2024-03-23 NOTE — ED NOTES
Chief Complaint   Patient presents with    Abdominal Pain    N/V     Pt ambulated to triage c/o lower abdominal pain . Pt has history of endometriosis and had laparoscopic surgery 3/18. Pt said that she lost her pain medication while she was at the pharmacy yesterday . Pt tried calling her surgeon but he was out of town until next week.

## 2024-03-23 NOTE — ED NOTES
Bedside report received from JESSE Angel assumed care of patient.  POC discussed with patient. Call light within reach, all needs addressed at this time.   Fall risk interventions in place: Not Applicable (all applicable per [place] Fall risk assessment)   Continuous monitoring: Cardiac Leads, Pulse Ox, or Blood Pressure  IVF/IV medications: Not Applicable   Oxygen: Room Air  Bedside sitter: Not Applicable   Isolation: Not Applicable

## 2024-03-23 NOTE — ED NOTES
PIV placed, labs drawn and sent. Pt medicated for abdominal/pelvic pain per MAR. Pt reports pain is 8/10, sharp and cramping. LR infusion administered per MAR. Pt to CT.

## 2024-04-27 ENCOUNTER — APPOINTMENT (OUTPATIENT)
Dept: RADIOLOGY | Facility: MEDICAL CENTER | Age: 39
End: 2024-04-27
Attending: STUDENT IN AN ORGANIZED HEALTH CARE EDUCATION/TRAINING PROGRAM
Payer: MEDICAID

## 2024-04-27 ENCOUNTER — HOSPITAL ENCOUNTER (EMERGENCY)
Facility: MEDICAL CENTER | Age: 39
End: 2024-04-27
Attending: STUDENT IN AN ORGANIZED HEALTH CARE EDUCATION/TRAINING PROGRAM
Payer: MEDICAID

## 2024-04-27 VITALS
TEMPERATURE: 98 F | HEIGHT: 63 IN | DIASTOLIC BLOOD PRESSURE: 73 MMHG | SYSTOLIC BLOOD PRESSURE: 120 MMHG | BODY MASS INDEX: 28.83 KG/M2 | WEIGHT: 162.7 LBS | OXYGEN SATURATION: 96 % | HEART RATE: 78 BPM | RESPIRATION RATE: 16 BRPM

## 2024-04-27 DIAGNOSIS — R19.7 DIARRHEA, UNSPECIFIED TYPE: ICD-10-CM

## 2024-04-27 DIAGNOSIS — R11.2 NAUSEA AND VOMITING, UNSPECIFIED VOMITING TYPE: ICD-10-CM

## 2024-04-27 DIAGNOSIS — R10.31 RIGHT LOWER QUADRANT ABDOMINAL PAIN: ICD-10-CM

## 2024-04-27 LAB
ALBUMIN SERPL BCP-MCNC: 4.4 G/DL (ref 3.2–4.9)
ALBUMIN/GLOB SERPL: 1.9 G/DL
ALP SERPL-CCNC: 55 U/L (ref 30–99)
ALT SERPL-CCNC: 13 U/L (ref 2–50)
ANION GAP SERPL CALC-SCNC: 11 MMOL/L (ref 7–16)
APPEARANCE UR: CLEAR
AST SERPL-CCNC: 18 U/L (ref 12–45)
BASOPHILS # BLD AUTO: 0.5 % (ref 0–1.8)
BASOPHILS # BLD: 0.03 K/UL (ref 0–0.12)
BILIRUB SERPL-MCNC: 0.6 MG/DL (ref 0.1–1.5)
BILIRUB UR QL STRIP.AUTO: NEGATIVE
BUN SERPL-MCNC: 11 MG/DL (ref 8–22)
CALCIUM ALBUM COR SERPL-MCNC: 8.7 MG/DL (ref 8.5–10.5)
CALCIUM SERPL-MCNC: 9 MG/DL (ref 8.5–10.5)
CHLORIDE SERPL-SCNC: 107 MMOL/L (ref 96–112)
CO2 SERPL-SCNC: 22 MMOL/L (ref 20–33)
COLOR UR: YELLOW
CREAT SERPL-MCNC: 0.77 MG/DL (ref 0.5–1.4)
EOSINOPHIL # BLD AUTO: 0.15 K/UL (ref 0–0.51)
EOSINOPHIL NFR BLD: 2.6 % (ref 0–6.9)
ERYTHROCYTE [DISTWIDTH] IN BLOOD BY AUTOMATED COUNT: 42.9 FL (ref 35.9–50)
GFR SERPLBLD CREATININE-BSD FMLA CKD-EPI: 102 ML/MIN/1.73 M 2
GLOBULIN SER CALC-MCNC: 2.3 G/DL (ref 1.9–3.5)
GLUCOSE SERPL-MCNC: 87 MG/DL (ref 65–99)
GLUCOSE UR STRIP.AUTO-MCNC: NEGATIVE MG/DL
HCT VFR BLD AUTO: 43.8 % (ref 37–47)
HGB BLD-MCNC: 15.1 G/DL (ref 12–16)
IMM GRANULOCYTES # BLD AUTO: 0.01 K/UL (ref 0–0.11)
IMM GRANULOCYTES NFR BLD AUTO: 0.2 % (ref 0–0.9)
KETONES UR STRIP.AUTO-MCNC: NEGATIVE MG/DL
LEUKOCYTE ESTERASE UR QL STRIP.AUTO: NEGATIVE
LIPASE SERPL-CCNC: 30 U/L (ref 11–82)
LYMPHOCYTES # BLD AUTO: 2.08 K/UL (ref 1–4.8)
LYMPHOCYTES NFR BLD: 36.4 % (ref 22–41)
MCH RBC QN AUTO: 33.3 PG (ref 27–33)
MCHC RBC AUTO-ENTMCNC: 34.5 G/DL (ref 32.2–35.5)
MCV RBC AUTO: 96.7 FL (ref 81.4–97.8)
MICRO URNS: ABNORMAL
MONOCYTES # BLD AUTO: 0.38 K/UL (ref 0–0.85)
MONOCYTES NFR BLD AUTO: 6.6 % (ref 0–13.4)
NEUTROPHILS # BLD AUTO: 3.07 K/UL (ref 1.82–7.42)
NEUTROPHILS NFR BLD: 53.7 % (ref 44–72)
NITRITE UR QL STRIP.AUTO: NEGATIVE
NRBC # BLD AUTO: 0 K/UL
NRBC BLD-RTO: 0 /100 WBC (ref 0–0.2)
PH UR STRIP.AUTO: 6.5 [PH] (ref 5–8)
PLATELET # BLD AUTO: 250 K/UL (ref 164–446)
PMV BLD AUTO: 8.1 FL (ref 9–12.9)
POTASSIUM SERPL-SCNC: 3.9 MMOL/L (ref 3.6–5.5)
PROT SERPL-MCNC: 6.7 G/DL (ref 6–8.2)
PROT UR QL STRIP: NEGATIVE MG/DL
RBC # BLD AUTO: 4.53 M/UL (ref 4.2–5.4)
RBC UR QL AUTO: NEGATIVE
SODIUM SERPL-SCNC: 140 MMOL/L (ref 135–145)
SP GR UR STRIP.AUTO: >=1.045
UROBILINOGEN UR STRIP.AUTO-MCNC: 0.2 MG/DL
WBC # BLD AUTO: 5.7 K/UL (ref 4.8–10.8)

## 2024-04-27 PROCEDURE — 96375 TX/PRO/DX INJ NEW DRUG ADDON: CPT

## 2024-04-27 PROCEDURE — 85025 COMPLETE CBC W/AUTO DIFF WBC: CPT

## 2024-04-27 PROCEDURE — 36415 COLL VENOUS BLD VENIPUNCTURE: CPT

## 2024-04-27 PROCEDURE — 700117 HCHG RX CONTRAST REV CODE 255: Mod: UD | Performed by: STUDENT IN AN ORGANIZED HEALTH CARE EDUCATION/TRAINING PROGRAM

## 2024-04-27 PROCEDURE — 96376 TX/PRO/DX INJ SAME DRUG ADON: CPT

## 2024-04-27 PROCEDURE — 81003 URINALYSIS AUTO W/O SCOPE: CPT

## 2024-04-27 PROCEDURE — 700111 HCHG RX REV CODE 636 W/ 250 OVERRIDE (IP): Mod: JZ,UD | Performed by: STUDENT IN AN ORGANIZED HEALTH CARE EDUCATION/TRAINING PROGRAM

## 2024-04-27 PROCEDURE — 700105 HCHG RX REV CODE 258: Mod: UD | Performed by: STUDENT IN AN ORGANIZED HEALTH CARE EDUCATION/TRAINING PROGRAM

## 2024-04-27 PROCEDURE — 74177 CT ABD & PELVIS W/CONTRAST: CPT

## 2024-04-27 PROCEDURE — 83690 ASSAY OF LIPASE: CPT

## 2024-04-27 PROCEDURE — 96374 THER/PROPH/DIAG INJ IV PUSH: CPT

## 2024-04-27 PROCEDURE — 99285 EMERGENCY DEPT VISIT HI MDM: CPT

## 2024-04-27 PROCEDURE — 80053 COMPREHEN METABOLIC PANEL: CPT

## 2024-04-27 RX ORDER — MORPHINE SULFATE 4 MG/ML
4 INJECTION INTRAVENOUS ONCE
Status: COMPLETED | OUTPATIENT
Start: 2024-04-27 | End: 2024-04-27

## 2024-04-27 RX ORDER — ONDANSETRON 2 MG/ML
4 INJECTION INTRAMUSCULAR; INTRAVENOUS ONCE
Status: COMPLETED | OUTPATIENT
Start: 2024-04-27 | End: 2024-04-27

## 2024-04-27 RX ORDER — SODIUM CHLORIDE, SODIUM LACTATE, POTASSIUM CHLORIDE, CALCIUM CHLORIDE 600; 310; 30; 20 MG/100ML; MG/100ML; MG/100ML; MG/100ML
1000 INJECTION, SOLUTION INTRAVENOUS ONCE
Status: COMPLETED | OUTPATIENT
Start: 2024-04-27 | End: 2024-04-27

## 2024-04-27 RX ORDER — ONDANSETRON 4 MG/1
4 TABLET, ORALLY DISINTEGRATING ORAL EVERY 6 HOURS PRN
Qty: 10 TABLET | Refills: 0 | Status: SHIPPED | OUTPATIENT
Start: 2024-04-27 | End: 2024-05-19

## 2024-04-27 RX ADMIN — ONDANSETRON 4 MG: 2 INJECTION INTRAMUSCULAR; INTRAVENOUS at 15:35

## 2024-04-27 RX ADMIN — IOHEXOL 95 ML: 350 INJECTION, SOLUTION INTRAVENOUS at 17:15

## 2024-04-27 RX ADMIN — MORPHINE SULFATE 4 MG: 4 INJECTION INTRAVENOUS at 15:35

## 2024-04-27 RX ADMIN — MORPHINE SULFATE 4 MG: 4 INJECTION INTRAVENOUS at 17:48

## 2024-04-27 RX ADMIN — SODIUM CHLORIDE, POTASSIUM CHLORIDE, SODIUM LACTATE AND CALCIUM CHLORIDE 1000 ML: 600; 310; 30; 20 INJECTION, SOLUTION INTRAVENOUS at 15:34

## 2024-04-27 ASSESSMENT — FIBROSIS 4 INDEX: FIB4 SCORE: 0.94

## 2024-04-27 NOTE — Clinical Note
Marian Kent was seen and treated in our emergency department on 4/27/2024.  She may return to work on 04/29/2024.       If you have any questions or concerns, please don't hesitate to call.      Consuelo Hernandez M.D.

## 2024-04-27 NOTE — ED TRIAGE NOTES
Marian Olveraotson  38 y.o. female  Chief Complaint   Patient presents with    Abdominal Pain     Onset last HS 8/10 RLQ stabbing pain. +appendix, hx partial hysterectomy, endometriosis, and Chrones    N/V     Onset last HS       Pt ambulatory to triage with steady gait for above complaint.     Pt is GCS 15, speaking in full sentences, follows commands and responds appropriately to questions. Resp are even and unlabored.     Abdominal pain protocol ordered. Pt placed in draw room hallway. Urine sample cup provided. Pt educated on triage process. Pt encouraged to alert staff for any changes.       Vitals:    04/27/24 1251   BP: 100/78   Pulse: 86   Resp: 16   Temp: 36.9 °C (98.5 °F)   SpO2: 98%

## 2024-04-27 NOTE — ED PROVIDER NOTES
ED Provider Note    CHIEF COMPLAINT  Chief Complaint   Patient presents with    Abdominal Pain     Onset last HS 8/10 RLQ stabbing pain. +appendix, hx partial hysterectomy, endometriosis, and Chrones    N/V     Onset last HS       EXTERNAL RECORDS REVIEWED  Inpatient Notes patient had pelvic exam under anesthesia and operative laparoscopy with cauterization of endometriosis March 18, 2024 she does not have a uterus.  She had 2 small endometriosis lesions on the right ovary that were cauterized    HPI/ROS  LIMITATION TO HISTORY   Select: : None  OUTSIDE HISTORIAN(S):  None    Marian Laila Kent is a 38 y.o. female with history of Crohn's disease, endometriosis who presents for evaluation of right lower quadrant abdominal pain which started last night.  She is also been having vomiting and diarrhea.  She denies any blood in her diarrhea.  Her last episode of diarrhea was last night.  She continues to have vomiting today.  There is no blood in her emesis.  She denies any fevers.  She states that she has a small amount of dysuria.  She states that this pain feels different than her Crohn's disease or her endometriosis. She states that her Crohn's flares typically present with abdominal pain, diarrhea and estela wanting to eat.  She has been taking Advil without relief.  She denies any runny nose, sore throat, cough.    PAST MEDICAL HISTORY   has a past medical history of Anesthesia (02/14/2022), Anxiety (02/19/2019), Bipolar 1 disorder (HCC) (02/14/2022), Bowel habit changes (02/21/2024), Crohn's disease (HCC) (01/17/2023), Dental disorder (02/21/2024), EP (ectopic pregnancy), Gynecological disorder, Hydronephrosis right (01/03/2014), Nausea with vomiting (09/16/2022), Pain (02/14/2022), Pain (03/2019), Pain management, Placenta previa diagnosed with US at [facility] (12/17/2013), PONV (postoperative nausea and vomiting), Psychiatric problem (02/14/2022), and Ulcerative colitis (HCC) (01/17/2023).    SURGICAL HISTORY    has a past surgical history that includes  DELIVERY SER ( & ); cystoscopy (2016); laparoscopy (2017); laparoscopic lysis of adhesions (2017); pelviscopy (2011); salpingectomy (2011); primary c section (2006); repeat c section (2007); repeat c section (2014); pelviscopy (N/A, 2016); hysterectomy laparoscopy (2016); pelviscopy (N/A, 2018); inguinal hernia repair robotic (Left, 2019); lap,diagnostic abdomen (10/22/2020); lap,diagnostic abdomen (2022); lap,fulgurate/excise lesions (Right, 2022); lap,diagnostic abdomen (N/A, 2023); and lap,diagnostic abdomen (N/A, 3/18/2024).    FAMILY HISTORY  Family History   Problem Relation Age of Onset    Cancer Father 45        colon    Other Maternal Grandfather     Cancer Paternal Grandmother         breast    Diabetes Paternal Grandmother     Heart Disease Neg Hx     Stroke Neg Hx        SOCIAL HISTORY  Social History     Tobacco Use    Smoking status: Every Day     Current packs/day: 1.00     Average packs/day: 1 pack/day for 7.3 years (7.3 ttl pk-yrs)     Types: Cigarettes     Start date:     Smokeless tobacco: Never   Vaping Use    Vaping Use: Never used   Substance and Sexual Activity    Alcohol use: No     Alcohol/week: 0.0 oz     Comment: none since10/21    Drug use: No    Sexual activity: Not Currently     Partners: Male     Birth control/protection: Pill       CURRENT MEDICATIONS  Home Medications       Reviewed by Ritchie Miranda R.N. (Registered Nurse) on 24 at 1311  Med List Status: Partial     Medication Last Dose Status   AUVELITY  MG Tab CR  Active   clonazePAM (KLONOPIN) 1 MG Tab  Active   ibuprofen (MOTRIN) 800 MG Tab  Active   oxyCODONE-acetaminophen (PERCOCET) 5-325 MG Tab  Active   VRAYLAR 6 MG Cap  Active                  ALLERGIES  Allergies   Allergen Reactions    Sertraline Diarrhea     diarrhea    Oxycodone Hives and Itching     "Tramadol Hives and Itching     PHYSICAL EXAM  VITAL SIGNS: /78   Pulse 86   Temp 36.9 °C (98.5 °F) (Temporal)   Resp 16   Ht 1.6 m (5' 3\")   Wt 73.8 kg (162 lb 11.2 oz)   LMP 01/09/2016   SpO2 98%   BMI 28.82 kg/m²      Constitutional: Well developed, Well nourished, Mild distress 2/2 to pain  HEENT: Normocephalic, Atraumatic,  external ears normal, pharynx pink,  Mucous  Membranes moist, No rhinorrhea or mucosal edema  Eyes: PERRL, EOMI, Conjunctiva normal, No discharge.   Neck: Normal range of motion, No tenderness, Supple, No stridor.   Cardiovascular: Regular Rate and Rhythm, No murmurs,  rubs, or gallops.   Thorax & Lungs: Lungs clear to auscultation bilaterally, No respiratory distress, No wheezes, rhales or rhonchi, No chest wall tenderness.   Abdomen: Soft, mild tenderness to RUQ, tenderness to RLQ but no guarding, left side of abdomen is non-tender, no rebound tenderness, no distended  Skin: Warm, Dry, No erythema, No rash,   Back:  No CVA tenderness,  No spinal tenderness, bony crepitance step offs or instability.   Extremities: Equal, intact distal pulses, No cyanosis, clubbing or edema,  No tenderness.   Musculoskeletal: Good range of motion in all major joints. No tenderness to palpation or major deformities noted.   Neurologic: Alert & oriented No focal deficits noted.  Psychiatric: Affect normal, Judgment normal, Mood normal.      EKG/LABS  Results for orders placed or performed during the hospital encounter of 04/27/24   CBC with Differential   Result Value Ref Range    WBC 5.7 4.8 - 10.8 K/uL    RBC 4.53 4.20 - 5.40 M/uL    Hemoglobin 15.1 12.0 - 16.0 g/dL    Hematocrit 43.8 37.0 - 47.0 %    MCV 96.7 81.4 - 97.8 fL    MCH 33.3 (H) 27.0 - 33.0 pg    MCHC 34.5 32.2 - 35.5 g/dL    RDW 42.9 35.9 - 50.0 fL    Platelet Count 250 164 - 446 K/uL    MPV 8.1 (L) 9.0 - 12.9 fL    Neutrophils-Polys 53.70 44.00 - 72.00 %    Lymphocytes 36.40 22.00 - 41.00 %    Monocytes 6.60 0.00 - 13.40 %    " Eosinophils 2.60 0.00 - 6.90 %    Basophils 0.50 0.00 - 1.80 %    Immature Granulocytes 0.20 0.00 - 0.90 %    Nucleated RBC 0.00 0.00 - 0.20 /100 WBC    Neutrophils (Absolute) 3.07 1.82 - 7.42 K/uL    Lymphs (Absolute) 2.08 1.00 - 4.80 K/uL    Monos (Absolute) 0.38 0.00 - 0.85 K/uL    Eos (Absolute) 0.15 0.00 - 0.51 K/uL    Baso (Absolute) 0.03 0.00 - 0.12 K/uL    Immature Granulocytes (abs) 0.01 0.00 - 0.11 K/uL    NRBC (Absolute) 0.00 K/uL   Complete Metabolic Panel   Result Value Ref Range    Sodium 140 135 - 145 mmol/L    Potassium 3.9 3.6 - 5.5 mmol/L    Chloride 107 96 - 112 mmol/L    Co2 22 20 - 33 mmol/L    Anion Gap 11.0 7.0 - 16.0    Glucose 87 65 - 99 mg/dL    Bun 11 8 - 22 mg/dL    Creatinine 0.77 0.50 - 1.40 mg/dL    Calcium 9.0 8.5 - 10.5 mg/dL    Correct Calcium 8.7 8.5 - 10.5 mg/dL    AST(SGOT) 18 12 - 45 U/L    ALT(SGPT) 13 2 - 50 U/L    Alkaline Phosphatase 55 30 - 99 U/L    Total Bilirubin 0.6 0.1 - 1.5 mg/dL    Albumin 4.4 3.2 - 4.9 g/dL    Total Protein 6.7 6.0 - 8.2 g/dL    Globulin 2.3 1.9 - 3.5 g/dL    A-G Ratio 1.9 g/dL   Lipase   Result Value Ref Range    Lipase 30 11 - 82 U/L   Urinalysis    Specimen: Urine   Result Value Ref Range    Color Yellow     Character Clear     Specific Gravity >=1.045 (A) <1.035    Ph 6.5 5.0 - 8.0    Glucose Negative Negative mg/dL    Ketones Negative Negative mg/dL    Protein Negative Negative mg/dL    Bilirubin Negative Negative    Urobilinogen, Urine 0.2 Negative    Nitrite Negative Negative    Leukocyte Esterase Negative Negative    Occult Blood Negative Negative    Micro Urine Req see below    ESTIMATED GFR   Result Value Ref Range    GFR (CKD-EPI) 102 >60 mL/min/1.73 m 2       I have independently interpreted this EKG    RADIOLOGY/PROCEDURES   I have independently interpreted the diagnostic imaging associated with this visit and am waiting the final reading from the radiologist.   My preliminary interpretation is as follows: no free air in  abdomen    Radiologist interpretation:  CT-ABDOMEN-PELVIS WITH   Final Result      1.  No acute inflammation. No CT evidence of appendicitis.   2.  There is a resolving/crenated left ovarian cyst with a small amount of physiologic fluid in the pelvis. No evidence of a right adnexal mass or cyst.   3.  Stable mild splenomegaly.   4.  Chronic scarring at the left kidney again noted.          COURSE & MEDICAL DECISION MAKING    ASSESSMENT, COURSE AND PLAN  Care Narrative:   This is a 38-year-old female with history of endometriosis as well as Crohn's disease who presents for evaluation of right lower quadrant abdominal pain onset last night.  Associated with vomiting and diarrhea.  On arrival her vital signs are stable.  Differential diagnoses include but are not limited to appendicitis, endometriosis flare, Crohn's flare, urinary tract infection, kidney stone, electrolyte abnormality, gastroenteritis, ovarian pathology.    Labs obtained show no leukocytosis.  Electrolytes within normal limits.  Kidney function is normal.  Her lipase is normal, doubt pancreatitis.  UA without evidence of infection.  CT scan of the abdomen was done and shows no evidence appendicitis, acute inflammation, stricture, kidney stone.  She does have a resolving left-sided ovarian cyst but there is no evidence of right adnexal cyst or structure therefore less concern for ovarian torsion.  She is monogamous with her partner therefore doubt PID.  She is status post hysterectomy therefore ger making pregnancy related complications impossible.     4:42 PM Patient reeevaluated and states that pain is much better after morphine.  Patient is going to CT scan.    5:39 PM patient was reevaluated.  She is resting comfortably.  Her tenderness has resolved and her abdomen exam remains benign.  Discussed CT scan results which shows resolving left ovarian cyst but there is no evidence of acute inflammation or appendicitis.  There is no evidence of right  adnexal mass or cyst either.  I did offer to do a pelvic ultrasound to get a better look at her ovaries however patient wishes to defer her at this time that she has follow-up with her OB/GYN next week.  Patient is instructed to return for worsening pain, vomiting, any other concerns.  Rx for Zofran sent. She is agreeable to discharge plan no further questions.    Hydration: Based on the patient's presentation of Acute Vomiting the patient was given IV fluids. IV Hydration was used because oral hydration was not adequate alone. Upon recheck following hydration, the patient was improved.          ADDITIONAL PROBLEMS MANAGED  None    DISPOSITION AND DISCUSSIONS  I have discussed management of the patient with the following physicians and ASHANTI's:    None    Discussion of management with other [facility] or appropriate source(s): None     Escalation of care considered, and ultimately not performed:acute inpatient care management, however at this time, the patient is most appropriate for outpatient management    Barriers to care at this time, including but not limited to:  None .     Decision tools and prescription drugs considered including, but not limited to:  None .    The patient will return for new or worsening symptoms and is stable at the time of discharge.    The patient is referred to a primary physician for blood pressure management, diabetic screening, and for all other preventative health concerns.    DISPOSITION:  Patient will be discharged home in stable condition.    FOLLOW UP:  Rosey Ceja M.D.  [address] [id number] [phone]          [facility], Emergency Dept  [address] [facility] [id number] [phone]          OUTPATIENT MEDICATIONS:  Discharge Medication List as of 4/27/2024  5:52 PM        START taking these medications    Details   ondansetron (ZOFRAN ODT) 4 MG TABLET DISPERSIBLE Take 1 Tablet by mouth every 6 hours as needed for Nausea/Vomiting.,  Disp-10 Tablet, R-0, Normal               FINAL DIAGNOSIS  1. Right lower quadrant abdominal pain    2. Nausea and vomiting, unspecified vomiting type    3. Diarrhea, unspecified type         Electronically signed by: Consuelo Hernandez M.D., 4/27/2024 2:57 PM

## 2024-04-28 NOTE — DISCHARGE INSTRUCTIONS
You were seen in the emergency room for evaluation of abdominal pain.  Your appendix appears normal.  You do not have a urinary tract infection.  Your lab results are normal.  Please follow-up with your OB/GYN as you have scheduled next week.  Return if you have any worsening pains, fever, blood in the stool or any other concerns.

## 2024-05-19 ENCOUNTER — OFFICE VISIT (OUTPATIENT)
Dept: URGENT CARE | Facility: CLINIC | Age: 39
End: 2024-05-19
Payer: MEDICAID

## 2024-05-19 VITALS
HEART RATE: 80 BPM | TEMPERATURE: 97.5 F | WEIGHT: 162 LBS | RESPIRATION RATE: 15 BRPM | OXYGEN SATURATION: 97 % | DIASTOLIC BLOOD PRESSURE: 60 MMHG | HEIGHT: 63 IN | BODY MASS INDEX: 28.7 KG/M2 | SYSTOLIC BLOOD PRESSURE: 100 MMHG

## 2024-05-19 DIAGNOSIS — R11.2 NAUSEA AND VOMITING, UNSPECIFIED VOMITING TYPE: ICD-10-CM

## 2024-05-19 LAB
APPEARANCE UR: NORMAL
BILIRUB UR STRIP-MCNC: NEGATIVE MG/DL
COLOR UR AUTO: YELLOW
GLUCOSE UR STRIP.AUTO-MCNC: NEGATIVE MG/DL
KETONES UR STRIP.AUTO-MCNC: NEGATIVE MG/DL
LEUKOCYTE ESTERASE UR QL STRIP.AUTO: NEGATIVE
NITRITE UR QL STRIP.AUTO: NEGATIVE
PH UR STRIP.AUTO: 7.5 [PH] (ref 5–8)
PROT UR QL STRIP: NEGATIVE MG/DL
RBC UR QL AUTO: NEGATIVE
S PYO DNA SPEC NAA+PROBE: NOT DETECTED
SP GR UR STRIP.AUTO: 1.02
UROBILINOGEN UR STRIP-MCNC: 0.2 MG/DL

## 2024-05-19 PROCEDURE — 81002 URINALYSIS NONAUTO W/O SCOPE: CPT | Performed by: PHYSICIAN ASSISTANT

## 2024-05-19 PROCEDURE — 3078F DIAST BP <80 MM HG: CPT | Performed by: PHYSICIAN ASSISTANT

## 2024-05-19 PROCEDURE — 99214 OFFICE O/P EST MOD 30 MIN: CPT | Performed by: PHYSICIAN ASSISTANT

## 2024-05-19 PROCEDURE — 87651 STREP A DNA AMP PROBE: CPT | Performed by: PHYSICIAN ASSISTANT

## 2024-05-19 PROCEDURE — 3074F SYST BP LT 130 MM HG: CPT | Performed by: PHYSICIAN ASSISTANT

## 2024-05-19 RX ORDER — ONDANSETRON 4 MG/1
4 TABLET, ORALLY DISINTEGRATING ORAL EVERY 8 HOURS PRN
Qty: 10 TABLET | Refills: 0 | Status: SHIPPED | OUTPATIENT
Start: 2024-05-19

## 2024-05-19 ASSESSMENT — ENCOUNTER SYMPTOMS
FLANK PAIN: 0
ABDOMINAL PAIN: 0
CONSTIPATION: 0
FEVER: 0
VOMITING: 1
BLOOD IN STOOL: 0
DIARRHEA: 0
CHILLS: 0
SORE THROAT: 1
NAUSEA: 1

## 2024-05-19 ASSESSMENT — FIBROSIS 4 INDEX: FIB4 SCORE: 0.74

## 2024-05-19 NOTE — LETTER
May 19, 2024         Patient: Marian Kent   YOB: 1985   Date of Visit: 5/19/2024           To Whom it May Concern:    Marian Kent was seen in my clinic on 5/19/2024.  Please excuse her absence today.    If you have any questions or concerns, please don't hesitate to call.        Sincerely,           Reynaldo Hyatt P.A.-C.  Electronically Signed

## 2024-05-19 NOTE — LETTER
May 20, 2024         Patient: Marian Kent   YOB: 1985   Date of Visit: 5/19/2024           To Whom it May Concern:    Marian Kent was seen in my clinic on 5/19/2024.  Please excuse her absence today and tomorrow.    If you have any questions or concerns, please don't hesitate to call.        Sincerely,           Reynaldo Hyatt P.A.-C.  Electronically Signed

## 2024-05-20 NOTE — PROGRESS NOTES
Subjective:   Marian Kent is a 38 y.o. female who presents today with   Chief Complaint   Patient presents with    Vomiting     Started today      Vomiting  This is a new problem. The current episode started today. The problem occurs constantly. The problem has been unchanged. Associated symptoms include nausea, a sore throat and vomiting. Pertinent negatives include no abdominal pain, chills or fever. Treatments tried: zofran. The treatment provided mild relief.     Patient states she is currently taking metronidazole for BV.  She has had some nausea and vomiting throughout today.  Has only been tolerating small amounts of fluid and has had decreased appetite.  Normal bowel movements.    PMH:  has a past medical history of Anesthesia (02/14/2022), Anxiety (02/19/2019), Bipolar 1 disorder (HCC) (02/14/2022), Bowel habit changes (02/21/2024), Crohn's disease ([facility]) (01/17/2023), Dental disorder (02/21/2024), EP (ectopic pregnancy), Gynecological disorder, Hydronephrosis right (01/03/2014), Nausea with vomiting (09/16/2022), Pain (02/14/2022), Pain (03/2019), Pain management, Placenta previa diagnosed with US at [facility] (12/17/2013), PONV (postoperative nausea and vomiting), Psychiatric problem (02/14/2022), and Ulcerative colitis ([facility]) (01/17/2023).    She has no past medical history of Addisons disease ([facility]), Adrenal disorder ([facility]), Allergy, Anemia, Blood transfusion, Breath shortness, Clotting disorder ([facility]), Cushings syndrome ([facility]), Diabetic neuropathy ([facility]), Hyperlipidemia, Meningitis, Migraine, Muscle disorder, OSTEOPOROSIS, Parathyroid disorder ([facility]), Pituitary disease ([facility]), Sickle cell disease ([facility]), or Substance abuse ([facility]).  MEDS:   Current Outpatient Medications:     ondansetron (ZOFRAN ODT) 4 MG TABLET DISPERSIBLE, Take 1 Tablet by mouth every 8 hours as needed for Nausea/Vomiting., Disp: 10 Tablet, Rfl: 0    clonazePAM (KLONOPIN) 1 MG Tab, Take 1 mg by mouth 2 times a day., Disp: , Rfl:      AUVELITY  MG Tab CR, Take 1 Tablet by mouth 2 times a day., Disp: , Rfl:     ibuprofen (MOTRIN) 800 MG Tab, Take 1 Tablet by mouth every 8 hours as needed for Mild Pain or Moderate Pain., Disp: 30 Tablet, Rfl: 0    VRAYLAR 6 MG Cap, Take 6 mg by mouth at bedtime., Disp: , Rfl:   ALLERGIES:   Allergies   Allergen Reactions    Sertraline Diarrhea     diarrhea    Oxycodone Hives and Itching    Tramadol Hives and Itching     SURGHX:   Past Surgical History:   Procedure Laterality Date    TN LAP,DIAGNOSTIC ABDOMEN N/A 3/18/2024    Procedure: PELVIC EXAM UNDER ANESTHESIA, DIAGNOSTIC AND OPERATIVE LAPAROSCOPY, CAUTERIZATION OF ENDOMETRIOSIS;  Surgeon: Mark Price M.D.;  Location: [facility];  Service: Gynecology    TN LAP,DIAGNOSTIC ABDOMEN N/A 4/20/2023    Procedure: DIAGNOSTIC AND OPERATIVE LAPAROSCOPY, WITH CAUTERIZATION OF ENDOMETRIOSIS OF RIGHT OVARY AND RESECTION OF LEFT OVARIAN EXCRESCENCE;  Surgeon: Mark Price M.D.;  Location: SURGERY SAME DAY [facility];  Service: Gynecology    TN LAP,DIAGNOSTIC ABDOMEN  02/17/2022    Procedure: LAPAROSCOPY - DIAGNOSTIC and operative;  Surgeon: Mark Price M.D.;  Location: SURGERY SAME DAY [facility];  Service: Gynecology    TN LAP,FULGURATE/EXCISE LESIONS Right 02/17/2022    Procedure: cauterization ,ENDOMETRIOSIS lesion right ovary;  Surgeon: Mark Price M.D.;  Location: SURGERY SAME DAY [facility];  Service: Gynecology    TN LAP,DIAGNOSTIC ABDOMEN  10/22/2020    Procedure: PELVISCOPY - DIAGNOSTIC AND OPERATIVE. LYSIS OF ADHESIONS, CAUTERIZATION OF ENDOMETRIOSIS RIGHT OVARY;  Surgeon: Mark Price M.D.;  Location: SURGERY SAME DAY [facility];  Service: Gynecology    INGUINAL HERNIA REPAIR ROBOTIC Left 03/28/2019    Procedure: INGUINAL HERNIA REPAIR ROBOTIC WITH MESH PLACEMENT  ;  Surgeon: Chris Cooley M.D.;  Location: SURGERY SAME DAY [facility];  Service: General    PELVISCOPY N/A 07/09/2018    Procedure: PELVISCOPY-DIAGNOSTIC;  Surgeon:  Mark Mariano M.D.;  Location: SURGERY SAME DAY [facility];  Service: Gynecology    LAPAROSCOPY  2017    Procedure: Exploratory laparoscopy;  Surgeon: Mark Mariano M.D.;  Location: SURGERY [facility];  Service: Gynecology    LAPAROSCOPIC LYSIS OF ADHESIONS  2017    Procedure: LAPAROSCOPIC LYSIS OF ADHESIONS;  Surgeon: Mark Mariano M.D.;  Location: SURGERY [facility];  Service: Gynecology    CYSTOSCOPY  2016    Procedure: CYSTOSCOPY;  Surgeon: Mark Mariano M.D.;  Location: SURGERY SAME DAY [facility];  Service:     HYSTERECTOMY LAPAROSCOPY  2016    Procedure: HYSTERECTOMY LAPAROSCOPY RIGHT SALPINGECTOMY;  Surgeon: Mark Mariano M.D.;  Location: SURGERY SAME DAY [facility];  Service:     PELVISCOPY N/A 2016    Procedure: PELVISCOPY Diagnostic;  Surgeon: Mark Mariano M.D.;  Location: SURGERY [facility];  Service:     REPEAT C SECTION  2014    Performed by Mark Mariano M.D. at [facility] DEL[facility]    PELVISCOPY  2011    Performed by MARK MARIANO at SURGERY [facility]    SALPINGECTOMY  2011    Performed by MARK MARIANO at SURGERY [facility]    REPEAT C SECTION  2007    PRIMARY C SECTION  2006    [facility]  DELIVERY SER   &      SOCHX:  reports that she has been smoking cigarettes. She started smoking about 7 years ago. She has a 7.4 pack-year smoking history. She has never used smokeless tobacco. She reports that she does not drink alcohol and does not use drugs.  FH: Reviewed with patient, not pertinent to this visit.     Review of Systems   Constitutional:  Negative for chills and fever.   HENT:  Positive for sore throat.    Gastrointestinal:  Positive for nausea and vomiting. Negative for abdominal pain, blood in stool, constipation, diarrhea and melena.   Genitourinary:  Negative for dysuria, flank pain, frequency, hematuria and urgency.      Objective:   /60   Pulse 80    "Temp 36.4 °C (97.5 °F) (Temporal)   Resp 15   Ht 1.6 m (5' 3\")   Wt 73.5 kg (162 lb)   LMP 01/09/2016   SpO2 97%   BMI 28.70 kg/m²   Physical Exam  Vitals and nursing note reviewed.   Constitutional:       General: She is not in acute distress.     Appearance: Normal appearance. She is well-developed. She is not ill-appearing or toxic-appearing.   HENT:      Head: Normocephalic and atraumatic.      Right Ear: Hearing normal.      Left Ear: Hearing normal.      Mouth/Throat:      Mouth: Mucous membranes are moist.      Pharynx: Uvula midline. Posterior oropharyngeal erythema present. No oropharyngeal exudate or uvula swelling.      Tonsils: No tonsillar exudate or tonsillar abscesses.   Cardiovascular:      Rate and Rhythm: Normal rate and regular rhythm.      Heart sounds: Normal heart sounds.   Pulmonary:      Effort: Pulmonary effort is normal. No respiratory distress.      Breath sounds: Normal breath sounds. No stridor. No wheezing, rhonchi or rales.   Abdominal:      General: Bowel sounds are normal. There is no distension.      Palpations: Abdomen is soft. There is no mass.      Tenderness: There is no abdominal tenderness. There is no right CVA tenderness, left CVA tenderness, guarding or rebound.      Hernia: No hernia is present.   Musculoskeletal:      Comments: Normal movement in all 4 extremities   Skin:     General: Skin is warm and dry.   Neurological:      Mental Status: She is alert.      Coordination: Coordination normal.   Psychiatric:         Mood and Affect: Mood normal.       STREP A -  UA -      Assessment/Plan:   Assessment    1. Nausea and vomiting, unspecified vomiting type  - POCT GROUP A STREP, PCR  - POCT Urinalysis  - ondansetron (ZOFRAN ODT) 4 MG TABLET DISPERSIBLE; Take 1 Tablet by mouth every 8 hours as needed for Nausea/Vomiting.  Dispense: 10 Tablet; Refill: 0    Suspect likely viral illness causing nausea and vomiting.  Would recommend ruling out strep today as she does have " redness to her throat.  Offered IM Zofran but patient declines.  Discussed that any alcohol use with metronidazole could cause significant vomiting reaction but she states she has not drink at all recently.  No acute concerning findings on abdominal exam and no indication for further evaluation or imaging at this time.  Suggest sips of fluid and bland diet.    Differential diagnosis, natural history, supportive care, and indications for immediate follow-up discussed.   Patient given instructions and understanding of medications and treatment.    If not improving in 3-5 days, F/U with PCP or return to UC if symptoms worsen.    Patient agreeable to plan.      Please note that this dictation was created using voice recognition software. I have made every reasonable attempt to correct obvious errors, but I expect that there are errors of grammar and possibly content that I did not discover before finalizing the note.    Reynaldo Hyatt PA-C

## 2024-06-12 ENCOUNTER — APPOINTMENT (OUTPATIENT)
Dept: URGENT CARE | Facility: CLINIC | Age: 39
End: 2024-06-12
Payer: MEDICAID

## 2024-08-04 ENCOUNTER — HOSPITAL ENCOUNTER (EMERGENCY)
Facility: MEDICAL CENTER | Age: 39
End: 2024-08-04
Attending: STUDENT IN AN ORGANIZED HEALTH CARE EDUCATION/TRAINING PROGRAM
Payer: MEDICAID

## 2024-08-04 ENCOUNTER — APPOINTMENT (OUTPATIENT)
Dept: RADIOLOGY | Facility: MEDICAL CENTER | Age: 39
End: 2024-08-04
Attending: STUDENT IN AN ORGANIZED HEALTH CARE EDUCATION/TRAINING PROGRAM
Payer: MEDICAID

## 2024-08-04 VITALS
BODY MASS INDEX: 28.24 KG/M2 | DIASTOLIC BLOOD PRESSURE: 77 MMHG | RESPIRATION RATE: 16 BRPM | WEIGHT: 159.39 LBS | OXYGEN SATURATION: 95 % | HEART RATE: 68 BPM | SYSTOLIC BLOOD PRESSURE: 118 MMHG | HEIGHT: 63 IN | TEMPERATURE: 97.9 F

## 2024-08-04 DIAGNOSIS — R10.31 RIGHT LOWER QUADRANT ABDOMINAL PAIN: ICD-10-CM

## 2024-08-04 DIAGNOSIS — N30.00 ACUTE CYSTITIS WITHOUT HEMATURIA: Primary | ICD-10-CM

## 2024-08-04 DIAGNOSIS — R11.2 NAUSEA AND VOMITING, UNSPECIFIED VOMITING TYPE: ICD-10-CM

## 2024-08-04 DIAGNOSIS — R19.7 DIARRHEA, UNSPECIFIED TYPE: ICD-10-CM

## 2024-08-04 LAB
ALBUMIN SERPL BCP-MCNC: 4.1 G/DL (ref 3.2–4.9)
ALBUMIN/GLOB SERPL: 2.1 G/DL
ALP SERPL-CCNC: 55 U/L (ref 30–99)
ALT SERPL-CCNC: 11 U/L (ref 2–50)
ANION GAP SERPL CALC-SCNC: 14 MMOL/L (ref 7–16)
APPEARANCE UR: ABNORMAL
AST SERPL-CCNC: 13 U/L (ref 12–45)
BACTERIA #/AREA URNS HPF: ABNORMAL /HPF
BASOPHILS # BLD AUTO: 0.3 % (ref 0–1.8)
BASOPHILS # BLD: 0.02 K/UL (ref 0–0.12)
BILIRUB SERPL-MCNC: 0.3 MG/DL (ref 0.1–1.5)
BILIRUB UR QL STRIP.AUTO: NEGATIVE
BUN SERPL-MCNC: 5 MG/DL (ref 8–22)
CALCIUM ALBUM COR SERPL-MCNC: 8.6 MG/DL (ref 8.5–10.5)
CALCIUM SERPL-MCNC: 8.7 MG/DL (ref 8.4–10.2)
CAOX CRY #/AREA URNS HPF: ABNORMAL /HPF
CHLORIDE SERPL-SCNC: 105 MMOL/L (ref 96–112)
CO2 SERPL-SCNC: 22 MMOL/L (ref 20–33)
COLOR UR: YELLOW
CREAT SERPL-MCNC: 0.75 MG/DL (ref 0.5–1.4)
EOSINOPHIL # BLD AUTO: 0.05 K/UL (ref 0–0.51)
EOSINOPHIL NFR BLD: 0.7 % (ref 0–6.9)
EPI CELLS #/AREA URNS HPF: ABNORMAL /HPF
ERYTHROCYTE [DISTWIDTH] IN BLOOD BY AUTOMATED COUNT: 41.4 FL (ref 35.9–50)
GFR SERPLBLD CREATININE-BSD FMLA CKD-EPI: 105 ML/MIN/1.73 M 2
GLOBULIN SER CALC-MCNC: 2 G/DL (ref 1.9–3.5)
GLUCOSE SERPL-MCNC: 99 MG/DL (ref 65–99)
GLUCOSE UR STRIP.AUTO-MCNC: NEGATIVE MG/DL
HCG SERPL QL: NEGATIVE
HCT VFR BLD AUTO: 45 % (ref 37–47)
HGB BLD-MCNC: 16 G/DL (ref 12–16)
IMM GRANULOCYTES # BLD AUTO: 0.02 K/UL (ref 0–0.11)
IMM GRANULOCYTES NFR BLD AUTO: 0.3 % (ref 0–0.9)
KETONES UR STRIP.AUTO-MCNC: NEGATIVE MG/DL
LEUKOCYTE ESTERASE UR QL STRIP.AUTO: NEGATIVE
LIPASE SERPL-CCNC: 26 U/L (ref 11–82)
LYMPHOCYTES # BLD AUTO: 2.18 K/UL (ref 1–4.8)
LYMPHOCYTES NFR BLD: 29 % (ref 22–41)
MCH RBC QN AUTO: 34.3 PG (ref 27–33)
MCHC RBC AUTO-ENTMCNC: 35.6 G/DL (ref 32.2–35.5)
MCV RBC AUTO: 96.6 FL (ref 81.4–97.8)
MICRO URNS: ABNORMAL
MONOCYTES # BLD AUTO: 0.37 K/UL (ref 0–0.85)
MONOCYTES NFR BLD AUTO: 4.9 % (ref 0–13.4)
NEUTROPHILS # BLD AUTO: 4.87 K/UL (ref 1.82–7.42)
NEUTROPHILS NFR BLD: 64.8 % (ref 44–72)
NITRITE UR QL STRIP.AUTO: POSITIVE
NRBC # BLD AUTO: 0 K/UL
NRBC BLD-RTO: 0 /100 WBC (ref 0–0.2)
PH UR STRIP.AUTO: 6 [PH] (ref 5–8)
PLATELET # BLD AUTO: 329 K/UL (ref 164–446)
PMV BLD AUTO: 8.2 FL (ref 9–12.9)
POTASSIUM SERPL-SCNC: 3.7 MMOL/L (ref 3.6–5.5)
PROT SERPL-MCNC: 6.1 G/DL (ref 6–8.2)
PROT UR QL STRIP: NEGATIVE MG/DL
RBC # BLD AUTO: 4.66 M/UL (ref 4.2–5.4)
RBC # URNS HPF: ABNORMAL /HPF
RBC UR QL AUTO: NEGATIVE
SODIUM SERPL-SCNC: 141 MMOL/L (ref 135–145)
SP GR UR STRIP.AUTO: >=1.03
WBC # BLD AUTO: 7.5 K/UL (ref 4.8–10.8)
WBC #/AREA URNS HPF: ABNORMAL /HPF

## 2024-08-04 PROCEDURE — A9270 NON-COVERED ITEM OR SERVICE: HCPCS | Performed by: STUDENT IN AN ORGANIZED HEALTH CARE EDUCATION/TRAINING PROGRAM

## 2024-08-04 PROCEDURE — 96375 TX/PRO/DX INJ NEW DRUG ADDON: CPT

## 2024-08-04 PROCEDURE — 81001 URINALYSIS AUTO W/SCOPE: CPT

## 2024-08-04 PROCEDURE — 85025 COMPLETE CBC W/AUTO DIFF WBC: CPT

## 2024-08-04 PROCEDURE — 84703 CHORIONIC GONADOTROPIN ASSAY: CPT

## 2024-08-04 PROCEDURE — 700111 HCHG RX REV CODE 636 W/ 250 OVERRIDE (IP): Mod: JZ | Performed by: STUDENT IN AN ORGANIZED HEALTH CARE EDUCATION/TRAINING PROGRAM

## 2024-08-04 PROCEDURE — 36415 COLL VENOUS BLD VENIPUNCTURE: CPT

## 2024-08-04 PROCEDURE — 96374 THER/PROPH/DIAG INJ IV PUSH: CPT | Mod: XU

## 2024-08-04 PROCEDURE — 83690 ASSAY OF LIPASE: CPT

## 2024-08-04 PROCEDURE — 80053 COMPREHEN METABOLIC PANEL: CPT

## 2024-08-04 PROCEDURE — 700117 HCHG RX CONTRAST REV CODE 255: Performed by: STUDENT IN AN ORGANIZED HEALTH CARE EDUCATION/TRAINING PROGRAM

## 2024-08-04 PROCEDURE — 96376 TX/PRO/DX INJ SAME DRUG ADON: CPT

## 2024-08-04 PROCEDURE — 99285 EMERGENCY DEPT VISIT HI MDM: CPT

## 2024-08-04 PROCEDURE — 700102 HCHG RX REV CODE 250 W/ 637 OVERRIDE(OP): Performed by: STUDENT IN AN ORGANIZED HEALTH CARE EDUCATION/TRAINING PROGRAM

## 2024-08-04 PROCEDURE — 74177 CT ABD & PELVIS W/CONTRAST: CPT

## 2024-08-04 PROCEDURE — 700105 HCHG RX REV CODE 258: Performed by: STUDENT IN AN ORGANIZED HEALTH CARE EDUCATION/TRAINING PROGRAM

## 2024-08-04 RX ORDER — FAMOTIDINE 20 MG/1
20 TABLET, FILM COATED ORAL 2 TIMES DAILY
Qty: 60 TABLET | Refills: 0 | Status: SHIPPED | OUTPATIENT
Start: 2024-08-04 | End: 2024-08-14 | Stop reason: SDUPTHER

## 2024-08-04 RX ORDER — CEPHALEXIN 500 MG/1
500 CAPSULE ORAL 4 TIMES DAILY
Qty: 20 CAPSULE | Refills: 0 | Status: ACTIVE | OUTPATIENT
Start: 2024-08-04 | End: 2024-08-09

## 2024-08-04 RX ORDER — MORPHINE SULFATE 4 MG/ML
4 INJECTION INTRAVENOUS ONCE
Status: COMPLETED | OUTPATIENT
Start: 2024-08-04 | End: 2024-08-04

## 2024-08-04 RX ORDER — ONDANSETRON 4 MG/1
4 TABLET, ORALLY DISINTEGRATING ORAL EVERY 6 HOURS PRN
Qty: 10 TABLET | Refills: 0 | Status: SHIPPED | OUTPATIENT
Start: 2024-08-04

## 2024-08-04 RX ORDER — ALUMINA, MAGNESIA, AND SIMETHICONE 2400; 2400; 240 MG/30ML; MG/30ML; MG/30ML
10 SUSPENSION ORAL 4 TIMES DAILY PRN
Qty: 560 ML | Refills: 0 | Status: SHIPPED | OUTPATIENT
Start: 2024-08-04

## 2024-08-04 RX ORDER — MORPHINE SULFATE 4 MG/ML
2 INJECTION INTRAVENOUS ONCE
Status: COMPLETED | OUTPATIENT
Start: 2024-08-04 | End: 2024-08-04

## 2024-08-04 RX ORDER — LOPERAMIDE HCL 2 MG
2 CAPSULE ORAL 4 TIMES DAILY PRN
Qty: 4 CAPSULE | Refills: 0 | Status: SHIPPED | OUTPATIENT
Start: 2024-08-04

## 2024-08-04 RX ORDER — ONDANSETRON 2 MG/ML
4 INJECTION INTRAMUSCULAR; INTRAVENOUS ONCE
Status: COMPLETED | OUTPATIENT
Start: 2024-08-04 | End: 2024-08-04

## 2024-08-04 RX ORDER — SODIUM CHLORIDE, SODIUM LACTATE, POTASSIUM CHLORIDE, CALCIUM CHLORIDE 600; 310; 30; 20 MG/100ML; MG/100ML; MG/100ML; MG/100ML
1000 INJECTION, SOLUTION INTRAVENOUS ONCE
Status: COMPLETED | OUTPATIENT
Start: 2024-08-04 | End: 2024-08-04

## 2024-08-04 RX ORDER — FAMOTIDINE 20 MG/1
20 TABLET, FILM COATED ORAL ONCE
Status: COMPLETED | OUTPATIENT
Start: 2024-08-04 | End: 2024-08-04

## 2024-08-04 RX ADMIN — LIDOCAINE HYDROCHLORIDE 30 ML: 20 SOLUTION ORAL; TOPICAL at 18:08

## 2024-08-04 RX ADMIN — MORPHINE SULFATE 4 MG: 4 INJECTION, SOLUTION INTRAMUSCULAR; INTRAVENOUS at 18:08

## 2024-08-04 RX ADMIN — MORPHINE SULFATE 2 MG: 4 INJECTION, SOLUTION INTRAMUSCULAR; INTRAVENOUS at 18:58

## 2024-08-04 RX ADMIN — FAMOTIDINE 20 MG: 20 TABLET ORAL at 18:08

## 2024-08-04 RX ADMIN — ONDANSETRON 4 MG: 2 INJECTION INTRAMUSCULAR; INTRAVENOUS at 18:08

## 2024-08-04 RX ADMIN — SODIUM CHLORIDE, POTASSIUM CHLORIDE, SODIUM LACTATE AND CALCIUM CHLORIDE 1000 ML: 600; 310; 30; 20 INJECTION, SOLUTION INTRAVENOUS at 18:09

## 2024-08-04 RX ADMIN — IOHEXOL 100 ML: 350 INJECTION, SOLUTION INTRAVENOUS at 18:24

## 2024-08-04 ASSESSMENT — FIBROSIS 4 INDEX: FIB4 SCORE: 0.65

## 2024-08-04 ASSESSMENT — PAIN SCALES - WONG BAKER: WONGBAKER_NUMERICALRESPONSE: HURTS EVEN MORE

## 2024-08-04 NOTE — ED TRIAGE NOTES
Chief Complaint   Patient presents with    Abdominal Pain     RLQ with diarrhea and BR bloody emesis since today. Reports 2 episodes of emesis today. Denies noting blood in stools. Denies known fevers.      Physical Exam  Pulmonary:      Effort: Pulmonary effort is normal.   Skin:     General: Skin is warm and dry.   Neurological:      Mental Status: She is alert.

## 2024-08-05 NOTE — ED NOTES
Discharge instructions given to pt with verbalized understanding.  Pt's sister will drive her home.

## 2024-08-05 NOTE — ED PROVIDER NOTES
ED Provider Note    CHIEF COMPLAINT  Chief Complaint   Patient presents with    Abdominal Pain     RLQ with diarrhea and BR bloody emesis since today. Reports 2 episodes of emesis today. Denies noting blood in stools. Denies known fevers.        EXTERNAL RECORDS REVIEWED  External ED Note      HPI/ROS  LIMITATION TO HISTORY   Select: : None  OUTSIDE HISTORIAN(S):  none    Marian Kent is a 38 y.o. female with history of endometriosis who presents to the emergency department chief complaint of of 1 day of right lower quadrant abdominal pain associate with nausea, vomiting and several episodes of blood-tinged emesis.  Patient states that she has had some urinary frequency, urgency and incomplete voiding.  She denies any foul-smelling urine, flank pain.  She denies any fevers, melena or hematochezia.  Patient does not take any blood thinners.  She does have a history of partial hysterectomy, multiple laparoscopies for endometriosis.    PAST MEDICAL HISTORY   has a past medical history of Anesthesia (2022), Anxiety (2019), Bipolar 1 disorder (HCC) (2022), Bowel habit changes (2024), Crohn's disease (HCC) (2023), Dental disorder (2024), EP (ectopic pregnancy), Gynecological disorder, Hydronephrosis right (2014), Nausea with vomiting (2022), Pain (2022), Pain (2019), Pain management, Placenta previa diagnosed with US at [facility] (2013), PONV (postoperative nausea and vomiting), Psychiatric problem (2022), and Ulcerative colitis ([facility]) (2023).    SURGICAL HISTORY   has a past surgical history that includes  DELIVERY SER ( & ); cystoscopy (2016); laparoscopy (2017); laparoscopic lysis of adhesions (2017); pelviscopy (2011); salpingectomy (2011); primary c section (2006); repeat c section (2007); repeat c section (2014); pelviscopy (N/A, 2016); hysterectomy laparoscopy  "(08/08/2016); pelviscopy (N/A, 07/09/2018); inguinal hernia repair robotic (Left, 03/28/2019); lap,diagnostic abdomen (10/22/2020); lap,diagnostic abdomen (02/17/2022); lap,fulgurate/excise lesions (Right, 02/17/2022); lap,diagnostic abdomen (N/A, 4/20/2023); and lap,diagnostic abdomen (N/A, 3/18/2024).    FAMILY HISTORY  Family History   Problem Relation Age of Onset    Cancer Father 45        colon    Other Maternal Grandfather     Cancer Paternal Grandmother         breast    Diabetes Paternal Grandmother     Heart Disease Neg Hx     Stroke Neg Hx        SOCIAL HISTORY  Social History     Tobacco Use    Smoking status: Every Day     Current packs/day: 1.00     Average packs/day: 1 pack/day for 7.6 years (7.6 ttl pk-yrs)     Types: Cigarettes     Start date: 2017    Smokeless tobacco: Never   Vaping Use    Vaping status: Never Used   Substance and Sexual Activity    Alcohol use: No     Alcohol/week: 0.0 oz     Comment: none since10/21    Drug use: No    Sexual activity: Not Currently     Partners: Male     Birth control/protection: Pill       CURRENT MEDICATIONS  Home Medications       Reviewed by Marley Villa R.N. (Registered Nurse) on 08/04/24 at 1636  Med List Status: Not Addressed     Medication Last Dose Status   AUVELITY  MG Tab CR  Active   clonazePAM (KLONOPIN) 1 MG Tab  Active   ibuprofen (MOTRIN) 800 MG Tab  Active   ondansetron (ZOFRAN ODT) 4 MG TABLET DISPERSIBLE  Active   VRAYLAR 6 MG Cap  Active                    ALLERGIES  Allergies   Allergen Reactions    Sertraline Diarrhea     diarrhea    Oxycodone Hives and Itching    Tramadol Hives and Itching       PHYSICAL EXAM  VITAL SIGNS: /86   Pulse 98   Temp 36.9 °C (98.4 °F) (Temporal)   Resp 16   Ht 1.6 m (5' 3\")   Wt 72.3 kg (159 lb 6.3 oz)   LMP 01/09/2016   SpO2 94%   BMI 28.24 kg/m²    Constitutional: Well developed, Well nourished, No acute distress, Non-toxic appearance.   HENT: Normocephalic, Atraumatic, Bilateral " external ears normal, Oropharynx is clear mucous membranes are moist. No oral exudates or nasal discharge.   Cardiovascular: Regular rate and rhythm without murmur rub or gallop.  Thorax & Lungs: Clear breath sounds bilaterally without wheezes, rhonchi or rales. There is no chest wall tenderness.   Abdomen: Nondistended, mild tenderness palpation epigastric region in the right lower quadrant. There is no rebound or guarding. No organomegaly is appreciated. Bowel sounds are normal.  Skin: Normal without rash.   Back: No CVA or spinal tenderness.   Extremities: Intact distal pulses, No edema, No tenderness, No cyanosis, No clubbing. Capillary refill is less than 2 seconds.  Neurologic: Alert & oriented x 3, Normal motor function, Normal sensory function, No focal deficits noted. Reflexes are normal.      EKG/LABS  Patient's labs reviewed, no significant leukocytosis, negative beta hCG, metabolic panel largely unremarkable.  Urinalysis did have bacteria and was nitrite positive, was contaminated sample with epithelial cells but due to bacteria as well as nitrite positivity we will treat as a UTI.    I have independently interpreted this EKG    RADIOLOGY/PROCEDURES   I have independently interpreted the diagnostic imaging associated with this visit and am waiting the final reading from the radiologist.   My preliminary interpretation is as follows: No free air, obvious bowel obstruction or perforated viscus    Radiologist interpretation:  CT-ABDOMEN-PELVIS WITH   Final Result         1. Several mildly prominent small bowel loops in the left abdomen with air-fluid levels, likely enteritis/mild focal ileus.   2. Normal appendix.   3. Mild splenomegaly.          COURSE & MEDICAL DECISION MAKING    ASSESSMENT, COURSE AND PLAN  Care Narrative: Patient is a 38-year-old female with history of endometriosis, multiple visits with abdominal pain presented to the emergency department chief complaint of vomiting, nausea, diarrhea  associated with right lower quadrant abdominal pain.  Patient denies any fevers chills.  Previous surgical partial hysterectomy as well as laparoscopically for endometriosis.  Patient has had multiple CT scans in the past but on exam is having tenderness to palpation the right lower quadrant and still does have her appendix.  Will order analgesics, antiemetics, GI medications.  Will also order labs, pregnancy, urine, CT of the abdomen and pelvis with IV contrast.    Patient reassessed and was tolerating p.o. in the emergency department, pain much improved with oral medications as well as IV analgesics.  Patient was given fluids.  Labs reviewed, largely unremarkable.  Urinalysis did show evidence of infection with nitrite positivity and bacteriuria.  CT of the abdomen pelvis was reviewed did not show any evidence of appendicitis, did have evidence of enteritis.  Patient tolerating orals, well-appearing with normal vital signs and largely unremarkable labs.  Will treat cystitis and will prescribe medications for acid suppression, antiemetics, antidiarrheals.              ADDITIONAL PROBLEMS MANAGED  none    DISPOSITION AND DISCUSSIONS  I have discussed management of the patient with the following physicians and ASHANTI's:  none      Escalation of care considered, and ultimately not performed:acute inpatient care management, however at this time, the patient is most appropriate for outpatient management        FINAL DIAGNOSIS  1. Acute cystitis without hematuria Active   2. Right lower quadrant abdominal pain Active   3. Nausea and vomiting, unspecified vomiting type Active   4. Diarrhea, unspecified type Active        Electronically signed by: Fernando Nash M.D., 8/4/2024 5:53 PM

## 2024-08-05 NOTE — DISCHARGE INSTRUCTIONS
You are seen today in the emergency department for your right lower quadrant abdominal pain.  Thankfully your CT scan does not show any evidence of appendicitis.  It did show concerns for potential viral infection.  Your urine also was concerning for infection.  You will be prescribed antibiotics for your infection.  Also encourage you to take the medications for your stomach as you had some nausea and vomiting with some blood-tinged vomit.  Please return to the emergency department for any concerning symptoms.  Please refrain from using any marijuana, alcohol.  Please continue to increase your fluid intake including electrolytes.

## 2024-08-14 ENCOUNTER — OFFICE VISIT (OUTPATIENT)
Dept: URGENT CARE | Facility: CLINIC | Age: 39
End: 2024-08-14
Payer: MEDICAID

## 2024-08-14 VITALS
HEIGHT: 63 IN | HEART RATE: 85 BPM | DIASTOLIC BLOOD PRESSURE: 62 MMHG | SYSTOLIC BLOOD PRESSURE: 124 MMHG | RESPIRATION RATE: 16 BRPM | OXYGEN SATURATION: 97 % | BODY MASS INDEX: 28.53 KG/M2 | TEMPERATURE: 97.3 F | WEIGHT: 161 LBS

## 2024-08-14 DIAGNOSIS — K21.9 GASTROESOPHAGEAL REFLUX DISEASE, UNSPECIFIED WHETHER ESOPHAGITIS PRESENT: ICD-10-CM

## 2024-08-14 DIAGNOSIS — R11.2 NAUSEA AND VOMITING, UNSPECIFIED VOMITING TYPE: ICD-10-CM

## 2024-08-14 PROCEDURE — 3074F SYST BP LT 130 MM HG: CPT | Performed by: FAMILY MEDICINE

## 2024-08-14 PROCEDURE — 99213 OFFICE O/P EST LOW 20 MIN: CPT | Performed by: FAMILY MEDICINE

## 2024-08-14 PROCEDURE — 3078F DIAST BP <80 MM HG: CPT | Performed by: FAMILY MEDICINE

## 2024-08-14 RX ORDER — ONDANSETRON 4 MG/1
4 TABLET, ORALLY DISINTEGRATING ORAL ONCE
Status: COMPLETED | OUTPATIENT
Start: 2024-08-14 | End: 2024-08-14

## 2024-08-14 RX ORDER — FAMOTIDINE 20 MG/1
20 TABLET, FILM COATED ORAL 2 TIMES DAILY
Qty: 60 TABLET | Refills: 0 | Status: SHIPPED | OUTPATIENT
Start: 2024-08-14

## 2024-08-14 RX ORDER — DEXTROAMPHETAMINE SACCHARATE, AMPHETAMINE ASPARTATE, DEXTROAMPHETAMINE SULFATE AND AMPHETAMINE SULFATE 3.75; 3.75; 3.75; 3.75 MG/1; MG/1; MG/1; MG/1
15 TABLET ORAL 2 TIMES DAILY
COMMUNITY

## 2024-08-14 RX ORDER — ONDANSETRON 4 MG/1
4 TABLET, ORALLY DISINTEGRATING ORAL EVERY 8 HOURS PRN
Qty: 10 TABLET | Refills: 0 | Status: SHIPPED | OUTPATIENT
Start: 2024-08-14

## 2024-08-14 RX ADMIN — ONDANSETRON 4 MG: 4 TABLET, ORALLY DISINTEGRATING ORAL at 13:13

## 2024-08-14 ASSESSMENT — FIBROSIS 4 INDEX: FIB4 SCORE: 0.44

## 2024-08-14 ASSESSMENT — ENCOUNTER SYMPTOMS
EYE REDNESS: 0
MYALGIAS: 0
WEIGHT LOSS: 0
EYE DISCHARGE: 0

## 2024-08-14 NOTE — LETTER
August 14, 2024         Patient: Marian Kent   YOB: 1985   Date of Visit: 8/14/2024           To Whom it May Concern:    Marian Kent was seen in my clinic on 8/14/2024. Please excuse from work 8/13 through 8/15/2024.     Sincerely,           Facundo Carmichael M.D.  Electronically Signed

## 2024-08-14 NOTE — ASSESSMENT & PLAN NOTE
Orders:    ondansetron (Zofran ODT) dispertab 4 mg    ondansetron (ZOFRAN ODT) 4 MG TABLET DISPERSIBLE; Take 1 Tablet by mouth every 8 hours as needed for Nausea/Vomiting.

## 2024-08-14 NOTE — PROGRESS NOTES
"Subjective     Marian Kent is a 38 y.o. female who presents with Emesis (Stomach pain associated with vomiting. Started today )            Onset yesterday nausea and vomiting.  Multiple episodes.  No blood in emesis.  No diarrhea.  No fever.  Intermittent cramping abdominal pain.  No localizing abdominal pain.  No obvious food trigger.  She notes that this happens to her every few weeks.  Occasionally consumes marijuana but unsure if contributing.  Denies possible pregnancy.  Previous Rx Pepcid.  Will refill.  No other aggravating or alleviating factors.        Review of Systems   Constitutional:  Negative for malaise/fatigue and weight loss.   Eyes:  Negative for discharge and redness.   Musculoskeletal:  Negative for joint pain and myalgias.   Skin:  Negative for itching and rash.              Objective     /62   Pulse 85   Temp 36.3 °C (97.3 °F)   Resp 16   Ht 1.6 m (5' 3\")   Wt 73 kg (161 lb)   LMP 01/09/2016   SpO2 97%   BMI 28.52 kg/m²      Physical Exam  Constitutional:       General: She is not in acute distress.     Appearance: She is well-developed.   HENT:      Head: Normocephalic and atraumatic.   Eyes:      Conjunctiva/sclera: Conjunctivae normal.   Cardiovascular:      Rate and Rhythm: Normal rate and regular rhythm.      Heart sounds: Normal heart sounds. No murmur heard.  Pulmonary:      Effort: Pulmonary effort is normal.      Breath sounds: Normal breath sounds. No wheezing.   Abdominal:      Palpations: Abdomen is soft.      Tenderness: There is no abdominal tenderness.   Skin:     General: Skin is warm and dry.      Findings: No rash.   Neurological:      Mental Status: She is alert.                             Assessment & Plan        Assessment & Plan  Nausea and vomiting, unspecified vomiting type    Orders:    ondansetron (Zofran ODT) dispertab 4 mg    ondansetron (ZOFRAN ODT) 4 MG TABLET DISPERSIBLE; Take 1 Tablet by mouth every 8 hours as needed for " Nausea/Vomiting.    Gastroesophageal reflux disease, unspecified whether esophagitis present    Orders:    famotidine (PEPCID) 20 MG Tab; Take 1 Tablet by mouth 2 times a day.    Differential diagnosis, natural history, supportive care, and indications for immediate follow-up were discussed.

## 2024-11-06 ENCOUNTER — HOSPITAL ENCOUNTER (OUTPATIENT)
Facility: MEDICAL CENTER | Age: 39
End: 2024-11-06
Attending: NURSE PRACTITIONER
Payer: COMMERCIAL

## 2024-11-06 ENCOUNTER — NON-PROVIDER VISIT (OUTPATIENT)
Dept: OCCUPATIONAL MEDICINE | Facility: CLINIC | Age: 39
End: 2024-11-06

## 2024-11-06 DIAGNOSIS — Z02.89 VISIT FOR OCCUPATIONAL HEALTH EXAMINATION: Primary | ICD-10-CM

## 2024-11-06 DIAGNOSIS — Z02.89 VISIT FOR OCCUPATIONAL HEALTH EXAMINATION: ICD-10-CM

## 2024-11-06 PROCEDURE — 86480 TB TEST CELL IMMUN MEASURE: CPT | Performed by: NURSE PRACTITIONER

## 2024-11-08 LAB
GAMMA INTERFERON BACKGROUND BLD IA-ACNC: 0.06 IU/ML
M TB IFN-G BLD-IMP: NEGATIVE
M TB IFN-G CD4+ BCKGRND COR BLD-ACNC: -0.01 IU/ML
MITOGEN IGNF BCKGRD COR BLD-ACNC: >10 IU/ML
QFT TB2 - NIL TBQ2: 0 IU/ML

## 2024-12-09 ENCOUNTER — HOSPITAL ENCOUNTER (OUTPATIENT)
Facility: MEDICAL CENTER | Age: 39
End: 2024-12-09
Attending: UROLOGY
Payer: MEDICAID

## 2024-12-09 PROCEDURE — 87077 CULTURE AEROBIC IDENTIFY: CPT

## 2024-12-09 PROCEDURE — 87086 URINE CULTURE/COLONY COUNT: CPT

## 2024-12-12 LAB
BACTERIA UR CULT: ABNORMAL
BACTERIA UR CULT: ABNORMAL
SIGNIFICANT IND 70042: ABNORMAL
SITE SITE: ABNORMAL
SOURCE SOURCE: ABNORMAL

## 2024-12-25 NOTE — PROGRESS NOTES
[id number] [id number]   Office [phone], Fax [phone]    Post-Partum Day Number 1 Progress Note    Patient doing well post-partum without significant complaint.  Pain well controlled.  Lochia wnl.  Tolerating  diet.  Ambulating.  Voiding without difficulty.  + flatus.  no BM.      Vitals:    Vitals:    24 0658   BP: 110/67   Pulse: 81   Resp: 16   Temp: 97.9 °F (36.6 °C)   SpO2:       Temp (24hrs), Av °F (36.7 °C), Min:97.5 °F (36.4 °C), Max:98.8 °F (37.1 °C)      Exam:  Patient without distress.               CTAB, no w/r/r/c.               RRR, +S1 and S2, no m/r/g.    Abdomen soft, fundus firm at level of umbilicus, nontender.               Lower extremities:  No edema. No palpable cords or tenderness.    Lab/Data Review:  No results found for this or any previous visit (from the past 12 hour(s)).    Assessment and Plan:    Agustin Contreras is a 41 y.o.  PPD1 s/p  at 38w4d. Pregnancy was complicated by AMA, SARAI, HBNI. Patient appears to be having uncomplicated post-partum course.      Continue routine perineal care and maternal education.    Plan discharge tomorrow if no problems occur.    Patient discussed with Dr. Freda Perez.                 Betzy Jaramillo MD  Family Medicine Resident    Subjective     Marian Kent is a very pleasant 37 y.o. female who presents with Tooth Ache (Pt has tooth pain, swelling x 4 days )            Patient broke her left lower posterior tooth 3 days ago and now she is having significant pain and swelling.  No fever, chest pain or shortness of breath.    Dental Pain   This is a new problem. The current episode started in the past 7 days. The problem occurs constantly. The problem has been unchanged. The pain is at a severity of 7/10. The pain is moderate. Associated symptoms include facial pain and thermal sensitivity. Pertinent negatives include no difficulty swallowing, fever, oral bleeding or sinus pressure. She has tried acetaminophen and NSAIDs for the symptoms. The treatment provided mild relief.       PMH:  has a past medical history of Anesthesia (02/14/2022), Anxiety (2/19/2019), Bipolar 1 disorder (HCC) (02/14/2022), Breath shortness, Crohn's disease (formerly [facility]) (1/17/2023), EP (ectopic pregnancy), Gynecological disorder, Hydronephrosis right (1/3/2014), Nausea with vomiting (9/16/2022), Pain (02/14/2022), Pain (03/2019), Pain management, Placenta previa diagnosed with US at [facility] (12/17/2013), PONV (postoperative nausea and vomiting), Psychiatric problem (02/14/2022), and Ulcerative colitis (formerly [facility]) (1/17/2023).    She has no past medical history of Addisons disease (formerly [facility]), Adrenal disorder (formerly [facility]), Allergy, Anemia, Blood transfusion, Clotting disorder (formerly [facility]), Cushings syndrome (formerly [facility]), Diabetic neuropathy (formerly [facility]), Hyperlipidemia, Meningitis, Migraine, Muscle disorder, OSTEOPOROSIS, Parathyroid disorder (formerly [facility]), Pituitary disease (formerly [facility]), Sickle cell disease (formerly [facility]), or Substance abuse (formerly [facility]).  MEDS:   Current Outpatient Medications:     ibuprofen (MOTRIN) 800 MG Tab, Take 1 Tablet by mouth every 8 hours as needed for Moderate Pain., Disp: 30 Tablet, Rfl: 0    VRAYLAR 6 MG Cap, , Disp: , Rfl:     zolpidem (AMBIEN) 10 MG Tab, TAKE 1 TABLET AT BEDTIME TAKE AS NEEDED FOR  SLEEP, INCREASED FROM 5MG, Disp: , Rfl:     acetaminophen (TYLENOL) 325 MG Tab, Take 650 mg by mouth., Disp: , Rfl:   ALLERGIES:   Allergies   Allergen Reactions    Sertraline      Other reaction(s): diarrhea    Oxycodone Hives and Itching    Tramadol Hives and Itching     SURGHX:   Past Surgical History:   Procedure Laterality Date    [place] LAP,DIAGNOSTIC ABDOMEN N/A 4/20/2023    Procedure: DIAGNOSTIC AND OPERATIVE LAPAROSCOPY, WITH CAUTERIZATION OF ENDOMETRIOSIS OF RIGHT OVARY AND RESECTION OF LEFT OVARIAN EXCRESCENCE;  Surgeon: Mark Price M.D.;  Location: SURGERY SAME DAY [facility];  Service: Gynecology    [place] LAP,DIAGNOSTIC ABDOMEN  02/17/2022    Procedure: LAPAROSCOPY - DIAGNOSTIC and operative;  Surgeon: Mark Price M.D.;  Location: SURGERY SAME DAY [facility];  Service: Gynecology    [place] LAP,FULGURATE/EXCISE LESIONS Right 02/17/2022    Procedure: cauterization ,ENDOMETRIOSIS lesion right ovary;  Surgeon: Mark Price M.D.;  Location: SURGERY SAME DAY [facility];  Service: Gynecology    [place] LAP,DIAGNOSTIC ABDOMEN  10/22/2020    Procedure: PELVISCOPY - DIAGNOSTIC AND OPERATIVE. LYSIS OF ADHESIONS, CAUTERIZATION OF ENDOMETRIOSIS RIGHT OVARY;  Surgeon: Mark Price M.D.;  Location: SURGERY SAME DAY [facility];  Service: Gynecology    INGUINAL HERNIA REPAIR ROBOTIC Left 03/28/2019    Procedure: INGUINAL HERNIA REPAIR ROBOTIC WITH MESH PLACEMENT  ;  Surgeon: Chris Cooley M.D.;  Location: SURGERY SAME DAY [facility] ORS;  Service: General    PELVISCOPY N/A 07/09/2018    Procedure: PELVISCOPY-DIAGNOSTIC;  Surgeon: Mark Price M.D.;  Location: SURGERY SAME DAY [facility] ORS;  Service: Gynecology    LAPAROSCOPY  12/09/2017    Procedure: Exploratory laparoscopy;  Surgeon: Mark Price M.D.;  Location: [facility];  Service: Gynecology    LAPAROSCOPIC LYSIS OF ADHESIONS  12/09/2017    Procedure: LAPAROSCOPIC LYSIS OF ADHESIONS;  Surgeon: Mark Price M.D.;  Location: [facility]  "ORS;  Service: Gynecology    CYSTOSCOPY  2016    Procedure: CYSTOSCOPY;  Surgeon: Mark Mariano M.D.;  Location: SURGERY SAME DAY [facility] ORS;  Service:     HYSTERECTOMY LAPAROSCOPY  2016    Procedure: HYSTERECTOMY LAPAROSCOPY RIGHT SALPINGECTOMY;  Surgeon: Mark Mariano M.D.;  Location: SURGERY SAME DAY [facility] ORS;  Service:     PELVISCOPY N/A 2016    Procedure: PELVISCOPY Diagnostic;  Surgeon: Mark Mariano M.D.;  Location: SURGERY [facility];  Service:     REPEAT C SECTION  2014    Performed by Mark Mariano M.D. at LABOR AND DELIVERY    PELVISCOPY  2011    Performed by MARK MARIANO at SURGERY [facility]    SALPINGECTOMY  2011    Performed by MARK MARIANO at SURGERY [facility]    REPEAT C SECTION  2007    PRIMARY C SECTION  2006    [facility]  DELIVERY SER   &      SOCHX:  reports that she has been smoking cigarettes. She has a 10.00 pack-year smoking history. She has never used smokeless tobacco. She reports that she does not drink alcohol and does not use drugs.  FH: family history includes Cancer in her paternal grandmother; Cancer (age of onset: 45) in her father; Diabetes in her paternal grandmother; Other in her maternal grandfather.      Review of Systems   Constitutional:  Negative for chills and fever.   HENT:  Negative for congestion, ear pain, sinus pressure and sore throat.         Dental infection   Respiratory: Negative.     Cardiovascular: Negative.    Gastrointestinal:  Positive for vomiting. Negative for abdominal pain, diarrhea and nausea.   Neurological: Negative.        Medications, Allergies, and current problem list reviewed today in Epic             Objective     /76 (BP Location: Right arm, Patient Position: Sitting, BP Cuff Size: Adult)   Pulse 82   Temp 36.7 °C (98.1 °F) (Temporal)   Resp 16   Ht 1.6 m (5' 3\")   Wt 73.8 kg (162 lb 12.8 oz)   LMP 2016   SpO2 96%   BMI " 28.84 kg/m²      Physical Exam  Vitals and nursing note reviewed.   Constitutional:       General: She is not in acute distress.     Appearance: Normal appearance. She is well-developed. She is not ill-appearing, toxic-appearing or diaphoretic.   HENT:      Head: Normocephalic and atraumatic.      Right Ear: Hearing normal.      Left Ear: Hearing normal.      Nose: Nose normal.      Mouth/Throat:      Dentition: Abnormal dentition. Does not have dentures. Dental tenderness, gingival swelling, dental caries and dental abscesses present. No gum lesions.     Eyes:      General:         Right eye: No discharge.         Left eye: No discharge.      Conjunctiva/sclera: Conjunctivae normal.   Cardiovascular:      Rate and Rhythm: Normal rate and regular rhythm.      Heart sounds: Normal heart sounds.   Pulmonary:      Effort: Pulmonary effort is normal. No respiratory distress.      Breath sounds: Normal breath sounds. No wheezing.   Abdominal:      General: Abdomen is flat.      Palpations: Abdomen is soft.   Musculoskeletal:         General: Normal range of motion.      Cervical back: Normal range of motion and neck supple.      Right lower leg: No edema.      Left lower leg: No edema.   Skin:     General: Skin is warm and dry.      Capillary Refill: Capillary refill takes less than 2 seconds.   Neurological:      General: No focal deficit present.      Mental Status: She is alert and oriented to person, place, and time.   Psychiatric:         Mood and Affect: Mood normal.                        Assessment & Plan     This is a very pleasant 37-year-old female presenting with a left lower posterior dental infection  She cracked half of her tooth 3 days ago and since then pain and swelling have been increasing.  Denies fever, headache, dizziness, chest pain, shortness of breath or fever.  Patient will call dentistry on Monday.  Vitals normal.  Exam shows a cracked broken left lower posterior molar with gingival swelling,  erythema and early abscess.  Slight facial swelling noted.  No regional adenopathy.  Remainder of exam benign.  Patient declines Toradol injection.  Start Augmentin.  OTC meds and conservative measures as discussed.    1. Dental infection  amoxicillin-clavulanate (AUGMENTIN) 875-125 MG Tab          I personally reviewed prior external notes and test results pertinent to today's visit. Return to clinic or go to ED if symptoms worsen or persist. Red flag symptoms and indications for ED discussed at length. Patient/Parent/Guardian voices understanding. Follow-up with your primary care provider in 3-5 days. All side effects and potential interactions of prescribed medication discussed including allergic response, GI upset, tendon injury, rash, sedation, OCP effectiveness, etc.    Please note that this dictation was created using voice recognition software. I have made every reasonable attempt to correct obvious errors, but I expect that there are errors of grammar and possibly content that I did not discover before finalizing the note.

## 2025-02-03 ENCOUNTER — HOSPITAL ENCOUNTER (OUTPATIENT)
Facility: MEDICAL CENTER | Age: 40
End: 2025-02-03
Attending: NURSE PRACTITIONER
Payer: MEDICAID

## 2025-02-03 ENCOUNTER — OFFICE VISIT (OUTPATIENT)
Dept: URGENT CARE | Facility: CLINIC | Age: 40
End: 2025-02-03
Payer: MEDICAID

## 2025-02-03 VITALS
BODY MASS INDEX: 25.69 KG/M2 | SYSTOLIC BLOOD PRESSURE: 136 MMHG | WEIGHT: 145 LBS | OXYGEN SATURATION: 96 % | RESPIRATION RATE: 20 BRPM | HEART RATE: 123 BPM | HEIGHT: 63 IN | TEMPERATURE: 99.5 F | DIASTOLIC BLOOD PRESSURE: 88 MMHG

## 2025-02-03 DIAGNOSIS — B34.9 VIRAL SYNDROME: ICD-10-CM

## 2025-02-03 DIAGNOSIS — J02.9 SORE THROAT: ICD-10-CM

## 2025-02-03 LAB
FLUAV RNA SPEC QL NAA+PROBE: NEGATIVE
FLUBV RNA SPEC QL NAA+PROBE: NEGATIVE
RSV RNA SPEC QL NAA+PROBE: NEGATIVE
S PYO DNA SPEC NAA+PROBE: NORMAL
SARS-COV-2 RNA RESP QL NAA+PROBE: NEGATIVE

## 2025-02-03 PROCEDURE — 94664 DEMO&/EVAL PT USE INHALER: CPT | Performed by: NURSE PRACTITIONER

## 2025-02-03 PROCEDURE — 87637 SARSCOV2&INF A&B&RSV AMP PRB: CPT | Mod: QW | Performed by: NURSE PRACTITIONER

## 2025-02-03 PROCEDURE — 99213 OFFICE O/P EST LOW 20 MIN: CPT | Mod: 25 | Performed by: NURSE PRACTITIONER

## 2025-02-03 PROCEDURE — 87651 STREP A DNA AMP PROBE: CPT | Performed by: NURSE PRACTITIONER

## 2025-02-03 PROCEDURE — 87070 CULTURE OTHR SPECIMN AEROBIC: CPT

## 2025-02-03 PROCEDURE — 3075F SYST BP GE 130 - 139MM HG: CPT | Performed by: NURSE PRACTITIONER

## 2025-02-03 PROCEDURE — 3079F DIAST BP 80-89 MM HG: CPT | Performed by: NURSE PRACTITIONER

## 2025-02-03 RX ORDER — BENZONATATE 100 MG/1
100 CAPSULE ORAL 3 TIMES DAILY PRN
Qty: 45 CAPSULE | Refills: 0 | Status: SHIPPED | OUTPATIENT
Start: 2025-02-03 | End: 2025-02-18

## 2025-02-03 RX ORDER — CETIRIZINE HYDROCHLORIDE 10 MG/1
10 TABLET ORAL DAILY
Qty: 30 TABLET | Refills: 0 | Status: SHIPPED | OUTPATIENT
Start: 2025-02-03

## 2025-02-03 RX ORDER — FLUTICASONE PROPIONATE 50 MCG
1 SPRAY, SUSPENSION (ML) NASAL DAILY
Qty: 16 G | Refills: 0 | Status: SHIPPED | OUTPATIENT
Start: 2025-02-03

## 2025-02-03 RX ORDER — ALBUTEROL SULFATE 90 UG/1
2 INHALANT RESPIRATORY (INHALATION) EVERY 6 HOURS PRN
Qty: 8.5 G | Refills: 0 | Status: SHIPPED | OUTPATIENT
Start: 2025-02-03

## 2025-02-03 ASSESSMENT — ENCOUNTER SYMPTOMS
ABDOMINAL PAIN: 0
HEADACHES: 1
WHEEZING: 0
RHINORRHEA: 1
COUGH: 1
SINUS PAIN: 1
SORE THROAT: 1
VOMITING: 0

## 2025-02-03 ASSESSMENT — FIBROSIS 4 INDEX: FIB4 SCORE: 0.51

## 2025-02-03 NOTE — LETTER
February 3, 2025    To Whom It May Concern:         This is confirmation that Marian Kent attended her scheduled appointment with MANUEL Fonseca on 2/03/25. It's my medical opinion that this patient would benefit from rest and recuperation for 4 days.   May return to work/school/ on 2/7/2025, or sooner if feeling better.         If you have any questions please do not hesitate to call me at the phone number listed below.    Sincerely,          YEVGENIY Fonseca.  [phone]

## 2025-02-04 DIAGNOSIS — B34.9 VIRAL SYNDROME: ICD-10-CM

## 2025-02-04 NOTE — PROGRESS NOTES
Patient/parent/guardian has consented to treatment and for use of patient information for treatment and billing purposes.  Subjective:   Marian Kent  is a 39 y.o. female who presents for Sore Throat (Sore throat , patient would like to be tested for covid and RSV)       URI   This is a new problem. The current episode started in the past 7 days. The problem has been gradually worsening. There has been no fever. Associated symptoms include congestion, coughing, headaches, rhinorrhea, sinus pain, sneezing and a sore throat. Pertinent negatives include no abdominal pain, ear pain, plugged ear sensation, vomiting or wheezing. She has tried decongestant for the symptoms. The treatment provided no relief.       Review of Systems   HENT:  Positive for congestion, rhinorrhea, sinus pain, sneezing and sore throat. Negative for ear pain.    Respiratory:  Positive for cough. Negative for wheezing.    Gastrointestinal:  Negative for abdominal pain and vomiting.   Neurological:  Positive for headaches.         CURRENT MEDICATIONS:  amphetamine-dextroamphetamine  Auvelity Tbcr  clonazePAM Tabs  famotidine Tabs  ibuprofen Tabs  loperamide Caps  mag hydrox-al hydrox-simeth Susp  ondansetron Tbdp  Vraylar Caps  Allergies:     Allergies   Allergen Reactions    Sertraline Diarrhea     diarrhea    Oxycodone Hives and Itching    Tramadol Hives and Itching     Current Problems: Marian Kent does not have any pertinent problems on file.  Past Surgical Hx:    Past Surgical History:   Procedure Laterality Date    [place] LAP,DIAGNOSTIC ABDOMEN N/A 3/18/2024    Procedure: PELVIC EXAM UNDER ANESTHESIA, DIAGNOSTIC AND OPERATIVE LAPAROSCOPY, CAUTERIZATION OF ENDOMETRIOSIS;  Surgeon: Mark Price M.D.;  Location: SURGERY [facility];  Service: Gynecology    [place] LAP,DIAGNOSTIC ABDOMEN N/A 4/20/2023    Procedure: DIAGNOSTIC AND OPERATIVE LAPAROSCOPY, WITH CAUTERIZATION OF ENDOMETRIOSIS OF RIGHT OVARY AND RESECTION OF LEFT OVARIAN  EXCRESCENCE;  Surgeon: Mark Price M.D.;  Location: SURGERY SAME DAY [facility];  Service: Gynecology    [place] LAP,DIAGNOSTIC ABDOMEN  02/17/2022    Procedure: LAPAROSCOPY - DIAGNOSTIC and operative;  Surgeon: Mark Price M.D.;  Location: SURGERY SAME DAY [facility];  Service: Gynecology    [place] LAP,FULGURATE/EXCISE LESIONS Right 02/17/2022    Procedure: cauterization ,ENDOMETRIOSIS lesion right ovary;  Surgeon: Mark Price M.D.;  Location: SURGERY SAME DAY [facility];  Service: Gynecology    [place] LAP,DIAGNOSTIC ABDOMEN  10/22/2020    Procedure: PELVISCOPY - DIAGNOSTIC AND OPERATIVE. LYSIS OF ADHESIONS, CAUTERIZATION OF ENDOMETRIOSIS RIGHT OVARY;  Surgeon: Mark Price M.D.;  Location: SURGERY SAME DAY [facility];  Service: Gynecology    INGUINAL HERNIA REPAIR ROBOTIC Left 03/28/2019    Procedure: INGUINAL HERNIA REPAIR ROBOTIC WITH MESH PLACEMENT  ;  Surgeon: Chris Cooley M.D.;  Location: SURGERY SAME DAY [facility];  Service: General    PELVISCOPY N/A 07/09/2018    Procedure: PELVISCOPY-DIAGNOSTIC;  Surgeon: Mark Price M.D.;  Location: SURGERY SAME DAY [facility];  Service: Gynecology    LAPAROSCOPY  12/09/2017    Procedure: Exploratory laparoscopy;  Surgeon: Mark Price M.D.;  Location: [facility];  Service: Gynecology    LAPAROSCOPIC LYSIS OF ADHESIONS  12/09/2017    Procedure: LAPAROSCOPIC LYSIS OF ADHESIONS;  Surgeon: Mark Price M.D.;  Location: [facility];  Service: Gynecology    CYSTOSCOPY  08/08/2016    Procedure: CYSTOSCOPY;  Surgeon: Mark Price M.D.;  Location: SURGERY SAME DAY [facility];  Service:     HYSTERECTOMY LAPAROSCOPY  08/08/2016    Procedure: HYSTERECTOMY LAPAROSCOPY RIGHT SALPINGECTOMY;  Surgeon: Mark Price M.D.;  Location: SURGERY SAME DAY [facility];  Service:     PELVISCOPY N/A 05/20/2016    Procedure: PELVISCOPY Diagnostic;  Surgeon: Mark Price M.D.;  Location: [facility];  Service:     REPEAT C  "SECTION  2014    Performed by Mark Mariano M.D. at LABOR AND DELIVERY    PELVISCOPY  2011    Performed by MARK MARIANO at SURGERY [facility] ORS    SALPINGECTOMY  2011    Performed by MARK MARIANO at SURGERY [facility] ORS    REPEAT C SECTION  2007    PRIMARY C SECTION  2006    [facility]  DELIVERY SER   &       Past Social Hx:  reports that she has been smoking cigarettes. She started smoking about 8 years ago. She has a 8.1 pack-year smoking history. She has never used smokeless tobacco. She reports that she does not drink alcohol and does not use drugs.    Objective:   /88   Pulse (!) 123   Temp 37.5 °C (99.5 °F) (Temporal)   Resp 20   Ht 1.6 m (5' 3\")   Wt 65.8 kg (145 lb)   LMP 2016   SpO2 96%   BMI 25.69 kg/m²   Physical Exam  Vitals and nursing note reviewed.   Constitutional:       General: She is not in acute distress.     Appearance: Normal appearance. She is normal weight. She is ill-appearing.   HENT:      Head: Normocephalic and atraumatic.      Right Ear: External ear normal. Tenderness present. No swelling. A middle ear effusion is present. Tympanic membrane is bulging. Tympanic membrane is not erythematous.      Left Ear: External ear normal. Tenderness present. No swelling. A middle ear effusion is present. Tympanic membrane is bulging. Tympanic membrane is not erythematous.      Nose: Mucosal edema, congestion and rhinorrhea present. Rhinorrhea is clear.      Right Sinus: No maxillary sinus tenderness or frontal sinus tenderness.      Left Sinus: No maxillary sinus tenderness or frontal sinus tenderness.      Mouth/Throat:      Mouth: Mucous membranes are moist.      Pharynx: Posterior oropharyngeal erythema and postnasal drip present. No oropharyngeal exudate or uvula swelling.   Eyes:      Extraocular Movements: Extraocular movements intact.      Conjunctiva/sclera: Conjunctivae normal.      Pupils: Pupils are equal, " round, and reactive to light.   Cardiovascular:      Rate and Rhythm: Regular rhythm. Tachycardia present.      Pulses: Normal pulses.      Heart sounds: Normal heart sounds.   Pulmonary:      Effort: Pulmonary effort is normal.      Breath sounds: Normal breath sounds. No wheezing.   Abdominal:      Palpations: Abdomen is soft.      Tenderness: There is no abdominal tenderness.   Musculoskeletal:         General: Normal range of motion.      Cervical back: Normal range of motion and neck supple.   Skin:     General: Skin is warm and dry.      Findings: No rash.   Neurological:      General: No focal deficit present.      Mental Status: She is alert and oriented to person, place, and time.   Psychiatric:         Mood and Affect: Mood normal.         Behavior: Behavior normal.       Results for orders placed or performed in visit on 02/03/25   POCT Cepheid Group A Strep - PCR    Collection Time: 02/03/25  9:09 PM   Result Value Ref Range    POC Group A Strep, PCR Invalid Not Detected, Invalid   POCT Cepheid CoV-2, Flu A/B, RSV - PCR    Collection Time: 02/03/25  9:09 PM   Result Value Ref Range    SARS-CoV-2 by PCR Negative Negative, Invalid    Influenza virus A RNA Negative Negative, Invalid    Influenza virus B, PCR Negative Negative, Invalid    RSV, PCR Negative Negative, Invalid     *Note: Due to a large number of results and/or encounters for the requested time period, some results have not been displayed. A complete set of results can be found in Results Review.       Assessment/Plan:   1. Viral syndrome  - benzonatate (TESSALON) 100 MG Cap; Take 1 Capsule by mouth 3 times a day as needed for Cough for up to 15 days.  Dispense: 45 Capsule; Refill: 0  - fluticasone (FLONASE) 50 MCG/ACT nasal spray; Administer 1 Spray into affected nostril(S) every day.  Dispense: 16 g; Refill: 0  - cetirizine (ZYRTEC ALLERGY) 10 MG Tab; Take 1 Tablet by mouth every day.  Dispense: 30 Tablet; Refill: 0  - albuterol 108 (90 Base)  MCG/ACT Aero Soln inhalation aerosol; Inhale 2 Puffs every 6 hours as needed for Shortness of Breath.  Dispense: 8.5 g; Refill: 0  - CULTURE THROAT; Future    2. Sore throat  - POCT Cepheid Group A Strep - PCR  - POCT Cepheid CoV-2, Flu A/B, RSV - PCR    Point-of-care strep and viral ordered.  Symptom management medication sent to pharmacy with instructions for use.  Will contact patient with any positive results.  Point-of-care strep machine has malfunctioned today after running the test 3 times, no result provided.  Will send swab for culture to confirm. Recommend adequate hydration, rest, deep breathing and coughing, ambulation as tolerated, OTC medications.  Recommend Delsym as it lasts 12 hours.  Recommend Coricidin cough and cold if taking hypertension medication. May use Ponaris nasal emollient, saline nasal spray for nasal congestion or to prevent nasal passage dryness or allergies.   Educated patient on how to use MDI.  Discussed expected side effects and purpose of medication. Verbalized understanding.   Red flags, RTC and ER precautions discussed, with patient confirming their understanding of  need for immediate follow-up.  Discussed medication management options, risks and benefits, and alternatives to treatment plan agreed upon. Instructed to continue  medications without changes as ordered by primary care unless aforementioned above.  Patient expresses understanding and agrees to plan of care. All questions or concerns answered. For my MDM, I have personally reviewed previous notes, and test results as pertinent to today's visit.  2109  All point-of-care testing negative.  No change to treatment plan.  Please note that this dictation was created using voice recognition software. I have made every reasonable attempt to correct obvious errors,  but there may be grammar errors, and possibly content that I did not discover before finalizing the note.   This note was electronically signed by Pura Espinoza  MANUEL

## 2025-02-04 NOTE — PATIENT INSTRUCTIONS
Recommend adequate hydration, rest, deep breathing and coughing, ambulation as tolerated, OTC medications.  Recommend Delsym as it lasts 12 hours.  Recommend Coricidin cough and cold if taking hypertension medication. May use Ponaris nasal emollient, saline nasal spray for nasal congestion or to prevent nasal passage dryness or allergies.     Upper Respiratory Infection, Adult  An upper respiratory infection (URI) is a common viral infection of the nose, throat, and upper air passages that lead to the lungs. The most common type of URI is the common cold. URIs usually get better on their own, without medical treatment.  What are the causes?  A URI is caused by a virus. You may catch a virus by:  Breathing in droplets from an infected person's cough or sneeze.  Touching something that has been exposed to the virus (is contaminated) and then touching your mouth, nose, or eyes.  What increases the risk?  You are more likely to get a URI if:  You are very young or very old.  You have close contact with others, such as at work, school, or a health care facility.  You smoke.  You have long-term (chronic) heart or lung disease.  You have a weakened disease-fighting system (immune system).  You have nasal allergies or asthma.  You are experiencing a lot of stress.  You have poor nutrition.  What are the signs or symptoms?  A URI usually involves some of the following symptoms:  Runny or stuffy (congested) nose.  Cough.  Sneezing.  Sore throat.  Headache.  Fatigue.  Fever.  Loss of appetite.  Pain in your forehead, behind your eyes, and over your cheekbones (sinus pain).  Muscle aches.  Redness or irritation of the eyes.  Pressure in the ears or face.  How is this diagnosed?  This condition may be diagnosed based on your medical history and symptoms, and a physical exam. Your health care provider may use a swab to take a mucus sample from your nose (nasal swab). This sample can be tested to determine what virus is causing  the illness.  How is this treated?  URIs usually get better on their own within 7-10 days. Medicines cannot cure URIs, but your health care provider may recommend certain medicines to help relieve symptoms, such as:  Over-the-counter cold medicines.  Cough suppressants. Coughing is a type of defense against infection that helps to clear the respiratory system, so take these medicines only as recommended by your health care provider.  Fever-reducing medicines.  Follow these instructions at home:  Activity  Rest as needed.  If you have a fever, stay home from work or school until your fever is gone or until your health care provider says your URI cannot spread to other people (is no longer contagious). Your health care provider may have you wear a face mask to prevent your infection from spreading.  Relieving symptoms  Gargle with a mixture of salt and water 3-4 times a day or as needed. To make salt water, completely dissolve ½-1 tsp (3-6 g) of salt in 1 cup (237 mL) of warm water.  Use a cool-mist humidifier to add moisture to the air. This can help you breathe more easily.  Eating and drinking    Drink enough fluid to keep your urine pale yellow.  Eat soups and other clear broths.  General instructions    Take over-the-counter and prescription medicines only as told by your health care provider. These include cold medicines, fever reducers, and cough suppressants.  Do not use any products that contain nicotine or tobacco. These products include cigarettes, chewing tobacco, and vaping devices, such as e-cigarettes. If you need help quitting, ask your health care provider.  Stay away from secondhand smoke.  Stay up to date on all immunizations, including the yearly (annual) flu vaccine.  Keep all follow-up visits. This is important.  How to prevent the spread of infection to others  URIs can be contagious. To prevent the infection from spreading:  Wash your hands with soap and water for at least 20 seconds. If soap  and water are not available, use hand .  Avoid touching your mouth, face, eyes, or nose.  Cough or sneeze into a tissue or your sleeve or elbow instead of into your hand or into the air.    Contact a health care provider if:  You are getting worse instead of better.  You have a fever or chills.  Your mucus is brown or red.  You have yellow or brown discharge coming from your nose.  You have pain in your face, especially when you bend forward.  You have swollen neck glands.  You have pain while swallowing.  You have white areas in the back of your throat.  Get help right away if:  You have shortness of breath that gets worse.  You have severe or persistent:  Headache.  Ear pain.  Sinus pain.  Chest pain.  You have chronic lung disease along with any of the following:  Making high-pitched whistling sounds when you breathe, most often when you breathe out (wheezing).  Prolonged cough (more than 14 days).  Coughing up blood.  A change in your usual mucus.  You have a stiff neck.  You have changes in your:  Vision.  Hearing.  Thinking.  Mood.  These symptoms may be an emergency. Get help right away. Call 911.  Do not wait to see if the symptoms will go away.  Do not drive yourself to the hospital.  Summary  An upper respiratory infection (URI) is a common infection of the nose, throat, and upper air passages that lead to the lungs.  A URI is caused by a virus.  URIs usually get better on their own within 7-10 days.  Medicines cannot cure URIs, but your health care provider may recommend certain medicines to help relieve symptoms.  This information is not intended to replace advice given to you by your health care provider. Make sure you discuss any questions you have with your health care provider.  Document Revised: 07/20/2022 Document Reviewed: 07/20/2022  Elsevier Patient Education © 2023 [employer] Inc[employer]

## 2025-02-06 LAB
BACTERIA SPEC RESP CULT: NORMAL
SIGNIFICANT IND 70042: NORMAL
SITE SITE: NORMAL
SOURCE SOURCE: NORMAL

## 2025-02-10 ENCOUNTER — OFFICE VISIT (OUTPATIENT)
Dept: URGENT CARE | Facility: CLINIC | Age: 40
End: 2025-02-10
Payer: MEDICAID

## 2025-02-10 VITALS
DIASTOLIC BLOOD PRESSURE: 72 MMHG | TEMPERATURE: 98.2 F | HEART RATE: 84 BPM | SYSTOLIC BLOOD PRESSURE: 118 MMHG | RESPIRATION RATE: 16 BRPM | HEIGHT: 63 IN | OXYGEN SATURATION: 96 % | WEIGHT: 156 LBS | BODY MASS INDEX: 27.64 KG/M2

## 2025-02-10 DIAGNOSIS — J40 BRONCHITIS: ICD-10-CM

## 2025-02-10 PROCEDURE — 3074F SYST BP LT 130 MM HG: CPT | Performed by: NURSE PRACTITIONER

## 2025-02-10 PROCEDURE — 99214 OFFICE O/P EST MOD 30 MIN: CPT | Performed by: NURSE PRACTITIONER

## 2025-02-10 PROCEDURE — 3078F DIAST BP <80 MM HG: CPT | Performed by: NURSE PRACTITIONER

## 2025-02-10 RX ORDER — DEXTROMETHORPHAN HYDROBROMIDE AND PROMETHAZINE HYDROCHLORIDE 15; 6.25 MG/5ML; MG/5ML
5 SYRUP ORAL EVERY 4 HOURS PRN
Qty: 120 ML | Refills: 0 | Status: SHIPPED | OUTPATIENT
Start: 2025-02-10

## 2025-02-10 RX ORDER — METHYLPREDNISOLONE 4 MG/1
TABLET ORAL
Qty: 21 TABLET | Refills: 0 | Status: SHIPPED | OUTPATIENT
Start: 2025-02-10

## 2025-02-10 ASSESSMENT — FIBROSIS 4 INDEX: FIB4 SCORE: 0.51

## 2025-02-10 NOTE — LETTER
February 10, 2025         Patient: Marian Kent   YOB: 1985   Date of Visit: 2/10/2025           To Whom it May Concern:    Marian Kent was seen in my clinic on 2/10/2025. She may return to work on 2/12/25.    If you have any questions or concerns, please don't hesitate to call.        Sincerely,           YEVGENIY Guidry.  Electronically Signed

## 2025-02-11 ASSESSMENT — ENCOUNTER SYMPTOMS
SORE THROAT: 1
FEVER: 0
NAUSEA: 0
VOMITING: 0
HEADACHES: 1
EYE REDNESS: 0
DIZZINESS: 0
SHORTNESS OF BREATH: 0
COUGH: 1
MYALGIAS: 0
CHILLS: 0
RHINORRHEA: 1

## 2025-02-12 NOTE — PROGRESS NOTES
Subjective:   Marian Kent is a 39 y.o. female who presents for Cough (Getting worse at night, sore throat worse, cough rx isnt helping )      URI   This is a recurrent problem. The current episode started in the past 7 days (Viral testing negative, strep testing negative previously). The problem has been gradually worsening. Associated symptoms include congestion, coughing, headaches, rhinorrhea and a sore throat. Pertinent negatives include no chest pain, dysuria, ear pain, nausea, plugged ear sensation, rash or vomiting. She has tried acetaminophen and sleep for the symptoms. The treatment provided no relief.       Review of Systems   Constitutional:  Negative for chills and fever.   HENT:  Positive for congestion, rhinorrhea and sore throat. Negative for ear pain.    Eyes:  Negative for redness.   Respiratory:  Positive for cough. Negative for shortness of breath.    Cardiovascular:  Negative for chest pain.   Gastrointestinal:  Negative for nausea and vomiting.   Genitourinary:  Negative for dysuria.   Musculoskeletal:  Negative for myalgias.   Skin:  Negative for rash.   Neurological:  Positive for headaches. Negative for dizziness.       Medications:    albuterol Aers  amphetamine-dextroamphetamine  Auvelity Tbcr  benzonatate Caps  cetirizine Tabs  clonazePAM Tabs  fluticasone  ibuprofen Tabs  loperamide Caps  mag hydrox-al hydrox-simeth Susp  methylPREDNISolone Tbpk  ondansetron Tbdp  promethazine-dextromethorphan  Vraylar Caps    Allergies: Sertraline, Oxycodone, and Tramadol    Problem List: Marian Kent does not have any pertinent problems on file.    Surgical History:  Past Surgical History:   Procedure Laterality Date    [place] LAP,DIAGNOSTIC ABDOMEN N/A 3/18/2024    Procedure: PELVIC EXAM UNDER ANESTHESIA, DIAGNOSTIC AND OPERATIVE LAPAROSCOPY, CAUTERIZATION OF ENDOMETRIOSIS;  Surgeon: Mark Price M.D.;  Location: SURGERY [facility];  Service: Gynecology    [place] LAP,DIAGNOSTIC ABDOMEN  N/A 4/20/2023    Procedure: DIAGNOSTIC AND OPERATIVE LAPAROSCOPY, WITH CAUTERIZATION OF ENDOMETRIOSIS OF RIGHT OVARY AND RESECTION OF LEFT OVARIAN EXCRESCENCE;  Surgeon: Mark Price M.D.;  Location: SURGERY SAME DAY [facility];  Service: Gynecology    [place] LAP,DIAGNOSTIC ABDOMEN  02/17/2022    Procedure: LAPAROSCOPY - DIAGNOSTIC and operative;  Surgeon: Mark Price M.D.;  Location: SURGERY SAME DAY [facility];  Service: Gynecology    [place] LAP,FULGURATE/EXCISE LESIONS Right 02/17/2022    Procedure: cauterization ,ENDOMETRIOSIS lesion right ovary;  Surgeon: Mark Price M.D.;  Location: SURGERY SAME DAY [facility];  Service: Gynecology    [place] LAP,DIAGNOSTIC ABDOMEN  10/22/2020    Procedure: PELVISCOPY - DIAGNOSTIC AND OPERATIVE. LYSIS OF ADHESIONS, CAUTERIZATION OF ENDOMETRIOSIS RIGHT OVARY;  Surgeon: Mark Price M.D.;  Location: SURGERY SAME DAY [facility];  Service: Gynecology    INGUINAL HERNIA REPAIR ROBOTIC Left 03/28/2019    Procedure: INGUINAL HERNIA REPAIR ROBOTIC WITH MESH PLACEMENT  ;  Surgeon: Chris Cooley M.D.;  Location: SURGERY SAME DAY [facility];  Service: General    PELVISCOPY N/A 07/09/2018    Procedure: PELVISCOPY-DIAGNOSTIC;  Surgeon: Mark Price M.D.;  Location: SURGERY SAME DAY [facility];  Service: Gynecology    LAPAROSCOPY  12/09/2017    Procedure: Exploratory laparoscopy;  Surgeon: Mark Price M.D.;  Location: [facility];  Service: Gynecology    LAPAROSCOPIC LYSIS OF ADHESIONS  12/09/2017    Procedure: LAPAROSCOPIC LYSIS OF ADHESIONS;  Surgeon: Mark Price M.D.;  Location: [facility];  Service: Gynecology    CYSTOSCOPY  08/08/2016    Procedure: CYSTOSCOPY;  Surgeon: Mark Price M.D.;  Location: SURGERY SAME DAY [facility];  Service:     HYSTERECTOMY LAPAROSCOPY  08/08/2016    Procedure: HYSTERECTOMY LAPAROSCOPY RIGHT SALPINGECTOMY;  Surgeon: Mark Price M.D.;  Location: SURGERY SAME DAY [facility];  Service:     PELVISCOPY N/A  "2016    Procedure: PELVISCOPY Diagnostic;  Surgeon: Mark Mariano M.D.;  Location: SURGERY [facility];  Service:     REPEAT C SECTION  2014    Performed by Mark Mariano M.D. at LABOR AND DELIVERY    PELVISCOPY  2011    Performed by MARK MARIANO at SURGERY [facility]    SALPINGECTOMY  2011    Performed by MARK MARIANO at SURGERY [facility]    REPEAT C SECTION  2007    PRIMARY C SECTION  2006    [facility]  DELIVERY SER   &        Past Social Hx: Marian Kent  reports that she has been smoking cigarettes. She started smoking about 8 years ago. She has a 8.1 pack-year smoking history. She has never used smokeless tobacco. She reports that she does not drink alcohol and does not use drugs.     Past Family Hx:  Marian Kent family history includes Cancer in her paternal grandmother; Cancer (age of onset: 45) in her father; Diabetes in her paternal grandmother; Other in her maternal grandfather.     Problem list, medications, and allergies reviewed by myself today in Epic.     Objective:     /72   Pulse 84   Temp 36.8 °C (98.2 °F) (Temporal)   Resp 16   Ht 1.6 m (5' 3\")   Wt 70.8 kg (156 lb)   LMP 2016   SpO2 96%   BMI 27.63 kg/m²     Physical Exam  Vitals and nursing note reviewed.   Constitutional:       General: She is not in acute distress.     Appearance: She is well-developed.   HENT:      Head: Normocephalic and atraumatic.      Right Ear: Tympanic membrane and external ear normal.      Left Ear: Tympanic membrane and external ear normal.      Nose: Nose normal.      Right Sinus: No maxillary sinus tenderness or frontal sinus tenderness.      Left Sinus: No maxillary sinus tenderness or frontal sinus tenderness.      Mouth/Throat:      Mouth: Mucous membranes are moist.      Pharynx: Uvula midline. No posterior oropharyngeal erythema.      Tonsils: No tonsillar exudate or tonsillar abscesses.   Eyes: "      General:         Right eye: No discharge.         Left eye: No discharge.      Conjunctiva/sclera: Conjunctivae normal.   Cardiovascular:      Rate and Rhythm: Normal rate.   Pulmonary:      Effort: Pulmonary effort is normal. No respiratory distress.      Breath sounds: Normal breath sounds.   Abdominal:      General: There is no distension.   Musculoskeletal:         General: Normal range of motion.   Skin:     General: Skin is warm and dry.   Neurological:      General: No focal deficit present.      Mental Status: She is alert and oriented to person, place, and time. Mental status is at baseline.      Gait: Gait (gait at baseline) normal.   Psychiatric:         Judgment: Judgment normal.         Assessment/Plan:     Diagnosis and associated orders:     1. Bronchitis  methylPREDNISolone (MEDROL DOSEPAK) 4 MG Tablet Therapy Pack    promethazine-dextromethorphan (PROMETHAZINE-DM) 6.25-15 MG/5ML syrup         Comments/MDM:     I personally reviewed prior external notes and prior test results pertinent to today's visit. 6 previous visit viral testing negative, strep testing negative, throat culture negative, did discuss symptoms still likely viral in etiology antibiotics not indicated will treat with steroids  Discussed management options, risks and benefits, and alternatives to treatment plan agreed upon.   Red flags discussed and indications to immediately call 911 or present to the Emergency Department.   Supportive care, differential diagnoses, and indications for immediate follow-up discussed with patient.    Patient expresses understanding and agrees to plan. Patient denies any other questions or concerns.                  Please note that this dictation was created using voice recognition software. I have made a reasonable attempt to correct obvious errors, but I expect that there are errors of grammar and possibly content that I did not discover before finalizing the note.    This note was electronically  signed by Shola OQUENDO.

## 2025-07-14 ENCOUNTER — APPOINTMENT (OUTPATIENT)
Dept: RADIOLOGY | Facility: IMAGING CENTER | Age: 40
End: 2025-07-14
Attending: NURSE PRACTITIONER
Payer: MEDICAID

## 2025-07-14 ENCOUNTER — OFFICE VISIT (OUTPATIENT)
Dept: URGENT CARE | Facility: CLINIC | Age: 40
End: 2025-07-14
Payer: MEDICAID

## 2025-07-14 VITALS
DIASTOLIC BLOOD PRESSURE: 80 MMHG | BODY MASS INDEX: 27.71 KG/M2 | HEART RATE: 67 BPM | OXYGEN SATURATION: 96 % | SYSTOLIC BLOOD PRESSURE: 102 MMHG | HEIGHT: 63 IN | WEIGHT: 156.4 LBS | TEMPERATURE: 98.5 F

## 2025-07-14 DIAGNOSIS — M53.3 COCCYX PAIN: Primary | ICD-10-CM

## 2025-07-14 DIAGNOSIS — S32.2XXA CLOSED FRACTURE OF COCCYX, INITIAL ENCOUNTER (HCC): ICD-10-CM

## 2025-07-14 PROCEDURE — 72220 X-RAY EXAM SACRUM TAILBONE: CPT | Mod: TC | Performed by: RADIOLOGY

## 2025-07-14 PROCEDURE — 3074F SYST BP LT 130 MM HG: CPT | Performed by: NURSE PRACTITIONER

## 2025-07-14 PROCEDURE — 3079F DIAST BP 80-89 MM HG: CPT | Performed by: NURSE PRACTITIONER

## 2025-07-14 PROCEDURE — 99213 OFFICE O/P EST LOW 20 MIN: CPT | Performed by: NURSE PRACTITIONER

## 2025-07-14 RX ORDER — LIDOCAINE 50 MG/G
2 PATCH TOPICAL EVERY 24 HOURS
Qty: 60 PATCH | Refills: 0 | Status: SHIPPED | OUTPATIENT
Start: 2025-07-14 | End: 2025-08-13

## 2025-07-14 RX ORDER — KETOROLAC TROMETHAMINE 15 MG/ML
15 INJECTION, SOLUTION INTRAMUSCULAR; INTRAVENOUS ONCE
Status: COMPLETED | OUTPATIENT
Start: 2025-07-14 | End: 2025-07-14

## 2025-07-14 RX ORDER — NAPROXEN 500 MG/1
500 TABLET ORAL 2 TIMES DAILY WITH MEALS
Qty: 30 TABLET | Refills: 0 | Status: SHIPPED | OUTPATIENT
Start: 2025-07-14 | End: 2025-07-21

## 2025-07-14 RX ADMIN — KETOROLAC TROMETHAMINE 15 MG: 15 INJECTION, SOLUTION INTRAMUSCULAR; INTRAVENOUS at 15:11

## 2025-07-14 ASSESSMENT — FIBROSIS 4 INDEX: FIB4 SCORE: 0.51

## 2025-07-14 NOTE — LETTER
[facility]  [address] [place] [address]     July 14, 2025    Patient: Marian Kent   YOB: 1985   Date of Visit: 7/14/2025       To Whom It May Concern:    Marian Kent was seen and treated in our department on 7/14/2025. Please excuse absence. It's my medical opinion that this patient would benefit from rest and recuperation. May return to work/school/ on 07/17/2025, or sooner if feeling better.      Sincerely,     YEVGENIY Fonseca.

## 2025-07-14 NOTE — PROGRESS NOTES
Patient/parent/guardian has consented to treatment and for use of patient information for treatment and billing purposes.  Subjective:   Marian eKnt  is a 39 y.o. female who presents for Fall (X pt fell down stairs last night straight onto buttocks, lower back and sciatic px, trouble breathing, is unable to push when trying to defecate )       39-year-old female presents with concern for left low back and coccyx pain.  States last night she was walking up the stairs when her foot slipped and she fell on 2-3 stairs.  Pain with BM. No numbness or tingling in lower extremities.  No incontinence of bowel or bladder.  No other injuries.        ROS      CURRENT MEDICATIONS:  albuterol Aers  amphetamine-dextroamphetamine  Auvelity Tbcr  cetirizine Tabs  clonazePAM Tabs  fluticasone  ibuprofen Tabs  loperamide Caps  mag hydrox-al hydrox-simeth Susp  methylPREDNISolone Tbpk  ondansetron Tbdp  promethazine-dextromethorphan  Vraylar Caps  Allergies:   Allergies[1]  Current Problems: Marian Kent does not have any pertinent problems on file.  Past Surgical Hx:    Past Surgical History:   Procedure Laterality Date    [place] LAP,DIAGNOSTIC ABDOMEN N/A 3/18/2024    Procedure: PELVIC EXAM UNDER ANESTHESIA, DIAGNOSTIC AND OPERATIVE LAPAROSCOPY, CAUTERIZATION OF ENDOMETRIOSIS;  Surgeon: Mark Price M.D.;  Location: SURGERY [facility];  Service: Gynecology    [place] LAP,DIAGNOSTIC ABDOMEN N/A 4/20/2023    Procedure: DIAGNOSTIC AND OPERATIVE LAPAROSCOPY, WITH CAUTERIZATION OF ENDOMETRIOSIS OF RIGHT OVARY AND RESECTION OF LEFT OVARIAN EXCRESCENCE;  Surgeon: Mark Price M.D.;  Location: SURGERY SAME DAY [facility];  Service: Gynecology    [place] LAP,DIAGNOSTIC ABDOMEN  02/17/2022    Procedure: LAPAROSCOPY - DIAGNOSTIC and operative;  Surgeon: Mark Price M.D.;  Location: SURGERY SAME DAY [facility];  Service: Gynecology    [place] LAP,FULGURATE/EXCISE LESIONS Right 02/17/2022    Procedure: cauterization ,ENDOMETRIOSIS lesion  right ovary;  Surgeon: Mark Mariano M.D.;  Location: SURGERY SAME DAY [facility];  Service: Gynecology    [place] LAP,DIAGNOSTIC ABDOMEN  10/22/2020    Procedure: PELVISCOPY - DIAGNOSTIC AND OPERATIVE. LYSIS OF ADHESIONS, CAUTERIZATION OF ENDOMETRIOSIS RIGHT OVARY;  Surgeon: Mark Mariano M.D.;  Location: SURGERY SAME DAY [facility];  Service: Gynecology    INGUINAL HERNIA REPAIR ROBOTIC Left 2019    Procedure: INGUINAL HERNIA REPAIR ROBOTIC WITH MESH PLACEMENT  ;  Surgeon: Chris Cooley M.D.;  Location: SURGERY SAME DAY [facility];  Service: General    PELVISCOPY N/A 2018    Procedure: PELVISCOPY-DIAGNOSTIC;  Surgeon: Mark Mariano M.D.;  Location: SURGERY SAME DAY [facility];  Service: Gynecology    LAPAROSCOPY  2017    Procedure: Exploratory laparoscopy;  Surgeon: Mark Mariano M.D.;  Location: SURGERY [facility];  Service: Gynecology    LAPAROSCOPIC LYSIS OF ADHESIONS  2017    Procedure: LAPAROSCOPIC LYSIS OF ADHESIONS;  Surgeon: Mark Mariano M.D.;  Location: SURGERY [facility];  Service: Gynecology    CYSTOSCOPY  2016    Procedure: CYSTOSCOPY;  Surgeon: Mark Mariano M.D.;  Location: SURGERY SAME DAY [facility];  Service:     HYSTERECTOMY LAPAROSCOPY  2016    Procedure: HYSTERECTOMY LAPAROSCOPY RIGHT SALPINGECTOMY;  Surgeon: Mark Mariano M.D.;  Location: SURGERY SAME DAY [facility];  Service:     PELVISCOPY N/A 2016    Procedure: PELVISCOPY Diagnostic;  Surgeon: Mark Mariano M.D.;  Location: SURGERY [facility];  Service:     REPEAT C SECTION  2014    Performed by Mark Mariano M.D. at LABOR AND DELIVERY    PELVISCOPY  2011    Performed by MARK MARIANO at SURGERY [facility]    SALPINGECTOMY  2011    Performed by MARK MARIANO at [facility]    REPEAT C SECTION  2007    PRIMARY C SECTION  2006    [facility]  DELIVERY SER   &       Past Social Hx:  reports that  "she has been smoking cigarettes. She started smoking about 8 years ago. She has a 8.5 pack-year smoking history. She has never used smokeless tobacco. She reports that she does not drink alcohol and does not use drugs.    Objective:   /80   Pulse 67   Temp 36.9 °C (98.5 °F)   Ht 1.6 m (5' 3\")   Wt 70.9 kg (156 lb 6.4 oz)   LMP 01/09/2016   SpO2 96%   BMI 27.71 kg/m²   Physical Exam  Vitals and nursing note reviewed.   Constitutional:       General: She is not in acute distress.     Appearance: Normal appearance. She is not ill-appearing.   HENT:      Head: Normocephalic and atraumatic.      Right Ear: External ear normal.      Left Ear: External ear normal.      Nose: Nose normal.   Eyes:      Extraocular Movements: Extraocular movements intact.      Conjunctiva/sclera: Conjunctivae normal.      Pupils: Pupils are equal, round, and reactive to light.   Cardiovascular:      Rate and Rhythm: Normal rate.   Pulmonary:      Effort: Pulmonary effort is normal.   Musculoskeletal:         General: Normal range of motion.      Cervical back: Full passive range of motion without pain and normal range of motion.        Legs:       Comments: Reported coccyx tenderness.   Skin:     General: Skin is warm and dry.   Neurological:      General: No focal deficit present.      Mental Status: She is alert.       Results for orders placed or performed during the hospital encounter of 02/03/25   CULTURE THROAT    Collection Time: 02/03/25  9:06 PM    Specimen: Throat   Result Value Ref Range    Significant Indicator NEG     Source THRT     Site -     Culture Result       Moderate growth usual upper respiratory antony  No group A beta Streptococcus isolated.       DX-SACRUM AND COCCYX 2+  Result Date: 7/14/2025 7/14/2025 3:18 PM HISTORY/REASON FOR EXAM:  Pain Following Trauma. Fall onto buttocks, coccyx pain. TECHNIQUE/EXAM DESCRIPTION AND NUMBER OF VIEWS:  3 views of the sacrum and coccyx. COMPARISON: CT abdomen and pelvis " 8/4/2024 FINDINGS: Cortical irregularity of the lower sacrum, S5 segment consistent with mildly displaced fracture. Abrupt angulation of the coccyx, chronic. SI joints are symmetric. Visualized pelvis is intact.     Mildly displaced fracture of the lower sacrum.    Assessment/Plan:   1. Coccyx pain  - DX-SACRUM AND COCCYX 2+  - ketorolac (Toradol) 15 MG/ML injection 15 mg  - lidocaine (LIDODERM) 5 % Patch; Place 2 Patches on the skin every 24 hours for 30 days.  Dispense: 60 Patch; Refill: 0  - naproxen (NAPROSYN) 500 MG Tab; Take 1 Tablet by mouth 2 times a day with meals.  Dispense: 30 Tablet; Refill: 0  - Referral to Physical Therapy    2. Closed fracture of coccyx, initial encounter ([facility])  - lidocaine (LIDODERM) 5 % Patch; Place 2 Patches on the skin every 24 hours for 30 days.  Dispense: 60 Patch; Refill: 0  - naproxen (NAPROSYN) 500 MG Tab; Take 1 Tablet by mouth 2 times a day with meals.  Dispense: 30 Tablet; Refill: 0  - Referral to Physical Therapy    Imaging ordered, positive for coccyx fracture. Medicated with Toradol in clinic for pain.  Referral to physical therapy.  Provided Lidoderm patches.  Work note provided as requested.    Recommend Tylenol per package instructions for the next 24 hours.   Heat, ice as needed. Apply for no more than 15 min at a time.   Encouraged topical analgesics such as Biofreeze, IcyHot, or Voltaren gel.     Shared decision-making process utilized.  Clear instructions provided.  Verified patient/parent/guardian understood by having them repeated back to me. Discussed expected length of time for symptom improvement as well as when to return to clinic for reexam.  Reviewed red flags and ER precautions.  Discussed medication management options, risks and benefits, and alternatives to treatment plan agreed upon. Instructed to continue medications without changes as ordered by primary care unless aforementioned above.  Patient expresses understanding and agrees to plan of care.  All questions or concerns answered. For my MDM, I have personally reviewed previous notes, and test results as pertinent to today's visit.    Please note that this dictation was created using voice recognition software. I have made every reasonable attempt to correct obvious errors,  but there may be grammar errors, and possibly content that I did not discover before finalizing the note.   This note was electronically signed by MANUEL Fonseca              [1]   Allergies  Allergen Reactions    Sertraline Diarrhea     diarrhea    Oxycodone Hives and Itching    Tramadol Hives and Itching

## 2025-07-14 NOTE — LETTER
[facility]  [address] [place] [address]     July 14, 2025    Patient: Marian Kent   YOB: 1985   Date of Visit: 7/14/2025       To Whom It May Concern:    Marian Kent was seen and treated in our department on 7/14/2025. Please excuse absence. It's my medical opinion that this patient would benefit from rest and recuperation. May return to work/school/ on 7/17/2025, or sooner if feeling better.        Sincerely,     YEVGENIY Fonseca.

## 2025-07-15 ENCOUNTER — OFFICE VISIT (OUTPATIENT)
Dept: MEDICAL GROUP | Facility: MEDICAL CENTER | Age: 40
End: 2025-07-15
Attending: FAMILY MEDICINE
Payer: MEDICAID

## 2025-07-15 VITALS
DIASTOLIC BLOOD PRESSURE: 60 MMHG | SYSTOLIC BLOOD PRESSURE: 90 MMHG | WEIGHT: 157 LBS | OXYGEN SATURATION: 95 % | BODY MASS INDEX: 27.82 KG/M2 | HEART RATE: 73 BPM | RESPIRATION RATE: 16 BRPM | HEIGHT: 63 IN | TEMPERATURE: 98.6 F

## 2025-07-15 DIAGNOSIS — M53.3 COCCYX PAIN: Primary | ICD-10-CM

## 2025-07-15 DIAGNOSIS — S32.2XXA CLOSED FRACTURE OF COCCYX, INITIAL ENCOUNTER (HCC): ICD-10-CM

## 2025-07-15 PROCEDURE — 3074F SYST BP LT 130 MM HG: CPT | Performed by: FAMILY MEDICINE

## 2025-07-15 PROCEDURE — 99213 OFFICE O/P EST LOW 20 MIN: CPT | Performed by: FAMILY MEDICINE

## 2025-07-15 PROCEDURE — 99214 OFFICE O/P EST MOD 30 MIN: CPT | Performed by: FAMILY MEDICINE

## 2025-07-15 PROCEDURE — 3078F DIAST BP <80 MM HG: CPT | Performed by: FAMILY MEDICINE

## 2025-07-15 RX ORDER — CYCLOBENZAPRINE HCL 10 MG
10 TABLET ORAL 3 TIMES DAILY PRN
Qty: 30 TABLET | Refills: 0 | Status: SHIPPED | OUTPATIENT
Start: 2025-07-15 | End: 2025-07-21

## 2025-07-15 RX ORDER — DICLOFENAC SODIUM 75 MG/1
75 TABLET, DELAYED RELEASE ORAL
Qty: 30 TABLET | Refills: 0 | Status: SHIPPED | OUTPATIENT
Start: 2025-07-15 | End: 2025-07-21

## 2025-07-15 ASSESSMENT — FIBROSIS 4 INDEX: FIB4 SCORE: 0.51

## 2025-07-15 NOTE — Clinical Note
REFERRAL APPROVAL NOTICE         Sent on July 15, 2025                   Marian Kent  [address]  [facility] [address]                   Dear Ms. Kent,    After a careful review of the medical information and benefit coverage, [facility] has processed your referral. See below for additional details.    If applicable, you must be actively enrolled with your insurance for coverage of the authorized service. If you have any questions regarding your coverage, please contact your insurance directly.    REFERRAL INFORMATION   Referral #:  [id number]  Referred-To Department    Referred-By Provider:  Physical Therapy    MANUEL Fonseca   [address]      [address]  [address]  [facility] [address] [phone] [address]  [address]  [facility] [address] [phone]    Referral Start Date:  07/14/2025  Referral End Date:   07/14/2026             SCHEDULING  If you do not already have an appointment, please call [phone] to make an appointment.     MORE INFORMATION  If you do not already have a [employer] account, sign up at: [email or web address]  You can access your medical information, make appointments, see lab results, billing information, and more.  If you have questions regarding this referral, please contact  the [facility] Referrals department at:             [phone]. Monday - Friday 8:00AM - 5:00PM.     Sincerely,    [facility]

## 2025-07-16 NOTE — PROGRESS NOTES
Verbal consent was acquired by the patient to use [employer] ambient listening note generation during this visit.    Subjective   Chief Complaint   Patient presents with    Follow-Up     [facility] follow up patients states broken tail bone   Referral to PT        HPI:     History of Present Illness  The patient presents for a follow-up visit after an urgent care visit on 07/14/2025 due to an accidental fall and a fractured tailbone.    Fractured Tailbone  She reports that her tailbone, which previously protruded, has now retracted. This change is causing discomfort during deep breaths, sitting, and rapid movements in the car. She was prescribed naproxen, but it did not alleviate her pain. She also tried ibuprofen, tramadol, and Toradol, all without success. She received a call this morning requesting a referral for physical therapy. She is considering the use of a muscle relaxer and plans to return to work in 3 days. She is not currently on any medications and has no known allergies. She has used muscle relaxers in the past without any adverse effects.  - Onset: Accidental fall on 07/14/2025.  - Location: Tailbone.  - Character: Discomfort during deep breaths, sitting, and rapid movements in the car.  - Alleviating/Aggravating Factors: Naproxen, ibuprofen, tramadol, and Toradol did not alleviate pain; considering muscle relaxer; referral for physical therapy.  - Timing: Discomfort persists; plans to return to work in 3 days.  - Severity: Pain not alleviated by prescribed medications.      Health Maintenance Due   Topic Date Due    Chickenpox Vaccine (Varicella) (1 of 2 - 13+ 2-dose series) Never done    HPV Vaccines (1 - 3-dose SCDM series) Never done    Pneumococcal Vaccine: 0-49 Years (2 of 2 - PCV) 12/09/2018    IMM DTaP/Tdap/Td Vaccine (7 - Td or Tdap) 05/23/2024    COVID-19 Vaccine (1 - 2024-25 season) Never done       Objective   Social History[1]    Exam:  BP 90/60 (BP Location: Left arm, Patient Position: Sitting,  "BP Cuff Size: Adult)   Pulse 73   Temp 37 °C (98.6 °F) (Temporal)   Resp 16   Ht 1.6 m (5' 3\")   Wt 71.2 kg (157 lb)   LMP 01/09/2016   SpO2 95%   BMI 27.81 kg/m²     Physical Exam  Constitutional: Alert, no distress  Skin: No rashes in visible areas  Eye: Conjunctiva clear, lids normal  Respiratory: Unlabored respiratory effort, no cough  MSK: Normal gait, moves all extremities  Psych: Alert and oriented x3, normal affect and mood    Allergies[2]    [id number] [place], [place] - [address] [place] [facility] [place]  [address]  [place] [address]  Phone: [phone] Fax: [phone]    [facility] [place]  [address]  [place] [address]  Phone: [phone] Fax: [phone]    [id number] - [place], [place] - [address] AT [place]  [address]  [address]  [place] [address]  Phone: [phone] Fax: [phone]    Current Medications[3]    Assessment & Plan    39 y.o. female with the following -   1. Coccyx pain  Referral to Physical Therapy    diclofenac  (VOLTAREN) 75 MG Tablet Delayed Response    cyclobenzaprine (FLEXERIL) 10 MG Tab      2. Closed fracture of coccyx, initial encounter ([facility])  Referral to Physical Therapy    diclofenac  (VOLTAREN) 75 MG Tablet Delayed Response    cyclobenzaprine (FLEXERIL) 10 MG Tab        Assessment & Plan  1. Tailbone fracture: Non-displaced; Acute; pain not well controlled  - Physical therapy referral to [facility]; contact directly to schedule sessions.  - Prescription for diclofenac; reviewed proper use  - Prescription for cyclobenzaprine, up to three times daily as needed; take with food to prevent stomach upset.  - Use a seated cushion or gel donut to alleviate pressure during sitting.  - Naproxen and lidocaine patch prescriptions available for future use; knows not to mix NSAID  - Take muscle relaxer at night initially to monitor effects.  - Reassured patient that no surgical intervention " needed and will heal with time      Return if symptoms worsen or fail to improve.    Please be advised that this document was generated using voice recognition software. While I have made reasonable efforts to correct any obvious errors, there may still be grammatical or content inaccuracies that were not identified or corrected prior to finalization.                [1]   Social History  Tobacco Use    Smoking status: Every Day     Current packs/day: 1.00     Average packs/day: 1 pack/day for 8.5 years (8.5 ttl pk-yrs)     Types: Cigarettes     Start date: 2017    Smokeless tobacco: Never   Vaping Use    Vaping status: Never Used   Substance Use Topics    Alcohol use: No     Alcohol/week: 0.0 oz     Comment: none since10/21    Drug use: Yes     Types: Marijuana   [2]   Allergies  Allergen Reactions    Sertraline Diarrhea     diarrhea    Oxycodone Hives and Itching    Tramadol Hives and Itching   [3]   Current Outpatient Medications   Medication Sig Dispense Refill    diclofenac DR (VOLTAREN) 75 MG Tablet Delayed Response Take 1 Tablet by mouth 2 times daily with meals as needed (pain). 30 Tablet 0    cyclobenzaprine (FLEXERIL) 10 MG Tab Take 1 Tablet by mouth 3 times a day as needed for Muscle Spasms or Moderate Pain. 30 Tablet 0    lidocaine (LIDODERM) 5 % Patch Place 2 Patches on the skin every 24 hours for 30 days. 60 Patch 0    naproxen (NAPROSYN) 500 MG Tab Take 1 Tablet by mouth 2 times a day with meals. 30 Tablet 0     No current facility-administered medications for this visit.

## 2025-07-17 NOTE — Clinical Note
REFERRAL APPROVAL NOTICE         Sent on July 17, 2025                   Marian Kent  [address]  [facility] [address]                   Dear Ms. Kent,    After a careful review of the medical information and benefit coverage, [facility] has processed your referral. See below for additional details.    If applicable, you must be actively enrolled with your insurance for coverage of the authorized service. If you have any questions regarding your coverage, please contact your insurance directly.    REFERRAL INFORMATION   Referral #:  [id number]  Referred-To Department    Referred-By Provider:  Physical Therapy    Ofe Gregory M.D.   [facility] [address]      [address]  [facility] [address]  [phone] [address]  [address]  [facility] [id number] [phone]    Referral Start Date:  07/15/2025  Referral End Date:   07/15/2026             SCHEDULING  If you do not already have an appointment, please call [phone] to make an appointment.     MORE INFORMATION  If you do not already have a [employer] account, sign up at: [email or web address]  You can access your medical information, make appointments, see lab results, billing information, and more.  If you have questions regarding this referral, please contact  the [facility] Referrals department at:             [phone]. Monday - Friday 8:00AM - 5:00PM.     Sincerely,    [facility]

## 2025-07-21 ENCOUNTER — OFFICE VISIT (OUTPATIENT)
Dept: URGENT CARE | Facility: CLINIC | Age: 40
End: 2025-07-21
Payer: MEDICAID

## 2025-07-21 ENCOUNTER — APPOINTMENT (OUTPATIENT)
Dept: URGENT CARE | Facility: CLINIC | Age: 40
End: 2025-07-21

## 2025-07-21 VITALS
HEART RATE: 89 BPM | BODY MASS INDEX: 27.64 KG/M2 | RESPIRATION RATE: 24 BRPM | WEIGHT: 156 LBS | HEIGHT: 63 IN | SYSTOLIC BLOOD PRESSURE: 96 MMHG | DIASTOLIC BLOOD PRESSURE: 76 MMHG | TEMPERATURE: 98.1 F | OXYGEN SATURATION: 98 %

## 2025-07-21 DIAGNOSIS — S39.012A STRAIN OF LUMBAR REGION, INITIAL ENCOUNTER: ICD-10-CM

## 2025-07-21 DIAGNOSIS — S32.2XXA CLOSED FRACTURE OF COCCYX, INITIAL ENCOUNTER (HCC): ICD-10-CM

## 2025-07-21 PROCEDURE — 3074F SYST BP LT 130 MM HG: CPT | Performed by: PHYSICIAN ASSISTANT

## 2025-07-21 PROCEDURE — 99214 OFFICE O/P EST MOD 30 MIN: CPT | Performed by: PHYSICIAN ASSISTANT

## 2025-07-21 PROCEDURE — 3078F DIAST BP <80 MM HG: CPT | Performed by: PHYSICIAN ASSISTANT

## 2025-07-21 RX ORDER — METHYLPREDNISOLONE 4 MG/1
TABLET ORAL
Qty: 21 TABLET | Refills: 0 | Status: SHIPPED | OUTPATIENT
Start: 2025-07-21

## 2025-07-21 RX ORDER — NAPROXEN 500 MG/1
500 TABLET ORAL 2 TIMES DAILY WITH MEALS
Qty: 30 TABLET | Refills: 0 | Status: SHIPPED | OUTPATIENT
Start: 2025-07-21

## 2025-07-21 RX ORDER — METHOCARBAMOL 500 MG/1
500 TABLET, FILM COATED ORAL 3 TIMES DAILY
Qty: 30 TABLET | Refills: 0 | Status: SHIPPED | OUTPATIENT
Start: 2025-07-21

## 2025-07-21 ASSESSMENT — ENCOUNTER SYMPTOMS
PERIANAL NUMBNESS: 0
PARESTHESIAS: 0
PARESIS: 0
ABDOMINAL PAIN: 0
BACK PAIN: 1
SHORTNESS OF BREATH: 1
BOWEL INCONTINENCE: 0
TINGLING: 0
LEG PAIN: 0
FEVER: 0
HEADACHES: 0

## 2025-07-21 ASSESSMENT — FIBROSIS 4 INDEX: FIB4 SCORE: 0.51

## 2025-07-21 NOTE — LETTER
July 22, 2025    To Whom It May Concern:         This is confirmation that Marian Kent attended her scheduled appointment with Steven Stokes P.A.-C. on 7/21/25. She may return to work on 7/25/2025.          If you have any questions please do not hesitate to call me at the phone number listed below.    Sincerely,          Venita Szymanski, Med Ass't  [phone]

## 2025-07-21 NOTE — LETTER
July 21, 2025         Patient: Marian Kent   YOB: 1985   Date of Visit: 7/21/2025           To Whom it May Concern:    Marian Kent was seen in my clinic on 7/21/2025.       If you have any questions or concerns, please don't hesitate to call.        Sincerely,           Steven Stokes P.A.-C.  Electronically Signed

## 2025-07-21 NOTE — PROGRESS NOTES
Subjective     Marian Kent is a very pleasant 39 y.o. female who presents with Shortness of Breath (X Saturday was walking and ran into a tree, pt is having SOB and px from tailbone up into lower back )            All causing a sacrum fracture on 7/14/2025.  He has been seen twice and is currently being treated by her PCP.  However over the weekend she had another fall walking forward into a tree.  She is now having left lumbar pain.  No radicular symptoms.  No bowel or bladder incontinence or saddle anesthesia.    Back Pain  This is a new problem. The current episode started yesterday. The problem occurs constantly. The problem is unchanged. The pain is present in the lumbar spine. The pain does not radiate. Pertinent negatives include no abdominal pain, bladder incontinence, bowel incontinence, chest pain, dysuria, fever, headaches, leg pain, paresis, paresthesias, pelvic pain, perianal numbness or tingling. She has tried NSAIDs for the symptoms.       PMH:  has a past medical history of Anesthesia (02/14/2022), Anxiety (02/19/2019), Bipolar 1 disorder (HCC) (02/14/2022), Bowel habit changes (02/21/2024), Crohn's disease (HCC) (01/17/2023), Dental disorder (02/21/2024), EP (ectopic pregnancy), Gynecological disorder, Hydronephrosis right (01/03/2014), Nausea with vomiting (09/16/2022), Pain (02/14/2022), Pain (03/2019), Pain management, Placenta previa diagnosed with US at [facility] (12/17/2013), PONV (postoperative nausea and vomiting), Psychiatric problem (02/14/2022), and Ulcerative colitis (HCC) (01/17/2023).    She has no past medical history of Addisons disease ([facility]), Adrenal disorder ([facility]), Allergy, Anemia, Blood transfusion, Breath shortness, Clotting disorder ([facility]), Cushings syndrome (HCC), Diabetic neuropathy (HCC), Hyperlipidemia, Meningitis, Migraine, Muscle disorder, OSTEOPOROSIS, Parathyroid disorder ([facility]), Pituitary disease (HCC), Sickle cell disease ([facility]), or Substance abuse ([facility]).  MEDS:  "Current Medications[1]  ALLERGIES: Allergies[2]  SURGHX: Past Surgical History[3]  SOCHX:  reports that she has been smoking cigarettes. She started smoking about 8 years ago. She has a 8.6 pack-year smoking history. She has never used smokeless tobacco. She reports current drug use. Drug: Marijuana. She reports that she does not drink alcohol.  FH: family history includes Cancer in her paternal grandmother; Cancer (age of onset: 45) in her father; Diabetes in her paternal grandmother; Other in her maternal grandfather.      Review of Systems   Constitutional:  Negative for fever.   Respiratory:  Positive for shortness of breath.    Cardiovascular:  Negative for chest pain.   Gastrointestinal:  Negative for abdominal pain and bowel incontinence.   Genitourinary:  Negative for bladder incontinence, dysuria and pelvic pain.   Musculoskeletal:  Positive for back pain.   Neurological:  Negative for tingling, headaches and paresthesias.       Medications, Allergies, and current problem list reviewed today in Epic           Objective     BP 96/76   Pulse 89   Temp 36.7 °C (98.1 °F)   Resp (!) 24   Ht 1.6 m (5' 3\")   Wt 70.8 kg (156 lb)   LMP 01/09/2016   SpO2 98%   BMI 27.63 kg/m²      Physical Exam  Vitals and nursing note reviewed.   Constitutional:       General: She is not in acute distress.     Appearance: Normal appearance. She is well-developed. She is not ill-appearing, toxic-appearing or diaphoretic.   HENT:      Head: Normocephalic and atraumatic.      Right Ear: Hearing and external ear normal.      Left Ear: Hearing and external ear normal.      Nose: Nose normal.   Eyes:      General:         Right eye: No discharge.         Left eye: No discharge.      Conjunctiva/sclera: Conjunctivae normal.   Cardiovascular:      Rate and Rhythm: Normal rate and regular rhythm.      Pulses: Normal pulses.      Heart sounds: Normal heart sounds.   Pulmonary:      Effort: Pulmonary effort is normal. No respiratory " distress.      Breath sounds: Normal breath sounds. No wheezing, rhonchi or rales.   Abdominal:      Tenderness: There is no abdominal tenderness. There is no right CVA tenderness, left CVA tenderness, guarding or rebound.   Musculoskeletal:      Cervical back: Normal range of motion and neck supple.      Lumbar back: Swelling, spasms and tenderness present. No edema, deformity, signs of trauma or bony tenderness. Decreased range of motion. Negative right straight leg raise test and negative left straight leg raise test. No scoliosis.      Right lower leg: No edema.      Left lower leg: No edema.      Comments: Right sided lumbar paraspinal muscular TTP radiating into the SI joint and gluteal region.  Localized edema and spasming noted.  Midline and bony tenderness in the area of fracture of the sacrum.  No lumbar bony tenderness.  No gross deformity, lesions, erythema, or ecchymosis.  Lower extremity strength and DTRs equal.  Gait appropriate.  Forward flexion limited secondary to pain.   Skin:     General: Skin is warm and dry.      Findings: No bruising.      Comments: No other signs of trauma, injury or abuse   Neurological:      General: No focal deficit present.      Mental Status: She is alert and oriented to person, place, and time.   Psychiatric:         Mood and Affect: Mood normal.         Behavior: Behavior normal.         Thought Content: Thought content normal.         Judgment: Judgment normal.                                  Assessment & Plan  Strain of lumbar region, initial encounter  Very pleasant 39-year-old female presenting with left lumbar strain after a fall.  Coincidentally, she fractured her sacrum on 7/14/2020 via another fall.  It appears both injuries are synergistically causing increasing pain and inflammation.  No radicular symptoms or red flag symptoms.  Vitals and exam reassuring.  Previous notes, imaging and ER visit reviewed.  Patient has allergies to pain meds and tramadol.   Therefore, we will change her muscle relaxer as Flexeril was causing side effects.  Will add Medrol Dosepak and high-dose naproxen.  She declines Toradol injection today.  Note for work given.  ER red flag symptoms discussed at length.      Orders:    methocarbamol (ROBAXIN) 500 MG Tab; Take 1 Tablet by mouth 3 times a day.    methylPREDNISolone (MEDROL DOSEPAK) 4 MG Tablet Therapy Pack; Follow schedule on package instructions.    Closed fracture of coccyx, initial encounter (HCC)    Orders:    naproxen (NAPROSYN) 500 MG Tab; Take 1 Tablet by mouth 2 times a day with meals.         I personally reviewed prior external notes and test results pertinent to today's visit. Return to clinic or go to ED if symptoms worsen or persist. Red flag symptoms and indications for ED discussed at length. Patient/Parent/Guardian voices understanding.  AVS with post-visit instructions printed and provided or given verbally.  Follow-up with your primary care provider in 3-5 days. All side effects and potential interactions of prescribed medication discussed including allergic response, GI upset, tendon injury, rash, sedation, OCP effectiveness, etc.    Please note that this dictation was created using voice recognition software. I have made every reasonable attempt to correct obvious errors, but I expect that there are errors of grammar and possibly content that I did not discover before finalizing the note.               [1]   Current Outpatient Medications:     methocarbamol (ROBAXIN) 500 MG Tab, Take 1 Tablet by mouth 3 times a day., Disp: 30 Tablet, Rfl: 0    methylPREDNISolone (MEDROL DOSEPAK) 4 MG Tablet Therapy Pack, Follow schedule on package instructions., Disp: 21 Tablet, Rfl: 0    naproxen (NAPROSYN) 500 MG Tab, Take 1 Tablet by mouth 2 times a day with meals., Disp: 30 Tablet, Rfl: 0    lidocaine (LIDODERM) 5 % Patch, Place 2 Patches on the skin every 24 hours for 30 days., Disp: 60 Patch, Rfl: 0  [2]   Allergies  Allergen  Reactions    Sertraline Diarrhea     diarrhea    Oxycodone Hives and Itching    Tramadol Hives and Itching   [3]   Past Surgical History:  Procedure Laterality Date    SC LAP,DIAGNOSTIC ABDOMEN N/A 3/18/2024    Procedure: PELVIC EXAM UNDER ANESTHESIA, DIAGNOSTIC AND OPERATIVE LAPAROSCOPY, CAUTERIZATION OF ENDOMETRIOSIS;  Surgeon: Mark Price M.D.;  Location: [facility];  Service: Gynecology    SC LAP,DIAGNOSTIC ABDOMEN N/A 4/20/2023    Procedure: DIAGNOSTIC AND OPERATIVE LAPAROSCOPY, WITH CAUTERIZATION OF ENDOMETRIOSIS OF RIGHT OVARY AND RESECTION OF LEFT OVARIAN EXCRESCENCE;  Surgeon: Mark Price M.D.;  Location: SURGERY SAME DAY [facility];  Service: Gynecology    SC LAP,DIAGNOSTIC ABDOMEN  02/17/2022    Procedure: LAPAROSCOPY - DIAGNOSTIC and operative;  Surgeon: Mark Price M.D.;  Location: SURGERY SAME DAY [facility];  Service: Gynecology    SC LAP,FULGURATE/EXCISE LESIONS Right 02/17/2022    Procedure: cauterization ,ENDOMETRIOSIS lesion right ovary;  Surgeon: Mark Price M.D.;  Location: SURGERY SAME DAY [facility];  Service: Gynecology    SC LAP,DIAGNOSTIC ABDOMEN  10/22/2020    Procedure: PELVISCOPY - DIAGNOSTIC AND OPERATIVE. LYSIS OF ADHESIONS, CAUTERIZATION OF ENDOMETRIOSIS RIGHT OVARY;  Surgeon: Mark Price M.D.;  Location: SURGERY SAME DAY [facility];  Service: Gynecology    INGUINAL HERNIA REPAIR ROBOTIC Left 03/28/2019    Procedure: INGUINAL HERNIA REPAIR ROBOTIC WITH MESH PLACEMENT  ;  Surgeon: Crhis Cooley M.D.;  Location: SURGERY SAME DAY [facility];  Service: General    PELVISCOPY N/A 07/09/2018    Procedure: PELVISCOPY-DIAGNOSTIC;  Surgeon: Mark Price M.D.;  Location: SURGERY SAME DAY [facility];  Service: Gynecology    LAPAROSCOPY  12/09/2017    Procedure: Exploratory laparoscopy;  Surgeon: Mark Price M.D.;  Location: [facility];  Service: Gynecology    LAPAROSCOPIC LYSIS OF ADHESIONS  12/09/2017    Procedure: LAPAROSCOPIC LYSIS OF  ADHESIONS;  Surgeon: Mark Mariano M.D.;  Location: SURGERY [facility];  Service: Gynecology    CYSTOSCOPY  2016    Procedure: CYSTOSCOPY;  Surgeon: Mark Mariano M.D.;  Location: SURGERY SAME DAY [facility];  Service:     HYSTERECTOMY LAPAROSCOPY  2016    Procedure: HYSTERECTOMY LAPAROSCOPY RIGHT SALPINGECTOMY;  Surgeon: Mark Mariano M.D.;  Location: SURGERY SAME DAY [facility];  Service:     PELVISCOPY N/A 2016    Procedure: PELVISCOPY Diagnostic;  Surgeon: Mark Mariano M.D.;  Location: SURGERY [facility];  Service:     REPEAT C SECTION  2014    Performed by Mark Mariano M.D. at [facility]    PELVISCOPY  2011    Performed by MARK MARIANO at SURGERY [facility]    SALPINGECTOMY  2011    Performed by MARK MARIANO at [facility]    REPEAT C SECTION  2007    PRIMARY C SECTION  2006    [facility]  DELIVERY SER   &

## 2025-07-21 NOTE — LETTER
July 21, 2025         Patient: Marian Kent   YOB: 1985   Date of Visit: 7/21/2025           To Whom it May Concern:    Marian Kent was seen in my clinic on 7/21/2025. She is excused from work today for follow-up of her fractured tailbone.      If you have any questions or concerns, please don't hesitate to call.        Sincerely,           Steven Stokes P.A.-C.  Electronically Signed

## 2025-07-22 ENCOUNTER — TELEPHONE (OUTPATIENT)
Dept: URGENT CARE | Facility: CLINIC | Age: 40
End: 2025-07-22
Payer: MEDICAID

## 2025-07-22 NOTE — TELEPHONE ENCOUNTER
She called (3x) requesting you extend her note for one more day as she is stating that the medication prescribed is making her sleepy.

## 2025-07-22 NOTE — TELEPHONE ENCOUNTER
Pt called asking for an extension on work note, told her I would sent the provider a message and see if they would approve it.

## 2025-07-25 ENCOUNTER — HOSPITAL ENCOUNTER (OUTPATIENT)
Facility: MEDICAL CENTER | Age: 40
End: 2025-07-25
Attending: FAMILY MEDICINE
Payer: MEDICAID

## 2025-07-25 ENCOUNTER — PATIENT MESSAGE (OUTPATIENT)
Dept: MEDICAL GROUP | Facility: MEDICAL CENTER | Age: 40
End: 2025-07-25

## 2025-07-25 ENCOUNTER — TELEPHONE (OUTPATIENT)
Dept: MEDICAL GROUP | Facility: MEDICAL CENTER | Age: 40
End: 2025-07-25

## 2025-07-25 ENCOUNTER — OFFICE VISIT (OUTPATIENT)
Dept: MEDICAL GROUP | Facility: MEDICAL CENTER | Age: 40
End: 2025-07-25
Attending: FAMILY MEDICINE
Payer: MEDICAID

## 2025-07-25 VITALS
DIASTOLIC BLOOD PRESSURE: 60 MMHG | TEMPERATURE: 99 F | RESPIRATION RATE: 16 BRPM | HEIGHT: 63 IN | WEIGHT: 157.1 LBS | BODY MASS INDEX: 27.84 KG/M2 | SYSTOLIC BLOOD PRESSURE: 92 MMHG | HEART RATE: 99 BPM | OXYGEN SATURATION: 97 %

## 2025-07-25 DIAGNOSIS — R29.898 WEAKNESS OF BOTH LOWER EXTREMITIES: Primary | ICD-10-CM

## 2025-07-25 DIAGNOSIS — R29.898 WEAKNESS OF BOTH LOWER EXTREMITIES: ICD-10-CM

## 2025-07-25 DIAGNOSIS — S32.2XXA CLOSED FRACTURE OF COCCYX, INITIAL ENCOUNTER (HCC): ICD-10-CM

## 2025-07-25 DIAGNOSIS — M53.3 COCCYX PAIN: ICD-10-CM

## 2025-07-25 PROCEDURE — 3078F DIAST BP <80 MM HG: CPT | Performed by: FAMILY MEDICINE

## 2025-07-25 PROCEDURE — 99214 OFFICE O/P EST MOD 30 MIN: CPT | Performed by: FAMILY MEDICINE

## 2025-07-25 PROCEDURE — 80307 DRUG TEST PRSMV CHEM ANLYZR: CPT

## 2025-07-25 PROCEDURE — 3074F SYST BP LT 130 MM HG: CPT | Performed by: FAMILY MEDICINE

## 2025-07-25 PROCEDURE — G0480 DRUG TEST DEF 1-7 CLASSES: HCPCS

## 2025-07-25 PROCEDURE — 99213 OFFICE O/P EST LOW 20 MIN: CPT | Performed by: FAMILY MEDICINE

## 2025-07-25 RX ORDER — OXYCODONE AND ACETAMINOPHEN 5; 325 MG/1; MG/1
1 TABLET ORAL EVERY 12 HOURS PRN
Qty: 14 TABLET | Refills: 0 | Status: SHIPPED | OUTPATIENT
Start: 2025-07-25 | End: 2025-07-25

## 2025-07-25 RX ORDER — ALPRAZOLAM 0.25 MG
TABLET ORAL
Qty: 2 TABLET | Refills: 0 | Status: SHIPPED | OUTPATIENT
Start: 2025-07-25 | End: 2025-08-01

## 2025-07-25 RX ORDER — ALPRAZOLAM 0.25 MG
TABLET ORAL
Qty: 2 TABLET | Refills: 0 | Status: SHIPPED | OUTPATIENT
Start: 2025-07-25 | End: 2025-07-25

## 2025-07-25 RX ORDER — OXYCODONE AND ACETAMINOPHEN 5; 325 MG/1; MG/1
1 TABLET ORAL EVERY 12 HOURS PRN
Qty: 14 TABLET | Refills: 0 | Status: SHIPPED | OUTPATIENT
Start: 2025-07-25 | End: 2025-08-01

## 2025-07-25 ASSESSMENT — FIBROSIS 4 INDEX: FIB4 SCORE: 0.51

## 2025-07-25 ASSESSMENT — PATIENT HEALTH QUESTIONNAIRE - PHQ9: CLINICAL INTERPRETATION OF PHQ2 SCORE: 0

## 2025-07-25 NOTE — LETTER
July 25, 2025    To Whom it May Concern,    Marian Laila Kent was seen in my office on 7/25/25. Please excuse her from work 7/25/25 through 7/28/25 and may return on 7/29/25.    Sincerely,                  Rosey Ceja M.D.

## 2025-07-25 NOTE — PROGRESS NOTES
Verbal consent was acquired by the patient to use [employer] ambient listening note generation during this visit   Subjective:     HPI:   History of Present Illness  The patient presents for evaluation of a broken tailbone. She is accompanied by her daughter.    Broken Tailbone and Associated Pain  Approximately 2 weeks ago, she sustained a tailbone fracture. She has sought medical attention from various doctors and emergency rooms. 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 This activity exacerbates her pain, which radiates from her lower back to her mid-thigh, particularly when walking. The pain intensified after an incident where she tripped over a hole and collided with a tree. Despite trying several medications including naproxen, Flexeril, methocarbamol, Toradol, tramadol, ibuprofen, and Tylenol, she found no relief. However, Percocet provided some relief. She also finds some comfort in using a heating pad. An x-ray was not performed during her ER visit.  - Onset: Approximately 2 weeks ago.  - Location: Lower back to mid-thigh.  - Duration: Pain has worsened since the tree incident, becoming constant rather than intermittent.  - Character: Radiating pain, particularly when walking.  - Alleviating/Aggravating Factors: Exacerbated by lifting heavy items; Percocet and heating pad provide some relief.  - Radiation: Pain radiates from lower back to mid-thigh.  - Timing: Pain intensified after tripping over a hole and colliding with a tree.  - Severity: Severe enough to cause difficulty walking and driving; worsened since the tree incident.    Difficulty with Urination and Bowel Movements  She reports difficulty with urination and bowel movements, often feeling the urge but unable to pass urine or stool. This issue was present during her ER visit on 07/18/2025. She experiences pain while walking and driving, and has not used a donut  "pillow or sacral pillow. She is awaiting a call from physical therapy. She had difficulty sleeping last night due to the pain. She has been researching surgical options for her condition. She attempted to return to work today after missing all of last week and part of the previous week, but found walking too painful. She has increased her naproxen dosage from 3 times to 6 times daily without any benefit. She completed a course of steroids and took Flexeril 2 to 3 times daily, which did not help with sleep.  - Onset: Present during ER visit on 07/18/2025.  - Duration: Ongoing since the tailbone fracture.  - Character: Difficulty with urination and bowel movements, often feeling the urge but unable to pass urine or stool.  - Alleviating/Aggravating Factors: Increased naproxen dosage without benefit; completed a course of steroids and took Flexeril 2 to 3 times daily without help.  - Timing: Pain while walking and driving; difficulty sleeping last night due to pain.  - Severity: Severe enough to cause difficulty walking and driving; worsened since the tree incident.    Social History:  Occupations:   Recreational Drugs: [employer]  Sleep: Reports difficulty sleeping, slept for 2 hours last night      Objective:     Exam:  BP 92/60 (BP Location: Right arm, Patient Position: Sitting, BP Cuff Size: Adult)   Pulse 99   Temp 37.2 °C (99 °F) (Temporal)   Resp 16   Ht 1.6 m (5' 3\")   Wt 71.3 kg (157 lb 1.6 oz)   LMP 01/09/2016   SpO2 97%   BMI 27.83 kg/m²  Body mass index is 27.83 kg/m².    Physical Exam  Neurological: Decreased sensation on the right side compared to the left side in all dermatomes except for S1, Tingling sensation noted on the right foot  Musculoskeletal:   Hip flexion: 4/5 b/l  Knee extension and flexion: 4/5 b/l        Data:   Reviewed last primary care note, ER note, UC note    Assessment & Plan:     1. Weakness of both lower extremities  MR-LUMBAR SPINE-W/O    PAIN MANAGEMENT SCRN, W/ " RFLX TO QNT    Controlled Substance Treatment Agreement    ALPRAZolam (XANAX) 0.25 MG Tab    oxyCODONE-acetaminophen (PERCOCET) 5-325 MG Tab    DISCONTINUED: ALPRAZolam (XANAX) 0.25 MG Tab    DISCONTINUED: oxyCODONE-acetaminophen (PERCOCET) 5-325 MG Tab      2. Coccyx pain  MR-LUMBAR SPINE-W/O    PAIN MANAGEMENT SCRN, W/ RFLX TO QNT    Controlled Substance Treatment Agreement    ALPRAZolam (XANAX) 0.25 MG Tab    oxyCODONE-acetaminophen (PERCOCET) 5-325 MG Tab    DISCONTINUED: ALPRAZolam (XANAX) 0.25 MG Tab    DISCONTINUED: oxyCODONE-acetaminophen (PERCOCET) 5-325 MG Tab    CANCELED: DX-SACRUM AND COCCYX 2+      3. Closed fracture of coccyx, initial encounter (HCC)  MR-LUMBAR SPINE-W/O    PAIN MANAGEMENT SCRN, W/ RFLX TO QNT    Controlled Substance Treatment Agreement    ALPRAZolam (XANAX) 0.25 MG Tab    oxyCODONE-acetaminophen (PERCOCET) 5-325 MG Tab    DISCONTINUED: ALPRAZolam (XANAX) 0.25 MG Tab    DISCONTINUED: oxyCODONE-acetaminophen (PERCOCET) 5-325 MG Tab    CANCELED: DX-SACRUM AND COCCYX 2+          Assessment & Plan  1. Coccydynia: Acute.  - Reports worsening pain radiating to the mid-thigh, difficulty with urination and bowel movements, and significant impact on daily activities, including walking and driving.  - Advised to use a sacral cushion to alleviate pressure on the sacrum and coccyx.  - MRI will be ordered to r/o cauda equina given sx of urinary/bowel retention.   - she has multiple sx of weakness and numbness in the lower extremities, however they are not consistent with any particular etiology - numbness in the R let, pain worse in the L leg, weakness b/l.   - Drug test to be conducted today.  - Prescription for Percocet 5 mg twice daily for one week provided. Advised no further fills will be given  - Instructed to discontinue all THC use.  - Advised to take Xanax 0.25 mg 30 minutes prior to the MRI and not to combine it with Percocet.  - A work note will be provided.    Follow-up  - MRI to be  scheduled urgently.          Return in about 1 month (around 8/25/2025) for routine f/u, hasn' tbeen seen in 2 years.      Please note that this dictation was created using voice recognition software. I have made every reasonable attempt to correct obvious errors, but I expect that there are errors of grammar and possibly content that I did not discover before finalizing the note.         Patient

## 2025-07-27 LAB
AMPHET CTO UR CFM-MCNC: NEGATIVE NG/ML
BARBITURATES CTO UR CFM-MCNC: NEGATIVE NG/ML
BENZODIAZ CTO UR CFM-MCNC: NEGATIVE NG/ML
BUPRENORPHINE UR-MCNC: NEGATIVE NG/ML
CANNABINOIDS CTO UR CFM-MCNC: ABNORMAL NG/ML
CARISOPRODOL UR-MCNC: NEGATIVE NG/ML
COCAINE CTO UR CFM-MCNC: NEGATIVE NG/ML
CREAT UR-MCNC: 106.8 MG/DL (ref 20–400)
DRUG SCREEN COMMENT UR-IMP: ABNORMAL
ETHYL GLUCURONIDE UR QL SCN: NEGATIVE NG/ML
FENTANYL UR-MCNC: NEGATIVE NG/ML
MEPERIDINE CTO UR SCN-MCNC: NEGATIVE NG/ML
METHADONE CTO UR CFM-MCNC: NEGATIVE NG/ML
OPIATES UR QL SCN: NEGATIVE NG/ML
OXYCDOXYM URSCRN 2005102: NEGATIVE NG/ML
PCP CTO UR CFM-MCNC: NEGATIVE NG/ML
PROPOXYPH CTO UR CFM-MCNC: NEGATIVE NG/ML
TAPENTADOL UR-MCNC: NEGATIVE NG/ML
TRAMADOL CTO UR SCN-MCNC: NEGATIVE NG/ML
ZOLPIDEM UR-MCNC: NEGATIVE NG/ML

## 2025-07-28 ENCOUNTER — HOSPITAL ENCOUNTER (OUTPATIENT)
Dept: RADIOLOGY | Facility: MEDICAL CENTER | Age: 40
End: 2025-07-28
Attending: FAMILY MEDICINE
Payer: MEDICAID

## 2025-07-28 ENCOUNTER — TELEPHONE (OUTPATIENT)
Dept: URGENT CARE | Facility: CLINIC | Age: 40
End: 2025-07-28

## 2025-07-28 DIAGNOSIS — R29.898 WEAKNESS OF BOTH LOWER EXTREMITIES: ICD-10-CM

## 2025-07-28 DIAGNOSIS — M53.3 COCCYX PAIN: ICD-10-CM

## 2025-07-28 DIAGNOSIS — S32.2XXA CLOSED FRACTURE OF COCCYX, INITIAL ENCOUNTER (HCC): ICD-10-CM

## 2025-07-28 PROCEDURE — 72148 MRI LUMBAR SPINE W/O DYE: CPT

## 2025-07-29 ENCOUNTER — RESULTS FOLLOW-UP (OUTPATIENT)
Dept: MEDICAL GROUP | Facility: MEDICAL CENTER | Age: 40
End: 2025-07-29
Payer: MEDICAID

## 2025-07-30 LAB — THC UR CFM-MCNC: >500 NG/ML

## 2025-08-01 ENCOUNTER — OFFICE VISIT (OUTPATIENT)
Dept: MEDICAL GROUP | Facility: MEDICAL CENTER | Age: 40
End: 2025-08-01
Attending: INTERNAL MEDICINE
Payer: MEDICAID

## 2025-08-01 ENCOUNTER — TELEPHONE (OUTPATIENT)
Dept: MEDICAL GROUP | Facility: MEDICAL CENTER | Age: 40
End: 2025-08-01

## 2025-08-01 VITALS
TEMPERATURE: 97 F | BODY MASS INDEX: 26.88 KG/M2 | HEART RATE: 72 BPM | WEIGHT: 151.7 LBS | RESPIRATION RATE: 16 BRPM | SYSTOLIC BLOOD PRESSURE: 104 MMHG | OXYGEN SATURATION: 96 % | HEIGHT: 63 IN | DIASTOLIC BLOOD PRESSURE: 62 MMHG

## 2025-08-01 DIAGNOSIS — S32.2XXD CLOSED FRACTURE OF COCCYX WITH ROUTINE HEALING, SUBSEQUENT ENCOUNTER: ICD-10-CM

## 2025-08-01 DIAGNOSIS — R11.0 NAUSEA: Primary | ICD-10-CM

## 2025-08-01 PROCEDURE — 99214 OFFICE O/P EST MOD 30 MIN: CPT | Performed by: INTERNAL MEDICINE

## 2025-08-01 PROCEDURE — 3078F DIAST BP <80 MM HG: CPT | Performed by: INTERNAL MEDICINE

## 2025-08-01 PROCEDURE — 99212 OFFICE O/P EST SF 10 MIN: CPT | Performed by: INTERNAL MEDICINE

## 2025-08-01 PROCEDURE — 3074F SYST BP LT 130 MM HG: CPT | Performed by: INTERNAL MEDICINE

## 2025-08-01 RX ORDER — ONDANSETRON 4 MG/1
4 TABLET, FILM COATED ORAL EVERY 4 HOURS PRN
Qty: 20 TABLET | Refills: 0 | Status: SHIPPED | OUTPATIENT
Start: 2025-08-01

## 2025-08-01 ASSESSMENT — FIBROSIS 4 INDEX: FIB4 SCORE: 0.51

## 2025-08-25 ENCOUNTER — HOSPITAL ENCOUNTER (OUTPATIENT)
Dept: LAB | Facility: MEDICAL CENTER | Age: 40
End: 2025-08-25
Attending: FAMILY MEDICINE
Payer: MEDICAID

## 2025-08-25 ENCOUNTER — OFFICE VISIT (OUTPATIENT)
Dept: MEDICAL GROUP | Facility: MEDICAL CENTER | Age: 40
End: 2025-08-25
Attending: FAMILY MEDICINE
Payer: MEDICAID

## 2025-08-25 VITALS
HEART RATE: 104 BPM | WEIGHT: 156.6 LBS | TEMPERATURE: 97 F | RESPIRATION RATE: 16 BRPM | BODY MASS INDEX: 27.75 KG/M2 | DIASTOLIC BLOOD PRESSURE: 70 MMHG | HEIGHT: 63 IN | OXYGEN SATURATION: 97 % | SYSTOLIC BLOOD PRESSURE: 102 MMHG

## 2025-08-25 DIAGNOSIS — Z11.1 SCREENING-PULMONARY TB: ICD-10-CM

## 2025-08-25 DIAGNOSIS — Z02.1 PHYSICAL EXAM, PRE-EMPLOYMENT: ICD-10-CM

## 2025-08-25 PROCEDURE — 99215 OFFICE O/P EST HI 40 MIN: CPT | Performed by: FAMILY MEDICINE

## 2025-08-25 PROCEDURE — 99213 OFFICE O/P EST LOW 20 MIN: CPT | Performed by: FAMILY MEDICINE

## 2025-08-25 PROCEDURE — 86480 TB TEST CELL IMMUN MEASURE: CPT

## 2025-08-25 PROCEDURE — 36415 COLL VENOUS BLD VENIPUNCTURE: CPT

## 2025-08-25 PROCEDURE — 3074F SYST BP LT 130 MM HG: CPT | Performed by: FAMILY MEDICINE

## 2025-08-25 PROCEDURE — 99212 OFFICE O/P EST SF 10 MIN: CPT | Performed by: FAMILY MEDICINE

## 2025-08-25 PROCEDURE — 3078F DIAST BP <80 MM HG: CPT | Performed by: FAMILY MEDICINE

## 2025-08-25 ASSESSMENT — LIFESTYLE VARIABLES
HOW OFTEN DO YOU HAVE A DRINK CONTAINING ALCOHOL: NEVER
SKIP TO QUESTIONS 9-10: 1
HOW MANY STANDARD DRINKS CONTAINING ALCOHOL DO YOU HAVE ON A TYPICAL DAY: PATIENT DOES NOT DRINK
HOW OFTEN DO YOU HAVE SIX OR MORE DRINKS ON ONE OCCASION: NEVER
AUDIT-C TOTAL SCORE: 0

## 2025-08-25 ASSESSMENT — FIBROSIS 4 INDEX: FIB4 SCORE: 0.51

## 2025-08-27 ENCOUNTER — RESULTS FOLLOW-UP (OUTPATIENT)
Dept: MEDICAL GROUP | Facility: MEDICAL CENTER | Age: 40
End: 2025-08-27
Payer: MEDICAID

## 2025-08-27 LAB
GAMMA INTERFERON BACKGROUND BLD IA-ACNC: 0.04 IU/ML
M TB IFN-G BLD-IMP: NEGATIVE
M TB IFN-G CD4+ BCKGRND COR BLD-ACNC: 0.01 IU/ML
MITOGEN IGNF BCKGRD COR BLD-ACNC: >10 IU/ML
QFT TB2 - NIL TBQ2: 0.01 IU/ML

## (undated) DEVICE — TROCAR STEP 5MM - (3/CA)

## (undated) DEVICE — DRAPE 3 ARM DA VANCI SI - (5EA/CA)

## (undated) DEVICE — CANISTER SUCTION 3000ML MECHANICAL FILTER AUTO SHUTOFF MEDI-VAC NONSTERILE LF DISP  (40EA/CA)

## (undated) DEVICE — ADHESIVE DERMABOND HVD MINI (12EA/BX)

## (undated) DEVICE — SET SUCTION/IRRIGATION WITH DISPOSABLE TIP (6/CA )PART #0250-070-520 IS A SUB

## (undated) DEVICE — SET LEADWIRE 5 LEAD BEDSIDE DISPOSABLE ECG (1SET OF 5/EA)

## (undated) DEVICE — TROCAR STEP 11MM - (3/CA)

## (undated) DEVICE — PROTECTOR ULNA NERVE - (36PR/CA)

## (undated) DEVICE — GOWN WARMING STANDARD FLEX - (30/CA)

## (undated) DEVICE — NEEDLE INSUFFLATION FOR STEP - (12/BX)

## (undated) DEVICE — TUBING CLEARLINK DUO-VENT - C-FLO (48EA/CA)

## (undated) DEVICE — BANDAID X-LARGE 2 X 4 IN LF (50EA/BX)

## (undated) DEVICE — TUBE E-T HI-LO CUFF 7.0MM (10EA/PK)

## (undated) DEVICE — MASK ANESTHESIA ADULT  - (100/CA)

## (undated) DEVICE — MASK OXYGEN VNYL ADLT MED CONC WITH 7 FOOT TUBING  - (50EA/CA)

## (undated) DEVICE — KIT ANESTHESIA W/CIRCUIT & 3/LT BAG W/FILTER (20EA/CA)

## (undated) DEVICE — PACK LAPAROSCOPY - (1/CA)

## (undated) DEVICE — DERMABOND ADVANCED - (12EA/BX)

## (undated) DEVICE — BANDAID SHEER STRIP 3/4 IN (100EA/BX 12BX/CA)

## (undated) DEVICE — TRAY SRGPRP PVP IOD WT PRP - (20/CA)

## (undated) DEVICE — SENSOR SPO2 NEO LNCS ADHESIVE (20/BX) SEE USER NOTES

## (undated) DEVICE — SET TUBING PNEUMOCLEAR HIGH FLOW SMOKE EVACUATION (10EA/BX)

## (undated) DEVICE — SUTURE 4-0 MONOCRYL PLUS PS-2 - 27 INCH (36/BX)

## (undated) DEVICE — ELECTRODE 850 FOAM ADHESIVE - HYDROGEL RADIOTRNSPRNT (50/PK)

## (undated) DEVICE — SYRINGE DISP. 12 CC LL - (100/BX)

## (undated) DEVICE — GLOVE BIOGEL PI INDICATOR SZ 7.5 SURGICAL PF LF -(50/BX 4BX/CA)

## (undated) DEVICE — KIT  I.V. START (100EA/CA)

## (undated) DEVICE — ARMREST CRADLE FOAM - (2PR/PK 12PR/CA)

## (undated) DEVICE — CATHETER IV 20 GA X 1-1/4 ---SURG.& SDS ONLY--- (50EA/BX)

## (undated) DEVICE — COVER LIGHT HANDLE ALC PLUS DISP (18EA/BX)

## (undated) DEVICE — SET EXTENSION WITH 2 PORTS (48EA/CA) ***PART #2C8610 IS A SUBSTITUTE*****

## (undated) DEVICE — SUTURE 0 VICRYL PLUS UR-6 - 27 INCH (36/BX)

## (undated) DEVICE — CANNULA O2 COMFORT SOFT EAR ADULT 7 FT TUBING (50/CA)

## (undated) DEVICE — ELECTRODE DUAL RETURN W/ CORD - (50/PK)

## (undated) DEVICE — PAD SANITARY 11IN MAXI IND WRAPPED  (12EA/PK 24PK/CA)

## (undated) DEVICE — TUBE CONNECTING SUCTION - CLEAR PLASTIC STERILE 72 IN (50EA/CA)

## (undated) DEVICE — CANISTER SUCTION RIGID RED 1500CC (40EA/CA)

## (undated) DEVICE — COVER TIP ENDOWRIST HOT SHEAR - (10EA/BX) DA VINCI

## (undated) DEVICE — SODIUM CHL IRRIGATION 0.9% 1000ML (12EA/CA)

## (undated) DEVICE — SUTURE GENERAL

## (undated) DEVICE — HEAD HOLDER JUNIOR/ADULT

## (undated) DEVICE — GLOVE BIOGEL SZ 6 PF LATEX - (50EA/BX 4BX/CA)

## (undated) DEVICE — GLOVE BIOGEL PI INDICATOR SZ 6.5 SURGICAL PF LF - (50/BX 4BX/CA)

## (undated) DEVICE — SENSOR OXIMETER ADULT SPO2 RD SET (20EA/BX)

## (undated) DEVICE — PENCIL ELECTSURG 10FT BTN SWH - (50/CA)

## (undated) DEVICE — DRESSING TRANSPARENT FILM TEGADERM 2.375 X 2.75"  (100EA/BX)"

## (undated) DEVICE — CHLORAPREP 26 ML APPLICATOR - ORANGE TINT(25/CA)

## (undated) DEVICE — SUCTION INSTRUMENT YANKAUER BULBOUS TIP W/O VENT (50EA/CA)

## (undated) DEVICE — GLOVE BIOGEL INDICATOR SZ 6.5 SURGICAL PF LTX - (50PR/BX 4BX/CA)

## (undated) DEVICE — KIT ROOM DECONTAMINATION

## (undated) DEVICE — Device

## (undated) DEVICE — SLEEVE, VASO, THIGH, MED

## (undated) DEVICE — LACTATED RINGERS INJ 1000 ML - (14EA/CA 60CA/PF)

## (undated) DEVICE — SUTURE 0 PDS CT-2 (36PK/BX)

## (undated) DEVICE — CANNULA W/ SUPPLY TUBING O2 - (50/CA)

## (undated) DEVICE — NEEDLE INSFL 120MM 14GA VRRS - (20/BX)

## (undated) DEVICE — TOWEL STOP TIMEOUT SAFETY FLAG (40EA/CA)

## (undated) DEVICE — ELECTRODE 5MM LHK LAPSCP STERILE DISP- MEGADYNE  (5/CA)

## (undated) DEVICE — GLOVE SZ 7.5 BIOGEL PI MICRO - PF LF (50PR/BX)

## (undated) DEVICE — GLOVE BIOGEL SZ 7.5 SURGICAL PF LTX - (50PR/BX 4BX/CA)

## (undated) DEVICE — GOWN SURGEONS X-LARGE - DISP. (30/CA)

## (undated) DEVICE — TUBING SETDISPOS HIGH FLOW II - (10/BX)

## (undated) DEVICE — SYRINGE SAFETY 3 ML 18 GA X 1 1/2 BLUNT LL (100/BX 8BX/CA)

## (undated) DEVICE — SCISSOR MONOPLR CRV(HOT SHEAR - DA VINCI 10X'S REUSABLE

## (undated) DEVICE — GOWN SURGEONS LARGE - (32/CA)

## (undated) DEVICE — SLEEVE VASO CALF MED - (10PR/CA)

## (undated) DEVICE — GLOVE SZ 7 BIOGEL PI MICRO - PF LF (50PR/BX 4BX/CA)

## (undated) DEVICE — TUBE CONNECT SUCTION CLEAR 120 X 1/4" (50EA/CA)"

## (undated) DEVICE — SYRINGE NON SAFETY 3 CC 21 GA X 1 1/2 IN (100/BX 8BX/CA)

## (undated) DEVICE — ROBOTIC SURGERY SERVICES

## (undated) DEVICE — TRAY FOLEY CATHETER STATLOCK 16FR SURESTEP  (10EA/CA)

## (undated) DEVICE — SUTURE 4-0 VICRYL PLUS FS-2 - 27 INCH (36/BX)

## (undated) DEVICE — MANIFOLD NEPTUNE 1 PORT (20/PK)

## (undated) DEVICE — WATER IRRIG. STER. 1500 ML - (9/CA)

## (undated) DEVICE — WATER IRRIGATION STERILE 1000ML (12EA/CA)

## (undated) DEVICE — TRAY CATHETER FOLEY URINE METER W/STATLOCK 350ML (10EA/CA)

## (undated) DEVICE — OBTURATOR 8MM BLADELESS - (24EA/BX) DA VINCI

## (undated) DEVICE — SYSTEM CLEARIFY VISUALIZATION (10EA/PK)

## (undated) DEVICE — TUBE NG SALEM SUMP 16FR (50EA/CA)

## (undated) DEVICE — SUTURE 0 COATED VICRYL 6-18IN - (12PK/BX)

## (undated) DEVICE — GLOVE BIOGEL INDICATOR SZ 8 SURGICAL PF LTX - (50/BX 4BX/CA)

## (undated) DEVICE — GLOVE BIOGEL PI INDICATOR SZ 6.0 SURGICAL PF LF -(200PR/CA)

## (undated) DEVICE — PACK TRENGUARD 450 PROCEDURE (12EA/CA)

## (undated) DEVICE — SPONGE GAUZESTER. 2X2 4-PL - (2/PK 50PK/BX 30BX/CS)

## (undated) DEVICE — ENDOWRIST PRO GRASP - DA VINCI 10X'S REUSABLE

## (undated) DEVICE — GLOVE BIOGEL ECLIPSE  PF LATEX SIZE 6.5 (50PR/BX)

## (undated) DEVICE — TROCAR 12 X 150 KII FIOS Z THREAD (6EA/BX)

## (undated) DEVICE — GLOVE BIOGEL SZ 8 SURGICAL PF LTX - (50PR/BX 4BX/CA)

## (undated) DEVICE — SODIUM CHL. INJ. 0.9% 500ML (24EA/CA 50CA/PF)

## (undated) DEVICE — GLOVE BIOGEL SZ 6.5 SURGICAL PF LTX (50PR/BX 4BX/CA)

## (undated) DEVICE — GELPORT 120MM - (1/EA)

## (undated) DEVICE — NEEDLE DRIVER SUTURE CUT - DA VINCI 10X'S REUSABLE

## (undated) DEVICE — TUBE E-T HI-LO CUFF 6.5MM (10EA/BX)

## (undated) DEVICE — NEPTUNE 4 PORT MANIFOLD - (20/PK)

## (undated) DEVICE — GLOVE BIOGEL ECLIPSE PF LATEX SIZE 7.5